# Patient Record
Sex: FEMALE | Race: WHITE | Employment: OTHER | URBAN - METROPOLITAN AREA
[De-identification: names, ages, dates, MRNs, and addresses within clinical notes are randomized per-mention and may not be internally consistent; named-entity substitution may affect disease eponyms.]

---

## 2017-05-18 ENCOUNTER — HOSPITAL ENCOUNTER (OUTPATIENT)
Age: 67
Discharge: HOME | End: 2017-05-18
Payer: MEDICARE

## 2017-05-18 ENCOUNTER — HOSPITAL ENCOUNTER (OUTPATIENT)
Dept: GENERAL RADIOLOGY | Age: 67
Discharge: HOME | End: 2017-05-18
Payer: MEDICARE

## 2017-05-18 DIAGNOSIS — R60.9 SWELLING: ICD-10-CM

## 2017-05-18 PROCEDURE — 71020 XR CHEST STANDARD TWO VW: CPT

## 2017-10-09 ENCOUNTER — HOSPITAL ENCOUNTER (OUTPATIENT)
Age: 67
Discharge: HOME | End: 2017-10-09
Payer: MEDICARE

## 2017-10-09 LAB
ALBUMIN SERPL-MCNC: 3.7 G/DL (ref 3.5–5.2)
ALBUMIN/GLOBULIN RATIO: NORMAL (ref 1–2.5)
ALP BLD-CCNC: 94 U/L (ref 35–104)
ALT SERPL-CCNC: 17 U/L (ref 5–33)
ANION GAP SERPL CALCULATED.3IONS-SCNC: 10 MMOL/L (ref 9–17)
AST SERPL-CCNC: 18 U/L
BILIRUB SERPL-MCNC: 0.68 MG/DL (ref 0.3–1.2)
BUN BLDV-MCNC: 9 MG/DL (ref 8–23)
BUN/CREAT BLD: 13 (ref 9–20)
CALCIUM SERPL-MCNC: 8.9 MG/DL (ref 8.6–10.4)
CHLORIDE BLD-SCNC: 104 MMOL/L (ref 98–107)
CHOLESTEROL, FASTING: 117 MG/DL
CHOLESTEROL/HDL RATIO: 2.5
CO2: 27 MMOL/L (ref 20–31)
CREAT SERPL-MCNC: 0.7 MG/DL (ref 0.5–0.9)
GFR AFRICAN AMERICAN: >60 ML/MIN
GFR NON-AFRICAN AMERICAN: >60 ML/MIN
GFR SERPL CREATININE-BSD FRML MDRD: NORMAL ML/MIN/{1.73_M2}
GFR SERPL CREATININE-BSD FRML MDRD: NORMAL ML/MIN/{1.73_M2}
GLUCOSE FASTING: 91 MG/DL (ref 70–99)
HDLC SERPL-MCNC: 47 MG/DL
LDL CHOLESTEROL: 47 MG/DL (ref 0–130)
POTASSIUM SERPL-SCNC: 4.6 MMOL/L (ref 3.7–5.3)
SODIUM BLD-SCNC: 141 MMOL/L (ref 135–144)
TOTAL CK: 98 U/L (ref 26–192)
TOTAL PROTEIN: 6.9 G/DL (ref 6.4–8.3)
TRIGLYCERIDE, FASTING: 114 MG/DL
VLDLC SERPL CALC-MCNC: NORMAL MG/DL (ref 1–30)

## 2017-10-09 PROCEDURE — 36415 COLL VENOUS BLD VENIPUNCTURE: CPT

## 2017-10-09 PROCEDURE — 80053 COMPREHEN METABOLIC PANEL: CPT

## 2017-10-09 PROCEDURE — 82550 ASSAY OF CK (CPK): CPT

## 2017-10-09 PROCEDURE — 80061 LIPID PANEL: CPT

## 2018-05-15 ENCOUNTER — HOSPITAL ENCOUNTER (OUTPATIENT)
Dept: GENERAL RADIOLOGY | Facility: CLINIC | Age: 68
Discharge: HOME | End: 2018-05-17
Payer: MEDICARE

## 2018-05-15 ENCOUNTER — HOSPITAL ENCOUNTER (OUTPATIENT)
Facility: CLINIC | Age: 68
Discharge: HOME | End: 2018-05-17
Payer: MEDICARE

## 2018-05-15 DIAGNOSIS — M54.41 LOW BACK PAIN WITH BILATERAL SCIATICA, UNSPECIFIED BACK PAIN LATERALITY, UNSPECIFIED CHRONICITY: ICD-10-CM

## 2018-05-15 DIAGNOSIS — M54.42 LOW BACK PAIN WITH BILATERAL SCIATICA, UNSPECIFIED BACK PAIN LATERALITY, UNSPECIFIED CHRONICITY: ICD-10-CM

## 2018-05-15 DIAGNOSIS — Z72.0 TOBACCO ABUSE: ICD-10-CM

## 2018-05-15 DIAGNOSIS — M54.41 ACUTE RIGHT-SIDED LOW BACK PAIN WITH RIGHT-SIDED SCIATICA: ICD-10-CM

## 2018-05-15 PROCEDURE — 71046 X-RAY EXAM CHEST 2 VIEWS: CPT

## 2018-05-15 PROCEDURE — 72100 X-RAY EXAM L-S SPINE 2/3 VWS: CPT

## 2018-07-02 ENCOUNTER — HOSPITAL ENCOUNTER (OUTPATIENT)
Age: 68
Discharge: HOME | End: 2018-07-02
Payer: MEDICARE

## 2018-07-02 LAB
ALBUMIN SERPL-MCNC: 3.8 G/DL (ref 3.5–5.2)
ALBUMIN/GLOBULIN RATIO: NORMAL (ref 1–2.5)
ALP BLD-CCNC: 103 U/L (ref 35–104)
ALT SERPL-CCNC: 13 U/L (ref 5–33)
ANION GAP SERPL CALCULATED.3IONS-SCNC: 10 MMOL/L (ref 9–17)
AST SERPL-CCNC: 17 U/L
BILIRUB SERPL-MCNC: 0.57 MG/DL (ref 0.3–1.2)
BUN BLDV-MCNC: 10 MG/DL (ref 8–23)
BUN/CREAT BLD: 15 (ref 9–20)
CALCIUM SERPL-MCNC: 9.1 MG/DL (ref 8.6–10.4)
CHLORIDE BLD-SCNC: 103 MMOL/L (ref 98–107)
CHOLESTEROL, FASTING: 132 MG/DL
CHOLESTEROL/HDL RATIO: 2.9
CO2: 28 MMOL/L (ref 20–31)
CREAT SERPL-MCNC: 0.65 MG/DL (ref 0.5–0.9)
GFR AFRICAN AMERICAN: >60 ML/MIN
GFR NON-AFRICAN AMERICAN: >60 ML/MIN
GFR SERPL CREATININE-BSD FRML MDRD: NORMAL ML/MIN/{1.73_M2}
GFR SERPL CREATININE-BSD FRML MDRD: NORMAL ML/MIN/{1.73_M2}
GLUCOSE FASTING: 90 MG/DL (ref 70–99)
HDLC SERPL-MCNC: 46 MG/DL
LDL CHOLESTEROL: 71 MG/DL (ref 0–130)
POTASSIUM SERPL-SCNC: 4.8 MMOL/L (ref 3.7–5.3)
SODIUM BLD-SCNC: 141 MMOL/L (ref 135–144)
TOTAL CK: 147 U/L (ref 26–192)
TOTAL PROTEIN: 6.7 G/DL (ref 6.4–8.3)
TRIGLYCERIDE, FASTING: 74 MG/DL
VLDLC SERPL CALC-MCNC: NORMAL MG/DL (ref 1–30)

## 2018-07-02 PROCEDURE — 82550 ASSAY OF CK (CPK): CPT

## 2018-07-02 PROCEDURE — 80053 COMPREHEN METABOLIC PANEL: CPT

## 2018-07-02 PROCEDURE — 36415 COLL VENOUS BLD VENIPUNCTURE: CPT

## 2018-07-02 PROCEDURE — 80061 LIPID PANEL: CPT

## 2018-12-04 ENCOUNTER — HOSPITAL ENCOUNTER (OUTPATIENT)
Age: 68
Discharge: HOME | End: 2018-12-04
Payer: MEDICARE

## 2018-12-04 LAB
ALBUMIN SERPL-MCNC: 4 G/DL (ref 3.5–5.2)
ALBUMIN/GLOBULIN RATIO: ABNORMAL (ref 1–2.5)
ALP BLD-CCNC: 98 U/L (ref 35–104)
ALT SERPL-CCNC: 35 U/L (ref 5–33)
ANION GAP SERPL CALCULATED.3IONS-SCNC: 11 MMOL/L
AST SERPL-CCNC: 30 U/L
BILIRUB SERPL-MCNC: 0.67 MG/DL (ref 0.3–1.2)
BUN BLDV-MCNC: 14 MG/DL (ref 8–23)
BUN/CREAT BLD: ABNORMAL (ref 9–20)
CALCIUM SERPL-MCNC: 9 MG/DL (ref 8.6–10.4)
CHLORIDE BLD-SCNC: 106 MMOL/L (ref 98–107)
CHOLESTEROL, FASTING: 199 MG/DL
CHOLESTEROL/HDL RATIO: 3.2
CO2: 26 MMOL/L (ref 20–31)
CREAT SERPL-MCNC: 0.61 MG/DL (ref 0.5–0.9)
GFR AFRICAN AMERICAN: >60 ML/MIN
GFR NON-AFRICAN AMERICAN: >60 ML/MIN
GFR SERPL CREATININE-BSD FRML MDRD: ABNORMAL ML/MIN/{1.73_M2}
GFR SERPL CREATININE-BSD FRML MDRD: ABNORMAL ML/MIN/{1.73_M2}
GLUCOSE BLD-MCNC: 91 MG/DL (ref 70–99)
HDLC SERPL-MCNC: 63 MG/DL
LDL CHOLESTEROL: 125 MG/DL (ref 0–130)
POTASSIUM SERPL-SCNC: 4.5 MMOL/L (ref 3.7–5.3)
SODIUM BLD-SCNC: 143 MMOL/L (ref 135–144)
TOTAL CK: 115 U/L (ref 26–192)
TOTAL PROTEIN: 6.9 G/DL (ref 6.4–8.3)
TRIGLYCERIDE, FASTING: 53 MG/DL
VLDLC SERPL CALC-MCNC: NORMAL MG/DL (ref 1–30)

## 2018-12-04 PROCEDURE — 80061 LIPID PANEL: CPT

## 2018-12-04 PROCEDURE — 80053 COMPREHEN METABOLIC PANEL: CPT

## 2018-12-04 PROCEDURE — 82550 ASSAY OF CK (CPK): CPT

## 2018-12-04 PROCEDURE — 36415 COLL VENOUS BLD VENIPUNCTURE: CPT

## 2019-03-18 ENCOUNTER — HOSPITAL ENCOUNTER (OUTPATIENT)
Age: 69
Discharge: HOME | End: 2019-03-18
Payer: MEDICARE

## 2019-03-18 LAB
ALBUMIN SERPL-MCNC: 4 G/DL (ref 3.5–5.2)
ALBUMIN/GLOBULIN RATIO: ABNORMAL (ref 1–2.5)
ALP BLD-CCNC: 92 U/L (ref 35–104)
ALT SERPL-CCNC: 18 U/L (ref 5–33)
ANION GAP SERPL CALCULATED.3IONS-SCNC: 8 MMOL/L (ref 9–17)
AST SERPL-CCNC: 19 U/L
BILIRUB SERPL-MCNC: 0.48 MG/DL (ref 0.3–1.2)
BUN BLDV-MCNC: 14 MG/DL (ref 8–23)
BUN/CREAT BLD: ABNORMAL (ref 9–20)
CALCIUM SERPL-MCNC: 9.1 MG/DL (ref 8.6–10.4)
CHLORIDE BLD-SCNC: 106 MMOL/L (ref 98–107)
CHOLESTEROL/HDL RATIO: 2.5
CHOLESTEROL: 140 MG/DL
CO2: 29 MMOL/L (ref 20–31)
CREAT SERPL-MCNC: 0.64 MG/DL (ref 0.5–0.9)
GFR AFRICAN AMERICAN: >60 ML/MIN
GFR NON-AFRICAN AMERICAN: >60 ML/MIN
GFR SERPL CREATININE-BSD FRML MDRD: ABNORMAL ML/MIN/{1.73_M2}
GFR SERPL CREATININE-BSD FRML MDRD: ABNORMAL ML/MIN/{1.73_M2}
GLUCOSE BLD-MCNC: 94 MG/DL (ref 70–99)
HDLC SERPL-MCNC: 55 MG/DL
LDL CHOLESTEROL: 71 MG/DL (ref 0–130)
POTASSIUM SERPL-SCNC: 4.6 MMOL/L (ref 3.7–5.3)
SODIUM BLD-SCNC: 143 MMOL/L (ref 135–144)
TOTAL CK: 139 U/L (ref 26–192)
TOTAL PROTEIN: 6.8 G/DL (ref 6.4–8.3)
TRIGL SERPL-MCNC: 70 MG/DL
VLDLC SERPL CALC-MCNC: NORMAL MG/DL (ref 1–30)

## 2019-03-18 PROCEDURE — 80053 COMPREHEN METABOLIC PANEL: CPT

## 2019-03-18 PROCEDURE — 36415 COLL VENOUS BLD VENIPUNCTURE: CPT

## 2019-03-18 PROCEDURE — 80061 LIPID PANEL: CPT

## 2019-03-18 PROCEDURE — 82550 ASSAY OF CK (CPK): CPT

## 2019-11-05 ENCOUNTER — HOSPITAL ENCOUNTER (OUTPATIENT)
Age: 69
Discharge: HOME | End: 2019-11-05
Payer: MEDICARE

## 2019-11-05 LAB
ALT SERPL-CCNC: 15 U/L (ref 5–33)
AST SERPL-CCNC: 17 U/L
CHOLESTEROL/HDL RATIO: 2.5
CHOLESTEROL: 114 MG/DL
HDLC SERPL-MCNC: 45 MG/DL
LDL CHOLESTEROL: 55 MG/DL (ref 0–130)
TOTAL CK: 101 U/L (ref 26–192)
TRIGL SERPL-MCNC: 69 MG/DL
VLDLC SERPL CALC-MCNC: NORMAL MG/DL (ref 1–30)

## 2019-11-05 PROCEDURE — 36415 COLL VENOUS BLD VENIPUNCTURE: CPT

## 2019-11-05 PROCEDURE — 80061 LIPID PANEL: CPT

## 2019-11-05 PROCEDURE — 82550 ASSAY OF CK (CPK): CPT

## 2019-11-05 PROCEDURE — 84460 ALANINE AMINO (ALT) (SGPT): CPT

## 2019-11-05 PROCEDURE — 84450 TRANSFERASE (AST) (SGOT): CPT

## 2020-10-21 ENCOUNTER — HOSPITAL ENCOUNTER (OUTPATIENT)
Age: 70
Discharge: HOME | End: 2020-10-21
Payer: MEDICARE

## 2020-10-21 LAB
ALBUMIN SERPL-MCNC: 3.9 G/DL (ref 3.5–5.2)
ALBUMIN/GLOBULIN RATIO: ABNORMAL (ref 1–2.5)
ALP BLD-CCNC: 84 U/L (ref 35–104)
ALT SERPL-CCNC: 15 U/L (ref 5–33)
ANION GAP SERPL CALCULATED.3IONS-SCNC: 10 MMOL/L (ref 9–17)
AST SERPL-CCNC: 18 U/L
BILIRUB SERPL-MCNC: 0.41 MG/DL (ref 0.3–1.2)
BUN BLDV-MCNC: 15 MG/DL (ref 8–23)
BUN/CREAT BLD: ABNORMAL (ref 9–20)
CALCIUM SERPL-MCNC: 9.3 MG/DL (ref 8.6–10.4)
CHLORIDE BLD-SCNC: 108 MMOL/L (ref 98–107)
CHOLESTEROL/HDL RATIO: 2.6
CHOLESTEROL: 124 MG/DL
CO2: 24 MMOL/L (ref 20–31)
CREAT SERPL-MCNC: 0.67 MG/DL (ref 0.5–0.9)
GFR AFRICAN AMERICAN: >60 ML/MIN
GFR NON-AFRICAN AMERICAN: >60 ML/MIN
GFR SERPL CREATININE-BSD FRML MDRD: ABNORMAL ML/MIN/{1.73_M2}
GFR SERPL CREATININE-BSD FRML MDRD: ABNORMAL ML/MIN/{1.73_M2}
GLUCOSE BLD-MCNC: 103 MG/DL (ref 70–99)
HDLC SERPL-MCNC: 48 MG/DL
LDL CHOLESTEROL: 62 MG/DL (ref 0–130)
POTASSIUM SERPL-SCNC: 4.4 MMOL/L (ref 3.7–5.3)
SODIUM BLD-SCNC: 142 MMOL/L (ref 135–144)
TOTAL CK: 90 U/L (ref 26–192)
TOTAL PROTEIN: 6.8 G/DL (ref 6.4–8.3)
TRIGL SERPL-MCNC: 70 MG/DL
VLDLC SERPL CALC-MCNC: NORMAL MG/DL (ref 1–30)

## 2020-10-21 PROCEDURE — 80061 LIPID PANEL: CPT

## 2020-10-21 PROCEDURE — 36415 COLL VENOUS BLD VENIPUNCTURE: CPT

## 2020-10-21 PROCEDURE — 82550 ASSAY OF CK (CPK): CPT

## 2020-10-21 PROCEDURE — 80053 COMPREHEN METABOLIC PANEL: CPT

## 2021-05-26 ENCOUNTER — HOSPITAL ENCOUNTER (OUTPATIENT)
Dept: WOMENS IMAGING | Age: 71
Discharge: HOME | End: 2021-05-28
Payer: MEDICARE

## 2021-05-26 VITALS — HEIGHT: 66 IN | BODY MASS INDEX: 26.03 KG/M2 | WEIGHT: 162 LBS

## 2021-05-26 DIAGNOSIS — Z13.820 SCREENING FOR OSTEOPOROSIS: ICD-10-CM

## 2021-05-26 DIAGNOSIS — Z12.31 SCREENING MAMMOGRAM, ENCOUNTER FOR: ICD-10-CM

## 2021-05-26 DIAGNOSIS — Z78.0 MENOPAUSE: ICD-10-CM

## 2021-05-26 PROCEDURE — 77063 BREAST TOMOSYNTHESIS BI: CPT

## 2021-05-26 PROCEDURE — 77080 DXA BONE DENSITY AXIAL: CPT

## 2022-02-01 ENCOUNTER — TELEPHONE (OUTPATIENT)
Dept: GASTROENTEROLOGY | Age: 72
End: 2022-02-01

## 2022-02-01 NOTE — TELEPHONE ENCOUNTER
1st attempt; called and LVM for patient regarding colon screen referral.    2nd attempt; mailed colon screen letter to patient's home address.

## 2022-02-10 DIAGNOSIS — Z86.010 HISTORY OF COLON POLYPS: Primary | ICD-10-CM

## 2022-02-10 RX ORDER — SODIUM, POTASSIUM,MAG SULFATES 17.5-3.13G
SOLUTION, RECONSTITUTED, ORAL ORAL
Qty: 1 EACH | Refills: 0 | Status: ON HOLD | OUTPATIENT
Start: 2022-02-10 | End: 2022-03-24 | Stop reason: ALTCHOICE

## 2022-02-10 NOTE — TELEPHONE ENCOUNTER
Pt called in today wanting to sched GI referral.  After going through colon screen questionnaire w/ pt, she is able to sched colon proc. Pt is unable to remember who or where she had colon done, but she states it was about 5 yrs ago and she had polyps removed. Pt is sched w/ [place] at [facility] Fri 3/18/22 @ 10:30am proc time, 8:30am arrival time. Suprep order will be sent to [facility] on [address]. Bowel prep instructions given to pt over the phone and hard copy mailed to pt's home address. Pt instructed she will need a  and to bring proof of Covid-19 vaccs. PAT phone call is sched 3/11/22 @ 9:15am.  F/u appt sched [facility] 4/14/22 @ 9:45am w/ Ginna Fry. Pt voices her understanding. Appt reminder also mailed w/ prep instructions.

## 2022-02-14 ENCOUNTER — HOSPITAL ENCOUNTER (OUTPATIENT)
Age: 72
Discharge: HOME | End: 2022-02-14
Payer: MEDICARE

## 2022-02-14 LAB
ALBUMIN SERPL-MCNC: 4.1 G/DL (ref 3.5–5.2)
ALBUMIN/GLOBULIN RATIO: NORMAL (ref 1–2.5)
ALP BLD-CCNC: 77 U/L (ref 35–104)
ALT SERPL-CCNC: 15 U/L (ref 5–33)
ANION GAP SERPL CALCULATED.3IONS-SCNC: 9 MMOL/L (ref 9–17)
AST SERPL-CCNC: 17 U/L
BILIRUB SERPL-MCNC: 0.55 MG/DL (ref 0.3–1.2)
BUN BLDV-MCNC: 18 MG/DL (ref 8–23)
BUN/CREAT BLD: NORMAL (ref 9–20)
CALCIUM SERPL-MCNC: 9.2 MG/DL (ref 8.6–10.4)
CHLORIDE BLD-SCNC: 107 MMOL/L (ref 98–107)
CHOLESTEROL, FASTING: 130 MG/DL
CHOLESTEROL/HDL RATIO: 2.3
CO2: 27 MMOL/L (ref 20–31)
CREAT SERPL-MCNC: 0.75 MG/DL (ref 0.5–0.9)
GFR AFRICAN AMERICAN: >60 ML/MIN
GFR NON-AFRICAN AMERICAN: >60 ML/MIN
GFR SERPL CREATININE-BSD FRML MDRD: NORMAL ML/MIN/{1.73_M2}
GFR SERPL CREATININE-BSD FRML MDRD: NORMAL ML/MIN/{1.73_M2}
GLUCOSE FASTING: 99 MG/DL (ref 70–99)
HDLC SERPL-MCNC: 57 MG/DL
LDL CHOLESTEROL: 61 MG/DL (ref 0–130)
POTASSIUM SERPL-SCNC: 4.4 MMOL/L (ref 3.7–5.3)
SODIUM BLD-SCNC: 143 MMOL/L (ref 135–144)
TOTAL CK: 113 U/L (ref 26–192)
TOTAL PROTEIN: 6.7 G/DL (ref 6.4–8.3)
TRIGLYCERIDE, FASTING: 58 MG/DL
VLDLC SERPL CALC-MCNC: NORMAL MG/DL (ref 1–30)

## 2022-02-14 PROCEDURE — 36415 COLL VENOUS BLD VENIPUNCTURE: CPT

## 2022-02-14 PROCEDURE — 82550 ASSAY OF CK (CPK): CPT

## 2022-02-14 PROCEDURE — 80053 COMPREHEN METABOLIC PANEL: CPT

## 2022-02-14 PROCEDURE — 80061 LIPID PANEL: CPT

## 2022-03-01 RX ORDER — SODIUM, POTASSIUM,MAG SULFATES 17.5-3.13G
SOLUTION, RECONSTITUTED, ORAL ORAL
Qty: 1 EACH | Refills: 0 | Status: SHIPPED | OUTPATIENT
Start: 2022-03-01 | End: 2022-04-14 | Stop reason: ALTCHOICE

## 2022-03-01 NOTE — TELEPHONE ENCOUNTER
Called pt to r/s 3/18/22 due to change in provider schedule. Pt now scheduled for [facility] Dr Azeb Klein 3/24/22 at 1030am colon recall (new), suprep, em, vacc, pat call 3/11/22 at 915am. Reviewed bowel prep instructions with patient over phone and mailed to home address.

## 2022-03-08 RX ORDER — POLYETHYLENE GLYCOL 3350 17 G/17G
POWDER, FOR SOLUTION ORAL
Qty: 238 G | Refills: 0 | Status: ON HOLD | OUTPATIENT
Start: 2022-03-08 | End: 2022-03-24 | Stop reason: ALTCHOICE

## 2022-03-08 RX ORDER — BISACODYL 5 MG
TABLET, DELAYED RELEASE (ENTERIC COATED) ORAL
Qty: 2 TABLET | Refills: 0 | Status: ON HOLD | OUTPATIENT
Start: 2022-03-08 | End: 2022-03-24 | Stop reason: ALTCHOICE

## 2022-03-08 NOTE — TELEPHONE ENCOUNTER
Called pt back; went over miralax prep instructions. Sent refill to pharmacy and mailed prep sheet to pt home address.

## 2022-03-08 NOTE — TELEPHONE ENCOUNTER
Rec'd a call from Rox Montiel from pt's PCP office, pt called them & states her suprep is not covered by her insurance and is confused about the procedure. Writer returned phone call to pt, but unfortunately I wasn't able to speak with her as she stated she was having someone work on her car at the time and wouldn't be home for another 20 minutes. Pt is asking for a return phone call to discuss prep and procedure.

## 2022-03-11 ENCOUNTER — HOSPITAL ENCOUNTER (OUTPATIENT)
Dept: PREADMISSION TESTING | Age: 72
Discharge: HOME | End: 2022-03-15

## 2022-03-11 VITALS — HEIGHT: 66 IN | BODY MASS INDEX: 25.07 KG/M2 | WEIGHT: 156 LBS

## 2022-03-11 NOTE — PROGRESS NOTES
doctor should speak with them after your surgery. · After surgery, you will be taken to the recovery room then when you are awake and stable you will go to the short stay unit for preparation to be discharged. · If you use a Bi-PAP or C-PAP machine, please bring it with you and leave it in the car in case it is needed in recovery room. Instructions read to Khadar Boyce and understanding verbalized.

## 2022-03-23 ENCOUNTER — ANESTHESIA EVENT (OUTPATIENT)
Dept: ENDOSCOPY | Age: 72
End: 2022-03-23
Payer: MEDICARE

## 2022-03-24 ENCOUNTER — HOSPITAL ENCOUNTER (OUTPATIENT)
Age: 72
Setting detail: OUTPATIENT SURGERY
Discharge: HOME | End: 2022-03-24
Attending: INTERNAL MEDICINE | Admitting: INTERNAL MEDICINE
Payer: MEDICARE

## 2022-03-24 ENCOUNTER — ANESTHESIA (OUTPATIENT)
Dept: ENDOSCOPY | Age: 72
End: 2022-03-24
Payer: MEDICARE

## 2022-03-24 VITALS
WEIGHT: 156 LBS | OXYGEN SATURATION: 99 % | SYSTOLIC BLOOD PRESSURE: 96 MMHG | DIASTOLIC BLOOD PRESSURE: 76 MMHG | BODY MASS INDEX: 25.07 KG/M2 | TEMPERATURE: 98 F | RESPIRATION RATE: 18 BRPM | HEIGHT: 66 IN | HEART RATE: 88 BPM

## 2022-03-24 VITALS — SYSTOLIC BLOOD PRESSURE: 108 MMHG | TEMPERATURE: 98.6 F | OXYGEN SATURATION: 100 % | DIASTOLIC BLOOD PRESSURE: 67 MMHG

## 2022-03-24 PROCEDURE — 3609010300 HC COLONOSCOPY W/BIOPSY SINGLE/MULTIPLE: Performed by: INTERNAL MEDICINE

## 2022-03-24 PROCEDURE — 2500000003 HC RX 250 WO HCPCS: Performed by: NURSE ANESTHETIST, CERTIFIED REGISTERED

## 2022-03-24 PROCEDURE — 2709999900 HC NON-CHARGEABLE SUPPLY: Performed by: INTERNAL MEDICINE

## 2022-03-24 PROCEDURE — 3700000001 HC ADD 15 MINUTES (ANESTHESIA): Performed by: INTERNAL MEDICINE

## 2022-03-24 PROCEDURE — 88305 TISSUE EXAM BY PATHOLOGIST: CPT

## 2022-03-24 PROCEDURE — 7100000010 HC PHASE II RECOVERY - FIRST 15 MIN: Performed by: INTERNAL MEDICINE

## 2022-03-24 PROCEDURE — 3700000000 HC ANESTHESIA ATTENDED CARE: Performed by: INTERNAL MEDICINE

## 2022-03-24 PROCEDURE — 45385 COLONOSCOPY W/LESION REMOVAL: CPT | Performed by: INTERNAL MEDICINE

## 2022-03-24 PROCEDURE — 2500000003 HC RX 250 WO HCPCS: Performed by: ANESTHESIOLOGY

## 2022-03-24 PROCEDURE — 2580000003 HC RX 258: Performed by: ANESTHESIOLOGY

## 2022-03-24 PROCEDURE — 6360000002 HC RX W HCPCS: Performed by: NURSE ANESTHETIST, CERTIFIED REGISTERED

## 2022-03-24 PROCEDURE — 7100000011 HC PHASE II RECOVERY - ADDTL 15 MIN: Performed by: INTERNAL MEDICINE

## 2022-03-24 RX ORDER — SODIUM CHLORIDE 0.9 % (FLUSH) 0.9 %
5-40 SYRINGE (ML) INJECTION PRN
Status: DISCONTINUED | OUTPATIENT
Start: 2022-03-24 | End: 2022-03-24 | Stop reason: HOSPADM

## 2022-03-24 RX ORDER — LIDOCAINE HYDROCHLORIDE 10 MG/ML
1 INJECTION, SOLUTION EPIDURAL; INFILTRATION; INTRACAUDAL; PERINEURAL
Status: COMPLETED | OUTPATIENT
Start: 2022-03-24 | End: 2022-03-24

## 2022-03-24 RX ORDER — SODIUM CHLORIDE 0.9 % (FLUSH) 0.9 %
10 SYRINGE (ML) INJECTION PRN
Status: DISCONTINUED | OUTPATIENT
Start: 2022-03-24 | End: 2022-03-24 | Stop reason: HOSPADM

## 2022-03-24 RX ORDER — FENTANYL CITRATE 50 UG/ML
25 INJECTION, SOLUTION INTRAMUSCULAR; INTRAVENOUS EVERY 5 MIN PRN
Status: DISCONTINUED | OUTPATIENT
Start: 2022-03-24 | End: 2022-03-24 | Stop reason: HOSPADM

## 2022-03-24 RX ORDER — SODIUM CHLORIDE 0.9 % (FLUSH) 0.9 %
5-40 SYRINGE (ML) INJECTION EVERY 12 HOURS SCHEDULED
Status: DISCONTINUED | OUTPATIENT
Start: 2022-03-24 | End: 2022-03-24 | Stop reason: HOSPADM

## 2022-03-24 RX ORDER — METOCLOPRAMIDE HYDROCHLORIDE 5 MG/ML
10 INJECTION INTRAMUSCULAR; INTRAVENOUS
Status: DISCONTINUED | OUTPATIENT
Start: 2022-03-24 | End: 2022-03-24 | Stop reason: HOSPADM

## 2022-03-24 RX ORDER — PROPOFOL 10 MG/ML
INJECTION, EMULSION INTRAVENOUS PRN
Status: DISCONTINUED | OUTPATIENT
Start: 2022-03-24 | End: 2022-03-24 | Stop reason: SDUPTHER

## 2022-03-24 RX ORDER — SODIUM CHLORIDE 0.9 % (FLUSH) 0.9 %
10 SYRINGE (ML) INJECTION EVERY 12 HOURS SCHEDULED
Status: DISCONTINUED | OUTPATIENT
Start: 2022-03-24 | End: 2022-03-24 | Stop reason: HOSPADM

## 2022-03-24 RX ORDER — ONDANSETRON 2 MG/ML
4 INJECTION INTRAMUSCULAR; INTRAVENOUS
Status: DISCONTINUED | OUTPATIENT
Start: 2022-03-24 | End: 2022-03-24 | Stop reason: HOSPADM

## 2022-03-24 RX ORDER — SODIUM CHLORIDE 9 MG/ML
25 INJECTION, SOLUTION INTRAVENOUS PRN
Status: DISCONTINUED | OUTPATIENT
Start: 2022-03-24 | End: 2022-03-24 | Stop reason: HOSPADM

## 2022-03-24 RX ORDER — MEPERIDINE HYDROCHLORIDE 25 MG/ML
12.5 INJECTION INTRAMUSCULAR; INTRAVENOUS; SUBCUTANEOUS EVERY 5 MIN PRN
Status: DISCONTINUED | OUTPATIENT
Start: 2022-03-24 | End: 2022-03-24 | Stop reason: HOSPADM

## 2022-03-24 RX ORDER — DIPHENHYDRAMINE HYDROCHLORIDE 50 MG/ML
12.5 INJECTION INTRAMUSCULAR; INTRAVENOUS
Status: DISCONTINUED | OUTPATIENT
Start: 2022-03-24 | End: 2022-03-24 | Stop reason: HOSPADM

## 2022-03-24 RX ORDER — LIDOCAINE HYDROCHLORIDE 20 MG/ML
INJECTION, SOLUTION EPIDURAL; INFILTRATION; INTRACAUDAL; PERINEURAL PRN
Status: DISCONTINUED | OUTPATIENT
Start: 2022-03-24 | End: 2022-03-24 | Stop reason: SDUPTHER

## 2022-03-24 RX ORDER — SODIUM CHLORIDE, SODIUM LACTATE, POTASSIUM CHLORIDE, CALCIUM CHLORIDE 600; 310; 30; 20 MG/100ML; MG/100ML; MG/100ML; MG/100ML
INJECTION, SOLUTION INTRAVENOUS CONTINUOUS
Status: DISCONTINUED | OUTPATIENT
Start: 2022-03-24 | End: 2022-03-24 | Stop reason: HOSPADM

## 2022-03-24 RX ADMIN — PROPOFOL 140 MG: 10 INJECTION, EMULSION INTRAVENOUS at 10:34

## 2022-03-24 RX ADMIN — SODIUM CHLORIDE, POTASSIUM CHLORIDE, SODIUM LACTATE AND CALCIUM CHLORIDE: 600; 310; 30; 20 INJECTION, SOLUTION INTRAVENOUS at 08:57

## 2022-03-24 RX ADMIN — LIDOCAINE HYDROCHLORIDE 60 MG: 20 INJECTION, SOLUTION EPIDURAL; INFILTRATION; INTRACAUDAL; PERINEURAL at 10:34

## 2022-03-24 RX ADMIN — LIDOCAINE HYDROCHLORIDE 1 ML: 10 INJECTION, SOLUTION EPIDURAL; INFILTRATION; INTRACAUDAL; PERINEURAL at 08:55

## 2022-03-24 ASSESSMENT — PULMONARY FUNCTION TESTS
PIF_VALUE: 1
PIF_VALUE: 0
PIF_VALUE: 1
PIF_VALUE: 0
PIF_VALUE: 1
PIF_VALUE: 1
PIF_VALUE: 3
PIF_VALUE: 1

## 2022-03-24 ASSESSMENT — ENCOUNTER SYMPTOMS
STRIDOR: 0
SHORTNESS OF BREATH: 1
COUGH: 0
CHEST TIGHTNESS: 0
WHEEZING: 0
SINUS PRESSURE: 0
BACK PAIN: 1
TROUBLE SWALLOWING: 0
SINUS PAIN: 0
RHINORRHEA: 0
SHORTNESS OF BREATH: 1
SORE THROAT: 0
APNEA: 0

## 2022-03-24 ASSESSMENT — PAIN - FUNCTIONAL ASSESSMENT: PAIN_FUNCTIONAL_ASSESSMENT: 0-10

## 2022-03-24 ASSESSMENT — LIFESTYLE VARIABLES: SMOKING_STATUS: 1

## 2022-03-24 ASSESSMENT — PAIN SCALES - GENERAL: PAINLEVEL_OUTOF10: 0

## 2022-03-24 NOTE — OP NOTE
COLONOSCOPY    DATE OF PROCEDURE: 3/24/2022    SURGEON: Jacqueline Wei MD    ASSISTANT: None    PREOPERATIVE DIAGNOSIS: Polyp surveillance colonoscopy. Patient gives history of multiple colon polyps/5 in 2016 and was advised to have colonoscopy in 2021 by different physician. POSTOPERATIVE DIAGNOSIS: Polyp in the lower sigmoid colon. Diverticulosis. OPERATION: Total colonoscopy and snare polypectomy. ANESTHESIA: MAC    ESTIMATED BLOOD LOSS: None    COMPLICATIONS: None     SPECIMENS:  Was Obtained: Polyp from the lower sigmoid colon. HISTORY: The patient is a 66y.o. year old female with history of above preop diagnosis. I recommended colonoscopy with possible biopsy or polypectomy and I explained the risk, benefits, expected outcome, and alternatives to the procedure. Risks included but are not limited to bleeding, infection, respiratory distress, hypotension, and perforation of the colon and possibility of missing a lesion. The patient understands and is in agreement. PROCEDURE:  The patient's SPO2 remained above 90% throughout the procedure. Digital rectal exam was normal.  The colonoscope was inserted through the anus into the rectum and advanced under direct vision to the cecum without difficulty. Terminal ileum was examined for approximately 2 inches. The prep was adequate. Findings:  Terminal ileum: normal    Cecum/Ascending colon: normal, also examined in the retroflexed view. Transverse colon: normal    Descending/Sigmoid colon: normal, has diverticulosis. In the lower sigmoid colon a polyp which is about 5 to 7 mm seen, removed with cold snare. Rectum/Anus: examined in normal and retroflexed positions and was normal    Withdrawal Time was (minutes): 15          The colon was decompressed and the scope was removed. The patient tolerated the procedure without unusual events.      During the procedure, the patient's blood pressure, pulse and oxygen saturation remained stable and documented. No unusual events occurred during the procedure. Patient was transferred to recovery room and will be discharged when criteria is met. Recommendations/Plan:   1. F/U Biopsies  2. F/U In Office as instructed  3. Discussed with the family  4. High fiber diet   5. Precautions to avoid constipation     Next colonoscopy: 3-5 years. If Colonoscopy is less than 10 years the recommended reason is due:polyps, has recurrent colon polyps.     Electronically signed by Uvaldo Pastor MD  on 3/24/2022 at 10:58 AM

## 2022-03-24 NOTE — ANESTHESIA POSTPROCEDURE EVALUATION
POST- ANESTHESIA EVALUATION       Pt Name: Kimmy Antonio  MRN: [id number]  [place]: 1950  Date of evaluation: 3/24/2022  Time:  12:31 PM      BP 96/76   Pulse 88   Temp 98 °F (36.7 °C) (Infrared)   Resp 18   Ht 5' 6\" (1.676 m)   Wt 156 lb (70.8 kg)   SpO2 99%   BMI 25.18 kg/m²      Consciousness Level  Awake  Cardiopulmonary Status  Stable  Pain Adequately Treated YES  Nausea / Vomiting  NO  Adequate Hydration  YES  Anesthesia Related Complications NONE      Electronically signed by Jens Wyatt MD on 3/24/2022 at 12:31 PM       Department of Anesthesiology  Postprocedure Note    Patient: Kimmy Antonio  MRN: [id number]  YOB: 1950  Date of evaluation: 3/24/2022  Time:  12:31 PM     Procedure Summary     Date: 03/24/22 Room / Location: [place] [id number] / [facility]    Anesthesia Start: 1030 Anesthesia Stop: 1101    Procedure: COLONOSCOPY POLYPECTOMY SNARE/COLD BIOPSY (N/A Anus) Diagnosis:       (HX OF COLON POLYPS)      (PT FULLY VACCINATED)    Surgeons: Clive Alonso MD Responsible Provider: Jens Wyatt MD    Anesthesia Type: general ASA Status: 3          Anesthesia Type: general    Shannon Phase I:      Shannon Phase II: Shannon Score: 9    Last vitals: Reviewed and per EMR flowsheets.        Anesthesia Post Evaluation

## 2022-03-24 NOTE — ANESTHESIA PRE PROCEDURE
Department of Anesthesiology  Preprocedure Note       Name:  Art Murray   Age:  66 y.o.  :  1950                                          MRN:  [id number]         Date:  3/24/2022      Surgeon: Yesica Wall):  Alysia Lovell MD    Procedure: Procedure(s):  COLONOSCOPY DIAGNOSTIC    Medications prior to admission:   Prior to Admission medications    Medication Sig Start Date End Date Taking? Authorizing Provider   Na Sulfate-K Sulfate-Mg Sulf (SUPREP) 17.5-3.13-1.6 GM/177ML SOLN solution Follow instructions provided by physician office. 3/1/22   Alysia Lovell MD   Calcium-Vitamin D 600-200 MG-UNIT TABS Take 1 tablet by mouth 3 times daily (before meals) 21   Gudelia Morejon MD   rosuvastatin (CRESTOR) 20 MG tablet  21   Historical Provider, MD   aspirin 81 MG EC tablet Take 81 mg by mouth daily    Historical Provider, MD   Handicap Placard MISC by Does not apply route 5 years, cannot walk over 200 ft. 3/16/21   Gudelia Morejon MD   ramipril (ALTACE) 5 MG capsule Take 5 mg by mouth daily    Historical Provider, MD   metoprolol (LOPRESSOR) 50 MG tablet Take 50 mg by mouth daily     Historical Provider, MD       Current medications:    Current Facility-Administered Medications   Medication Dose Route Frequency Provider Last Rate Last Admin    lactated ringers infusion   IntraVENous Continuous Maurice Blair  mL/hr at 22 0857 New Bag at 22 0857    sodium chloride flush 0.9 % injection 10 mL  10 mL IntraVENous 2 times per day Maurice Blair MD        sodium chloride flush 0.9 % injection 10 mL  10 mL IntraVENous PRN Maurice Blair MD        0.9 % sodium chloride infusion  25 mL IntraVENous PRN Maurice Blair MD           Allergies: Allergies   Allergen Reactions    Ciprofloxacin Hives and Itching       Problem List:  There is no problem list on file for this patient.       Past Medical History:        Diagnosis Date    CAD (coronary artery disease)     Hx of myocardial infarction 2008    Hyperlipidemia     Hypertension     SOBOE (shortness of breath on exertion)        Past Surgical History:        Procedure Laterality Date    COLONOSCOPY      WITH POLYPECTOMY     CORONARY ANGIOPLASTY WITH STENT PLACEMENT      STENTS X4    SHOULDER SURGERY Right 2/1/16    arthroscopy& mini open rotator cuff repair    TUBAL LIGATION         Social History:    Social History     Tobacco Use    Smoking status: Current Every Day Smoker     Packs/day: 1.00     Years: 50.00     Pack years: 50.00    Smokeless tobacco: Never Used   Substance Use Topics    Alcohol use: Yes     Alcohol/week: 0.0 standard drinks     Comment: Rarely                                 Ready to quit: Not Answered  Counseling given: Not Answered      Vital Signs (Current):   Vitals:    03/24/22 0838   BP: 136/78   Pulse: 96   Resp: 18   Temp: 97.1 °F (36.2 °C)   TempSrc: Temporal   SpO2: 96%   Weight: 156 lb (70.8 kg)   Height: 5' 6\" (1.676 m)                                              BP Readings from Last 3 Encounters:   03/24/22 136/78   09/16/21 126/74   03/16/21 124/76       NPO Status: Time of last liquid consumption: 2345                        Time of last solid consumption: 2359                        Date of last liquid consumption: 03/23/22                        Date of last solid food consumption: 03/22/22    BMI:   Wt Readings from Last 3 Encounters:   03/24/22 156 lb (70.8 kg)   03/11/22 156 lb (70.8 kg)   09/16/21 159 lb 8 oz (72.3 kg)     Body mass index is 25.18 kg/m².     CBC:   Lab Results   Component Value Date    WBC 6.0 01/22/2016    RBC 5.03 01/22/2016    HGB 15.6 01/22/2016    HCT 46.8 01/22/2016    MCV 93.0 01/22/2016    RDW 13.7 01/22/2016     01/22/2016       CMP:   Lab Results   Component Value Date     02/14/2022    K 4.4 02/14/2022     02/14/2022    CO2 27 02/14/2022    BUN 18 02/14/2022    CREATININE 0.75 02/14/2022    GFRAA >60 02/14/2022    LABGLOM >60 02/14/2022    GLUCOSE 103 10/21/2020    PROT 6.7 02/14/2022    CALCIUM 9.2 02/14/2022    BILITOT 0.55 02/14/2022    ALKPHOS 77 02/14/2022    AST 17 02/14/2022    ALT 15 02/14/2022       POC Tests: No results for input(s): POCGLU, POCNA, POCK, POCCL, POCBUN, POCHEMO, POCHCT in the last 72 hours. Coags: No results found for: PROTIME, INR, APTT    HCG (If Applicable): No results found for: PREGTESTUR, PREGSERUM, HCG, HCGQUANT     ABGs: No results found for: PHART, PO2ART, FEV0STO, RRI9MKA, BEART, S0EKZPCM     Type & Screen (If Applicable):  No results found for: LABABO, LABRH    Drug/Infectious Status (If Applicable):  No results found for: HIV, HEPCAB    COVID-19 Screening (If Applicable): No results found for: COVID19        Anesthesia Evaluation  Patient summary reviewed and Nursing notes reviewed no history of anesthetic complications:   Airway: Mallampati: II  TM distance: >3 FB   Neck ROM: full  Mouth opening: > = 3 FB Dental:    (+) upper dentures and lower dentures      Pulmonary: breath sounds clear to auscultation  (+) shortness of breath:  current smoker    (-) rhonchi, wheezes, rales, stridor and no decreased breath sounds                           Cardiovascular:    (+) hypertension: no interval change, past MI: no interval change and > 6 months, CAD: no interval change, GOMEZ:,     (-) murmur, weak pulses,  friction rub, systolic click, carotid bruit,  JVD and peripheral edema    ECG reviewed  Rhythm: regular  Rate: normal                    Neuro/Psych:   Negative Neuro/Psych ROS              GI/Hepatic/Renal:             Endo/Other:                     Abdominal:             Vascular: negative vascular ROS. Other Findings:             Anesthesia Plan      general     ASA 3       Induction: intravenous. Anesthetic plan and risks discussed with patient. Plan discussed with CRNA.                   Moisés Allen MD   3/24/2022

## 2022-03-24 NOTE — H&P
HISTORY and [facility] [address]       NAME:  Darinel Villafana  MRN: [id number]   YOB: 1950   Date: 3/24/2022   Age: 66 y.o. Gender: female       COMPLAINT AND PRESENT HISTORY:   Darinel Villafana  is a 66 y.o. female presenting today for a COLONOSCOPY DIAGNOSTIC as r/t a hx of colon polyps. Pt denies any gi symptoms including n/v/d/c, no abdominal pain, no bloody or tarry stools. Pt reports one past colonoscopy with polyp removal, she denies any family hx of colon cancer. Pt reports completing bowel prep without complications, last BM reported this morning. She states last BM was clear with no stool particles. Pt has a PMHX significant for CAD, HTN, HLD, SOBOE, MI-stents x4      NPO since midnight. Pt does wear full  dentures. Pt denies any hx of MRSA infection  Pt currently taking ASA 81Mg, last dose one week ago   Pt denies any personal or FHx of complications with anesthesia. Pt denies any acute symptoms of illness at this time including no SOB, CP, fever, URI or UTI symptoms. RECENT IMAGING    No results found. PAST MEDICAL HISTORY     Past Medical History:   Diagnosis Date    CAD (coronary artery disease)     Hx of myocardial infarction 2008    Hyperlipidemia     Hypertension     SOBOE (shortness of breath on exertion)        SURGICAL HISTORY       Past Surgical History:   Procedure Laterality Date    COLONOSCOPY      WITH POLYPECTOMY     CORONARY ANGIOPLASTY WITH STENT PLACEMENT      STENTS X4    SHOULDER SURGERY Right 2/1/16    arthroscopy& mini open rotator cuff repair    TUBAL LIGATION         FAMILY HISTORY       Family History   Problem Relation Age of Onset    Cancer Father     Kidney Disease Mother        SOCIAL HISTORY       Social History     Socioeconomic History    Marital status:       Spouse name: Not on file    Number of children: Not on file    Years of education: Not on file    Highest education level: Not on file Occupational History    Not on file   Tobacco Use    Smoking status: Current Every Day Smoker     Packs/day: 1.00     Years: 50.00     Pack years: 50.00    Smokeless tobacco: Never Used   Vaping Use    Vaping Use: Never used   Substance and Sexual Activity    Alcohol use: Yes     Alcohol/week: 0.0 standard drinks     Comment: Rarely     Drug use: No    Sexual activity: Not on file   Other Topics Concern    Not on file   Social History Narrative    Not on file     Social Determinants of Health     Financial Resource Strain:     Difficulty of Paying Living Expenses: Not on file   Food Insecurity:     Worried About Running Out of Food in the Last Year: Not on file    Adi of Food in the Last Year: Not on file   Transportation Needs:     Lack of Transportation (Medical): Not on file    Lack of Transportation (Non-Medical): Not on file   Physical Activity:     Days of Exercise per Week: Not on file    Minutes of Exercise per Session: Not on file   Stress:     Feeling of Stress : Not on file   Social Connections:     Frequency of Communication with Friends and Family: Not on file    Frequency of Social Gatherings with Friends and Family: Not on file    Attends Denominational Services: Not on file    Active Member of [address] [place] [address] Con[employer] or Organizations: Not on file    Attends Club or Organization Meetings: Not on file    Marital Status: Not on file   Intimate Partner Violence:     Fear of Current or Ex-Partner: Not on file    Emotionally Abused: Not on file    Physically Abused: Not on file    Sexually Abused: Not on file   Housing Stability:     Unable to Pay for Housing in the Last Year: Not on file    Number of [place] in the Last Year: Not on file    Unstable Housing in the Last Year: Not on file           REVIEW OF SYSTEMS      Allergies   Allergen Reactions    Ciprofloxacin Hives and Itching       No current facility-administered medications on file prior to encounter.      Current Outpatient Medications on File Prior to Encounter   Medication Sig Dispense Refill    Na Sulfate-K Sulfate-Mg Sulf (SUPREP) 17.5-3.13-1.6 GM/177ML SOLN solution Use as directed in your patient instructions. 1 each 0    Calcium-Vitamin D 600-200 MG-UNIT TABS Take 1 tablet by mouth 3 times daily (before meals) 90 tablet 11    rosuvastatin (CRESTOR) 20 MG tablet       aspirin 81 MG EC tablet Take 81 mg by mouth daily      Handicap Placard MISC by Does not apply route 5 years, cannot walk over 200 ft. 1 each 0    ramipril (ALTACE) 5 MG capsule Take 5 mg by mouth daily      metoprolol (LOPRESSOR) 50 MG tablet Take 50 mg by mouth daily           Review of Systems   Constitutional: Negative for chills, diaphoresis, fatigue and fever. HENT: Positive for dental problem and hearing loss. Negative for congestion, ear pain, postnasal drip, rhinorrhea, sinus pressure, sinus pain, sore throat and trouble swallowing. Fully edentulous    Eyes: Positive for visual disturbance. New glasses causing headache    Respiratory: Positive for shortness of breath. Negative for apnea, cough, chest tightness and wheezing. With exertion    Cardiovascular: Negative for chest pain, palpitations and leg swelling. Gastrointestinal:        SEE HPI    Genitourinary: Negative for dysuria, flank pain, frequency and hematuria. Musculoskeletal: Positive for arthralgias and back pain. Negative for joint swelling and myalgias. Skin: Negative for rash and wound. Neurological: Positive for headaches. Negative for dizziness, weakness and numbness. Hematological: Bruises/bleeds easily. Psychiatric/Behavioral: Negative for agitation and confusion. The patient is not nervous/anxious. See HPI    GENERAL PHYSICAL EXAM:     Vitals: See nurse flowsheet for current vitals. Physical Exam  Constitutional:       General: She is not in acute distress. Appearance: Normal appearance.  She is well-developed and normal weight. She is not ill-appearing or toxic-appearing. HENT:      Head: Normocephalic and atraumatic. Mouth/Throat:      Mouth: Mucous membranes are dry. Pharynx: Oropharynx is clear. No oropharyngeal exudate or posterior oropharyngeal erythema. Comments: Fully edentulous   Eyes:      Extraocular Movements: Extraocular movements intact. Conjunctiva/sclera: Conjunctivae normal.      Pupils: Pupils are equal, round, and reactive to light. Cardiovascular:      Rate and Rhythm: Normal rate and regular rhythm. Pulses: Normal pulses. Heart sounds: Normal heart sounds. No murmur heard. No friction rub. No gallop. Pulmonary:      Effort: Pulmonary effort is normal.      Breath sounds: Normal breath sounds. No wheezing. Abdominal:      General: Bowel sounds are normal. There is no distension. Palpations: Abdomen is soft. Tenderness: There is no abdominal tenderness. There is no guarding or rebound. Comments: Hyperactive BS   Musculoskeletal:         General: No swelling. Normal range of motion. Cervical back: Normal range of motion and neck supple. No rigidity or tenderness. Right lower leg: No edema. Left lower leg: No edema. Skin:     General: Skin is warm and dry. Findings: No erythema. Neurological:      General: No focal deficit present. Mental Status: She is alert and oriented to person, place, and time. Mental status is at baseline. Sensory: No sensory deficit. Psychiatric:         Mood and Affect: Mood normal.         Behavior: Behavior normal.         Thought Content: Thought content normal.         Judgment: Judgment normal.                                                                                         PROVISIONAL DIAGNOSES / SURGERY:      COLONOSCOPY DIAGNOSTIC  Hx of colon polyps       There are no problems to display for this patient.               Daniel Green, HARI - CNP on 3/24/2022 at 6:08 AM

## 2022-03-25 LAB — SURGICAL PATHOLOGY REPORT: NORMAL

## 2022-03-28 PROBLEM — I10 PRIMARY HYPERTENSION: Status: ACTIVE | Noted: 2022-03-28

## 2022-03-28 PROBLEM — E78.2 MIXED HYPERLIPIDEMIA: Status: ACTIVE | Noted: 2022-03-28

## 2022-04-14 ENCOUNTER — OFFICE VISIT (OUTPATIENT)
Dept: GASTROENTEROLOGY | Age: 72
End: 2022-04-14
Payer: MEDICARE

## 2022-04-14 VITALS
HEIGHT: 66 IN | DIASTOLIC BLOOD PRESSURE: 82 MMHG | HEART RATE: 78 BPM | OXYGEN SATURATION: 98 % | BODY MASS INDEX: 25.04 KG/M2 | SYSTOLIC BLOOD PRESSURE: 110 MMHG | WEIGHT: 155.8 LBS

## 2022-04-14 DIAGNOSIS — K57.90 DIVERTICULOSIS: ICD-10-CM

## 2022-04-14 DIAGNOSIS — D36.9 TUBULAR ADENOMA: Primary | ICD-10-CM

## 2022-04-14 PROCEDURE — 99213 OFFICE O/P EST LOW 20 MIN: CPT | Performed by: NURSE PRACTITIONER

## 2022-04-14 ASSESSMENT — ENCOUNTER SYMPTOMS
ABDOMINAL PAIN: 0
BLOOD IN STOOL: 0
CONSTIPATION: 0
TROUBLE SWALLOWING: 0
ANAL BLEEDING: 0
ABDOMINAL DISTENTION: 0
NAUSEA: 0
BACK PAIN: 1
COUGH: 0
RECTAL PAIN: 0
SHORTNESS OF BREATH: 0
VOMITING: 0
SORE THROAT: 0
VOICE CHANGE: 0
CHOKING: 0
DIARRHEA: 0

## 2022-04-14 NOTE — PROGRESS NOTES
GI CLINIC FOLLOW UP    INTERVAL HISTORY:   No referring provider defined for this encounter. HISTORY OF PRESENT ILLNESS:     Patient being seen for follow-up surveillance colonoscopy. Colonoscopy prep was adequate. Noted to have 7 mm polyp in the lower sigmoid colon removed with cold snare. Histology revealed tubular adenoma. Has diverticulosis in the sigmoid colon. At present patient is doing well. No significant diarrhea or constipation  Denies any melena, hematochezia. Has good appetite  No weight loss. Reports intermittently has transient sensation of food sticking near the cervical esophagus. Denies any coughing, choking, or regurgitation. Reports has not happened in the last several months. No dyspeptic symptoms. Past Medical,Family, and Social History reviewed and does contribute to the patient presentingcondition. Patient's PMH/PSH,SH,PSYCH Hx, MEDs, ALLERGIES, and ROS were all reviewed and updated in the appropriate sections.     PAST MEDICAL HISTORY:  Past Medical History:   Diagnosis Date    CAD (coronary artery disease)     Hx of myocardial infarction 2008    Hyperlipidemia     Hypertension     SOBOE (shortness of breath on exertion)        Past Surgical History:   Procedure Laterality Date    COLONOSCOPY      WITH POLYPECTOMY     COLONOSCOPY N/A 3/24/2022    COLONOSCOPY WITH BIOPSY performed by Maria Eugenia Wiley MD at [address] [place] [address] X    SHOULDER SURGERY Right 2/1/16    arthroscopy& mini open rotator cuff repair    TUBAL LIGATION         CURRENT MEDICATIONS:    Current Outpatient Medications:     Calcium-Vitamin D 600-200 MG-UNIT TABS, Take 1 tablet by mouth 3 times daily (before meals), Disp: 90 tablet, Rfl: 11    rosuvastatin (CRESTOR) 20 MG tablet, , Disp: , Rfl:     aspirin 81 MG EC tablet, Take 81 mg by mouth daily, Disp: , Rfl:     Handicap Placard MISC, by Does not apply route 5 years, cannot walk over 200 ft., Disp: 1 each, Rfl: 0    ramipril (ALTACE) 5 MG capsule, Take 5 mg by mouth daily, Disp: , Rfl:     metoprolol (LOPRESSOR) 50 MG tablet, Take 50 mg by mouth daily , Disp: , Rfl:     ALLERGIES:   Allergies   Allergen Reactions    Ciprofloxacin Hives and Itching       FAMILY HISTORY:       Problem Relation Age of Onset    Cancer Father     Kidney Disease Mother          SOCIAL HISTORY:   Social History     Socioeconomic History    Marital status:      Spouse name: Not on file    Number of children: Not on file    Years of education: Not on file    Highest education level: Not on file   Occupational History    Not on file   Tobacco Use    Smoking status: Current Every Day Smoker     Packs/day: 1.00     Years: 50.00     Pack years: 50.00    Smokeless tobacco: Never Used   Vaping Use    Vaping Use: Never used   Substance and Sexual Activity    Alcohol use: Yes     Alcohol/week: 0.0 standard drinks     Comment: Rarely     Drug use: No    Sexual activity: Not on file   Other Topics Concern    Not on file   Social History Narrative    Not on file     Social Determinants of Health     Financial Resource Strain:     Difficulty of Paying Living Expenses: Not on file   Food Insecurity:     Worried About Running Out of Food in the Last Year: Not on file    Adi of Food in the Last Year: Not on file   Transportation Needs:     Lack of Transportation (Medical): Not on file    Lack of Transportation (Non-Medical):  Not on file   Physical Activity:     Days of Exercise per Week: Not on file    Minutes of Exercise per Session: Not on file   Stress:     Feeling of Stress : Not on file   Social Connections:     Frequency of Communication with Friends and Family: Not on file    Frequency of Social Gatherings with Friends and Family: Not on file    Attends Zoroastrian Services: Not on file    Active Member of Clubs or Organizations: Not on file    Attends Club or Organization Meetings: Not on file    Marital Status: Not on file   Intimate Partner Violence:     Fear of Current or Ex-Partner: Not on file    Emotionally Abused: Not on file    Physically Abused: Not on file    Sexually Abused: Not on file   Housing Stability:     Unable to Pay for Housing in the Last Year: Not on file    Number of [place] in the Last Year: Not on file    Unstable Housing in the Last Year: Not on file       REVIEW OF SYSTEMS: A 12-point review of systemswas obtained and pertinent positives and negatives were enumerated above in the history of present illness. All other reviewed systems / symptoms were negative. Review of Systems   Constitutional: Negative for appetite change, fatigue and unexpected weight change. HENT: Negative for dental problem, sore throat, trouble swallowing and voice change. Eyes: Negative for visual disturbance. Respiratory: Negative for cough, choking and shortness of breath. Cardiovascular: Negative for chest pain and leg swelling. Gastrointestinal: Negative for abdominal distention, abdominal pain, anal bleeding, blood in stool, constipation, diarrhea, nausea, rectal pain and vomiting. Genitourinary: Negative for difficulty urinating. Musculoskeletal: Positive for back pain. Negative for joint swelling and myalgias. Neurological: Positive for light-headedness and numbness (left hand fingers). Negative for dizziness, tremors, weakness and headaches. Hematological: Bruises/bleeds easily. Psychiatric/Behavioral: Negative for sleep disturbance. The patient is not nervous/anxious. PHYSICAL EXAMINATION: Vital signs reviewed per the nursing documentation. /82   Pulse 78   Ht 5' 6\" (1.676 m)   Wt 155 lb 12.8 oz (70.7 kg)   SpO2 98%   BMI 25.15 kg/m²   Body mass index is 25.15 kg/m². Physical Exam  Constitutional:       Appearance: Normal appearance. Eyes:      General: No scleral icterus.      Pupils: Pupils are equal, round, and reactive to light. Cardiovascular:      Rate and Rhythm: Normal rate and regular rhythm. Heart sounds: Normal heart sounds. Pulmonary:      Effort: Pulmonary effort is normal.      Breath sounds: Normal breath sounds. Abdominal:      General: Bowel sounds are normal. There is no distension. Palpations: Abdomen is soft. There is no mass. Tenderness: There is no abdominal tenderness. There is no guarding. Skin:     General: Skin is warm and dry. Coloration: Skin is not jaundiced. Neurological:      Mental Status: She is alert and oriented to person, place, and time. Mental status is at baseline. LABORATORY DATA: Reviewed  Lab Results   Component Value Date    WBC 6.0 01/22/2016    HGB 15.6 01/22/2016    HCT 46.8 (H) 01/22/2016    MCV 93.0 01/22/2016     01/22/2016     02/14/2022    K 4.4 02/14/2022     02/14/2022    CO2 27 02/14/2022    BUN 18 02/14/2022    CREATININE 0.75 02/14/2022    LABALBU 4.1 02/14/2022    BILITOT 0.55 02/14/2022    ALKPHOS 77 02/14/2022    AST 17 02/14/2022    ALT 15 02/14/2022         Lab Results   Component Value Date    RBC 5.03 01/22/2016    HGB 15.6 01/22/2016    MCV 93.0 01/22/2016    MCH 31.0 01/22/2016    MCHC 33.3 01/22/2016    RDW 13.7 01/22/2016    MPV 7.2 01/22/2016    BASOPCT 1 01/22/2016    LYMPHSABS 1.60 01/22/2016    MONOSABS 0.40 01/22/2016    NEUTROABS 3.80 01/22/2016    EOSABS 0.10 01/22/2016    BASOSABS 0.10 01/22/2016         DIAGNOSTIC TESTING:     COLONOSCOPY     DATE OF PROCEDURE: 3/24/2022     SURGEON: Gavin Marino MD     ASSISTANT: None     PREOPERATIVE DIAGNOSIS: Polyp surveillance colonoscopy. Patient gives history of multiple colon polyps/5 in 2016 and was advised to have colonoscopy in 2021 by different physician.     POSTOPERATIVE DIAGNOSIS: Polyp in the lower sigmoid colon.   Diverticulosis.     OPERATION: Total colonoscopy and snare polypectomy.     ANESTHESIA: MAC     ESTIMATED BLOOD LOSS: None     COMPLICATIONS: None      SPECIMENS:  Was Obtained: Polyp from the lower sigmoid colon.     HISTORY: The patient is a 66y.o. year old female with history of above preop diagnosis. I recommended colonoscopy with possible biopsy or polypectomy and I explained the risk, benefits, expected outcome, and alternatives to the procedure. Risks included but are not limited to bleeding, infection, respiratory distress, hypotension, and perforation of the colon and possibility of missing a lesion. The patient understands and is in agreement. PROCEDURE:  The patient's SPO2 remained above 90% throughout the procedure. Digital rectal exam was normal.  The colonoscope was inserted through the anus into the rectum and advanced under direct vision to the cecum without difficulty. Terminal ileum was examined for approximately 2 inches. The prep was adequate.       Findings:  Terminal ileum: normal     Cecum/Ascending colon: normal, also examined in the retroflexed view. Transverse colon: normal     Descending/Sigmoid colon: normal, has diverticulosis.     In the lower sigmoid colon a polyp which is about 5 to 7 mm seen, removed with cold snare.     Rectum/Anus: examined in normal and retroflexed positions and was normal     Withdrawal Time was (minutes): 15        The colon was decompressed and the scope was removed. The patient tolerated the procedure without unusual events.      During the procedure, the patient's blood pressure, pulse and oxygen saturation remained stable and documented. No unusual events occurred during the procedure. Patient was transferred to recovery room and will be discharged when criteria is met.      Recommendations/Plan:   1. F/U Biopsies  2. F/U In Office as instructed  3. Discussed with the family  4. High fiber diet   5. Precautions to avoid constipation      Next colonoscopy: 3-5 years.   If Colonoscopy is less than 10 years the recommended reason is due:polyps, has recurrent colon polyps.     Electronically signed by Gideon Reese MD  on 3/24/2022 at 10:58 AM    Surgical Pathology Report -- Diagnosis --   RECTOSIGMOID, BIOPSY:   -TUBULAR ADENOMA. Yudelka Parham M.D.   **Electronically Signed Out**         francoise/3/25/2022         Clinical Information   Pre-Op Diagnosis:  HX OF COLON POLYPS   Operative Findings:  RECTOSIGMOID POLYP   Operation Preformed:  COLONOSCOPY DIAGNOSTIC     Source of Specimen   A: RECTO SIGMOID POLYP     Gross Description   \"NIALL Carlyn Hail POLYP\" Four tan-white tissue fragments from   0.2 to 0.4 cm and are 0.6 x 0.4 x 0.2 cm in aggregate.  Entirely 1cs. lm tm       Microscopic Description   Microscopic examination performed. SURGICAL PATHOLOGY CONSULTATION         Patient Name: Deven Santos [facility] Rec: [id number]   Path Number: [id number]     [address], [facility] [address]. [place], [address]   [phone]   Fax: [phone]             IMPRESSION: Ms. Dilshad Sharma is a 66 y.o. female with    Diagnosis Orders   1. Tubular adenoma     2. Diverticulosis         Colonoscopy reviewed with patient in detail. Noted to have 1, 7 mm tubular adenoma in sigmoid colon. Patient has diverticulosis. Advised to follow high fiber diet and to take precautions to avoid constipation and straining. Surveillance colonoscopy recommended in 4 years. Follow-up as needed. Thank you for allowing me to participate in the care of Ms. Dilshad Sharma. For any further questions please do not hesitate to contact me. I have reviewed and agree with the ROS entered by the MA/BGN.          ALLYSSA Chanel    [facility]  Office #: [phone]

## 2022-09-15 ENCOUNTER — HOSPITAL ENCOUNTER (OUTPATIENT)
Age: 72
Discharge: HOME | End: 2022-09-15
Payer: MEDICARE

## 2022-09-15 LAB
ALT SERPL-CCNC: 18 U/L (ref 5–33)
AST SERPL-CCNC: 20 U/L
CHOLESTEROL/HDL RATIO: 2.4
CHOLESTEROL: 134 MG/DL
HDLC SERPL-MCNC: 55 MG/DL
LDL CHOLESTEROL: 68 MG/DL (ref 0–130)
TOTAL CK: 102 U/L (ref 26–192)
TRIGL SERPL-MCNC: 57 MG/DL

## 2022-09-15 PROCEDURE — 80061 LIPID PANEL: CPT

## 2022-09-15 PROCEDURE — 36415 COLL VENOUS BLD VENIPUNCTURE: CPT

## 2022-09-15 PROCEDURE — 84450 TRANSFERASE (AST) (SGOT): CPT

## 2022-09-15 PROCEDURE — 82550 ASSAY OF CK (CPK): CPT

## 2022-09-15 PROCEDURE — 84460 ALANINE AMINO (ALT) (SGPT): CPT

## 2022-10-24 ENCOUNTER — HOSPITAL ENCOUNTER (OUTPATIENT)
Dept: GENERAL RADIOLOGY | Age: 72
Discharge: HOME | End: 2022-10-26
Payer: MEDICARE

## 2022-10-24 ENCOUNTER — HOSPITAL ENCOUNTER (OUTPATIENT)
Age: 72
Discharge: HOME | End: 2022-10-26
Payer: MEDICARE

## 2022-10-24 DIAGNOSIS — M51.36 DEGENERATION OF LUMBAR INTERVERTEBRAL DISC: ICD-10-CM

## 2022-10-24 PROCEDURE — 72100 X-RAY EXAM L-S SPINE 2/3 VWS: CPT

## 2023-07-03 ENCOUNTER — HOSPITAL ENCOUNTER (OUTPATIENT)
Dept: GENERAL RADIOLOGY | Age: 73
Discharge: HOME | End: 2023-07-05
Payer: MEDICARE

## 2023-07-03 ENCOUNTER — HOSPITAL ENCOUNTER (OUTPATIENT)
Age: 73
Discharge: HOME | End: 2023-07-05

## 2023-07-03 ENCOUNTER — HOSPITAL ENCOUNTER (OUTPATIENT)
Age: 73
Discharge: HOME | End: 2023-07-03
Payer: MEDICARE

## 2023-07-03 DIAGNOSIS — M79.605 LEFT LEG PAIN: ICD-10-CM

## 2023-07-03 LAB
ALBUMIN SERPL-MCNC: 4.2 G/DL (ref 3.5–5.2)
ALP SERPL-CCNC: 99 U/L (ref 35–104)
ALT SERPL-CCNC: 17 U/L (ref 5–33)
ANION GAP SERPL CALCULATED.3IONS-SCNC: 9 MMOL/L (ref 9–17)
AST SERPL-CCNC: 20 U/L
BILIRUB SERPL-MCNC: 0.8 MG/DL (ref 0.3–1.2)
BUN SERPL-MCNC: 14 MG/DL (ref 8–23)
CALCIUM SERPL-MCNC: 9.3 MG/DL (ref 8.6–10.4)
CHLORIDE SERPL-SCNC: 106 MMOL/L (ref 98–107)
CHOLEST SERPL-MCNC: 133 MG/DL
CHOLESTEROL/HDL RATIO: 2.3
CK SERPL-CCNC: 90 U/L (ref 26–192)
CO2 SERPL-SCNC: 28 MMOL/L (ref 20–31)
CREAT SERPL-MCNC: 0.67 MG/DL (ref 0.5–0.9)
FOLATE SERPL-MCNC: 12 NG/ML
GFR SERPL CREATININE-BSD FRML MDRD: >60 ML/MIN/1.73M2
GLUCOSE SERPL-MCNC: 91 MG/DL (ref 70–99)
HDLC SERPL-MCNC: 57 MG/DL
LDLC SERPL CALC-MCNC: 63 MG/DL (ref 0–130)
POTASSIUM SERPL-SCNC: 4.7 MMOL/L (ref 3.7–5.3)
PROT SERPL-MCNC: 6.5 G/DL (ref 6.4–8.3)
SODIUM SERPL-SCNC: 143 MMOL/L (ref 135–144)
TRIGL SERPL-MCNC: 65 MG/DL
VIT B12 SERPL-MCNC: 486 PG/ML (ref 232–1245)

## 2023-07-03 PROCEDURE — 82607 VITAMIN B-12: CPT

## 2023-07-03 PROCEDURE — 80061 LIPID PANEL: CPT

## 2023-07-03 PROCEDURE — 82746 ASSAY OF FOLIC ACID SERUM: CPT

## 2023-07-03 PROCEDURE — 80053 COMPREHEN METABOLIC PANEL: CPT

## 2023-07-03 PROCEDURE — 72040 X-RAY EXAM NECK SPINE 2-3 VW: CPT

## 2023-07-03 PROCEDURE — 82550 ASSAY OF CK (CPK): CPT

## 2023-07-03 PROCEDURE — 36415 COLL VENOUS BLD VENIPUNCTURE: CPT

## 2023-07-03 PROCEDURE — 72100 X-RAY EXAM L-S SPINE 2/3 VWS: CPT

## 2023-09-09 ENCOUNTER — TELEPHONE (OUTPATIENT)
Dept: FAMILY MEDICINE CLINIC | Age: 73
End: 2023-09-09

## 2023-09-09 RX ORDER — NITROFURANTOIN 25; 75 MG/1; MG/1
100 CAPSULE ORAL 2 TIMES DAILY
Qty: 10 CAPSULE | Refills: 0 | Status: SHIPPED | OUTPATIENT
Start: 2023-09-09 | End: 2023-09-14

## 2023-09-09 NOTE — PROGRESS NOTES
Pt called after hours line    She tested positive for COVID and has moderate symptoms, cough, no dyspnea, pt is older than 65 and has risk of severe disease. Renal function normal, paxlovid sent to pharmacy. Risk of rebound symptoms discussed. Pt also has dysuria and urinary incontinence new onset, will send macrobid, pt to contact PCP on Monday.      Martina Beltrán MD  9/9/2023

## 2023-10-16 ENCOUNTER — HOSPITAL ENCOUNTER (OUTPATIENT)
Dept: WOMENS IMAGING | Age: 73
Discharge: HOME | End: 2023-10-18
Attending: FAMILY MEDICINE
Payer: MEDICARE

## 2023-10-16 DIAGNOSIS — Z12.31 SCREENING MAMMOGRAM FOR BREAST CANCER: ICD-10-CM

## 2023-10-16 PROCEDURE — 77063 BREAST TOMOSYNTHESIS BI: CPT

## 2024-05-16 ENCOUNTER — APPOINTMENT (OUTPATIENT)
Age: 74
DRG: 309 | End: 2024-05-16
Attending: INTERNAL MEDICINE
Payer: MEDICARE

## 2024-05-16 ENCOUNTER — HOSPITAL ENCOUNTER (INPATIENT)
Age: 74
LOS: 10 days | Discharge: HOME HEALTH | DRG: 273 | End: 2024-05-26
Attending: INTERNAL MEDICINE | Admitting: STUDENT IN AN ORGANIZED HEALTH CARE EDUCATION/TRAINING PROGRAM
Payer: MEDICARE

## 2024-05-16 ENCOUNTER — APPOINTMENT (OUTPATIENT)
Dept: GENERAL RADIOLOGY | Age: 74
DRG: 309 | End: 2024-05-16
Payer: MEDICARE

## 2024-05-16 ENCOUNTER — HOSPITAL ENCOUNTER (INPATIENT)
Age: 74
LOS: 1 days | Discharge: ANOTHER HOSPITAL | DRG: 309 | End: 2024-05-16
Attending: STUDENT IN AN ORGANIZED HEALTH CARE EDUCATION/TRAINING PROGRAM | Admitting: STUDENT IN AN ORGANIZED HEALTH CARE EDUCATION/TRAINING PROGRAM
Payer: MEDICARE

## 2024-05-16 VITALS
BODY MASS INDEX: 30.21 KG/M2 | TEMPERATURE: 98.4 F | HEART RATE: 102 BPM | DIASTOLIC BLOOD PRESSURE: 91 MMHG | SYSTOLIC BLOOD PRESSURE: 150 MMHG | HEIGHT: 61 IN | OXYGEN SATURATION: 93 % | RESPIRATION RATE: 23 BRPM | WEIGHT: 160 LBS

## 2024-05-16 DIAGNOSIS — R06.02 SHORTNESS OF BREATH: ICD-10-CM

## 2024-05-16 DIAGNOSIS — I47.29 NSVT (NONSUSTAINED VENTRICULAR TACHYCARDIA) (HCC): ICD-10-CM

## 2024-05-16 DIAGNOSIS — I42.9 CARDIOMYOPATHY, UNSPECIFIED TYPE (HCC): Primary | ICD-10-CM

## 2024-05-16 DIAGNOSIS — N30.00 ACUTE CYSTITIS WITHOUT HEMATURIA: ICD-10-CM

## 2024-05-16 DIAGNOSIS — R06.02 SHORTNESS OF BREATH: Primary | ICD-10-CM

## 2024-05-16 DIAGNOSIS — I47.20 VENTRICULAR TACHYCARDIA (HCC): ICD-10-CM

## 2024-05-16 DIAGNOSIS — I25.5 ISCHEMIC CARDIOMYOPATHY: ICD-10-CM

## 2024-05-16 DIAGNOSIS — Z95.5 S/P CORONARY ARTERY STENT PLACEMENT: ICD-10-CM

## 2024-05-16 DIAGNOSIS — I20.9 ANGINA PECTORIS (HCC): ICD-10-CM

## 2024-05-16 PROBLEM — I25.10 CAD (CORONARY ARTERY DISEASE): Status: ACTIVE | Noted: 2024-05-16

## 2024-05-16 PROBLEM — Z98.890 S/P CARDIAC CATH: Status: ACTIVE | Noted: 2024-05-16

## 2024-05-16 LAB
ALBUMIN SERPL-MCNC: 3.7 G/DL (ref 3.5–5.2)
ALP SERPL-CCNC: 96 U/L (ref 35–104)
ALT SERPL-CCNC: 68 U/L (ref 5–33)
ANION GAP SERPL CALCULATED.3IONS-SCNC: 10 MMOL/L (ref 9–17)
AST SERPL-CCNC: 58 U/L
BACTERIA URNS QL MICRO: ABNORMAL
BASOPHILS # BLD: 0.1 K/UL (ref 0–0.2)
BASOPHILS NFR BLD: 1 % (ref 0–2)
BILIRUB SERPL-MCNC: 0.4 MG/DL (ref 0.3–1.2)
BILIRUB UR QL STRIP: NEGATIVE
BNP SERPL-MCNC: 3585 PG/ML
BUN SERPL-MCNC: 24 MG/DL (ref 8–23)
CALCIUM SERPL-MCNC: 9.4 MG/DL (ref 8.6–10.4)
CASTS #/AREA URNS LPF: ABNORMAL /LPF
CHLORIDE SERPL-SCNC: 107 MMOL/L (ref 98–107)
CLARITY UR: ABNORMAL
CO2 SERPL-SCNC: 24 MMOL/L (ref 20–31)
COLOR UR: ABNORMAL
CREAT SERPL-MCNC: 0.9 MG/DL (ref 0.5–0.9)
ECHO BSA: 1.77 M2
EKG ATRIAL RATE: 77 BPM
EKG P AXIS: 82 DEGREES
EKG P-R INTERVAL: 164 MS
EKG Q-T INTERVAL: 258 MS
EKG Q-T INTERVAL: 376 MS
EKG QRS DURATION: 142 MS
EKG QRS DURATION: 74 MS
EKG QTC CALCULATION (BAZETT): 408 MS
EKG QTC CALCULATION (BAZETT): 425 MS
EKG R AXIS: 103 DEGREES
EKG R AXIS: 53 DEGREES
EKG T AXIS: 102 DEGREES
EKG T AXIS: 67 DEGREES
EKG VENTRICULAR RATE: 151 BPM
EKG VENTRICULAR RATE: 77 BPM
EOSINOPHIL # BLD: 0.1 K/UL (ref 0–0.4)
EOSINOPHILS RELATIVE PERCENT: 1 % (ref 0–4)
EPI CELLS #/AREA URNS HPF: ABNORMAL /HPF
ERYTHROCYTE [DISTWIDTH] IN BLOOD BY AUTOMATED COUNT: 14.4 % (ref 11.5–14.9)
FLUAV RNA RESP QL NAA+PROBE: NOT DETECTED
FLUBV RNA RESP QL NAA+PROBE: NOT DETECTED
GFR, ESTIMATED: 67 ML/MIN/1.73M2
GLUCOSE SERPL-MCNC: 109 MG/DL (ref 70–99)
GLUCOSE UR STRIP-MCNC: NEGATIVE MG/DL
HCT VFR BLD AUTO: 39.6 % (ref 36–46)
HGB BLD-MCNC: 12.9 G/DL (ref 12–16)
HGB UR QL STRIP.AUTO: NEGATIVE
INR PPP: 1.1
KETONES UR STRIP-MCNC: ABNORMAL MG/DL
LEUKOCYTE ESTERASE UR QL STRIP: ABNORMAL
LYMPHOCYTES NFR BLD: 1.7 K/UL (ref 1–4.8)
LYMPHOCYTES RELATIVE PERCENT: 21 % (ref 24–44)
MAGNESIUM SERPL-MCNC: 2.2 MG/DL (ref 1.6–2.6)
MCH RBC QN AUTO: 30.5 PG (ref 26–34)
MCHC RBC AUTO-ENTMCNC: 32.6 G/DL (ref 31–37)
MCV RBC AUTO: 93.4 FL (ref 80–100)
MONOCYTES NFR BLD: 0.6 K/UL (ref 0.1–1.3)
MONOCYTES NFR BLD: 7 % (ref 1–7)
NEUTROPHILS NFR BLD: 70 % (ref 36–66)
NEUTS SEG NFR BLD: 5.6 K/UL (ref 1.3–9.1)
NITRITE UR QL STRIP: NEGATIVE
PH UR STRIP: 5 [PH] (ref 5–8)
PLATELET # BLD AUTO: 176 K/UL (ref 150–450)
PMV BLD AUTO: 7.4 FL (ref 6–12)
POTASSIUM SERPL-SCNC: 4.5 MMOL/L (ref 3.7–5.3)
PROT SERPL-MCNC: 5.9 G/DL (ref 6.4–8.3)
PROT UR STRIP-MCNC: ABNORMAL MG/DL
PROTHROMBIN TIME: 14.7 SEC (ref 11.8–14.6)
RBC # BLD AUTO: 4.24 M/UL (ref 4–5.2)
RBC #/AREA URNS HPF: ABNORMAL /HPF
SARS-COV-2 RNA RESP QL NAA+PROBE: NOT DETECTED
SODIUM SERPL-SCNC: 141 MMOL/L (ref 135–144)
SOURCE: NORMAL
SP GR UR STRIP: 1.02 (ref 1–1.03)
SPECIMEN DESCRIPTION: NORMAL
TROPONIN I SERPL HS-MCNC: 20 NG/L (ref 0–14)
TROPONIN I SERPL HS-MCNC: 22 NG/L (ref 0–14)
UROBILINOGEN UR STRIP-ACNC: NORMAL EU/DL (ref 0–1)
WBC #/AREA URNS HPF: ABNORMAL /HPF
WBC OTHER # BLD: 7.9 K/UL (ref 3.5–11)

## 2024-05-16 PROCEDURE — 6360000002 HC RX W HCPCS: Performed by: SURGERY

## 2024-05-16 PROCEDURE — 2500000003 HC RX 250 WO HCPCS: Performed by: INTERNAL MEDICINE

## 2024-05-16 PROCEDURE — 80053 COMPREHEN METABOLIC PANEL: CPT

## 2024-05-16 PROCEDURE — 6370000000 HC RX 637 (ALT 250 FOR IP): Performed by: STUDENT IN AN ORGANIZED HEALTH CARE EDUCATION/TRAINING PROGRAM

## 2024-05-16 PROCEDURE — 99222 1ST HOSP IP/OBS MODERATE 55: CPT | Performed by: PHYSICIAN ASSISTANT

## 2024-05-16 PROCEDURE — 93005 ELECTROCARDIOGRAM TRACING: CPT | Performed by: STUDENT IN AN ORGANIZED HEALTH CARE EDUCATION/TRAINING PROGRAM

## 2024-05-16 PROCEDURE — 87636 SARSCOV2 & INF A&B AMP PRB: CPT

## 2024-05-16 PROCEDURE — 93010 ELECTROCARDIOGRAM REPORT: CPT | Performed by: INTERNAL MEDICINE

## 2024-05-16 PROCEDURE — 93458 L HRT ARTERY/VENTRICLE ANGIO: CPT | Performed by: INTERNAL MEDICINE

## 2024-05-16 PROCEDURE — 2580000003 HC RX 258: Performed by: STUDENT IN AN ORGANIZED HEALTH CARE EDUCATION/TRAINING PROGRAM

## 2024-05-16 PROCEDURE — 83735 ASSAY OF MAGNESIUM: CPT

## 2024-05-16 PROCEDURE — 83880 ASSAY OF NATRIURETIC PEPTIDE: CPT

## 2024-05-16 PROCEDURE — 99152 MOD SED SAME PHYS/QHP 5/>YRS: CPT | Performed by: INTERNAL MEDICINE

## 2024-05-16 PROCEDURE — 2580000003 HC RX 258: Performed by: INTERNAL MEDICINE

## 2024-05-16 PROCEDURE — 2000000000 HC ICU R&B

## 2024-05-16 PROCEDURE — 2500000003 HC RX 250 WO HCPCS: Performed by: STUDENT IN AN ORGANIZED HEALTH CARE EDUCATION/TRAINING PROGRAM

## 2024-05-16 PROCEDURE — 7100000011 HC PHASE II RECOVERY - ADDTL 15 MIN: Performed by: INTERNAL MEDICINE

## 2024-05-16 PROCEDURE — 6360000002 HC RX W HCPCS: Performed by: INTERNAL MEDICINE

## 2024-05-16 PROCEDURE — 84484 ASSAY OF TROPONIN QUANT: CPT

## 2024-05-16 PROCEDURE — 36415 COLL VENOUS BLD VENIPUNCTURE: CPT

## 2024-05-16 PROCEDURE — 93005 ELECTROCARDIOGRAM TRACING: CPT | Performed by: EMERGENCY MEDICINE

## 2024-05-16 PROCEDURE — 2709999900 HC NON-CHARGEABLE SUPPLY: Performed by: INTERNAL MEDICINE

## 2024-05-16 PROCEDURE — C1769 GUIDE WIRE: HCPCS | Performed by: INTERNAL MEDICINE

## 2024-05-16 PROCEDURE — 99223 1ST HOSP IP/OBS HIGH 75: CPT | Performed by: STUDENT IN AN ORGANIZED HEALTH CARE EDUCATION/TRAINING PROGRAM

## 2024-05-16 PROCEDURE — 51798 US URINE CAPACITY MEASURE: CPT

## 2024-05-16 PROCEDURE — 85025 COMPLETE CBC W/AUTO DIFF WBC: CPT

## 2024-05-16 PROCEDURE — B2151ZZ FLUOROSCOPY OF LEFT HEART USING LOW OSMOLAR CONTRAST: ICD-10-PCS | Performed by: INTERNAL MEDICINE

## 2024-05-16 PROCEDURE — 81001 URINALYSIS AUTO W/SCOPE: CPT

## 2024-05-16 PROCEDURE — 99285 EMERGENCY DEPT VISIT HI MDM: CPT

## 2024-05-16 PROCEDURE — 85610 PROTHROMBIN TIME: CPT

## 2024-05-16 PROCEDURE — 71045 X-RAY EXAM CHEST 1 VIEW: CPT

## 2024-05-16 PROCEDURE — B2111ZZ FLUOROSCOPY OF MULTIPLE CORONARY ARTERIES USING LOW OSMOLAR CONTRAST: ICD-10-PCS | Performed by: INTERNAL MEDICINE

## 2024-05-16 PROCEDURE — 96374 THER/PROPH/DIAG INJ IV PUSH: CPT

## 2024-05-16 PROCEDURE — 6360000004 HC RX CONTRAST MEDICATION: Performed by: INTERNAL MEDICINE

## 2024-05-16 PROCEDURE — 87086 URINE CULTURE/COLONY COUNT: CPT

## 2024-05-16 PROCEDURE — 4A023N7 MEASUREMENT OF CARDIAC SAMPLING AND PRESSURE, LEFT HEART, PERCUTANEOUS APPROACH: ICD-10-PCS | Performed by: INTERNAL MEDICINE

## 2024-05-16 PROCEDURE — 7100000010 HC PHASE II RECOVERY - FIRST 15 MIN: Performed by: INTERNAL MEDICINE

## 2024-05-16 PROCEDURE — 99223 1ST HOSP IP/OBS HIGH 75: CPT | Performed by: SURGERY

## 2024-05-16 PROCEDURE — 6360000002 HC RX W HCPCS: Performed by: STUDENT IN AN ORGANIZED HEALTH CARE EDUCATION/TRAINING PROGRAM

## 2024-05-16 RX ORDER — ENOXAPARIN SODIUM 100 MG/ML
40 INJECTION SUBCUTANEOUS DAILY
Status: CANCELLED | OUTPATIENT
Start: 2024-05-17

## 2024-05-16 RX ORDER — LEVALBUTEROL INHALATION SOLUTION 1.25 MG/3ML
1.25 SOLUTION RESPIRATORY (INHALATION) EVERY 4 HOURS PRN
Status: DISCONTINUED | OUTPATIENT
Start: 2024-05-16 | End: 2024-05-17 | Stop reason: CLARIF

## 2024-05-16 RX ORDER — POLYETHYLENE GLYCOL 3350 17 G/17G
17 POWDER, FOR SOLUTION ORAL DAILY PRN
Status: DISCONTINUED | OUTPATIENT
Start: 2024-05-16 | End: 2024-05-16 | Stop reason: HOSPADM

## 2024-05-16 RX ORDER — ONDANSETRON 4 MG/1
4 TABLET, ORALLY DISINTEGRATING ORAL EVERY 8 HOURS PRN
Status: DISCONTINUED | OUTPATIENT
Start: 2024-05-16 | End: 2024-05-16 | Stop reason: HOSPADM

## 2024-05-16 RX ORDER — ROSUVASTATIN CALCIUM 20 MG/1
20 TABLET, COATED ORAL NIGHTLY
Status: CANCELLED | OUTPATIENT
Start: 2024-05-16

## 2024-05-16 RX ORDER — ASPIRIN 81 MG/1
81 TABLET ORAL DAILY
Status: DISCONTINUED | OUTPATIENT
Start: 2024-05-17 | End: 2024-05-26 | Stop reason: HOSPADM

## 2024-05-16 RX ORDER — 0.9 % SODIUM CHLORIDE 0.9 %
500 INTRAVENOUS SOLUTION INTRAVENOUS ONCE
Status: COMPLETED | OUTPATIENT
Start: 2024-05-16 | End: 2024-05-16

## 2024-05-16 RX ORDER — METOPROLOL SUCCINATE 50 MG/1
50 TABLET, EXTENDED RELEASE ORAL DAILY
Status: DISCONTINUED | OUTPATIENT
Start: 2024-05-16 | End: 2024-05-16 | Stop reason: HOSPADM

## 2024-05-16 RX ORDER — ONDANSETRON 2 MG/ML
4 INJECTION INTRAMUSCULAR; INTRAVENOUS EVERY 6 HOURS PRN
Status: DISCONTINUED | OUTPATIENT
Start: 2024-05-16 | End: 2024-05-16 | Stop reason: SDUPTHER

## 2024-05-16 RX ORDER — SODIUM CHLORIDE 0.9 % (FLUSH) 0.9 %
5-40 SYRINGE (ML) INJECTION PRN
Status: DISCONTINUED | OUTPATIENT
Start: 2024-05-16 | End: 2024-05-16 | Stop reason: SDUPTHER

## 2024-05-16 RX ORDER — ACETAMINOPHEN 325 MG/1
650 TABLET ORAL EVERY 6 HOURS PRN
Status: CANCELLED | OUTPATIENT
Start: 2024-05-16

## 2024-05-16 RX ORDER — ROSUVASTATIN CALCIUM 20 MG/1
20 TABLET, COATED ORAL NIGHTLY
Status: DISCONTINUED | OUTPATIENT
Start: 2024-05-16 | End: 2024-05-17

## 2024-05-16 RX ORDER — POLYETHYLENE GLYCOL 3350 17 G/17G
17 POWDER, FOR SOLUTION ORAL DAILY PRN
Status: CANCELLED | OUTPATIENT
Start: 2024-05-16

## 2024-05-16 RX ORDER — ACETAMINOPHEN 650 MG/1
650 SUPPOSITORY RECTAL EVERY 6 HOURS PRN
Status: DISCONTINUED | OUTPATIENT
Start: 2024-05-16 | End: 2024-05-26 | Stop reason: HOSPADM

## 2024-05-16 RX ORDER — POLYETHYLENE GLYCOL 3350 17 G/17G
17 POWDER, FOR SOLUTION ORAL DAILY PRN
Status: DISCONTINUED | OUTPATIENT
Start: 2024-05-16 | End: 2024-05-16 | Stop reason: SDUPTHER

## 2024-05-16 RX ORDER — ACETAMINOPHEN 325 MG/1
650 TABLET ORAL EVERY 6 HOURS PRN
Status: DISCONTINUED | OUTPATIENT
Start: 2024-05-16 | End: 2024-05-26 | Stop reason: HOSPADM

## 2024-05-16 RX ORDER — SODIUM CHLORIDE 0.9 % (FLUSH) 0.9 %
5-40 SYRINGE (ML) INJECTION PRN
Status: CANCELLED | OUTPATIENT
Start: 2024-05-16

## 2024-05-16 RX ORDER — SODIUM CHLORIDE 9 MG/ML
INJECTION, SOLUTION INTRAVENOUS PRN
Status: DISCONTINUED | OUTPATIENT
Start: 2024-05-16 | End: 2024-05-26 | Stop reason: HOSPADM

## 2024-05-16 RX ORDER — POTASSIUM CHLORIDE 7.45 MG/ML
10 INJECTION INTRAVENOUS PRN
Status: DISCONTINUED | OUTPATIENT
Start: 2024-05-16 | End: 2024-05-16 | Stop reason: HOSPADM

## 2024-05-16 RX ORDER — LEVALBUTEROL INHALATION SOLUTION 1.25 MG/3ML
1.25 SOLUTION RESPIRATORY (INHALATION) EVERY 4 HOURS PRN
Status: DISCONTINUED | OUTPATIENT
Start: 2024-05-16 | End: 2024-05-16 | Stop reason: HOSPADM

## 2024-05-16 RX ORDER — ACETAMINOPHEN 650 MG/1
650 SUPPOSITORY RECTAL EVERY 6 HOURS PRN
Status: DISCONTINUED | OUTPATIENT
Start: 2024-05-16 | End: 2024-05-16 | Stop reason: HOSPADM

## 2024-05-16 RX ORDER — LISINOPRIL 20 MG/1
20 TABLET ORAL DAILY
Status: CANCELLED | OUTPATIENT
Start: 2024-05-17

## 2024-05-16 RX ORDER — METOPROLOL SUCCINATE 50 MG/1
50 TABLET, EXTENDED RELEASE ORAL DAILY
Status: CANCELLED | OUTPATIENT
Start: 2024-05-17

## 2024-05-16 RX ORDER — ROSUVASTATIN CALCIUM 20 MG/1
20 TABLET, COATED ORAL NIGHTLY
Status: DISCONTINUED | OUTPATIENT
Start: 2024-05-16 | End: 2024-05-16 | Stop reason: HOSPADM

## 2024-05-16 RX ORDER — SODIUM CHLORIDE 0.9 % (FLUSH) 0.9 %
5-40 SYRINGE (ML) INJECTION EVERY 12 HOURS SCHEDULED
Status: DISCONTINUED | OUTPATIENT
Start: 2024-05-16 | End: 2024-05-16 | Stop reason: SDUPTHER

## 2024-05-16 RX ORDER — POTASSIUM CHLORIDE 20 MEQ/1
40 TABLET, EXTENDED RELEASE ORAL PRN
Status: DISCONTINUED | OUTPATIENT
Start: 2024-05-16 | End: 2024-05-16 | Stop reason: HOSPADM

## 2024-05-16 RX ORDER — SODIUM CHLORIDE 9 MG/ML
INJECTION, SOLUTION INTRAVENOUS PRN
Status: DISCONTINUED | OUTPATIENT
Start: 2024-05-16 | End: 2024-05-16 | Stop reason: SDUPTHER

## 2024-05-16 RX ORDER — ASPIRIN 81 MG/1
81 TABLET ORAL DAILY
Status: DISCONTINUED | OUTPATIENT
Start: 2024-05-16 | End: 2024-05-16 | Stop reason: HOSPADM

## 2024-05-16 RX ORDER — POTASSIUM CHLORIDE 7.45 MG/ML
10 INJECTION INTRAVENOUS PRN
Status: DISCONTINUED | OUTPATIENT
Start: 2024-05-16 | End: 2024-05-16 | Stop reason: SDUPTHER

## 2024-05-16 RX ORDER — POTASSIUM CHLORIDE 20 MEQ/1
40 TABLET, EXTENDED RELEASE ORAL PRN
Status: CANCELLED | OUTPATIENT
Start: 2024-05-16

## 2024-05-16 RX ORDER — SODIUM CHLORIDE 0.9 % (FLUSH) 0.9 %
5-40 SYRINGE (ML) INJECTION PRN
Status: DISCONTINUED | OUTPATIENT
Start: 2024-05-16 | End: 2024-05-16 | Stop reason: HOSPADM

## 2024-05-16 RX ORDER — MAGNESIUM SULFATE IN WATER 40 MG/ML
2000 INJECTION, SOLUTION INTRAVENOUS PRN
Status: DISCONTINUED | OUTPATIENT
Start: 2024-05-16 | End: 2024-05-26 | Stop reason: HOSPADM

## 2024-05-16 RX ORDER — ACETAMINOPHEN 325 MG/1
650 TABLET ORAL EVERY 6 HOURS PRN
Status: DISCONTINUED | OUTPATIENT
Start: 2024-05-16 | End: 2024-05-16 | Stop reason: SDUPTHER

## 2024-05-16 RX ORDER — ASPIRIN 81 MG/1
81 TABLET ORAL DAILY
Status: CANCELLED | OUTPATIENT
Start: 2024-05-17

## 2024-05-16 RX ORDER — ACETAMINOPHEN 650 MG/1
650 SUPPOSITORY RECTAL EVERY 6 HOURS PRN
Status: CANCELLED | OUTPATIENT
Start: 2024-05-16

## 2024-05-16 RX ORDER — MIDAZOLAM HYDROCHLORIDE 1 MG/ML
INJECTION INTRAMUSCULAR; INTRAVENOUS PRN
Status: DISCONTINUED | OUTPATIENT
Start: 2024-05-16 | End: 2024-05-16 | Stop reason: HOSPADM

## 2024-05-16 RX ORDER — DOPAMINE HYDROCHLORIDE 160 MG/100ML
1-20 INJECTION, SOLUTION INTRAVENOUS CONTINUOUS
Status: DISCONTINUED | OUTPATIENT
Start: 2024-05-16 | End: 2024-05-16 | Stop reason: HOSPADM

## 2024-05-16 RX ORDER — POTASSIUM CHLORIDE 20 MEQ/1
40 TABLET, EXTENDED RELEASE ORAL PRN
Status: DISCONTINUED | OUTPATIENT
Start: 2024-05-16 | End: 2024-05-16 | Stop reason: SDUPTHER

## 2024-05-16 RX ORDER — FENTANYL CITRATE 50 UG/ML
INJECTION, SOLUTION INTRAMUSCULAR; INTRAVENOUS PRN
Status: DISCONTINUED | OUTPATIENT
Start: 2024-05-16 | End: 2024-05-16 | Stop reason: HOSPADM

## 2024-05-16 RX ORDER — ONDANSETRON 4 MG/1
4 TABLET, ORALLY DISINTEGRATING ORAL EVERY 8 HOURS PRN
Status: DISCONTINUED | OUTPATIENT
Start: 2024-05-16 | End: 2024-05-26 | Stop reason: HOSPADM

## 2024-05-16 RX ORDER — MAGNESIUM SULFATE IN WATER 40 MG/ML
2000 INJECTION, SOLUTION INTRAVENOUS PRN
Status: DISCONTINUED | OUTPATIENT
Start: 2024-05-16 | End: 2024-05-16 | Stop reason: SDUPTHER

## 2024-05-16 RX ORDER — LISINOPRIL 20 MG/1
20 TABLET ORAL DAILY
Status: DISCONTINUED | OUTPATIENT
Start: 2024-05-16 | End: 2024-05-16 | Stop reason: HOSPADM

## 2024-05-16 RX ORDER — ONDANSETRON 2 MG/ML
4 INJECTION INTRAMUSCULAR; INTRAVENOUS EVERY 6 HOURS PRN
Status: DISCONTINUED | OUTPATIENT
Start: 2024-05-16 | End: 2024-05-26 | Stop reason: HOSPADM

## 2024-05-16 RX ORDER — SODIUM CHLORIDE 0.9 % (FLUSH) 0.9 %
5-40 SYRINGE (ML) INJECTION PRN
Status: DISCONTINUED | OUTPATIENT
Start: 2024-05-16 | End: 2024-05-26 | Stop reason: HOSPADM

## 2024-05-16 RX ORDER — SODIUM CHLORIDE 9 MG/ML
INJECTION, SOLUTION INTRAVENOUS PRN
Status: CANCELLED | OUTPATIENT
Start: 2024-05-16

## 2024-05-16 RX ORDER — ENOXAPARIN SODIUM 100 MG/ML
40 INJECTION SUBCUTANEOUS DAILY
Status: DISCONTINUED | OUTPATIENT
Start: 2024-05-16 | End: 2024-05-16 | Stop reason: SDUPTHER

## 2024-05-16 RX ORDER — POTASSIUM CHLORIDE 7.45 MG/ML
10 INJECTION INTRAVENOUS PRN
Status: CANCELLED | OUTPATIENT
Start: 2024-05-16

## 2024-05-16 RX ORDER — POTASSIUM CHLORIDE 20 MEQ/1
40 TABLET, EXTENDED RELEASE ORAL PRN
Status: DISCONTINUED | OUTPATIENT
Start: 2024-05-16 | End: 2024-05-26 | Stop reason: HOSPADM

## 2024-05-16 RX ORDER — ONDANSETRON 4 MG/1
4 TABLET, ORALLY DISINTEGRATING ORAL EVERY 8 HOURS PRN
Status: DISCONTINUED | OUTPATIENT
Start: 2024-05-16 | End: 2024-05-16 | Stop reason: SDUPTHER

## 2024-05-16 RX ORDER — ENOXAPARIN SODIUM 100 MG/ML
40 INJECTION SUBCUTANEOUS DAILY
Status: DISCONTINUED | OUTPATIENT
Start: 2024-05-17 | End: 2024-05-26 | Stop reason: HOSPADM

## 2024-05-16 RX ORDER — SODIUM CHLORIDE 0.9 % (FLUSH) 0.9 %
5-40 SYRINGE (ML) INJECTION EVERY 12 HOURS SCHEDULED
Status: DISCONTINUED | OUTPATIENT
Start: 2024-05-16 | End: 2024-05-16 | Stop reason: HOSPADM

## 2024-05-16 RX ORDER — LEVALBUTEROL INHALATION SOLUTION 1.25 MG/3ML
1.25 SOLUTION RESPIRATORY (INHALATION) EVERY 4 HOURS PRN
Status: CANCELLED | OUTPATIENT
Start: 2024-05-16

## 2024-05-16 RX ORDER — MAGNESIUM SULFATE HEPTAHYDRATE 40 MG/ML
2000 INJECTION, SOLUTION INTRAVENOUS PRN
Status: DISCONTINUED | OUTPATIENT
Start: 2024-05-16 | End: 2024-05-16 | Stop reason: HOSPADM

## 2024-05-16 RX ORDER — SODIUM CHLORIDE 0.9 % (FLUSH) 0.9 %
5-40 SYRINGE (ML) INJECTION EVERY 12 HOURS SCHEDULED
Status: DISCONTINUED | OUTPATIENT
Start: 2024-05-16 | End: 2024-05-26 | Stop reason: HOSPADM

## 2024-05-16 RX ORDER — LIDOCAINE HCL/PF 100 MG/5ML
100 SYRINGE (ML) INJECTION ONCE
Status: COMPLETED | OUTPATIENT
Start: 2024-05-16 | End: 2024-05-16

## 2024-05-16 RX ORDER — LIDOCAINE HYDROCHLORIDE ANHYDROUS AND DEXTROSE MONOHYDRATE 5; 400 G/100ML; MG/100ML
1 INJECTION, SOLUTION INTRAVENOUS CONTINUOUS
Status: DISCONTINUED | OUTPATIENT
Start: 2024-05-16 | End: 2024-05-21

## 2024-05-16 RX ORDER — LISINOPRIL 20 MG/1
20 TABLET ORAL DAILY
Status: DISCONTINUED | OUTPATIENT
Start: 2024-05-17 | End: 2024-05-26 | Stop reason: HOSPADM

## 2024-05-16 RX ORDER — SODIUM CHLORIDE 9 MG/ML
INJECTION, SOLUTION INTRAVENOUS PRN
Status: DISCONTINUED | OUTPATIENT
Start: 2024-05-16 | End: 2024-05-16 | Stop reason: HOSPADM

## 2024-05-16 RX ORDER — ACETAMINOPHEN 325 MG/1
650 TABLET ORAL EVERY 6 HOURS PRN
Status: DISCONTINUED | OUTPATIENT
Start: 2024-05-16 | End: 2024-05-16 | Stop reason: HOSPADM

## 2024-05-16 RX ORDER — ONDANSETRON 2 MG/ML
4 INJECTION INTRAMUSCULAR; INTRAVENOUS EVERY 6 HOURS PRN
Status: DISCONTINUED | OUTPATIENT
Start: 2024-05-16 | End: 2024-05-16 | Stop reason: HOSPADM

## 2024-05-16 RX ORDER — ACETAMINOPHEN 650 MG/1
650 SUPPOSITORY RECTAL EVERY 6 HOURS PRN
Status: DISCONTINUED | OUTPATIENT
Start: 2024-05-16 | End: 2024-05-16 | Stop reason: SDUPTHER

## 2024-05-16 RX ORDER — POLYETHYLENE GLYCOL 3350 17 G/17G
17 POWDER, FOR SOLUTION ORAL DAILY PRN
Status: DISCONTINUED | OUTPATIENT
Start: 2024-05-16 | End: 2024-05-26 | Stop reason: HOSPADM

## 2024-05-16 RX ORDER — 0.9 % SODIUM CHLORIDE 0.9 %
1000 INTRAVENOUS SOLUTION INTRAVENOUS ONCE
Status: COMPLETED | OUTPATIENT
Start: 2024-05-16 | End: 2024-05-16

## 2024-05-16 RX ORDER — ENOXAPARIN SODIUM 100 MG/ML
40 INJECTION SUBCUTANEOUS DAILY
Status: DISCONTINUED | OUTPATIENT
Start: 2024-05-16 | End: 2024-05-16 | Stop reason: HOSPADM

## 2024-05-16 RX ORDER — DOPAMINE HYDROCHLORIDE 160 MG/100ML
1-20 INJECTION, SOLUTION INTRAVENOUS CONTINUOUS
Status: CANCELLED | OUTPATIENT
Start: 2024-05-16

## 2024-05-16 RX ORDER — SODIUM CHLORIDE 0.9 % (FLUSH) 0.9 %
5-40 SYRINGE (ML) INJECTION EVERY 12 HOURS SCHEDULED
Status: CANCELLED | OUTPATIENT
Start: 2024-05-16

## 2024-05-16 RX ORDER — MAGNESIUM SULFATE HEPTAHYDRATE 40 MG/ML
2000 INJECTION, SOLUTION INTRAVENOUS PRN
Status: CANCELLED | OUTPATIENT
Start: 2024-05-16

## 2024-05-16 RX ORDER — ONDANSETRON 2 MG/ML
4 INJECTION INTRAMUSCULAR; INTRAVENOUS EVERY 6 HOURS PRN
Status: CANCELLED | OUTPATIENT
Start: 2024-05-16

## 2024-05-16 RX ORDER — METOPROLOL SUCCINATE 50 MG/1
50 TABLET, EXTENDED RELEASE ORAL DAILY
Status: DISCONTINUED | OUTPATIENT
Start: 2024-05-17 | End: 2024-05-17

## 2024-05-16 RX ORDER — ONDANSETRON 4 MG/1
4 TABLET, ORALLY DISINTEGRATING ORAL EVERY 8 HOURS PRN
Status: CANCELLED | OUTPATIENT
Start: 2024-05-16

## 2024-05-16 RX ORDER — POTASSIUM CHLORIDE 7.45 MG/ML
10 INJECTION INTRAVENOUS PRN
Status: DISCONTINUED | OUTPATIENT
Start: 2024-05-16 | End: 2024-05-26 | Stop reason: HOSPADM

## 2024-05-16 RX ADMIN — ASPIRIN 81 MG: 81 TABLET, COATED ORAL at 10:33

## 2024-05-16 RX ADMIN — SODIUM CHLORIDE 500 ML: 0.9 INJECTION, SOLUTION INTRAVENOUS at 07:38

## 2024-05-16 RX ADMIN — AMIODARONE HYDROCHLORIDE 150 MG: 1.5 INJECTION, SOLUTION INTRAVENOUS at 05:50

## 2024-05-16 RX ADMIN — DOPAMINE HYDROCHLORIDE 2.5 MCG/KG/MIN: 160 INJECTION, SOLUTION INTRAVENOUS at 13:43

## 2024-05-16 RX ADMIN — CEFTRIAXONE SODIUM 1000 MG: 1 INJECTION, POWDER, FOR SOLUTION INTRAMUSCULAR; INTRAVENOUS at 05:52

## 2024-05-16 RX ADMIN — LIDOCAINE HYDROCHLORIDE 100 MG: 20 INJECTION, SOLUTION INTRAVENOUS at 17:10

## 2024-05-16 RX ADMIN — AMIODARONE HYDROCHLORIDE 0.5 MG/MIN: 1.8 INJECTION, SOLUTION INTRAVENOUS at 11:35

## 2024-05-16 RX ADMIN — SODIUM CHLORIDE 1000 ML: 9 INJECTION, SOLUTION INTRAVENOUS at 02:40

## 2024-05-16 RX ADMIN — LIDOCAINE HYDROCHLORIDE 2 MG/MIN: 4 INJECTION, SOLUTION INTRAVENOUS at 17:11

## 2024-05-16 RX ADMIN — SODIUM CHLORIDE, PRESERVATIVE FREE 10 ML: 5 INJECTION INTRAVENOUS at 21:00

## 2024-05-16 RX ADMIN — AMIODARONE HYDROCHLORIDE 1 MG/MIN: 1.8 INJECTION, SOLUTION INTRAVENOUS at 06:00

## 2024-05-16 ASSESSMENT — PAIN - FUNCTIONAL ASSESSMENT
PAIN_FUNCTIONAL_ASSESSMENT: NONE - DENIES PAIN
PAIN_FUNCTIONAL_ASSESSMENT: 0-10
PAIN_FUNCTIONAL_ASSESSMENT: NONE - DENIES PAIN

## 2024-05-16 ASSESSMENT — ENCOUNTER SYMPTOMS
SHORTNESS OF BREATH: 1
VOMITING: 0
SORE THROAT: 0
EYE REDNESS: 0
COUGH: 1
NAUSEA: 0
ABDOMINAL PAIN: 0
DIARRHEA: 0
SHORTNESS OF BREATH: 0
EYE DISCHARGE: 0

## 2024-05-16 ASSESSMENT — LIFESTYLE VARIABLES
HOW MANY STANDARD DRINKS CONTAINING ALCOHOL DO YOU HAVE ON A TYPICAL DAY: 1 OR 2
HOW OFTEN DO YOU HAVE A DRINK CONTAINING ALCOHOL: MONTHLY OR LESS

## 2024-05-16 ASSESSMENT — PAIN SCALES - GENERAL
PAINLEVEL_OUTOF10: 0
PAINLEVEL_OUTOF10: 0

## 2024-05-16 NOTE — ED TRIAGE NOTES
Mode of arrival (squad #, walk in, police, etc) : [facility]        Chief complaint(s): sweats and hand and feet numbness        Arrival Note (brief scenario, treatment PTA, etc).: hand and feet numbness last few months tonight awaken in sweats with sob denies any pain         C= \"Have you ever felt that you should Cut down on your drinking?\"  No  A= \"Have people Annoyed you by criticizing your drinking?\"  No  G= \"Have you ever felt bad or Guilty about your drinking?\"  No  E= \"Have you ever had a drink as an Eye-opener first thing in the morning to steady your nerves or to help a hangover?\"  No      Deferred []      Reason for deferring: N/A    *If yes to two or more: probable alcohol abuse.*

## 2024-05-16 NOTE — ED PROVIDER NOTES
EMERGENCY DEPARTMENT ENCOUNTER    Pt Name: Coni Baker  MRN: [id number]  Birthdate 1950  Date of evaluation: 5/16/24  CHIEF COMPLAINT       Chief Complaint   Patient presents with    Numbness     Hands and feet started yesterday    Sweats     Awaken with sweats tonight     HISTORY OF PRESENT ILLNESS   73-year-old history of hypertension, hyperlipidemia, CAD presenting with diaphoresis    Patient is concerned she has been waking up sweating in the middle of the night over the last couple nights    She does states she got a new dog and they have the AC on so she has been under blankets    She wakes up sweating has to go to the bathroom feels somewhat short of breath    She has no chest pain.  No abdominal pain.  No vomiting diarrhea dysuria hematuria frequency                  REVIEW OF SYSTEMS     Review of Systems   Constitutional:  Positive for diaphoresis. Negative for chills and fever.   HENT:  Negative for rhinorrhea and sore throat.    Eyes:  Negative for discharge and redness.   Respiratory:  Negative for shortness of breath.    Cardiovascular:  Negative for chest pain.   Gastrointestinal:  Negative for abdominal pain, diarrhea, nausea and vomiting.   Genitourinary:  Negative for dysuria, frequency and urgency.   Musculoskeletal:  Negative for arthralgias and myalgias.   Skin:  Negative for rash.   Neurological:  Negative for weakness and numbness.   Psychiatric/Behavioral:  Negative for suicidal ideas.    All other systems reviewed and are negative.    PASTMEDICAL HISTORY     Past Medical History:   Diagnosis Date    CAD (coronary artery disease)     Hx of myocardial infarction 2008    Hyperlipidemia     Hypertension     SOBOE (shortness of breath on exertion)      Past Problem List  Patient Active Problem List   Diagnosis Code    Polyp of sigmoid colon K63.5    Mixed hyperlipidemia E78.2    Primary hypertension I10    NSVT (nonsustained ventricular tachycardia) ([facility]) I47.29     SURGICAL HISTORY       Past          DISPOSITION/PLAN   DISPOSITION Admitted 05/16/2024 05:52:06 AM      OUTPATIENT FOLLOW UP THE PATIENT:  No follow-up provider specified.    MD Cinthya Roberts Jeffrey, MD  05/16/24 0600

## 2024-05-16 NOTE — H&P
[facility]  Office: [phone]  Joey Smith DO, Miguel Machado DO, Vikash Conklin DO, Alan Mcnulty DO, Joseph Thomas MD, Elsy Lacy MD, Eduardo Maddox MD, Kindra Mello MD,  Stef Awad MD, Esperanza Gandhi MD, Andi Hull MD,  Nolberto Allison DO, Anju Ellis MD, Paco Solis MD, Girma Smith DO, Heydi Yu MD,  Rigo Holliday DO, Rajwinder Stafford MD, Ita Camacho MD, Berna Vides MD, Eloy Bronson MD,  Gigi Romero MD, Elis Mckeon MD, Alfred Conner MD, Steve Greer MD, Jaime Banegas MD, Sandhya Collins MD, Boris Frye DO, Ion Henry DO, Jass Wyman MD,  Caesar Leiva MD, Shirley Waterhouse, CNP,  Chastity Astorga, CNP, Fran Fuentes, CNP,  Olive Melgoza, DNP, Madai Holley, CNP, Meghan Rodriguez, CNP, Viky White CNP, Mary Aleman, CNP, Haleigh Francois, CNP, Kira Rosales, PA-C, Pippa Daily PA-C, Meghna Holland, CNP, Tammy Jarrett, CNS, Brooke Tipton, CNP, Carolyne Boston, CNP, Tracy Schwab, CNP         [facility]   IN-PATIENT SERVICE   [facility]    HISTORY AND PHYSICAL EXAMINATION            Date:   5/16/2024  Patient name:  Coni Baker  Date of admission:  5/16/2024  3:45 PM  MRN:   [id number]  Account:  [id number]  YOB: 1950  PCP:    Earlene Salcedo MD  Room:   [facility] Room/PL  Code Status:    Full Code    Chief Complaint:     Shortness of breath    History Obtained From:     patient, electronic medical record    History of Present Illness:     Coni Baker is a 73 y.o. Non- / non  female who presents with shortness of breath and dizziness and is admitted to the hospital for the management of Shortness of breath. Patient has a history significant for hypertension, hyperlipidemia, and CAD with stent placement.    Patient initially presented to [place] ED this morning for shortness of breath and dizziness. Labs significant for elevated LFTs, troponin 22 > 20, BNP 3585, UA

## 2024-05-16 NOTE — H&P
[facility]  Procedure History and Physical Update          Patient Name: Coni Baker  MRN:    [id number]  YOB: 1950  Date of evaluation:  5/16/2024    Procedure:    Cardiac cath +/- PCI    Indication for procedure:  NSVT      Please refer to the office note completed by Allen Alva CNP on 05/16/2024 in the medical record and note that:    [x] I have examined the patient and reviewed the H&P/Consult and there are no changes to be made to the assessment or plan.    [] I have examined the patient and reviewed the H&P/Consult and have noted the following changes:    Past Medical History:   Diagnosis Date    CAD (coronary artery disease)     Hx of myocardial infarction 2008    Hyperlipidemia     Hypertension     SOBOE (shortness of breath on exertion)        Past Surgical History:   Procedure Laterality Date    COLONOSCOPY      WITH POLYPECTOMY     COLONOSCOPY N/A 3/24/2022    COLONOSCOPY WITH BIOPSY performed by Naldo Milton MD at [facility] [place] [facility]    CORONARY ANGIOPLASTY WITH STENT PLACEMENT      STENTS X4    SHOULDER SURGERY Right 2/1/16    arthroscopy& mini open rotator cuff repair    TUBAL LIGATION         Family History   Problem Relation Age of Onset    Cancer Father     Kidney Disease Mother        Allergies   Allergen Reactions    Ciprofloxacin Hives and Itching       Prior to Admission medications    Medication Sig Start Date End Date Taking? Authorizing Provider   Calcium-Vitamin D 600-200 MG-UNIT TABS Take 1 tablet by mouth 3 times daily (before meals) 5/26/21   Earlene Salcedo MD   rosuvastatin (CRESTOR) 20 MG tablet  2/22/21   ProviderDeshawn MD   aspirin 81 MG EC tablet Take 1 tablet by mouth daily    ProviderDeshawn MD   Handicap Placard MISC by Does not apply route 5 years, cannot walk over 200 ft. 3/16/21   Earlene Salcedo MD   ramipril (ALTACE) 5 MG capsule Take 1 capsule by mouth daily    ProviderDeshawn MD   metoprolol (LOPRESSOR) 50 MG

## 2024-05-16 NOTE — CONSULTS
[facility]    Consult                        Today's Date: 5/16/2024  Patient Name: Coni Baker  Date of admission: 5/16/2024  2:16 AM  Patient's age: 73 y.o., 1950  Admission Dx: Shortness of breath [R06.02]  Acute cystitis without hematuria [N30.00]  NSVT (nonsustained ventricular tachycardia) (HCC) [I47.29]    Reason for Consult:  Cardiac evaluation    Requesting Physician: Tan Caldwell MD    CHIEF COMPLAINT:  short of breath dizzy along with palpitation    History Obtained From:  patient, electronic medical record    HISTORY OF PRESENT ILLNESS:      73-year-old history of hypertension, hyperlipidemia, CAD with 4 stents in past presenting with diaphoresis. Was trying to lay down to sleep and been waking up sweating in the middle of the night over the last couple nights went of bathroom  felt short of breath dizzy along with palpitation , CXR unremarkable , was having NSVT in ER and was started on amiodarone drip , being treated for UTI       Past Medical History:   has a past medical history of CAD (coronary artery disease), Hx of myocardial infarction, Hyperlipidemia, Hypertension, and SOBOE (shortness of breath on exertion).    Past Surgical History:   has a past surgical history that includes Tubal ligation; Coronary angioplasty with stent; Colonoscopy; shoulder surgery (Right, 2/1/16); and Colonoscopy (N/A, 3/24/2022).     Home Medications:    Prior to Admission medications    Medication Sig Start Date End Date Taking? Authorizing Provider   Calcium-Vitamin D 600-200 MG-UNIT TABS Take 1 tablet by mouth 3 times daily (before meals) 5/26/21   Earlene Salcedo MD   rosuvastatin (CRESTOR) 20 MG tablet  2/22/21   Deshawn Murphy MD   aspirin 81 MG EC tablet Take 1 tablet by mouth daily    Deshawn Murphy MD   Handicap Placard MISC by Does not apply route 5 years, cannot walk over 200 ft. 3/16/21   Earlene Salcedo MD   ramipril (ALTACE) 5 MG capsule Take 1 capsule  dysfunction. LV systolic pressure is normal. LV end diastolic pressure is normal. There are wall motion abnormalities in the left ventricle. There is moderate (3+) mitral regurgitation.    Aortic Valve  There is no aortic valve stenosis.    Wall Motion  The following segments are dyskinetic: apical anterior and apical inferior.  The following segments are hypokinetic: mid anterior.  The following segments are normal: mid inferior, basilar anterior and basilar inferior.  All Measurements     Exam End: 07/20/22 08:52 Last Resulted: 07/20/22 09:06   Received From: [employer]  Result Received: 10/16/23 14:29   .    Labs:     CBC:   Recent Labs     05/16/24  0250   WBC 7.9   HGB 12.9   HCT 39.6        BMP:   Recent Labs     05/16/24  0250      K 4.5   CO2 24   BUN 24*   CREATININE 0.9   LABGLOM 67   GLUCOSE 109*     BNP: No results for input(s): \"BNP\" in the last 72 hours.  PT/INR:   Recent Labs     05/16/24  0250   PROTIME 14.7*   INR 1.1     APTT:No results for input(s): \"APTT\" in the last 72 hours.  CARDIAC ENZYMES:No results for input(s): \"CKTOTAL\", \"CKMB\", \"CKMBINDEX\", \"TROPONINI\" in the last 72 hours.  FASTING LIPID PANEL:  Lab Results   Component Value Date/Time    HDL 57 07/03/2023 11:28 AM    TRIG 57 09/15/2022 10:00 AM     LIVER PROFILE:  Recent Labs     05/16/24  0250   AST 58*   ALT 68*       IMPRESSION:    Patient Active Problem List   Diagnosis    Polyp of sigmoid colon    Mixed hyperlipidemia    Primary hypertension    NSVT (nonsustained ventricular tachycardia) (HCC)       RECOMMENDATIONS:  NSVT was started on  amio drip now in [facility] with ectopy symptomatic with heart in 30's  - hold amio drip  for now , start heparin drip   Hypotension - had multiple boluses but no urine output - if remains hypotensive , recommend starting dopamine drip   UTI - atb per primary   EKG /case reviewed with dr. Ureña - recommended transfer for [facility] for cardiac cath and need for EP consult

## 2024-05-16 NOTE — CONSULTS
[facility] PULMONARY & CRITICAL CARE SPECIALISTS   CONSULT NOTE:      DATE OF CONSULT 5/16/2024    REASON FOR CONSULTATION:  Critical care management      PCP Earlene Salcedo MD     CHIEF COMPLAINT: Dyspnea, diaphoresis    HISTORY OF PRESENT ILLNESS:     Coni is a pleasant 73-year-old female with no documented previous pulmonary history.  She  was in her usual state of health up until approximately 2 weeks ago when she started having dyspnea on exertion.  This was not accompanied by any cough, fevers, chest pain or pleurisy.  About 2 days prior to admission, she had an episode of diaphoresis and generalized weakness.  Yesterday, she had another episodes of diaphoresis weakness and worsening dyspnea.  She do not have any chest pain, pleurisy and she cannot feel her heart racing.  She did come into the emergency room and she was found to be in a rhythm of SVT.  There was also episodic ventricular tachycardia.  She was started on amiodarone bolus and drip.  Her blood pressure did drop after this.  She was also found to have a urinary tract infection based on her urinalysis.    She has smoked 1 pack a day for approximately 60 years but over the last week she has not smoked at all.  She has no documented history of asthma or COPD and she has never had pulmonary function testing.        ALLERGIES:  Allergies   Allergen Reactions    Ciprofloxacin Hives and Itching       HOME MEDICATIONS:  Medications Prior to Admission: Calcium-Vitamin D 600-200 MG-UNIT TABS, Take 1 tablet by mouth 3 times daily (before meals)  rosuvastatin (CRESTOR) 20 MG tablet,   aspirin 81 MG EC tablet, Take 1 tablet by mouth daily  Handicap Placard MISC, by Does not apply route 5 years, cannot walk over 200 ft.  ramipril (ALTACE) 5 MG capsule, Take 1 capsule by mouth daily  metoprolol (LOPRESSOR) 50 MG tablet, Take 1 tablet by mouth daily      PAST MEDICAL HISTORY:  Past Medical History:   Diagnosis Date    CAD (coronary artery disease)

## 2024-05-16 NOTE — H&P
[facility]  Family Medicine      History & Physical              Date:   5/16/2024  Patient name:  Coni Baker  Date of admission:  5/16/2024  2:16 AM  MRN:   [id number]  YOB: 1950    CHIEF COMPLAINT:       Chief Complaint   Patient presents with    Numbness     Hands and feet started yesterday    Sweats     Awaken with sweats tonight         ASSESSMENT/PLAN       Hospital Problems             Last Modified POA    * (Principal) NSVT (nonsustained ventricular tachycardia) ([facility]) 5/16/2024 Yes    S/P cardiac cath 5/17/2024 Yes    Mixed hyperlipidemia 5/17/2024 Yes    Primary hypertension 5/17/2024 Yes    Shortness of breath 5/17/2024 Yes    Angina pectoris ([facility]) 5/17/2024 Yes         Amiodarone drip, admit to ICU, monitor BP closely  Continue rocephin  Fluid bolus  Continue home meds crestor, aspirin, hold ramipril, hold metoprolol  Cardio on board. [facility] Cardio later transferred pt directly to cath lab.          The severity of this patient's signs and symptoms (specify NSVT) indicate the need for an inpatient admission.      Above plan discussed with the patient    Consultations:   Consults: IP CONSULT TO INTERNAL MEDICINE  IP CONSULT TO SOCIAL WORK  IP CONSULT TO CRITICAL CARE  IP CONSULT TO CARDIOLOGY      HPI:       History Obtained From:  Patient and chart review.    The patient is a 73 y.o.  female who presented with sweating, and feeling very weak and dizzy for 2 days. Pt has also had difficulty breathing and presented to the ER for workup. Pt had elevated BNP, +LE on urinalysis and was noted to have runs of NSVT. Cardio was consulted and pt started on amiodarone drip, admitted to the ICU. In the unit pt had hypotension and continued runs of NSVT, with episodes of severe bradycardia. Transfer to [facility] for cardiac cath was initiated.     PAST MEDICAL HISTORY:        has a past medical history of CAD (coronary artery disease), Hx of myocardial  k/uL    Monocytes Absolute 0.60 0.1 - 1.3 k/uL    Eosinophils Absolute 0.10 0.0 - 0.4 k/uL    Basophils Absolute 0.10 0.0 - 0.2 k/uL   CMP    Collection Time: 05/16/24  2:50 AM   Result Value Ref Range    Sodium 141 135 - 144 mmol/L    Potassium 4.5 3.7 - 5.3 mmol/L    Chloride 107 98 - 107 mmol/L    CO2 24 20 - 31 mmol/L    Anion Gap 10 9 - 17 mmol/L    Glucose 109 (H) 70 - 99 mg/dL    BUN 24 (H) 8 - 23 mg/dL    Creatinine 0.9 0.5 - 0.9 mg/dL    Est, Glom Filt Rate 67 >60 mL/min/1.73m2    Calcium 9.4 8.6 - 10.4 mg/dL    Total Protein 5.9 (L) 6.4 - 8.3 g/dL    Albumin 3.7 3.5 - 5.2 g/dL    Total Bilirubin 0.4 0.3 - 1.2 mg/dL    Alkaline Phosphatase 96 35 - 104 U/L    ALT 68 (H) 5 - 33 U/L    AST 58 (H) <32 U/L   Magnesium    Collection Time: 05/16/24  2:50 AM   Result Value Ref Range    Magnesium 2.2 1.6 - 2.6 mg/dL   Protime-INR    Collection Time: 05/16/24  2:50 AM   Result Value Ref Range    Protime 14.7 (H) 11.8 - 14.6 sec    INR 1.1    Troponin Now and Q 1 Hour    Collection Time: 05/16/24  2:50 AM   Result Value Ref Range    Troponin, High Sensitivity 22 (H) 0 - 14 ng/L   Brain Natriuretic Peptide    Collection Time: 05/16/24  2:50 AM   Result Value Ref Range    Pro-BNP 3,585 (H) <300 pg/mL   COVID-19 & Influenza Combo    Collection Time: 05/16/24  2:51 AM    Specimen: Nasopharyngeal Swab   Result Value Ref Range    Specimen Description .NASOPHARYNGEAL SWAB     Source .NASOPHARYNGEAL SWAB     SARS-CoV-2 RNA, RT PCR Not Detected Not Detected    Influenza A Not Detected Not Detected    Influenza B Not Detected Not Detected   Troponin Now and Q 1 Hour    Collection Time: 05/16/24  4:12 AM   Result Value Ref Range    Troponin, High Sensitivity 20 (H) 0 - 14 ng/L   Urinalysis with Reflex to Culture    Collection Time: 05/16/24  5:15 AM    Specimen: Urine   Result Value Ref Range    Color, UA Dark Yellow (A) Yellow    Turbidity UA Cloudy (A) Clear    Glucose, Ur NEGATIVE NEGATIVE mg/dL    Bilirubin, Urine NEGATIVE

## 2024-05-16 NOTE — PLAN OF CARE
Patient to be admitted under Dr Solis with [facility]. Cardiology to follow as consult. Dr Pham with EP paged regarding consult. Sign out given to Pippa WILKERSON on the phone.   Will continue to follow.    Nadir Gandhi MD       Cardiovascular Fellow  5/16/2024, 4:34 PM

## 2024-05-16 NOTE — PLAN OF CARE
Problem: Safety - Adult  Goal: Free from fall injury  Outcome: Progressing  Flowsheets (Taken 5/16/2024 1754)  Free From Fall Injury:   Instruct family/caregiver on patient safety   Based on caregiver fall risk screen, instruct family/caregiver to ask for assistance with transferring infant if caregiver noted to have fall risk factors     Problem: Discharge Planning  Goal: Discharge to home or other facility with appropriate resources  Outcome: Progressing  Flowsheets (Taken 5/16/2024 1800)  Discharge to home or other facility with appropriate resources:   Identify barriers to discharge with patient and caregiver   Arrange for needed discharge resources and transportation as appropriate   Identify discharge learning needs (meds, wound care, etc)   Arrange for interpreters to assist at discharge as needed   Refer to discharge planning if patient needs post-hospital services based on physician order or complex needs related to functional status, cognitive ability or social support system     Problem: Discharge Planning  Goal: Discharge to home or other facility with appropriate resources  Outcome: Progressing  Flowsheets (Taken 5/16/2024 1800)  Discharge to home or other facility with appropriate resources:   Identify barriers to discharge with patient and caregiver   Arrange for needed discharge resources and transportation as appropriate   Identify discharge learning needs (meds, wound care, etc)   Arrange for interpreters to assist at discharge as needed   Refer to discharge planning if patient needs post-hospital services based on physician order or complex needs related to functional status, cognitive ability or social support system

## 2024-05-16 NOTE — DISCHARGE INSTRUCTIONS
DISCHARGE INSTRUCTIONS::    Home Care :  Ok to shower in 24 hours after procedure.   Discontinue band aid prior to showering.  Do not apply further band aids. Keep clean, dry and open to air.  No powder or lotion.  Do not soak in a pool or Hot Tub or bath tub and for one week after the test.   If there is any bleeding at the catheter site, apply firm pressure with your hands until the bleeding stops. If bleeding continues after 3 minutes call 911.   If there is any swelling or firm areas at your puncture site, this could be bleeding under the skin (hematoma), and if you have any concerns seek help immediately.   Drink plenty of fluids after the test. This will flush the x-ray dye from your system.   Return to your normal diet.    The sedative will make you sleepy. Rest until the effects have worn off.   Ask your doctor when you will be able to return to work.   Avoid lifting objects heavier than 8-10 pounds (gallon of Milk) for 3-5 days.  Avoid Physically demanding activities, and sexual activity for 3-5 days.   Try to change positions frequently to prevent blood clots.      Medications :      If advised by your doctor, resume taking your normal medicines. Use acetaminophen (Tylenol) for pain relief.   DO NOT STOP TAKING PLAVIX/EFFIENT UNLESS TOLD TO BY YOUR CARDIOLOGIST  Do not take metformin (Glucophage) or glyburide and metformin (Glucovance) for 48 hours after the test.        Call Your Doctor If Any of the Following Occurs :  Signs of infection, including fever and chills   Redness, swelling, increasing pain, excessive bleeding, or any discharge from the insertion site     CALL 911 if you have symptoms including :  Drooping facial muscles, Changes in vision or speech   Difficulty walking or using your limbs   Change in sensation to affected leg, including numbness, feeling cold, or change in color , Blue Nail beds.  Extreme sweating, nausea or vomiting   Dizziness or lightheadedness. Weakness or

## 2024-05-16 NOTE — ED NOTES
Dr. Mendes at bedside for update. POC includes Cardiology consult for admission d/t new onset of irregular rhythm.

## 2024-05-17 ENCOUNTER — APPOINTMENT (OUTPATIENT)
Age: 74
DRG: 273 | End: 2024-05-17
Attending: INTERNAL MEDICINE
Payer: MEDICARE

## 2024-05-17 PROBLEM — I25.5 ISCHEMIC CARDIOMYOPATHY: Status: ACTIVE | Noted: 2024-05-17

## 2024-05-17 PROBLEM — N30.00 ACUTE CYSTITIS WITHOUT HEMATURIA: Status: ACTIVE | Noted: 2024-05-17

## 2024-05-17 PROBLEM — I47.20 VENTRICULAR TACHYCARDIA (HCC): Status: ACTIVE | Noted: 2024-05-16

## 2024-05-17 PROBLEM — Z95.5 S/P CORONARY ARTERY STENT PLACEMENT: Status: ACTIVE | Noted: 2024-05-17

## 2024-05-17 LAB
ANION GAP SERPL CALCULATED.3IONS-SCNC: 9 MMOL/L (ref 9–16)
BASOPHILS # BLD: 0.03 K/UL (ref 0–0.2)
BASOPHILS NFR BLD: 0 % (ref 0–2)
BUN SERPL-MCNC: 19 MG/DL (ref 8–23)
CALCIUM SERPL-MCNC: 8.1 MG/DL (ref 8.6–10.4)
CHLORIDE SERPL-SCNC: 110 MMOL/L (ref 98–107)
CO2 SERPL-SCNC: 20 MMOL/L (ref 20–31)
CREAT SERPL-MCNC: 0.7 MG/DL (ref 0.5–0.9)
EKG ATRIAL RATE: 65 BPM
EKG ATRIAL RATE: 71 BPM
EKG ATRIAL RATE: 83 BPM
EKG ATRIAL RATE: 86 BPM
EKG ATRIAL RATE: 93 BPM
EKG P AXIS: 59 DEGREES
EKG P AXIS: 65 DEGREES
EKG P AXIS: 66 DEGREES
EKG P AXIS: 70 DEGREES
EKG P AXIS: 72 DEGREES
EKG P-R INTERVAL: 156 MS
EKG P-R INTERVAL: 160 MS
EKG P-R INTERVAL: 168 MS
EKG P-R INTERVAL: 170 MS
EKG P-R INTERVAL: 170 MS
EKG Q-T INTERVAL: 340 MS
EKG Q-T INTERVAL: 364 MS
EKG Q-T INTERVAL: 374 MS
EKG Q-T INTERVAL: 394 MS
EKG Q-T INTERVAL: 418 MS
EKG QRS DURATION: 68 MS
EKG QRS DURATION: 70 MS
EKG QRS DURATION: 72 MS
EKG QRS DURATION: 72 MS
EKG QRS DURATION: 74 MS
EKG QTC CALCULATION (BAZETT): 409 MS
EKG QTC CALCULATION (BAZETT): 427 MS
EKG QTC CALCULATION (BAZETT): 447 MS
EKG QTC CALCULATION (BAZETT): 454 MS
EKG QTC CALCULATION (BAZETT): 484 MS
EKG R AXIS: 14 DEGREES
EKG R AXIS: 16 DEGREES
EKG R AXIS: 23 DEGREES
EKG R AXIS: 34 DEGREES
EKG R AXIS: 8 DEGREES
EKG T AXIS: 44 DEGREES
EKG T AXIS: 45 DEGREES
EKG T AXIS: 55 DEGREES
EKG T AXIS: 66 DEGREES
EKG T AXIS: 82 DEGREES
EKG VENTRICULAR RATE: 122 BPM
EKG VENTRICULAR RATE: 65 BPM
EKG VENTRICULAR RATE: 71 BPM
EKG VENTRICULAR RATE: 83 BPM
EKG VENTRICULAR RATE: 86 BPM
EOSINOPHIL # BLD: 0.05 K/UL (ref 0–0.44)
EOSINOPHILS RELATIVE PERCENT: 1 % (ref 1–4)
ERYTHROCYTE [DISTWIDTH] IN BLOOD BY AUTOMATED COUNT: 14.1 % (ref 11.8–14.4)
GFR, ESTIMATED: 85 ML/MIN/1.73M2
GLUCOSE SERPL-MCNC: 89 MG/DL (ref 74–99)
HCT VFR BLD AUTO: 37.1 % (ref 36.3–47.1)
HGB BLD-MCNC: 11.7 G/DL (ref 11.9–15.1)
IMM GRANULOCYTES # BLD AUTO: 0.03 K/UL (ref 0–0.3)
IMM GRANULOCYTES NFR BLD: 0 %
LYMPHOCYTES NFR BLD: 1.25 K/UL (ref 1.1–3.7)
LYMPHOCYTES RELATIVE PERCENT: 18 % (ref 24–43)
MCH RBC QN AUTO: 30.5 PG (ref 25.2–33.5)
MCHC RBC AUTO-ENTMCNC: 31.5 G/DL (ref 28.4–34.8)
MCV RBC AUTO: 96.6 FL (ref 82.6–102.9)
MICROORGANISM SPEC CULT: NO GROWTH
MONOCYTES NFR BLD: 0.52 K/UL (ref 0.1–1.2)
MONOCYTES NFR BLD: 8 % (ref 3–12)
NEUTROPHILS NFR BLD: 73 % (ref 36–65)
NEUTS SEG NFR BLD: 4.92 K/UL (ref 1.5–8.1)
NRBC BLD-RTO: 0 PER 100 WBC
PLATELET # BLD AUTO: 128 K/UL (ref 138–453)
PMV BLD AUTO: 9.8 FL (ref 8.1–13.5)
POTASSIUM SERPL-SCNC: 4.5 MMOL/L (ref 3.7–5.3)
RBC # BLD AUTO: 3.84 M/UL (ref 3.95–5.11)
SODIUM SERPL-SCNC: 139 MMOL/L (ref 136–145)
SPECIMEN DESCRIPTION: NORMAL
WBC OTHER # BLD: 6.8 K/UL (ref 3.5–11.3)

## 2024-05-17 PROCEDURE — 93005 ELECTROCARDIOGRAM TRACING: CPT | Performed by: STUDENT IN AN ORGANIZED HEALTH CARE EDUCATION/TRAINING PROGRAM

## 2024-05-17 PROCEDURE — 93306 TTE W/DOPPLER COMPLETE: CPT | Performed by: INTERNAL MEDICINE

## 2024-05-17 PROCEDURE — 6370000000 HC RX 637 (ALT 250 FOR IP): Performed by: STUDENT IN AN ORGANIZED HEALTH CARE EDUCATION/TRAINING PROGRAM

## 2024-05-17 PROCEDURE — 85025 COMPLETE CBC W/AUTO DIFF WBC: CPT

## 2024-05-17 PROCEDURE — 2700000000 HC OXYGEN THERAPY PER DAY

## 2024-05-17 PROCEDURE — 93005 ELECTROCARDIOGRAM TRACING: CPT | Performed by: INTERNAL MEDICINE

## 2024-05-17 PROCEDURE — 94640 AIRWAY INHALATION TREATMENT: CPT

## 2024-05-17 PROCEDURE — 93010 ELECTROCARDIOGRAM REPORT: CPT | Performed by: INTERNAL MEDICINE

## 2024-05-17 PROCEDURE — 99232 SBSQ HOSP IP/OBS MODERATE 35: CPT | Performed by: STUDENT IN AN ORGANIZED HEALTH CARE EDUCATION/TRAINING PROGRAM

## 2024-05-17 PROCEDURE — 2060000000 HC ICU INTERMEDIATE R&B

## 2024-05-17 PROCEDURE — 6360000002 HC RX W HCPCS: Performed by: INTERNAL MEDICINE

## 2024-05-17 PROCEDURE — 6360000002 HC RX W HCPCS: Performed by: STUDENT IN AN ORGANIZED HEALTH CARE EDUCATION/TRAINING PROGRAM

## 2024-05-17 PROCEDURE — 6370000000 HC RX 637 (ALT 250 FOR IP): Performed by: INTERNAL MEDICINE

## 2024-05-17 PROCEDURE — 93306 TTE W/DOPPLER COMPLETE: CPT

## 2024-05-17 PROCEDURE — 80048 BASIC METABOLIC PNL TOTAL CA: CPT

## 2024-05-17 PROCEDURE — 2580000003 HC RX 258: Performed by: INTERNAL MEDICINE

## 2024-05-17 PROCEDURE — 36415 COLL VENOUS BLD VENIPUNCTURE: CPT

## 2024-05-17 PROCEDURE — 99233 SBSQ HOSP IP/OBS HIGH 50: CPT | Performed by: INTERNAL MEDICINE

## 2024-05-17 PROCEDURE — 2500000003 HC RX 250 WO HCPCS: Performed by: STUDENT IN AN ORGANIZED HEALTH CARE EDUCATION/TRAINING PROGRAM

## 2024-05-17 PROCEDURE — 94761 N-INVAS EAR/PLS OXIMETRY MLT: CPT

## 2024-05-17 RX ORDER — PREDNISONE 20 MG/1
20 TABLET ORAL DAILY
Status: DISCONTINUED | OUTPATIENT
Start: 2024-05-17 | End: 2024-05-20

## 2024-05-17 RX ORDER — METOPROLOL SUCCINATE 25 MG/1
12.5 TABLET, EXTENDED RELEASE ORAL 2 TIMES DAILY
Status: DISCONTINUED | OUTPATIENT
Start: 2024-05-17 | End: 2024-05-26 | Stop reason: HOSPADM

## 2024-05-17 RX ORDER — LEVALBUTEROL INHALATION SOLUTION 0.63 MG/3ML
1.25 SOLUTION RESPIRATORY (INHALATION) EVERY 4 HOURS PRN
Status: DISCONTINUED | OUTPATIENT
Start: 2024-05-17 | End: 2024-05-18

## 2024-05-17 RX ORDER — BUDESONIDE AND FORMOTEROL FUMARATE DIHYDRATE 160; 4.5 UG/1; UG/1
2 AEROSOL RESPIRATORY (INHALATION)
Status: DISCONTINUED | OUTPATIENT
Start: 2024-05-17 | End: 2024-05-26 | Stop reason: HOSPADM

## 2024-05-17 RX ADMIN — Medication 0.5 MG/MIN: at 17:26

## 2024-05-17 RX ADMIN — ONDANSETRON 4 MG: 2 INJECTION INTRAMUSCULAR; INTRAVENOUS at 00:57

## 2024-05-17 RX ADMIN — Medication 150 MG: at 09:45

## 2024-05-17 RX ADMIN — BUDESONIDE AND FORMOTEROL FUMARATE DIHYDRATE 2 PUFF: 160; 4.5 AEROSOL RESPIRATORY (INHALATION) at 11:38

## 2024-05-17 RX ADMIN — ENOXAPARIN SODIUM 40 MG: 100 INJECTION SUBCUTANEOUS at 08:49

## 2024-05-17 RX ADMIN — METOPROLOL SUCCINATE 12.5 MG: 25 TABLET, EXTENDED RELEASE ORAL at 21:12

## 2024-05-17 RX ADMIN — SODIUM CHLORIDE, PRESERVATIVE FREE 10 ML: 5 INJECTION INTRAVENOUS at 08:50

## 2024-05-17 RX ADMIN — Medication 1 MG/MIN: at 10:03

## 2024-05-17 RX ADMIN — METOPROLOL SUCCINATE 12.5 MG: 25 TABLET, EXTENDED RELEASE ORAL at 08:49

## 2024-05-17 RX ADMIN — ASPIRIN 81 MG: 81 TABLET, COATED ORAL at 08:49

## 2024-05-17 RX ADMIN — LIDOCAINE HYDROCHLORIDE 1 MG/MIN: 4 INJECTION, SOLUTION INTRAVENOUS at 10:02

## 2024-05-17 RX ADMIN — Medication 1000 MG: at 05:54

## 2024-05-17 RX ADMIN — PREDNISONE 20 MG: 20 TABLET ORAL at 11:06

## 2024-05-17 NOTE — CARE COORDINATION
05/17/24 1045   Readmission Assessment   Number of Days since last admission? 1-7 days   Previous Disposition Other (comment)  (Transfer from [facility])   Who is being Interviewed Patient   What was the patient's/caregiver's perception as to why they think they needed to return back to the hospital? Other (Comment)  (Transfer from [facility])   Did you visit your Primary Care Physician after you left the hospital, before you returned this time? No   Why weren't you able to visit your PCP? Other (Comment)  (Transfer from [facility])   Did you see a specialist, such as Cardiac, Pulmonary, Orthopedic Physician, etc. after you left the hospital? Other (Comment)  (Transfer from [facility])   Who advised the patient to return to the hospital? Physician   Does the patient report anything that got in the way of taking their medications? No   In our efforts to provide the best possible care to you and others like you, can you think of anything that we could have done to help you after you left the hospital the first time, so that you might not have needed to return so soon? Other (Comment)  (Transfer from [facility])

## 2024-05-17 NOTE — DISCHARGE SUMMARY
[facility]  Family Medicine      Discharge Summary      NAME:  Coni Baker  :  1950  MRN:  [id number]    Admit date:  2024  Discharge date:  2024    Admitting Physician:  Tan Caldwell MD    Primary Diagnosis on Admission:   Present on Admission:   NSVT (nonsustained ventricular tachycardia) (HCC)   Mixed hyperlipidemia   Primary hypertension   Shortness of breath   S/P cardiac cath   Angina pectoris (HCC)   Acute cystitis without hematuria      Secondary Diagnoses:  has S/P cardiac cath on their pertinent problem list.      Admission Condition:  poor     Discharged Condition: poor      Hospital Course:   The patient was admitted for the management of sweating, and feeling very weak and dizzy for 2 days. Pt has also had difficulty breathing and presented to the ER for workup. Pt had elevated BNP, +LE on urinalysis and was noted to have runs of NSVT. Cardio was consulted and pt started on amiodarone drip, admitted to the ICU. In the unit pt had hypotension and continued runs of NSVT, with episodes of severe bradycardia. Transfer to [facility] for cardiac cath was initiated.     Consults:  cardiology and pulmonary/intensive care    Significant Diagnostic/theraputic interventions: ICU management      Disposition:   [facility]    Instructions to Patient:      Follow up with Earlene Salcedo MD in  1-2 weeks    Discharge Medications:       Medication List        CONTINUE taking these medications      Handicap Placard Misc  by Does not apply route 5 years, cannot walk over 200 ft.            ASK your doctor about these medications      aspirin 81 MG EC tablet     Calcium-Vitamin D 600-200 MG-UNIT Tabs  Take 1 tablet by mouth 3 times daily (before meals)     metoprolol tartrate 50 MG tablet  Commonly known as: LOPRESSOR     ramipril 5 MG capsule  Commonly known as: ALTACE     rosuvastatin 20 MG tablet  Commonly known as: CRESTOR              Send Copies to: Earlene Salcedo,

## 2024-05-17 NOTE — CARE COORDINATION
Case Management Assessment  Initial Evaluation    Date/Time of Evaluation: 5/17/2024 10:53 AM  Assessment Completed by: TIERA RUTLEDGE RN    If patient is discharged prior to next notation, then this note serves as note for discharge by case management.    Patient Name: Coni Baker                   YOB: 1950  Diagnosis: Angina pectoris (HCC) [I20.9]  Shortness of breath [R06.02]  S/P cardiac cath [Z98.890]                   Date / Time: 5/16/2024  3:45 PM    Patient Admission Status: Inpatient   Readmission Risk (Low < 19, Mod (19-27), High > 27): Readmission Risk Score: 9.8    Current PCP: Earlene Salcedo MD  PCP verified by CM? (P) Yes    Chart Reviewed: Yes      History Provided by: Patient  Patient Orientation: Alert and Oriented    Patient Cognition: Alert    Hospitalization in the last 30 days (Readmission):  No    If yes, Readmission Assessment in CM Navigator will be completed.    Advance Directives:      Code Status: Full Code   Patient's Primary Decision Maker is: (P) Legal Next of Kin    Primary Decision Maker: Girma Bateman - Child - [phone]    Secondary Decision Maker: Gi Chau - Child - [phone]    Discharge Planning:    Patient lives with: (P) Family Members (Granddaughter) Type of Home: (P) House  Primary Care Giver: Self  Patient Support Systems include: (P) Children   Current Financial resources: (P) Medicare  Current community resources:    Current services prior to admission: (P) Durable Medical Equipment            Current DME: (P) Shower Chair, Walker, Wheelchair (Grab bars)            Type of Home Care services:  (P) None    ADLS  Prior functional level: (P) Independent in ADLs/IADLs  Current functional level: (P) Other (see comment) (Unknown at present)    PT AM-PAC:   /24  OT AM-PAC:   /24    Family can provide assistance at DC: (P) Yes  Would you like Case Management to discuss the discharge plan with any other family members/significant others, and

## 2024-05-17 NOTE — PLAN OF CARE
Problem: Safety - Adult  Goal: Free from fall injury  5/17/2024 0724 by Kassandra Taveras RN  Outcome: Progressing  5/16/2024 1826 by Kassandra Taveras RN  Outcome: Progressing  Flowsheets (Taken 5/16/2024 1754)  Free From Fall Injury:   Instruct family/caregiver on patient safety   Based on caregiver fall risk screen, instruct family/caregiver to ask for assistance with transferring infant if caregiver noted to have fall risk factors     Problem: Discharge Planning  Goal: Discharge to home or other facility with appropriate resources  5/17/2024 0724 by Kassandra Taveras, RN  Outcome: Progressing  5/16/2024 1826 by Kassandra Taveras RN  Outcome: Progressing  Flowsheets (Taken 5/16/2024 1800)  Discharge to home or other facility with appropriate resources:   Identify barriers to discharge with patient and caregiver   Arrange for needed discharge resources and transportation as appropriate   Identify discharge learning needs (meds, wound care, etc)   Arrange for interpreters to assist at discharge as needed   Refer to discharge planning if patient needs post-hospital services based on physician order or complex needs related to functional status, cognitive ability or social support system     Problem: Skin/Tissue Integrity  Goal: Absence of new skin breakdown  Description: 1.  Monitor for areas of redness and/or skin breakdown  2.  Assess vascular access sites hourly  3.  Every 4-6 hours minimum:  Change oxygen saturation probe site  4.  Every 4-6 hours:  If on nasal continuous positive airway pressure, respiratory therapy assess nares and determine need for appliance change or resting period.  Outcome: Progressing

## 2024-05-18 PROBLEM — R74.01 TRANSAMINITIS: Status: ACTIVE | Noted: 2024-05-18

## 2024-05-18 PROBLEM — I42.9 CARDIOMYOPATHY (HCC): Status: ACTIVE | Noted: 2024-05-18

## 2024-05-18 LAB
ALBUMIN SERPL-MCNC: 3.5 G/DL (ref 3.5–5.2)
ALBUMIN/GLOB SERPL: 2 {RATIO} (ref 1–2.5)
ALP SERPL-CCNC: 84 U/L (ref 35–104)
ALT SERPL-CCNC: 120 U/L (ref 10–35)
ANION GAP SERPL CALCULATED.3IONS-SCNC: 7 MMOL/L (ref 9–16)
AST SERPL-CCNC: 66 U/L (ref 10–35)
BASOPHILS # BLD: 0.04 K/UL (ref 0–0.2)
BASOPHILS NFR BLD: 0 % (ref 0–2)
BILIRUB SERPL-MCNC: 0.4 MG/DL (ref 0–1.2)
BUN SERPL-MCNC: 24 MG/DL (ref 8–23)
CALCIUM SERPL-MCNC: 8.5 MG/DL (ref 8.6–10.4)
CHLORIDE SERPL-SCNC: 109 MMOL/L (ref 98–107)
CO2 SERPL-SCNC: 19 MMOL/L (ref 20–31)
CREAT SERPL-MCNC: 0.8 MG/DL (ref 0.5–0.9)
ECHO AO ASC DIAM: 3.3 CM
ECHO AO ASCENDING AORTA INDEX: 1.9 CM/M2
ECHO AO ROOT DIAM: 3.1 CM
ECHO AO ROOT INDEX: 1.78 CM/M2
ECHO AV AREA PEAK VELOCITY: 2.3 CM2
ECHO AV AREA VTI: 2.1 CM2
ECHO AV AREA/BSA PEAK VELOCITY: 1.3 CM2/M2
ECHO AV AREA/BSA VTI: 1.2 CM2/M2
ECHO AV MEAN GRADIENT: 7 MMHG
ECHO AV MEAN VELOCITY: 1.2 M/S
ECHO AV PEAK GRADIENT: 14 MMHG
ECHO AV PEAK VELOCITY: 1.9 M/S
ECHO AV VELOCITY RATIO: 0.74
ECHO AV VTI: 36 CM
ECHO BSA: 1.79 M2
ECHO EST RA PRESSURE: 15 MMHG
ECHO LA AREA 2C: 27.8 CM2
ECHO LA AREA 4C: 18.7 CM2
ECHO LA DIAMETER INDEX: 2.47 CM/M2
ECHO LA DIAMETER: 4.3 CM
ECHO LA MAJOR AXIS: 6.2 CM
ECHO LA MINOR AXIS: 6.9 CM
ECHO LA TO AORTIC ROOT RATIO: 1.39
ECHO LA VOL BP: 68 ML (ref 22–52)
ECHO LA VOL MOD A2C: 93 ML (ref 22–52)
ECHO LA VOL MOD A4C: 45 ML (ref 22–52)
ECHO LA VOL/BSA BIPLANE: 39 ML/M2 (ref 16–34)
ECHO LA VOLUME INDEX MOD A2C: 53 ML/M2 (ref 16–34)
ECHO LA VOLUME INDEX MOD A4C: 26 ML/M2 (ref 16–34)
ECHO LV E' LATERAL VELOCITY: 11 CM/S
ECHO LV E' SEPTAL VELOCITY: 9 CM/S
ECHO LV FRACTIONAL SHORTENING: 35 % (ref 28–44)
ECHO LV INTERNAL DIMENSION DIASTOLE INDEX: 2.76 CM/M2
ECHO LV INTERNAL DIMENSION DIASTOLIC: 4.8 CM (ref 3.9–5.3)
ECHO LV INTERNAL DIMENSION SYSTOLIC INDEX: 1.78 CM/M2
ECHO LV INTERNAL DIMENSION SYSTOLIC: 3.1 CM
ECHO LV IVSD: 1 CM (ref 0.6–0.9)
ECHO LV MASS 2D: 158.8 G (ref 67–162)
ECHO LV MASS INDEX 2D: 91.3 G/M2 (ref 43–95)
ECHO LV POSTERIOR WALL DIASTOLIC: 0.9 CM (ref 0.6–0.9)
ECHO LV RELATIVE WALL THICKNESS RATIO: 0.38
ECHO LVOT AREA: 3.1 CM2
ECHO LVOT AV VTI INDEX: 0.68
ECHO LVOT DIAM: 2 CM
ECHO LVOT MEAN GRADIENT: 4 MMHG
ECHO LVOT PEAK GRADIENT: 8 MMHG
ECHO LVOT PEAK VELOCITY: 1.4 M/S
ECHO LVOT STROKE VOLUME INDEX: 44.2 ML/M2
ECHO LVOT SV: 76.9 ML
ECHO LVOT VTI: 24.5 CM
ECHO MV A VELOCITY: 0.74 M/S
ECHO MV AREA VTI: 1.8 CM2
ECHO MV E DECELERATION TIME (DT): 179 MS
ECHO MV E VELOCITY: 0.9 M/S
ECHO MV E/A RATIO: 1.22
ECHO MV E/E' LATERAL: 8.18
ECHO MV E/E' RATIO (AVERAGED): 9.09
ECHO MV E/E' SEPTAL: 10
ECHO MV LVOT VTI INDEX: 1.75
ECHO MV MAX VELOCITY: 1.2 M/S
ECHO MV MEAN GRADIENT: 1 MMHG
ECHO MV MEAN VELOCITY: 0.4 M/S
ECHO MV PEAK GRADIENT: 5 MMHG
ECHO MV VTI: 42.8 CM
ECHO PV MAX VELOCITY: 0.9 M/S
ECHO PV PEAK GRADIENT: 3 MMHG
ECHO RA AREA 4C: 16.2 CM2
ECHO RA END SYSTOLIC VOLUME APICAL 4 CHAMBER INDEX BSA: 27 ML/M2
ECHO RA VOLUME: 47 ML
ECHO RIGHT VENTRICULAR SYSTOLIC PRESSURE (RVSP): 70 MMHG
ECHO RV FREE WALL PEAK S': 18 CM/S
ECHO RV INTERNAL DIMENSION: 3.8 CM
ECHO RV TAPSE: 2 CM (ref 1.7–?)
ECHO TV REGURGITANT MAX VELOCITY: 3.71 M/S
ECHO TV REGURGITANT PEAK GRADIENT: 55 MMHG
EOSINOPHIL # BLD: <0.03 K/UL (ref 0–0.44)
EOSINOPHILS RELATIVE PERCENT: 0 % (ref 1–4)
ERYTHROCYTE [DISTWIDTH] IN BLOOD BY AUTOMATED COUNT: 14.2 % (ref 11.8–14.4)
GFR, ESTIMATED: 75 ML/MIN/1.73M2
GLUCOSE SERPL-MCNC: 106 MG/DL (ref 74–99)
HCT VFR BLD AUTO: 37.5 % (ref 36.3–47.1)
HGB BLD-MCNC: 11.5 G/DL (ref 11.9–15.1)
IMM GRANULOCYTES # BLD AUTO: 0.04 K/UL (ref 0–0.3)
IMM GRANULOCYTES NFR BLD: 0 %
LYMPHOCYTES NFR BLD: 1.74 K/UL (ref 1.1–3.7)
LYMPHOCYTES RELATIVE PERCENT: 19 % (ref 24–43)
MCH RBC QN AUTO: 30.3 PG (ref 25.2–33.5)
MCHC RBC AUTO-ENTMCNC: 30.7 G/DL (ref 28.4–34.8)
MCV RBC AUTO: 98.7 FL (ref 82.6–102.9)
MONOCYTES NFR BLD: 0.72 K/UL (ref 0.1–1.2)
MONOCYTES NFR BLD: 8 % (ref 3–12)
NEUTROPHILS NFR BLD: 73 % (ref 36–65)
NEUTS SEG NFR BLD: 6.4 K/UL (ref 1.5–8.1)
NRBC BLD-RTO: 0 PER 100 WBC
PLATELET # BLD AUTO: 125 K/UL (ref 138–453)
PMV BLD AUTO: 10.1 FL (ref 8.1–13.5)
POTASSIUM SERPL-SCNC: 4.8 MMOL/L (ref 3.7–5.3)
PROT SERPL-MCNC: 5.6 G/DL (ref 6.6–8.7)
RBC # BLD AUTO: 3.8 M/UL (ref 3.95–5.11)
SODIUM SERPL-SCNC: 135 MMOL/L (ref 136–145)
WBC OTHER # BLD: 9 K/UL (ref 3.5–11.3)

## 2024-05-18 PROCEDURE — 6360000002 HC RX W HCPCS: Performed by: STUDENT IN AN ORGANIZED HEALTH CARE EDUCATION/TRAINING PROGRAM

## 2024-05-18 PROCEDURE — 6370000000 HC RX 637 (ALT 250 FOR IP): Performed by: STUDENT IN AN ORGANIZED HEALTH CARE EDUCATION/TRAINING PROGRAM

## 2024-05-18 PROCEDURE — 2060000000 HC ICU INTERMEDIATE R&B

## 2024-05-18 PROCEDURE — 36415 COLL VENOUS BLD VENIPUNCTURE: CPT

## 2024-05-18 PROCEDURE — 6370000000 HC RX 637 (ALT 250 FOR IP): Performed by: INTERNAL MEDICINE

## 2024-05-18 PROCEDURE — 2580000003 HC RX 258: Performed by: INTERNAL MEDICINE

## 2024-05-18 PROCEDURE — 85025 COMPLETE CBC W/AUTO DIFF WBC: CPT

## 2024-05-18 PROCEDURE — 99233 SBSQ HOSP IP/OBS HIGH 50: CPT | Performed by: NURSE PRACTITIONER

## 2024-05-18 PROCEDURE — 94761 N-INVAS EAR/PLS OXIMETRY MLT: CPT

## 2024-05-18 PROCEDURE — 2700000000 HC OXYGEN THERAPY PER DAY

## 2024-05-18 PROCEDURE — 94640 AIRWAY INHALATION TREATMENT: CPT

## 2024-05-18 PROCEDURE — 99232 SBSQ HOSP IP/OBS MODERATE 35: CPT | Performed by: STUDENT IN AN ORGANIZED HEALTH CARE EDUCATION/TRAINING PROGRAM

## 2024-05-18 PROCEDURE — 2500000003 HC RX 250 WO HCPCS: Performed by: STUDENT IN AN ORGANIZED HEALTH CARE EDUCATION/TRAINING PROGRAM

## 2024-05-18 PROCEDURE — 6360000002 HC RX W HCPCS: Performed by: INTERNAL MEDICINE

## 2024-05-18 PROCEDURE — 80053 COMPREHEN METABOLIC PANEL: CPT

## 2024-05-18 RX ORDER — LEVALBUTEROL INHALATION SOLUTION 0.63 MG/3ML
1.25 SOLUTION RESPIRATORY (INHALATION) EVERY 4 HOURS
Status: DISCONTINUED | OUTPATIENT
Start: 2024-05-18 | End: 2024-05-21

## 2024-05-18 RX ADMIN — LIDOCAINE HYDROCHLORIDE 1 MG/MIN: 4 INJECTION, SOLUTION INTRAVENOUS at 20:38

## 2024-05-18 RX ADMIN — METOPROLOL SUCCINATE 12.5 MG: 25 TABLET, EXTENDED RELEASE ORAL at 21:22

## 2024-05-18 RX ADMIN — ENOXAPARIN SODIUM 40 MG: 100 INJECTION SUBCUTANEOUS at 08:52

## 2024-05-18 RX ADMIN — SODIUM CHLORIDE: 9 INJECTION, SOLUTION INTRAVENOUS at 23:37

## 2024-05-18 RX ADMIN — METOPROLOL SUCCINATE 12.5 MG: 25 TABLET, EXTENDED RELEASE ORAL at 08:52

## 2024-05-18 RX ADMIN — LEVALBUTEROL HYDROCHLORIDE 1.25 MG: 0.63 SOLUTION RESPIRATORY (INHALATION) at 15:30

## 2024-05-18 RX ADMIN — ASPIRIN 81 MG: 81 TABLET, COATED ORAL at 08:52

## 2024-05-18 RX ADMIN — LEVALBUTEROL HYDROCHLORIDE 1.25 MG: 0.63 SOLUTION RESPIRATORY (INHALATION) at 21:29

## 2024-05-18 RX ADMIN — Medication 0.5 MG/MIN: at 09:35

## 2024-05-18 RX ADMIN — PREDNISONE 20 MG: 20 TABLET ORAL at 09:01

## 2024-05-18 RX ADMIN — BUDESONIDE AND FORMOTEROL FUMARATE DIHYDRATE 2 PUFF: 160; 4.5 AEROSOL RESPIRATORY (INHALATION) at 07:14

## 2024-05-18 RX ADMIN — SODIUM CHLORIDE, PRESERVATIVE FREE 10 ML: 5 INJECTION INTRAVENOUS at 20:39

## 2024-05-19 LAB
ALBUMIN SERPL-MCNC: 3.4 G/DL (ref 3.5–5.2)
ALBUMIN/GLOB SERPL: 2 {RATIO} (ref 1–2.5)
ALP SERPL-CCNC: 77 U/L (ref 35–104)
ALT SERPL-CCNC: 99 U/L (ref 10–35)
ANION GAP SERPL CALCULATED.3IONS-SCNC: 9 MMOL/L (ref 9–16)
AST SERPL-CCNC: 40 U/L (ref 10–35)
BASOPHILS # BLD: 0.07 K/UL (ref 0–0.2)
BASOPHILS NFR BLD: 1 % (ref 0–2)
BILIRUB SERPL-MCNC: 0.4 MG/DL (ref 0–1.2)
BUN SERPL-MCNC: 21 MG/DL (ref 8–23)
CALCIUM SERPL-MCNC: 8.5 MG/DL (ref 8.6–10.4)
CHLORIDE SERPL-SCNC: 110 MMOL/L (ref 98–107)
CO2 SERPL-SCNC: 19 MMOL/L (ref 20–31)
CREAT SERPL-MCNC: 0.8 MG/DL (ref 0.5–0.9)
EOSINOPHIL # BLD: 0.04 K/UL (ref 0–0.44)
EOSINOPHILS RELATIVE PERCENT: 1 % (ref 1–4)
ERYTHROCYTE [DISTWIDTH] IN BLOOD BY AUTOMATED COUNT: 14.4 % (ref 11.8–14.4)
GFR, ESTIMATED: 77 ML/MIN/1.73M2
GLUCOSE SERPL-MCNC: 86 MG/DL (ref 74–99)
HCT VFR BLD AUTO: 40.5 % (ref 36.3–47.1)
HGB BLD-MCNC: 11.9 G/DL (ref 11.9–15.1)
IMM GRANULOCYTES # BLD AUTO: 0.04 K/UL (ref 0–0.3)
IMM GRANULOCYTES NFR BLD: 1 %
LYMPHOCYTES NFR BLD: 2.31 K/UL (ref 1.1–3.7)
LYMPHOCYTES RELATIVE PERCENT: 27 % (ref 24–43)
MCH RBC QN AUTO: 31 PG (ref 25.2–33.5)
MCHC RBC AUTO-ENTMCNC: 29.4 G/DL (ref 28.4–34.8)
MCV RBC AUTO: 105.5 FL (ref 82.6–102.9)
MONOCYTES NFR BLD: 0.71 K/UL (ref 0.1–1.2)
MONOCYTES NFR BLD: 8 % (ref 3–12)
NEUTROPHILS NFR BLD: 63 % (ref 36–65)
NEUTS SEG NFR BLD: 5.44 K/UL (ref 1.5–8.1)
NRBC BLD-RTO: 0 PER 100 WBC
PLATELET # BLD AUTO: 115 K/UL (ref 138–453)
PMV BLD AUTO: 10.4 FL (ref 8.1–13.5)
POTASSIUM SERPL-SCNC: 4.8 MMOL/L (ref 3.7–5.3)
PROT SERPL-MCNC: 5.5 G/DL (ref 6.6–8.7)
RBC # BLD AUTO: 3.84 M/UL (ref 3.95–5.11)
RBC # BLD: ABNORMAL 10*6/UL
SODIUM SERPL-SCNC: 138 MMOL/L (ref 136–145)
WBC OTHER # BLD: 8.6 K/UL (ref 3.5–11.3)

## 2024-05-19 PROCEDURE — 94761 N-INVAS EAR/PLS OXIMETRY MLT: CPT

## 2024-05-19 PROCEDURE — 94640 AIRWAY INHALATION TREATMENT: CPT

## 2024-05-19 PROCEDURE — 2700000000 HC OXYGEN THERAPY PER DAY

## 2024-05-19 PROCEDURE — 99232 SBSQ HOSP IP/OBS MODERATE 35: CPT | Performed by: STUDENT IN AN ORGANIZED HEALTH CARE EDUCATION/TRAINING PROGRAM

## 2024-05-19 PROCEDURE — 6370000000 HC RX 637 (ALT 250 FOR IP): Performed by: STUDENT IN AN ORGANIZED HEALTH CARE EDUCATION/TRAINING PROGRAM

## 2024-05-19 PROCEDURE — 85025 COMPLETE CBC W/AUTO DIFF WBC: CPT

## 2024-05-19 PROCEDURE — 80053 COMPREHEN METABOLIC PANEL: CPT

## 2024-05-19 PROCEDURE — 36415 COLL VENOUS BLD VENIPUNCTURE: CPT

## 2024-05-19 PROCEDURE — 6360000002 HC RX W HCPCS: Performed by: STUDENT IN AN ORGANIZED HEALTH CARE EDUCATION/TRAINING PROGRAM

## 2024-05-19 PROCEDURE — 2500000003 HC RX 250 WO HCPCS: Performed by: STUDENT IN AN ORGANIZED HEALTH CARE EDUCATION/TRAINING PROGRAM

## 2024-05-19 PROCEDURE — 2060000000 HC ICU INTERMEDIATE R&B

## 2024-05-19 PROCEDURE — 6370000000 HC RX 637 (ALT 250 FOR IP): Performed by: NURSE PRACTITIONER

## 2024-05-19 PROCEDURE — 99233 SBSQ HOSP IP/OBS HIGH 50: CPT | Performed by: NURSE PRACTITIONER

## 2024-05-19 PROCEDURE — 2580000003 HC RX 258: Performed by: INTERNAL MEDICINE

## 2024-05-19 RX ORDER — SENNA AND DOCUSATE SODIUM 50; 8.6 MG/1; MG/1
2 TABLET, FILM COATED ORAL DAILY
Status: DISCONTINUED | OUTPATIENT
Start: 2024-05-19 | End: 2024-05-26 | Stop reason: HOSPADM

## 2024-05-19 RX ORDER — BISACODYL 10 MG
10 SUPPOSITORY, RECTAL RECTAL ONCE
Status: COMPLETED | OUTPATIENT
Start: 2024-05-19 | End: 2024-05-19

## 2024-05-19 RX ORDER — PANTOPRAZOLE SODIUM 40 MG/1
40 TABLET, DELAYED RELEASE ORAL
Status: DISCONTINUED | OUTPATIENT
Start: 2024-05-19 | End: 2024-05-26 | Stop reason: HOSPADM

## 2024-05-19 RX ADMIN — LEVALBUTEROL HYDROCHLORIDE 0.63 MG: 0.63 SOLUTION RESPIRATORY (INHALATION) at 20:19

## 2024-05-19 RX ADMIN — BISACODYL 10 MG: 10 SUPPOSITORY RECTAL at 21:01

## 2024-05-19 RX ADMIN — LEVALBUTEROL HYDROCHLORIDE 1.25 MG: 0.63 SOLUTION RESPIRATORY (INHALATION) at 15:42

## 2024-05-19 RX ADMIN — SODIUM CHLORIDE, PRESERVATIVE FREE 10 ML: 5 INJECTION INTRAVENOUS at 08:31

## 2024-05-19 RX ADMIN — SENNOSIDES AND DOCUSATE SODIUM 2 TABLET: 50; 8.6 TABLET ORAL at 23:30

## 2024-05-19 RX ADMIN — METOPROLOL SUCCINATE 12.5 MG: 25 TABLET, EXTENDED RELEASE ORAL at 08:31

## 2024-05-19 RX ADMIN — Medication 0.5 MG/MIN: at 19:25

## 2024-05-19 RX ADMIN — BUDESONIDE AND FORMOTEROL FUMARATE DIHYDRATE 2 PUFF: 160; 4.5 AEROSOL RESPIRATORY (INHALATION) at 20:18

## 2024-05-19 RX ADMIN — PREDNISONE 20 MG: 20 TABLET ORAL at 08:31

## 2024-05-19 RX ADMIN — LEVALBUTEROL HYDROCHLORIDE 0.63 MG: 0.63 SOLUTION RESPIRATORY (INHALATION) at 23:22

## 2024-05-19 RX ADMIN — LEVALBUTEROL HYDROCHLORIDE 1.25 MG: 0.63 SOLUTION RESPIRATORY (INHALATION) at 11:41

## 2024-05-19 RX ADMIN — Medication 0.5 MG/MIN: at 01:24

## 2024-05-19 RX ADMIN — BUDESONIDE AND FORMOTEROL FUMARATE DIHYDRATE 2 PUFF: 160; 4.5 AEROSOL RESPIRATORY (INHALATION) at 08:15

## 2024-05-19 RX ADMIN — SODIUM CHLORIDE, PRESERVATIVE FREE 10 ML: 5 INJECTION INTRAVENOUS at 23:28

## 2024-05-19 RX ADMIN — PANTOPRAZOLE SODIUM 40 MG: 40 TABLET, DELAYED RELEASE ORAL at 08:36

## 2024-05-19 RX ADMIN — ENOXAPARIN SODIUM 40 MG: 100 INJECTION SUBCUTANEOUS at 08:31

## 2024-05-19 RX ADMIN — ASPIRIN 81 MG: 81 TABLET, COATED ORAL at 08:31

## 2024-05-19 RX ADMIN — METOPROLOL SUCCINATE 12.5 MG: 25 TABLET, EXTENDED RELEASE ORAL at 23:28

## 2024-05-19 RX ADMIN — LEVALBUTEROL HYDROCHLORIDE 1.25 MG: 0.63 SOLUTION RESPIRATORY (INHALATION) at 08:03

## 2024-05-19 NOTE — PLAN OF CARE
Problem: Safety - Adult  Goal: Free from fall injury  Outcome: Progressing  Flowsheets (Taken 5/18/2024 1927)  Free From Fall Injury: Instruct family/caregiver on patient safety     Problem: Discharge Planning  Goal: Discharge to home or other facility with appropriate resources  Outcome: Progressing  Flowsheets (Taken 5/18/2024 2015)  Discharge to home or other facility with appropriate resources:   Identify barriers to discharge with patient and caregiver   Arrange for needed discharge resources and transportation as appropriate   Identify discharge learning needs (meds, wound care, etc)   Arrange for interpreters to assist at discharge as needed   Refer to discharge planning if patient needs post-hospital services based on physician order or complex needs related to functional status, cognitive ability or social support system     Problem: Skin/Tissue Integrity  Goal: Absence of new skin breakdown  Description: 1.  Monitor for areas of redness and/or skin breakdown  2.  Assess vascular access sites hourly  3.  Every 4-6 hours minimum:  Change oxygen saturation probe site  4.  Every 4-6 hours:  If on nasal continuous positive airway pressure, respiratory therapy assess nares and determine need for appliance change or resting period.  Outcome: Progressing     Problem: Respiratory - Adult  Goal: Achieves optimal ventilation and oxygenation  5/19/2024 0116 by TAO FLOWERS  Outcome: Progressing  5/18/2024 2134 by Eloy Grajeda RCP  Outcome: Progressing

## 2024-05-20 ENCOUNTER — APPOINTMENT (OUTPATIENT)
Dept: GENERAL RADIOLOGY | Age: 74
DRG: 273 | End: 2024-05-20
Attending: INTERNAL MEDICINE
Payer: MEDICARE

## 2024-05-20 LAB
ALBUMIN SERPL-MCNC: 3.8 G/DL (ref 3.5–5.2)
ALBUMIN/GLOB SERPL: 2 {RATIO} (ref 1–2.5)
ALP SERPL-CCNC: 84 U/L (ref 35–104)
ALT SERPL-CCNC: 96 U/L (ref 10–35)
ANION GAP SERPL CALCULATED.3IONS-SCNC: 10 MMOL/L (ref 9–16)
ANION GAP SERPL CALCULATED.3IONS-SCNC: 11 MMOL/L (ref 9–16)
AST SERPL-CCNC: 37 U/L (ref 10–35)
BASOPHILS # BLD: 0.05 K/UL (ref 0–0.2)
BASOPHILS # BLD: 0.07 K/UL (ref 0–0.2)
BASOPHILS NFR BLD: 1 % (ref 0–2)
BASOPHILS NFR BLD: 1 % (ref 0–2)
BILIRUB SERPL-MCNC: 0.6 MG/DL (ref 0–1.2)
BUN BLD-MCNC: 24 MG/DL (ref 8–26)
BUN SERPL-MCNC: 24 MG/DL (ref 8–23)
BUN SERPL-MCNC: 24 MG/DL (ref 8–23)
CA-I BLD-SCNC: 1.25 MMOL/L (ref 1.15–1.33)
CALCIUM SERPL-MCNC: 8.6 MG/DL (ref 8.6–10.4)
CALCIUM SERPL-MCNC: 8.7 MG/DL (ref 8.6–10.4)
CHLORIDE BLD-SCNC: 108 MMOL/L (ref 98–107)
CHLORIDE SERPL-SCNC: 106 MMOL/L (ref 98–107)
CHLORIDE SERPL-SCNC: 108 MMOL/L (ref 98–107)
CO2 BLD CALC-SCNC: 22 MMOL/L (ref 22–30)
CO2 SERPL-SCNC: 18 MMOL/L (ref 20–31)
CO2 SERPL-SCNC: 23 MMOL/L (ref 20–31)
CREAT SERPL-MCNC: 0.8 MG/DL (ref 0.5–0.9)
CREAT SERPL-MCNC: 0.8 MG/DL (ref 0.5–0.9)
EGFR, POC: 67 ML/MIN/1.73M2
EOSINOPHIL # BLD: 0.03 K/UL (ref 0–0.44)
EOSINOPHIL # BLD: <0.03 K/UL (ref 0–0.44)
EOSINOPHILS RELATIVE PERCENT: 0 % (ref 1–4)
EOSINOPHILS RELATIVE PERCENT: 0 % (ref 1–4)
ERYTHROCYTE [DISTWIDTH] IN BLOOD BY AUTOMATED COUNT: 14.5 % (ref 11.8–14.4)
ERYTHROCYTE [DISTWIDTH] IN BLOOD BY AUTOMATED COUNT: 14.6 % (ref 11.8–14.4)
GFR, ESTIMATED: 73 ML/MIN/1.73M2
GFR, ESTIMATED: 74 ML/MIN/1.73M2
GLUCOSE BLD-MCNC: 119 MG/DL (ref 74–100)
GLUCOSE SERPL-MCNC: 104 MG/DL (ref 74–99)
GLUCOSE SERPL-MCNC: 107 MG/DL (ref 74–99)
HCT VFR BLD AUTO: 37 % (ref 36.3–47.1)
HCT VFR BLD AUTO: 39.6 % (ref 36.3–47.1)
HCT VFR BLD AUTO: 40 % (ref 36–46)
HGB BLD-MCNC: 12.2 G/DL (ref 11.9–15.1)
HGB BLD-MCNC: 12.3 G/DL (ref 11.9–15.1)
IMM GRANULOCYTES # BLD AUTO: 0.05 K/UL (ref 0–0.3)
IMM GRANULOCYTES # BLD AUTO: 0.07 K/UL (ref 0–0.3)
IMM GRANULOCYTES NFR BLD: 1 %
IMM GRANULOCYTES NFR BLD: 1 %
LACTIC ACID, WHOLE BLOOD: 1.8 MMOL/L (ref 0.7–2.1)
LYMPHOCYTES NFR BLD: 0.96 K/UL (ref 1.1–3.7)
LYMPHOCYTES NFR BLD: 1.92 K/UL (ref 1.1–3.7)
LYMPHOCYTES RELATIVE PERCENT: 10 % (ref 24–43)
LYMPHOCYTES RELATIVE PERCENT: 18 % (ref 24–43)
MAGNESIUM SERPL-MCNC: 2 MG/DL (ref 1.6–2.4)
MCH RBC QN AUTO: 31.1 PG (ref 25.2–33.5)
MCH RBC QN AUTO: 31.3 PG (ref 25.2–33.5)
MCHC RBC AUTO-ENTMCNC: 30.8 G/DL (ref 28.4–34.8)
MCHC RBC AUTO-ENTMCNC: 33.2 G/DL (ref 28.4–34.8)
MCV RBC AUTO: 101 FL (ref 82.6–102.9)
MCV RBC AUTO: 94.1 FL (ref 82.6–102.9)
MONOCYTES NFR BLD: 0.65 K/UL (ref 0.1–1.2)
MONOCYTES NFR BLD: 0.87 K/UL (ref 0.1–1.2)
MONOCYTES NFR BLD: 7 % (ref 3–12)
MONOCYTES NFR BLD: 8 % (ref 3–12)
NEGATIVE BASE EXCESS, ART: 3 MMOL/L (ref 0–2)
NEUTROPHILS NFR BLD: 72 % (ref 36–65)
NEUTROPHILS NFR BLD: 83 % (ref 36–65)
NEUTS SEG NFR BLD: 7.6 K/UL (ref 1.5–8.1)
NEUTS SEG NFR BLD: 8.25 K/UL (ref 1.5–8.1)
NRBC BLD-RTO: 0 PER 100 WBC
NRBC BLD-RTO: 0 PER 100 WBC
PLATELET # BLD AUTO: 119 K/UL (ref 138–453)
PLATELET # BLD AUTO: 140 K/UL (ref 138–453)
PMV BLD AUTO: 10.3 FL (ref 8.1–13.5)
PMV BLD AUTO: 10.5 FL (ref 8.1–13.5)
POC ANION GAP: 11 MMOL/L (ref 7–16)
POC CREATININE: 0.9 MG/DL (ref 0.51–1.19)
POC HCO3: 22.2 MMOL/L (ref 21–28)
POC HEMOGLOBIN (CALC): 13.5 G/DL (ref 12–16)
POC LACTIC ACID: 1.3 MMOL/L (ref 0.56–1.39)
POC O2 SATURATION: 91.3 % (ref 94–98)
POC PCO2: 39.4 MM HG (ref 35–48)
POC PH: 7.36 (ref 7.35–7.45)
POC PO2: 64 MM HG (ref 83–108)
POTASSIUM BLD-SCNC: 4.9 MMOL/L (ref 3.5–4.5)
POTASSIUM SERPL-SCNC: 4.5 MMOL/L (ref 3.7–5.3)
POTASSIUM SERPL-SCNC: 4.7 MMOL/L (ref 3.7–5.3)
PROT SERPL-MCNC: 5.9 G/DL (ref 6.6–8.7)
RBC # BLD AUTO: 3.92 M/UL (ref 3.95–5.11)
RBC # BLD AUTO: 3.93 M/UL (ref 3.95–5.11)
RBC # BLD: ABNORMAL 10*6/UL
RBC # BLD: ABNORMAL 10*6/UL
SAMPLE SITE: ABNORMAL
SODIUM BLD-SCNC: 140 MMOL/L (ref 138–146)
SODIUM SERPL-SCNC: 137 MMOL/L (ref 136–145)
SODIUM SERPL-SCNC: 139 MMOL/L (ref 136–145)
WBC OTHER # BLD: 10 K/UL (ref 3.5–11.3)
WBC OTHER # BLD: 10.6 K/UL (ref 3.5–11.3)

## 2024-05-20 PROCEDURE — 99233 SBSQ HOSP IP/OBS HIGH 50: CPT | Performed by: NURSE PRACTITIONER

## 2024-05-20 PROCEDURE — 80053 COMPREHEN METABOLIC PANEL: CPT

## 2024-05-20 PROCEDURE — 6360000002 HC RX W HCPCS: Performed by: STUDENT IN AN ORGANIZED HEALTH CARE EDUCATION/TRAINING PROGRAM

## 2024-05-20 PROCEDURE — 2580000003 HC RX 258: Performed by: INTERNAL MEDICINE

## 2024-05-20 PROCEDURE — 83605 ASSAY OF LACTIC ACID: CPT

## 2024-05-20 PROCEDURE — 80051 ELECTROLYTE PANEL: CPT

## 2024-05-20 PROCEDURE — 83735 ASSAY OF MAGNESIUM: CPT

## 2024-05-20 PROCEDURE — 82947 ASSAY GLUCOSE BLOOD QUANT: CPT

## 2024-05-20 PROCEDURE — 6370000000 HC RX 637 (ALT 250 FOR IP): Performed by: NURSE PRACTITIONER

## 2024-05-20 PROCEDURE — 84520 ASSAY OF UREA NITROGEN: CPT

## 2024-05-20 PROCEDURE — 85025 COMPLETE CBC W/AUTO DIFF WBC: CPT

## 2024-05-20 PROCEDURE — 82330 ASSAY OF CALCIUM: CPT

## 2024-05-20 PROCEDURE — 93005 ELECTROCARDIOGRAM TRACING: CPT | Performed by: STUDENT IN AN ORGANIZED HEALTH CARE EDUCATION/TRAINING PROGRAM

## 2024-05-20 PROCEDURE — 36415 COLL VENOUS BLD VENIPUNCTURE: CPT

## 2024-05-20 PROCEDURE — 85014 HEMATOCRIT: CPT

## 2024-05-20 PROCEDURE — 6370000000 HC RX 637 (ALT 250 FOR IP): Performed by: STUDENT IN AN ORGANIZED HEALTH CARE EDUCATION/TRAINING PROGRAM

## 2024-05-20 PROCEDURE — 99291 CRITICAL CARE FIRST HOUR: CPT | Performed by: INTERNAL MEDICINE

## 2024-05-20 PROCEDURE — 2060000000 HC ICU INTERMEDIATE R&B

## 2024-05-20 PROCEDURE — 71045 X-RAY EXAM CHEST 1 VIEW: CPT

## 2024-05-20 PROCEDURE — 80048 BASIC METABOLIC PNL TOTAL CA: CPT

## 2024-05-20 PROCEDURE — 94761 N-INVAS EAR/PLS OXIMETRY MLT: CPT

## 2024-05-20 PROCEDURE — 6360000002 HC RX W HCPCS: Performed by: INTERNAL MEDICINE

## 2024-05-20 PROCEDURE — 82565 ASSAY OF CREATININE: CPT

## 2024-05-20 PROCEDURE — 82803 BLOOD GASES ANY COMBINATION: CPT

## 2024-05-20 PROCEDURE — 2700000000 HC OXYGEN THERAPY PER DAY

## 2024-05-20 PROCEDURE — 94640 AIRWAY INHALATION TREATMENT: CPT

## 2024-05-20 PROCEDURE — 99232 SBSQ HOSP IP/OBS MODERATE 35: CPT | Performed by: STUDENT IN AN ORGANIZED HEALTH CARE EDUCATION/TRAINING PROGRAM

## 2024-05-20 PROCEDURE — 94660 CPAP INITIATION&MGMT: CPT

## 2024-05-20 PROCEDURE — 74018 RADEX ABDOMEN 1 VIEW: CPT

## 2024-05-20 RX ORDER — FUROSEMIDE 10 MG/ML
40 INJECTION INTRAMUSCULAR; INTRAVENOUS ONCE
Status: COMPLETED | OUTPATIENT
Start: 2024-05-20 | End: 2024-05-20

## 2024-05-20 RX ORDER — POLYETHYLENE GLYCOL 3350 17 G/17G
17 POWDER, FOR SOLUTION ORAL DAILY
Status: DISCONTINUED | OUTPATIENT
Start: 2024-05-20 | End: 2024-05-26 | Stop reason: HOSPADM

## 2024-05-20 RX ORDER — BISACODYL 10 MG
10 SUPPOSITORY, RECTAL RECTAL DAILY PRN
Status: DISCONTINUED | OUTPATIENT
Start: 2024-05-20 | End: 2024-05-26 | Stop reason: HOSPADM

## 2024-05-20 RX ADMIN — LEVALBUTEROL HYDROCHLORIDE 1.25 MG: 0.63 SOLUTION RESPIRATORY (INHALATION) at 08:01

## 2024-05-20 RX ADMIN — LEVALBUTEROL HYDROCHLORIDE 0.63 MG: 0.63 SOLUTION RESPIRATORY (INHALATION) at 03:58

## 2024-05-20 RX ADMIN — PANTOPRAZOLE SODIUM 40 MG: 40 TABLET, DELAYED RELEASE ORAL at 07:52

## 2024-05-20 RX ADMIN — LEVALBUTEROL HYDROCHLORIDE 1.25 MG: 0.63 SOLUTION RESPIRATORY (INHALATION) at 15:21

## 2024-05-20 RX ADMIN — BUDESONIDE AND FORMOTEROL FUMARATE DIHYDRATE 2 PUFF: 160; 4.5 AEROSOL RESPIRATORY (INHALATION) at 20:27

## 2024-05-20 RX ADMIN — LEVALBUTEROL HYDROCHLORIDE 1.25 MG: 0.63 SOLUTION RESPIRATORY (INHALATION) at 21:25

## 2024-05-20 RX ADMIN — FUROSEMIDE 40 MG: 10 INJECTION, SOLUTION INTRAMUSCULAR; INTRAVENOUS at 03:25

## 2024-05-20 RX ADMIN — ASPIRIN 81 MG: 81 TABLET, COATED ORAL at 07:53

## 2024-05-20 RX ADMIN — LEVALBUTEROL HYDROCHLORIDE 1.25 MG: 0.63 SOLUTION RESPIRATORY (INHALATION) at 11:47

## 2024-05-20 RX ADMIN — SODIUM CHLORIDE, PRESERVATIVE FREE 10 ML: 5 INJECTION INTRAVENOUS at 21:00

## 2024-05-20 RX ADMIN — ENOXAPARIN SODIUM 40 MG: 100 INJECTION SUBCUTANEOUS at 07:52

## 2024-05-20 RX ADMIN — BUDESONIDE AND FORMOTEROL FUMARATE DIHYDRATE 2 PUFF: 160; 4.5 AEROSOL RESPIRATORY (INHALATION) at 08:02

## 2024-05-20 RX ADMIN — SODIUM CHLORIDE, PRESERVATIVE FREE 10 ML: 5 INJECTION INTRAVENOUS at 07:54

## 2024-05-20 RX ADMIN — SENNOSIDES AND DOCUSATE SODIUM 2 TABLET: 50; 8.6 TABLET ORAL at 07:52

## 2024-05-20 RX ADMIN — METHYLPREDNISOLONE SODIUM SUCCINATE 125 MG: 125 INJECTION INTRAMUSCULAR; INTRAVENOUS at 03:21

## 2024-05-20 ASSESSMENT — PAIN SCALES - GENERAL: PAINLEVEL_OUTOF10: 0

## 2024-05-20 NOTE — CONSULTS
input(s): \"APTT\" in the last 72 hours.  CARDIAC ENZYMES:No results for input(s): \"CKTOTAL\", \"CKMB\", \"CKMBINDEX\", \"TROPONINI\" in the last 72 hours.  FASTING LIPID PANEL:  Lab Results   Component Value Date/Time    HDL 57 07/03/2023 11:28 AM    TRIG 57 09/15/2022 10:00 AM     LIVER PROFILE:  Recent Labs     05/16/24  0250   AST 58*   ALT 68*         IMPRESSION:       Repetitive nonsustained monomorphic VT; left bundle inferior axis morphology c/w RVOT origin  Moderately reduced LVEF, 35% with anteroapical akinesis; no CHF on exam   CAD with h/o stents, patent on cardiac catheterization 5/16/24  Essential HTN  UTI  Hypotension, improved        RECOMMENDATIONS:    IV lidocaine 100 mg bolus followed by drip at 2 mg/min  Resume Toprol XL tomorrow AM at 12.5 mg bid   2D echo  Will ask Dr. Cordero to evaluate for consideration for VT ablation next week  Continue IV ceftriaxone    Discussed with patient and nursing.    Electronically signed by Papito Hammonds MD on 5/17/2024 at 6:15 AM.  [facility] cardiology Consultant    
rash or pruritis.  Neurological: No headache, diplopia, change in muscle strength, numbness or tingling. No change in gait, balance, coordination, mood, affect, memory, mentation, behavior.  Psychiatric: No anxiety, or depression.  Endocrine: No temperature intolerance. No excessive thirst, fluid intake, or urination. No tremor.  Hematologic/Lymphatic: No abnormal bruising or bleeding, blood clots or swollen lymph nodes.  Allergic/Immunologic: No nasal congestion or hives.    PHYSICAL EXAM:    Vitals:    VITALS:  BP (!) 106/52   Pulse (!) 44   Temp 98.3 °F (36.8 °C) (Oral)   Resp 17   Ht 1.549 m (5' 0.98\")   Wt 84.3 kg (185 lb 12.8 oz)   SpO2 98%   BMI 35.13 kg/m²   24HR INTAKE/OUTPUT:    Intake/Output Summary (Last 24 hours) at 5/20/2024 1418  Last data filed at 5/20/2024 0624  Gross per 24 hour   Intake 858.03 ml   Output 2800 ml   Net -1941.97 ml       General appearance: Awake, alert, cooperative  Head: Normocephalic, without obvious abnormality, atraumatic  Eyes: Conjunctivae/corneas clear, EOMs intact  Neck: no adenopathy, no carotid bruit, no JVD, and thyroid: not enlarged  Lungs: Mildly reduced air movement with trace expiratory wheezing  Heart: Regular rate with frequent extrasystole and bigeminy, no murmurs  Abdomen: Soft, non-tender, bowel sounds normal,  no organomegaly  Extremities: extremities normal, atraumatic, no cyanosis or edema  Skin: Skin color, turgor normal, no rashes or lesions  Neurologic: Grossly normal     DATA:     Cardiology Labs:No results for input(s): \"MYOGLOBIN\", \"CKTOTAL\", \"CKMB\", \"CKMBINDEX\", \"TROPHS\", \"TROPONINT\" in the last 72 hours.  Warfarin PT/INR:  Lab Results   Component Value Date/Time    PROTIME 14.7 05/16/2024 02:50 AM    INR 1.1 05/16/2024 02:50 AM     CBC:  Lab Results   Component Value Date/Time    WBC 10.0 05/20/2024 05:36 AM    RBC 3.93 05/20/2024 05:36 AM    HGB 12.3 05/20/2024 05:36 AM    HCT 37.0 05/20/2024 05:36 AM    MCV 94.1 05/20/2024 05:36 AM    [facility]

## 2024-05-20 NOTE — PLAN OF CARE
Problem: Respiratory - Adult  Goal: Achieves optimal ventilation and oxygenation  Flowsheets (Taken 5/19/2024 2023)  Achieves optimal ventilation and oxygenation:   Assess for changes in respiratory status   Assess for changes in mentation and behavior   Position to facilitate oxygenation and minimize respiratory effort   Oxygen supplementation based on oxygen saturation or arterial blood gases   Initiate smoking cessation protocol as indicated   Encourage broncho-pulmonary hygiene including cough, deep breathe, incentive spirometry   Assess the need for suctioning and aspirate as needed   Assess and instruct to report shortness of breath or any respiratory difficulty   Respiratory therapy support as indicated

## 2024-05-20 NOTE — CARE COORDINATION
Met with patient to discuss transitional planning. She is returning home. Her granddaughter has been staying with her. She would like home care. List provided

## 2024-05-20 NOTE — H&P (VIEW-ONLY)
Types: Cigarettes    Smokeless tobacco: Never   Vaping Use    Vaping Use: Never used   Substance and Sexual Activity    Alcohol use: Yes     Alcohol/week: 0.0 standard drinks of alcohol     Comment: Rarely     Drug use: No    Sexual activity: Not Currently   Other Topics Concern    Not on file   Social History Narrative    Not on file     Social Determinants of Health     Financial Resource Strain: Low Risk  (4/1/2024)    Overall Financial Resource Strain (CARDIA)     Difficulty of Paying Living Expenses: Not hard at all   Food Insecurity: No Food Insecurity (5/16/2024)    Hunger Vital Sign     Worried About Running Out of Food in the Last Year: Never true     Ran Out of Food in the Last Year: Never true   Transportation Needs: No Transportation Needs (5/16/2024)    PRAPARE - Transportation     Lack of Transportation (Medical): No     Lack of Transportation (Non-Medical): No   Physical Activity: Inactive (4/1/2024)    Exercise Vital Sign     Days of Exercise per Week: 0 days     Minutes of Exercise per Session: 0 min   Stress: Not on file   Social Connections: Not on file   Intimate Partner Violence: Not on file   Housing Stability: Low Risk  (5/16/2024)    Housing Stability Vital Sign     Unable to Pay for Housing in the Last Year: No     Number of Places Lived in the Last Year: 1     Unstable Housing in the Last Year: No     Family History:   Family History   Problem Relation Age of Onset    Cancer Father     Kidney Disease Mother        REVIEW OF SYSTEMS:   Constitutional: Per HPI  Eyes: No visual changes or diplopia. No scleral icterus.  ENT: No Headaches, hearing loss or vertigo. No mouth sores or sore throat.  Cardiovascular: Per HPI  Respiratory: Per HPI  Gastrointestinal: No abdominal pain, appetite loss, blood in stools. No change in bowel or bladder habits.  Genitourinary: No dysuria, trouble voiding, or hematuria.  Musculoskeletal:  No gait disturbance, weakness or joint complaints.  Integumentary: No

## 2024-05-20 NOTE — PLAN OF CARE
Problem: Respiratory - Adult  Goal: Achieves optimal ventilation and oxygenation  5/20/2024 0806 by Tabitha Peña, RCP  Outcome: Progressing  Note: BRONCHOSPASM/BRONCHOCONSTRICTION     [x]         IMPROVE AERATION/BREATH SOUNDS  [x]   ADMINISTER BRONCHODILATOR THERAPY AS APPROPRIATE  [x]   ASSESS BREATH SOUNDS  []   IMPLEMENT AEROSOL/MDI PROTOCOL  [x]   PATIENT EDUCATION AS NEEDED

## 2024-05-21 LAB
EKG ATRIAL RATE: 150 BPM
EKG ATRIAL RATE: 36 BPM
EKG ATRIAL RATE: 75 BPM
EKG P AXIS: 68 DEGREES
EKG P AXIS: 75 DEGREES
EKG P AXIS: 77 DEGREES
EKG P-R INTERVAL: 162 MS
EKG P-R INTERVAL: 168 MS
EKG P-R INTERVAL: 168 MS
EKG Q-T INTERVAL: 370 MS
EKG Q-T INTERVAL: 376 MS
EKG Q-T INTERVAL: 382 MS
EKG QRS DURATION: 72 MS
EKG QRS DURATION: 74 MS
EKG QRS DURATION: 76 MS
EKG QTC CALCULATION (BAZETT): 426 MS
EKG QTC CALCULATION (BAZETT): 450 MS
EKG QTC CALCULATION (BAZETT): 494 MS
EKG R AXIS: 22 DEGREES
EKG R AXIS: 22 DEGREES
EKG R AXIS: 26 DEGREES
EKG T AXIS: 51 DEGREES
EKG T AXIS: 73 DEGREES
EKG T AXIS: 86 DEGREES
EKG VENTRICULAR RATE: 104 BPM
EKG VENTRICULAR RATE: 75 BPM
EKG VENTRICULAR RATE: 89 BPM
MAGNESIUM SERPL-MCNC: 2.1 MG/DL (ref 1.6–2.4)

## 2024-05-21 PROCEDURE — 99233 SBSQ HOSP IP/OBS HIGH 50: CPT | Performed by: NURSE PRACTITIONER

## 2024-05-21 PROCEDURE — 83735 ASSAY OF MAGNESIUM: CPT

## 2024-05-21 PROCEDURE — C1894 INTRO/SHEATH, NON-LASER: HCPCS | Performed by: INTERNAL MEDICINE

## 2024-05-21 PROCEDURE — 6360000002 HC RX W HCPCS: Performed by: STUDENT IN AN ORGANIZED HEALTH CARE EDUCATION/TRAINING PROGRAM

## 2024-05-21 PROCEDURE — C1730 CATH, EP, 19 OR FEW ELECT: HCPCS | Performed by: INTERNAL MEDICINE

## 2024-05-21 PROCEDURE — 93613 INTRACARDIAC EPHYS 3D MAPG: CPT | Performed by: INTERNAL MEDICINE

## 2024-05-21 PROCEDURE — 94640 AIRWAY INHALATION TREATMENT: CPT

## 2024-05-21 PROCEDURE — C1732 CATH, EP, DIAG/ABL, 3D/VECT: HCPCS | Performed by: INTERNAL MEDICINE

## 2024-05-21 PROCEDURE — C1893 INTRO/SHEATH, FIXED,NON-PEEL: HCPCS | Performed by: INTERNAL MEDICINE

## 2024-05-21 PROCEDURE — 93010 ELECTROCARDIOGRAM REPORT: CPT | Performed by: INTERNAL MEDICINE

## 2024-05-21 PROCEDURE — 6370000000 HC RX 637 (ALT 250 FOR IP): Performed by: STUDENT IN AN ORGANIZED HEALTH CARE EDUCATION/TRAINING PROGRAM

## 2024-05-21 PROCEDURE — 94761 N-INVAS EAR/PLS OXIMETRY MLT: CPT

## 2024-05-21 PROCEDURE — 2709999900 HC NON-CHARGEABLE SUPPLY: Performed by: INTERNAL MEDICINE

## 2024-05-21 PROCEDURE — 2060000000 HC ICU INTERMEDIATE R&B

## 2024-05-21 PROCEDURE — 93622 COMP EP EVAL L VENTR PAC&REC: CPT | Performed by: INTERNAL MEDICINE

## 2024-05-21 PROCEDURE — 02563ZZ DESTRUCTION OF RIGHT ATRIUM, PERCUTANEOUS APPROACH: ICD-10-PCS | Performed by: INTERNAL MEDICINE

## 2024-05-21 PROCEDURE — 76376 3D RENDER W/INTRP POSTPROCES: CPT | Performed by: INTERNAL MEDICINE

## 2024-05-21 PROCEDURE — 6360000002 HC RX W HCPCS: Performed by: INTERNAL MEDICINE

## 2024-05-21 PROCEDURE — 02K83ZZ MAP CONDUCTION MECHANISM, PERCUTANEOUS APPROACH: ICD-10-PCS | Performed by: INTERNAL MEDICINE

## 2024-05-21 PROCEDURE — 2580000003 HC RX 258: Performed by: INTERNAL MEDICINE

## 2024-05-21 PROCEDURE — 2720000010 HC SURG SUPPLY STERILE: Performed by: INTERNAL MEDICINE

## 2024-05-21 PROCEDURE — 36415 COLL VENOUS BLD VENIPUNCTURE: CPT

## 2024-05-21 PROCEDURE — 2700000000 HC OXYGEN THERAPY PER DAY

## 2024-05-21 PROCEDURE — 93654 COMPRE EP EVAL TX VT: CPT | Performed by: INTERNAL MEDICINE

## 2024-05-21 PROCEDURE — 99153 MOD SED SAME PHYS/QHP EA: CPT | Performed by: INTERNAL MEDICINE

## 2024-05-21 PROCEDURE — 2500000003 HC RX 250 WO HCPCS: Performed by: INTERNAL MEDICINE

## 2024-05-21 PROCEDURE — 99152 MOD SED SAME PHYS/QHP 5/>YRS: CPT | Performed by: INTERNAL MEDICINE

## 2024-05-21 RX ORDER — PROTAMINE SULFATE 10 MG/ML
INJECTION, SOLUTION INTRAVENOUS PRN
Status: DISCONTINUED | OUTPATIENT
Start: 2024-05-21 | End: 2024-05-21 | Stop reason: HOSPADM

## 2024-05-21 RX ORDER — ACETAMINOPHEN 325 MG/1
650 TABLET ORAL EVERY 4 HOURS PRN
Status: CANCELLED | OUTPATIENT
Start: 2024-05-21

## 2024-05-21 RX ORDER — LEVALBUTEROL INHALATION SOLUTION 0.63 MG/3ML
1.25 SOLUTION RESPIRATORY (INHALATION) EVERY 6 HOURS
Status: DISCONTINUED | OUTPATIENT
Start: 2024-05-21 | End: 2024-05-21

## 2024-05-21 RX ORDER — MEXILETINE HYDROCHLORIDE 200 MG/1
200 CAPSULE ORAL
Status: DISCONTINUED | OUTPATIENT
Start: 2024-05-21 | End: 2024-05-23

## 2024-05-21 RX ORDER — SODIUM CHLORIDE 0.9 % (FLUSH) 0.9 %
5-40 SYRINGE (ML) INJECTION PRN
Status: CANCELLED | OUTPATIENT
Start: 2024-05-21

## 2024-05-21 RX ORDER — SODIUM CHLORIDE 9 MG/ML
INJECTION, SOLUTION INTRAVENOUS PRN
Status: CANCELLED | OUTPATIENT
Start: 2024-05-21

## 2024-05-21 RX ORDER — FENTANYL CITRATE 50 UG/ML
INJECTION, SOLUTION INTRAMUSCULAR; INTRAVENOUS PRN
Status: DISCONTINUED | OUTPATIENT
Start: 2024-05-21 | End: 2024-05-21 | Stop reason: HOSPADM

## 2024-05-21 RX ORDER — SODIUM CHLORIDE 0.9 % (FLUSH) 0.9 %
5-40 SYRINGE (ML) INJECTION EVERY 12 HOURS SCHEDULED
Status: CANCELLED | OUTPATIENT
Start: 2024-05-21

## 2024-05-21 RX ORDER — MEXILETINE HYDROCHLORIDE 150 MG/1
150 CAPSULE ORAL
Status: DISCONTINUED | OUTPATIENT
Start: 2024-05-21 | End: 2024-05-21

## 2024-05-21 RX ORDER — MIDAZOLAM HYDROCHLORIDE 1 MG/ML
INJECTION INTRAMUSCULAR; INTRAVENOUS PRN
Status: DISCONTINUED | OUTPATIENT
Start: 2024-05-21 | End: 2024-05-21 | Stop reason: HOSPADM

## 2024-05-21 RX ORDER — LEVALBUTEROL INHALATION SOLUTION 0.63 MG/3ML
1.25 SOLUTION RESPIRATORY (INHALATION)
Status: DISCONTINUED | OUTPATIENT
Start: 2024-05-21 | End: 2024-05-24

## 2024-05-21 RX ORDER — HEPARIN SODIUM 1000 [USP'U]/ML
INJECTION, SOLUTION INTRAVENOUS; SUBCUTANEOUS PRN
Status: DISCONTINUED | OUTPATIENT
Start: 2024-05-21 | End: 2024-05-21 | Stop reason: HOSPADM

## 2024-05-21 RX ADMIN — ASPIRIN 81 MG: 81 TABLET, COATED ORAL at 10:40

## 2024-05-21 RX ADMIN — SODIUM CHLORIDE, PRESERVATIVE FREE 5 ML: 5 INJECTION INTRAVENOUS at 10:53

## 2024-05-21 RX ADMIN — BUDESONIDE AND FORMOTEROL FUMARATE DIHYDRATE 2 PUFF: 160; 4.5 AEROSOL RESPIRATORY (INHALATION) at 07:55

## 2024-05-21 RX ADMIN — LEVALBUTEROL HYDROCHLORIDE 1.25 MG: 0.63 SOLUTION RESPIRATORY (INHALATION) at 20:02

## 2024-05-21 RX ADMIN — ENOXAPARIN SODIUM 40 MG: 100 INJECTION SUBCUTANEOUS at 10:40

## 2024-05-21 RX ADMIN — LEVALBUTEROL HYDROCHLORIDE 1.25 MG: 0.63 SOLUTION RESPIRATORY (INHALATION) at 07:53

## 2024-05-21 RX ADMIN — SODIUM CHLORIDE, PRESERVATIVE FREE 10 ML: 5 INJECTION INTRAVENOUS at 21:09

## 2024-05-21 RX ADMIN — LEVALBUTEROL HYDROCHLORIDE 1.26 MG: 0.63 SOLUTION RESPIRATORY (INHALATION) at 11:59

## 2024-05-21 ASSESSMENT — PAIN SCALES - GENERAL
PAINLEVEL_OUTOF10: 0

## 2024-05-21 ASSESSMENT — PULMONARY FUNCTION TESTS
PIF_VALUE: 0

## 2024-05-21 NOTE — PROCEDURES
[facility]        Date:   5/16/2024  Patient name: Coni Baker  Date of admission:  5/16/2024  3:45 PM  MRN:   [id number]  YOB: 1950    CARDIAC CATHETERIZATION    Operators:  Primary: Moises Dunbar MD.  Assistant:     Indications for cath: Ventricular tachycardia, NSTEMI    Procedure performed: Cardiac cath.    Access: Right Radial artery      Procedure: After informed consent was obtained with explanation of the risks and benefits, patient was brought to the cath lab. The right wrist was prepped and draped in sterile fashion. 1% lidocaine was used for local block. The Radial artery was cannulated with 6  Fr sheath with brisk arterial blood return. The side port was frequently flushed and aspirated with normal saline.    EBL is 5 mL    Dominance is right    Findings:    Left main: Normal with 0% stenosis.    LAD: Patent proximal and mid stents with luminal irregularities of 20 to 30%    LCX: Luminal irregularities of 20 to 30%    RCA: Normal with 0% stenosis.    The LV gram was performed in the GUNTER 30 position.   LVEF: 35%. LV Wall Motion: Anteroapical akinesis    Conclusions:  Patent prior LAD stents.  Minimal LCx and RCA disease  Moderately reduced LV systolic function    Recommendation:  Medical treatments.  Risk factors modifications.        Electronically signed by Moises Dunbar MD on 5/16/2024 at 4:15 PM      [facility]  [phone]              
ablation.      PROCEDURE AND FINDINGS:  The patient was brought to the EP laboratory in the post absorptive state.  The patient was then prepared and draped in routine sterile fashion.  Local anesthesia was provided with 1% lidocaine injection.  Modified Seldinger access was obtained at the right common femoral vein with two separate venipunctures.  A short 7 Kosovan sheath and short 9 Kosovan sheath were placed into the right common femoral vein in over-the-wire fashion.    CATHETERS USED FOR THE PROCEDURE:    A 7 Kosovan [employer] Livewire split Duo-deca catheter was positioned within the distal coronary sinus with electrodes spacing the CS and high right atrium.    A 7 Kosovan Smart Touch SF D-F irrigated ablation catheter was positioned in the right and left ventricular outflow tracts for mapping and ablation.    Adequate pacing thresholds were confirmed for all catheters.      PVC/VT MORPHOLOGY:    The presenting rhythm was normal sinus with frequency monomorphic PVCs in bigeminy.  PVC morphology is:     Left bundle branch block with fractionated QS in V1-V3  Qr in V4  qS in V5-V6  Deep inferior rightward axis  Delayed intrinsicoid point  Suggesting RV outflow tract origin    ABLATION PORTION OF THE PROCEDURE:    The Carto 3D electroanatomic mapping system was used to construct a rudimentary structure of the RVOT.  A local activation (LAT) map was created showing earliest PVC activation mapped to the anterior RVOT about 4 cm beneath the pulmonary annulus.  Local activation preceded the surface EKG by about  (-)15 msec. Pace-mapping at this location had a similar precordial transition point but with poor overall matched-morphology.  Multiple radiofrequency lesions were delivered at this location and a broad surrounding area without any suppression of PVCs.      Decision was made to map the LVOT as well.  Right femoral artery access was obtained with modified Seldinger technique and single puncture.  A short 6

## 2024-05-21 NOTE — PLAN OF CARE
Problem: Safety - Adult  Goal: Free from fall injury  Outcome: Progressing     Problem: Discharge Planning  Goal: Discharge to home or other facility with appropriate resources  Outcome: Progressing  Flowsheets (Taken 5/20/2024 2000)  Discharge to home or other facility with appropriate resources:   Identify barriers to discharge with patient and caregiver   Arrange for needed discharge resources and transportation as appropriate   Identify discharge learning needs (meds, wound care, etc)     Problem: Skin/Tissue Integrity  Goal: Absence of new skin breakdown  Description: 1.  Monitor for areas of redness and/or skin breakdown  2.  Assess vascular access sites hourly  3.  Every 4-6 hours minimum:  Change oxygen saturation probe site  4.  Every 4-6 hours:  If on nasal continuous positive airway pressure, respiratory therapy assess nares and determine need for appliance change or resting period.  Outcome: Progressing     Problem: Respiratory - Adult  Goal: Achieves optimal ventilation and oxygenation  Outcome: Progressing  Flowsheets  Taken 5/20/2024 2029 by Jadyn Gracia RCP  Achieves optimal ventilation and oxygenation:   Respiratory therapy support as indicated   Assess and instruct to report shortness of breath or any respiratory difficulty   Assess the need for suctioning and aspirate as needed   Initiate smoking cessation protocol as indicated   Encourage broncho-pulmonary hygiene including cough, deep breathe, incentive spirometry   Oxygen supplementation based on oxygen saturation or arterial blood gases   Position to facilitate oxygenation and minimize respiratory effort   Assess for changes in mentation and behavior   Assess for changes in respiratory status  Taken 5/20/2024 2000 by Norma Abbott, RN  Achieves optimal ventilation and oxygenation:   Assess for changes in respiratory status   Assess for changes in mentation and behavior   Position to facilitate oxygenation and minimize respiratory

## 2024-05-21 NOTE — PLAN OF CARE
Problem: Respiratory - Adult  Goal: Achieves optimal ventilation and oxygenation  5/21/2024 1640 by Vahe Kim RCP  Outcome: Progressing  5/21/2024 0528 by Norma Abbott, RN  Outcome: Progressing  Flowsheets  Taken 5/20/2024 2029 by Jadyn Gracia RCP  Achieves optimal ventilation and oxygenation:   Respiratory therapy support as indicated   Assess and instruct to report shortness of breath or any respiratory difficulty   Assess the need for suctioning and aspirate as needed   Initiate smoking cessation protocol as indicated   Encourage broncho-pulmonary hygiene including cough, deep breathe, incentive spirometry   Oxygen supplementation based on oxygen saturation or arterial blood gases   Position to facilitate oxygenation and minimize respiratory effort   Assess for changes in mentation and behavior   Assess for changes in respiratory status  Taken 5/20/2024 2000 by Norma Abbott, RN  Achieves optimal ventilation and oxygenation:   Assess for changes in respiratory status   Assess for changes in mentation and behavior   Position to facilitate oxygenation and minimize respiratory effort   Respiratory therapy support as indicated

## 2024-05-22 LAB
ECHO BSA: 1.79 M2
EKG ATRIAL RATE: 66 BPM
EKG P AXIS: 62 DEGREES
EKG P-R INTERVAL: 162 MS
EKG Q-T INTERVAL: 440 MS
EKG QRS DURATION: 84 MS
EKG QTC CALCULATION (BAZETT): 461 MS
EKG R AXIS: 14 DEGREES
EKG T AXIS: 60 DEGREES
EKG VENTRICULAR RATE: 66 BPM
MAGNESIUM SERPL-MCNC: 2.1 MG/DL (ref 1.6–2.4)

## 2024-05-22 PROCEDURE — 36415 COLL VENOUS BLD VENIPUNCTURE: CPT

## 2024-05-22 PROCEDURE — 6360000002 HC RX W HCPCS: Performed by: STUDENT IN AN ORGANIZED HEALTH CARE EDUCATION/TRAINING PROGRAM

## 2024-05-22 PROCEDURE — 6370000000 HC RX 637 (ALT 250 FOR IP): Performed by: STUDENT IN AN ORGANIZED HEALTH CARE EDUCATION/TRAINING PROGRAM

## 2024-05-22 PROCEDURE — 6360000002 HC RX W HCPCS: Performed by: INTERNAL MEDICINE

## 2024-05-22 PROCEDURE — 94640 AIRWAY INHALATION TREATMENT: CPT

## 2024-05-22 PROCEDURE — 93005 ELECTROCARDIOGRAM TRACING: CPT | Performed by: INTERNAL MEDICINE

## 2024-05-22 PROCEDURE — 2060000000 HC ICU INTERMEDIATE R&B

## 2024-05-22 PROCEDURE — 6370000000 HC RX 637 (ALT 250 FOR IP): Performed by: INTERNAL MEDICINE

## 2024-05-22 PROCEDURE — 2580000003 HC RX 258: Performed by: INTERNAL MEDICINE

## 2024-05-22 PROCEDURE — 94761 N-INVAS EAR/PLS OXIMETRY MLT: CPT

## 2024-05-22 PROCEDURE — 93010 ELECTROCARDIOGRAM REPORT: CPT | Performed by: INTERNAL MEDICINE

## 2024-05-22 PROCEDURE — 2700000000 HC OXYGEN THERAPY PER DAY

## 2024-05-22 PROCEDURE — 83735 ASSAY OF MAGNESIUM: CPT

## 2024-05-22 RX ORDER — SOTALOL HYDROCHLORIDE 120 MG/1
120 TABLET ORAL 2 TIMES DAILY
Status: DISCONTINUED | OUTPATIENT
Start: 2024-05-22 | End: 2024-05-23

## 2024-05-22 RX ADMIN — LEVALBUTEROL HYDROCHLORIDE 1.25 MG: 0.63 SOLUTION RESPIRATORY (INHALATION) at 07:38

## 2024-05-22 RX ADMIN — SODIUM CHLORIDE, PRESERVATIVE FREE 10 ML: 5 INJECTION INTRAVENOUS at 20:22

## 2024-05-22 RX ADMIN — SOTALOL HYDROCHLORIDE 120 MG: 120 TABLET ORAL at 09:23

## 2024-05-22 RX ADMIN — METOPROLOL SUCCINATE 12.5 MG: 25 TABLET, EXTENDED RELEASE ORAL at 08:03

## 2024-05-22 RX ADMIN — LEVALBUTEROL HYDROCHLORIDE 1.25 MG: 0.63 SOLUTION RESPIRATORY (INHALATION) at 15:11

## 2024-05-22 RX ADMIN — ENOXAPARIN SODIUM 40 MG: 100 INJECTION SUBCUTANEOUS at 08:07

## 2024-05-22 RX ADMIN — MEXILETINE HYDROCHLORIDE 200 MG: 200 CAPSULE ORAL at 08:03

## 2024-05-22 RX ADMIN — BUDESONIDE AND FORMOTEROL FUMARATE DIHYDRATE 2 PUFF: 160; 4.5 AEROSOL RESPIRATORY (INHALATION) at 20:32

## 2024-05-22 RX ADMIN — MEXILETINE HYDROCHLORIDE 200 MG: 200 CAPSULE ORAL at 13:38

## 2024-05-22 RX ADMIN — BUDESONIDE AND FORMOTEROL FUMARATE DIHYDRATE 2 PUFF: 160; 4.5 AEROSOL RESPIRATORY (INHALATION) at 07:48

## 2024-05-22 RX ADMIN — LEVALBUTEROL HYDROCHLORIDE 0.63 MG: 0.63 SOLUTION RESPIRATORY (INHALATION) at 20:32

## 2024-05-22 RX ADMIN — MEXILETINE HYDROCHLORIDE 200 MG: 200 CAPSULE ORAL at 17:17

## 2024-05-22 RX ADMIN — ASPIRIN 81 MG: 81 TABLET, COATED ORAL at 08:04

## 2024-05-22 RX ADMIN — LEVALBUTEROL HYDROCHLORIDE 1.25 MG: 0.63 SOLUTION RESPIRATORY (INHALATION) at 11:47

## 2024-05-22 RX ADMIN — SODIUM CHLORIDE, PRESERVATIVE FREE 10 ML: 5 INJECTION INTRAVENOUS at 08:05

## 2024-05-22 RX ADMIN — SOTALOL HYDROCHLORIDE 120 MG: 120 TABLET ORAL at 18:40

## 2024-05-22 RX ADMIN — POLYETHYLENE GLYCOL 3350 17 G: 17 POWDER, FOR SOLUTION ORAL at 17:19

## 2024-05-22 RX ADMIN — PANTOPRAZOLE SODIUM 40 MG: 40 TABLET, DELAYED RELEASE ORAL at 08:04

## 2024-05-22 ASSESSMENT — PAIN SCALES - GENERAL
PAINLEVEL_OUTOF10: 0

## 2024-05-22 NOTE — CARE COORDINATION
Met with patient to discuss transitional planning. She is returning home with her granddaughter. She has not chosen a home care agency yet. She denies other needs    Patient has a qualifying diagnosis for cardiac rehabilitation.     Education provided to patient regarding the health benefits of participating in a cardiac rehabilitation program. Handout provided with an overview of cardiac rehabilitation from the [employer].     Patient agreeable: Yes    Freedom of choice provided.     Referral made to :   [x] [facility]  [] [facility]  [] [facility]  [] [facility]  [] [facility]  [] Other

## 2024-05-22 NOTE — INTERVAL H&P NOTE
Update History & Physical    The patient's History and Physical was reviewed with the patient and I examined the patient. There was no change. The surgical site was confirmed by the patient and me.     Plan: The risks, benefits, expected outcome, and alternative to the recommended procedure have been discussed with the patient. Patient understands and wants to proceed with the procedure.     Electronically signed by Juan Pablo Cordero MD on 5/21/2024 at 8:15 PM

## 2024-05-22 NOTE — PLAN OF CARE
Problem: Safety - Adult  Goal: Free from fall injury  5/22/2024 1154 by Marisol Huggins RN  Outcome: Progressing  5/22/2024 0442 by Norma Abbott RN  Outcome: Progressing     Problem: Discharge Planning  Goal: Discharge to home or other facility with appropriate resources  5/22/2024 1154 by Marisol Huggins RN  Outcome: Progressing  5/22/2024 0442 by Norma Abbott RN  Outcome: Progressing  Flowsheets (Taken 5/21/2024 2000)  Discharge to home or other facility with appropriate resources:   Identify barriers to discharge with patient and caregiver   Arrange for needed discharge resources and transportation as appropriate   Identify discharge learning needs (meds, wound care, etc)     Problem: Skin/Tissue Integrity  Goal: Absence of new skin breakdown  Description: 1.  Monitor for areas of redness and/or skin breakdown  2.  Assess vascular access sites hourly  3.  Every 4-6 hours minimum:  Change oxygen saturation probe site  4.  Every 4-6 hours:  If on nasal continuous positive airway pressure, respiratory therapy assess nares and determine need for appliance change or resting period.  5/22/2024 1154 by Mairsol Huggins RN  Outcome: Progressing  5/22/2024 0442 by Norma Abbott RN  Outcome: Progressing     Problem: Respiratory - Adult  Goal: Achieves optimal ventilation and oxygenation  5/22/2024 1154 by Marisol Huggins RN  Outcome: Progressing  Flowsheets (Taken 5/22/2024 0748 by Barbara Rosario, [facility])  Achieves optimal ventilation and oxygenation:   Assess for changes in respiratory status   Assess for changes in mentation and behavior   Position to facilitate oxygenation and minimize respiratory effort   Oxygen supplementation based on oxygen saturation or arterial blood gases   Initiate smoking cessation protocol as indicated   Encourage broncho-pulmonary hygiene including cough, deep breathe, incentive spirometry   Assess the need for suctioning and aspirate as needed   Assess and instruct to report shortness of

## 2024-05-22 NOTE — PRE SEDATION
Sedation Plan  ASA: class 3 - patient with severe systemic disease     Mallampati class: II - soft palate, uvula, fauces visible.    Sedation plan: moderate (conscious sedation)    Risks, benefits, and alternatives discussed with patient.  Use of blood products discussed with patient who consented to blood products.

## 2024-05-22 NOTE — PLAN OF CARE
Problem: Safety - Adult  Goal: Free from fall injury  Outcome: Progressing     Problem: Discharge Planning  Goal: Discharge to home or other facility with appropriate resources  Outcome: Progressing  Flowsheets (Taken 5/21/2024 2000)  Discharge to home or other facility with appropriate resources:   Identify barriers to discharge with patient and caregiver   Arrange for needed discharge resources and transportation as appropriate   Identify discharge learning needs (meds, wound care, etc)     Problem: Skin/Tissue Integrity  Goal: Absence of new skin breakdown  Description: 1.  Monitor for areas of redness and/or skin breakdown  2.  Assess vascular access sites hourly  3.  Every 4-6 hours minimum:  Change oxygen saturation probe site  4.  Every 4-6 hours:  If on nasal continuous positive airway pressure, respiratory therapy assess nares and determine need for appliance change or resting period.  Outcome: Progressing     Problem: Respiratory - Adult  Goal: Achieves optimal ventilation and oxygenation  5/22/2024 0442 by Norma Abbott RN  Outcome: Progressing  Flowsheets (Taken 5/21/2024 2003 by Garth Guerrero RCP)  Achieves optimal ventilation and oxygenation:   Respiratory therapy support as indicated   Assess and instruct to report shortness of breath or any respiratory difficulty   Assess the need for suctioning and aspirate as needed   Encourage broncho-pulmonary hygiene including cough, deep breathe, incentive spirometry   Initiate smoking cessation protocol as indicated   Position to facilitate oxygenation and minimize respiratory effort   Oxygen supplementation based on oxygen saturation or arterial blood gases   Assess for changes in mentation and behavior   Assess for changes in respiratory status  5/21/2024 1640 by Vahe Kim RCP  Outcome: Progressing     Problem: Pain  Goal: Verbalizes/displays adequate comfort level or baseline comfort level  Outcome: Progressing

## 2024-05-23 LAB
ANION GAP SERPL CALCULATED.3IONS-SCNC: 8 MMOL/L (ref 9–16)
BUN SERPL-MCNC: 17 MG/DL (ref 8–23)
CALCIUM SERPL-MCNC: 7.8 MG/DL (ref 8.6–10.4)
CHLORIDE SERPL-SCNC: 107 MMOL/L (ref 98–107)
CO2 SERPL-SCNC: 23 MMOL/L (ref 20–31)
CREAT SERPL-MCNC: 0.8 MG/DL (ref 0.5–0.9)
GFR, ESTIMATED: 78 ML/MIN/1.73M2
GLUCOSE SERPL-MCNC: 91 MG/DL (ref 74–99)
POTASSIUM SERPL-SCNC: 4.2 MMOL/L (ref 3.7–5.3)
SODIUM SERPL-SCNC: 138 MMOL/L (ref 136–145)

## 2024-05-23 PROCEDURE — 94640 AIRWAY INHALATION TREATMENT: CPT

## 2024-05-23 PROCEDURE — 36415 COLL VENOUS BLD VENIPUNCTURE: CPT

## 2024-05-23 PROCEDURE — 6370000000 HC RX 637 (ALT 250 FOR IP): Performed by: STUDENT IN AN ORGANIZED HEALTH CARE EDUCATION/TRAINING PROGRAM

## 2024-05-23 PROCEDURE — 2060000000 HC ICU INTERMEDIATE R&B

## 2024-05-23 PROCEDURE — 93005 ELECTROCARDIOGRAM TRACING: CPT | Performed by: INTERNAL MEDICINE

## 2024-05-23 PROCEDURE — 94761 N-INVAS EAR/PLS OXIMETRY MLT: CPT

## 2024-05-23 PROCEDURE — 99233 SBSQ HOSP IP/OBS HIGH 50: CPT | Performed by: NURSE PRACTITIONER

## 2024-05-23 PROCEDURE — 6370000000 HC RX 637 (ALT 250 FOR IP): Performed by: INTERNAL MEDICINE

## 2024-05-23 PROCEDURE — 2700000000 HC OXYGEN THERAPY PER DAY

## 2024-05-23 PROCEDURE — 80048 BASIC METABOLIC PNL TOTAL CA: CPT

## 2024-05-23 PROCEDURE — 6360000002 HC RX W HCPCS: Performed by: INTERNAL MEDICINE

## 2024-05-23 PROCEDURE — 2580000003 HC RX 258: Performed by: INTERNAL MEDICINE

## 2024-05-23 PROCEDURE — 6360000002 HC RX W HCPCS: Performed by: STUDENT IN AN ORGANIZED HEALTH CARE EDUCATION/TRAINING PROGRAM

## 2024-05-23 PROCEDURE — 6370000000 HC RX 637 (ALT 250 FOR IP): Performed by: NURSE PRACTITIONER

## 2024-05-23 RX ORDER — METOPROLOL SUCCINATE 25 MG/1
12.5 TABLET, EXTENDED RELEASE ORAL 2 TIMES DAILY
Qty: 30 TABLET | Refills: 3 | Status: SHIPPED | OUTPATIENT
Start: 2024-05-23

## 2024-05-23 RX ORDER — SOTALOL HYDROCHLORIDE 80 MG/1
160 TABLET ORAL 2 TIMES DAILY
Status: DISCONTINUED | OUTPATIENT
Start: 2024-05-23 | End: 2024-05-24

## 2024-05-23 RX ORDER — MEXILETINE HYDROCHLORIDE 200 MG/1
200 CAPSULE ORAL
Qty: 90 CAPSULE | Refills: 3 | Status: SHIPPED | OUTPATIENT
Start: 2024-05-23 | End: 2024-05-26 | Stop reason: HOSPADM

## 2024-05-23 RX ORDER — SOTALOL HYDROCHLORIDE 120 MG/1
120 TABLET ORAL 2 TIMES DAILY
Qty: 60 TABLET | Refills: 3 | Status: SHIPPED | OUTPATIENT
Start: 2024-05-23 | End: 2024-05-26 | Stop reason: HOSPADM

## 2024-05-23 RX ADMIN — METOPROLOL SUCCINATE 12.5 MG: 25 TABLET, EXTENDED RELEASE ORAL at 08:31

## 2024-05-23 RX ADMIN — LEVALBUTEROL HYDROCHLORIDE 1.25 MG: 0.63 SOLUTION RESPIRATORY (INHALATION) at 11:30

## 2024-05-23 RX ADMIN — BUDESONIDE AND FORMOTEROL FUMARATE DIHYDRATE 2 PUFF: 160; 4.5 AEROSOL RESPIRATORY (INHALATION) at 07:40

## 2024-05-23 RX ADMIN — SOTALOL HYDROCHLORIDE 120 MG: 120 TABLET ORAL at 08:29

## 2024-05-23 RX ADMIN — MEXILETINE HYDROCHLORIDE 200 MG: 200 CAPSULE ORAL at 12:37

## 2024-05-23 RX ADMIN — SOTALOL HYDROCHLORIDE 160 MG: 80 TABLET ORAL at 18:33

## 2024-05-23 RX ADMIN — LEVALBUTEROL HYDROCHLORIDE 1.25 MG: 0.63 SOLUTION RESPIRATORY (INHALATION) at 07:32

## 2024-05-23 RX ADMIN — SENNOSIDES AND DOCUSATE SODIUM 2 TABLET: 50; 8.6 TABLET ORAL at 08:27

## 2024-05-23 RX ADMIN — LEVALBUTEROL HYDROCHLORIDE 1.25 MG: 0.63 SOLUTION RESPIRATORY (INHALATION) at 19:58

## 2024-05-23 RX ADMIN — ASPIRIN 81 MG: 81 TABLET, COATED ORAL at 08:28

## 2024-05-23 RX ADMIN — ENOXAPARIN SODIUM 40 MG: 100 INJECTION SUBCUTANEOUS at 08:30

## 2024-05-23 RX ADMIN — PANTOPRAZOLE SODIUM 40 MG: 40 TABLET, DELAYED RELEASE ORAL at 08:31

## 2024-05-23 RX ADMIN — LEVALBUTEROL HYDROCHLORIDE 1.25 MG: 0.63 SOLUTION RESPIRATORY (INHALATION) at 15:56

## 2024-05-23 RX ADMIN — SODIUM CHLORIDE, PRESERVATIVE FREE 10 ML: 5 INJECTION INTRAVENOUS at 21:49

## 2024-05-23 RX ADMIN — MEXILETINE HYDROCHLORIDE 200 MG: 200 CAPSULE ORAL at 08:28

## 2024-05-23 RX ADMIN — BUDESONIDE AND FORMOTEROL FUMARATE DIHYDRATE 2 PUFF: 160; 4.5 AEROSOL RESPIRATORY (INHALATION) at 19:58

## 2024-05-23 RX ADMIN — SODIUM CHLORIDE, PRESERVATIVE FREE 10 ML: 5 INJECTION INTRAVENOUS at 08:31

## 2024-05-23 ASSESSMENT — PAIN SCALES - GENERAL
PAINLEVEL_OUTOF10: 0

## 2024-05-23 NOTE — PLAN OF CARE
Problem: Safety - Adult  Goal: Free from fall injury  5/23/2024 1229 by Marisol Huggins RN  Outcome: Progressing  5/23/2024 0237 by Martine Gonzales RN  Outcome: Progressing     Problem: Discharge Planning  Goal: Discharge to home or other facility with appropriate resources  5/23/2024 1229 by Marisol Huggins RN  Outcome: Progressing  5/23/2024 0237 by Martine Gonzales RN  Outcome: Progressing     Problem: Skin/Tissue Integrity  Goal: Absence of new skin breakdown  Description: 1.  Monitor for areas of redness and/or skin breakdown  2.  Assess vascular access sites hourly  3.  Every 4-6 hours minimum:  Change oxygen saturation probe site  4.  Every 4-6 hours:  If on nasal continuous positive airway pressure, respiratory therapy assess nares and determine need for appliance change or resting period.  5/23/2024 1229 by Marisol Huggins RN  Outcome: Progressing  5/23/2024 0237 by Martine Gonzales RN  Outcome: Progressing     Problem: Respiratory - Adult  Goal: Achieves optimal ventilation and oxygenation  5/23/2024 1229 by Marisol Huggins RN  Outcome: Progressing  5/23/2024 0738 by Barbara Rosario DEBORAH  Outcome: Progressing  Flowsheets (Taken 5/23/2024 0732)  Achieves optimal ventilation and oxygenation:   Assess for changes in respiratory status   Assess for changes in mentation and behavior   Position to facilitate oxygenation and minimize respiratory effort   Oxygen supplementation based on oxygen saturation or arterial blood gases   Initiate smoking cessation protocol as indicated   Encourage broncho-pulmonary hygiene including cough, deep breathe, incentive spirometry   Assess the need for suctioning and aspirate as needed   Assess and instruct to report shortness of breath or any respiratory difficulty   Respiratory therapy support as indicated  5/23/2024 0237 by Martine Gonzales RN  Outcome: Progressing     Problem: Pain  Goal: Verbalizes/displays adequate comfort level or baseline comfort level  5/23/2024 1229

## 2024-05-23 NOTE — PLAN OF CARE
Problem: Respiratory - Adult  Goal: Achieves optimal ventilation and oxygenation  5/23/2024 0738 by Barbara Rosario, RCP  Outcome: Progressing  Flowsheets (Taken 5/23/2024 0732)  Achieves optimal ventilation and oxygenation:   Assess for changes in respiratory status   Assess for changes in mentation and behavior   Position to facilitate oxygenation and minimize respiratory effort   Oxygen supplementation based on oxygen saturation or arterial blood gases   Initiate smoking cessation protocol as indicated   Encourage broncho-pulmonary hygiene including cough, deep breathe, incentive spirometry   Assess the need for suctioning and aspirate as needed   Assess and instruct to report shortness of breath or any respiratory difficulty   Respiratory therapy support as indicated

## 2024-05-23 NOTE — PLAN OF CARE
Problem: Safety - Adult  Goal: Free from fall injury  Outcome: Progressing     Problem: Discharge Planning  Goal: Discharge to home or other facility with appropriate resources  Outcome: Progressing     Problem: Skin/Tissue Integrity  Goal: Absence of new skin breakdown  Description: 1.  Monitor for areas of redness and/or skin breakdown  2.  Assess vascular access sites hourly  3.  Every 4-6 hours minimum:  Change oxygen saturation probe site  4.  Every 4-6 hours:  If on nasal continuous positive airway pressure, respiratory therapy assess nares and determine need for appliance change or resting period.  Outcome: Progressing     Problem: Respiratory - Adult  Goal: Achieves optimal ventilation and oxygenation  5/23/2024 0237 by Martine Gonzales RN  Outcome: Progressing     Problem: Pain  Goal: Verbalizes/displays adequate comfort level or baseline comfort level  Outcome: Progressing

## 2024-05-23 NOTE — PLAN OF CARE
Problem: Respiratory - Adult  Goal: Achieves optimal ventilation and oxygenation  5/22/2024 2130 by Radha Nickerson RCP  Outcome: Progressing

## 2024-05-24 LAB
EKG ATRIAL RATE: 57 BPM
EKG P AXIS: 57 DEGREES
EKG P-R INTERVAL: 160 MS
EKG Q-T INTERVAL: 478 MS
EKG QRS DURATION: 86 MS
EKG QTC CALCULATION (BAZETT): 465 MS
EKG R AXIS: 8 DEGREES
EKG T AXIS: 45 DEGREES
EKG VENTRICULAR RATE: 57 BPM
GLUCOSE BLD-MCNC: 105 MG/DL (ref 65–105)

## 2024-05-24 PROCEDURE — 6370000000 HC RX 637 (ALT 250 FOR IP): Performed by: STUDENT IN AN ORGANIZED HEALTH CARE EDUCATION/TRAINING PROGRAM

## 2024-05-24 PROCEDURE — 97166 OT EVAL MOD COMPLEX 45 MIN: CPT

## 2024-05-24 PROCEDURE — 82947 ASSAY GLUCOSE BLOOD QUANT: CPT

## 2024-05-24 PROCEDURE — 6360000002 HC RX W HCPCS: Performed by: STUDENT IN AN ORGANIZED HEALTH CARE EDUCATION/TRAINING PROGRAM

## 2024-05-24 PROCEDURE — 94640 AIRWAY INHALATION TREATMENT: CPT

## 2024-05-24 PROCEDURE — 6370000000 HC RX 637 (ALT 250 FOR IP): Performed by: INTERNAL MEDICINE

## 2024-05-24 PROCEDURE — 2060000000 HC ICU INTERMEDIATE R&B

## 2024-05-24 PROCEDURE — 2580000003 HC RX 258: Performed by: INTERNAL MEDICINE

## 2024-05-24 PROCEDURE — 94761 N-INVAS EAR/PLS OXIMETRY MLT: CPT

## 2024-05-24 PROCEDURE — 97535 SELF CARE MNGMENT TRAINING: CPT

## 2024-05-24 PROCEDURE — 2700000000 HC OXYGEN THERAPY PER DAY

## 2024-05-24 PROCEDURE — 6370000000 HC RX 637 (ALT 250 FOR IP): Performed by: NURSE PRACTITIONER

## 2024-05-24 PROCEDURE — 6360000002 HC RX W HCPCS: Performed by: INTERNAL MEDICINE

## 2024-05-24 PROCEDURE — 93005 ELECTROCARDIOGRAM TRACING: CPT | Performed by: INTERNAL MEDICINE

## 2024-05-24 RX ORDER — LEVALBUTEROL INHALATION SOLUTION 1.25 MG/3ML
1.25 SOLUTION RESPIRATORY (INHALATION)
Status: DISCONTINUED | OUTPATIENT
Start: 2024-05-24 | End: 2024-05-25

## 2024-05-24 RX ORDER — MEXILETINE HYDROCHLORIDE 200 MG/1
200 CAPSULE ORAL
Status: DISCONTINUED | OUTPATIENT
Start: 2024-05-24 | End: 2024-05-26 | Stop reason: HOSPADM

## 2024-05-24 RX ORDER — SOTALOL HYDROCHLORIDE 120 MG/1
120 TABLET ORAL 2 TIMES DAILY
Status: DISCONTINUED | OUTPATIENT
Start: 2024-05-24 | End: 2024-05-26 | Stop reason: HOSPADM

## 2024-05-24 RX ORDER — MEXILETINE HYDROCHLORIDE 200 MG/1
200 CAPSULE ORAL EVERY 8 HOURS
Status: DISCONTINUED | OUTPATIENT
Start: 2024-05-24 | End: 2024-05-24

## 2024-05-24 RX ADMIN — BUDESONIDE AND FORMOTEROL FUMARATE DIHYDRATE 2 PUFF: 160; 4.5 AEROSOL RESPIRATORY (INHALATION) at 19:49

## 2024-05-24 RX ADMIN — BUDESONIDE AND FORMOTEROL FUMARATE DIHYDRATE 2 PUFF: 160; 4.5 AEROSOL RESPIRATORY (INHALATION) at 08:41

## 2024-05-24 RX ADMIN — ASPIRIN 81 MG: 81 TABLET, COATED ORAL at 09:09

## 2024-05-24 RX ADMIN — SOTALOL HYDROCHLORIDE 160 MG: 80 TABLET ORAL at 09:10

## 2024-05-24 RX ADMIN — MEXILETINE HYDROCHLORIDE 200 MG: 200 CAPSULE ORAL at 14:19

## 2024-05-24 RX ADMIN — SENNOSIDES AND DOCUSATE SODIUM 2 TABLET: 50; 8.6 TABLET ORAL at 09:08

## 2024-05-24 RX ADMIN — SODIUM CHLORIDE, PRESERVATIVE FREE 10 ML: 5 INJECTION INTRAVENOUS at 20:48

## 2024-05-24 RX ADMIN — MEXILETINE HYDROCHLORIDE 200 MG: 200 CAPSULE ORAL at 09:13

## 2024-05-24 RX ADMIN — LEVALBUTEROL HYDROCHLORIDE 1.25 MG: 1.25 SOLUTION RESPIRATORY (INHALATION) at 19:49

## 2024-05-24 RX ADMIN — MEXILETINE HYDROCHLORIDE 200 MG: 200 CAPSULE ORAL at 19:44

## 2024-05-24 RX ADMIN — LEVALBUTEROL HYDROCHLORIDE 1.25 MG: 1.25 SOLUTION RESPIRATORY (INHALATION) at 15:52

## 2024-05-24 RX ADMIN — METOPROLOL SUCCINATE 12.5 MG: 25 TABLET, EXTENDED RELEASE ORAL at 09:10

## 2024-05-24 RX ADMIN — ENOXAPARIN SODIUM 40 MG: 100 INJECTION SUBCUTANEOUS at 09:10

## 2024-05-24 RX ADMIN — LEVALBUTEROL HYDROCHLORIDE 1.25 MG: 1.25 SOLUTION RESPIRATORY (INHALATION) at 11:21

## 2024-05-24 RX ADMIN — SOTALOL HYDROCHLORIDE 120 MG: 120 TABLET ORAL at 20:44

## 2024-05-24 RX ADMIN — PANTOPRAZOLE SODIUM 40 MG: 40 TABLET, DELAYED RELEASE ORAL at 09:09

## 2024-05-24 RX ADMIN — SODIUM CHLORIDE, PRESERVATIVE FREE 10 ML: 5 INJECTION INTRAVENOUS at 09:15

## 2024-05-24 ASSESSMENT — PAIN SCALES - GENERAL
PAINLEVEL_OUTOF10: 0

## 2024-05-24 NOTE — PLAN OF CARE
Problem: Respiratory - Adult  Goal: Achieves optimal ventilation and oxygenation  5/24/2024 1953 by Anne Guardado RCP  Flowsheets (Taken 5/24/2024 1953)  Achieves optimal ventilation and oxygenation: Respiratory therapy support as indicated

## 2024-05-24 NOTE — PLAN OF CARE
Problem: Safety - Adult  Goal: Free from fall injury  Outcome: Progressing     Problem: Discharge Planning  Goal: Discharge to home or other facility with appropriate resources  Outcome: Progressing  Flowsheets (Taken 5/23/2024 2000)  Discharge to home or other facility with appropriate resources:   Identify barriers to discharge with patient and caregiver   Arrange for needed discharge resources and transportation as appropriate   Identify discharge learning needs (meds, wound care, etc)     Problem: Skin/Tissue Integrity  Goal: Absence of new skin breakdown  Description: 1.  Monitor for areas of redness and/or skin breakdown  2.  Assess vascular access sites hourly  3.  Every 4-6 hours minimum:  Change oxygen saturation probe site  4.  Every 4-6 hours:  If on nasal continuous positive airway pressure, respiratory therapy assess nares and determine need for appliance change or resting period.  Outcome: Progressing     Problem: Respiratory - Adult  Goal: Achieves optimal ventilation and oxygenation  Outcome: Progressing  Flowsheets (Taken 5/23/2024 1958 by Sheridan Saenz RCP)  Achieves optimal ventilation and oxygenation:   Assess for changes in respiratory status   Assess for changes in mentation and behavior   Position to facilitate oxygenation and minimize respiratory effort   Oxygen supplementation based on oxygen saturation or arterial blood gases   Encourage broncho-pulmonary hygiene including cough, deep breathe, incentive spirometry   Assess the need for suctioning and aspirate as needed   Assess and instruct to report shortness of breath or any respiratory difficulty   Respiratory therapy support as indicated     Problem: Pain  Goal: Verbalizes/displays adequate comfort level or baseline comfort level  Outcome: Progressing

## 2024-05-24 NOTE — SIGNIFICANT EVENT
ELECTROPHYSIOLOGY BRIEF NOTE    EKGs reviewed.  Mild QTC prolongation this morning to 460 msec    FINAL RECOMMENDATIONS:   Stable for discharge on sotalol 120 mg twice daily, mexiletine 200 mg twice daily, aspirin 81 mg daily, Toprol-XL 12.5 mg twice daily  Will refer to [facility] electrophysiology for outpatient assessment of cardial PVC ablation

## 2024-05-24 NOTE — PLAN OF CARE
Problem: Respiratory - Adult  Goal: Achieves optimal ventilation and oxygenation  5/24/2024 0850 by Estelle Sandoval RCP  Outcome: Progressing

## 2024-05-24 NOTE — CARE COORDINATION
Transitional planning  Spoke with pt and pt's daughter at bedside  Dtr states between her and her brother one of them will be with her.  Brother during the day and dtr at night.  Pt agitated and declining home care at this .  Called [phone] spoke with [place] asking if someone was able to do a PT eval today she will check doesn't look like pt was seen.  She will see if she can get someone to see her still today.

## 2024-05-25 LAB
EKG ATRIAL RATE: 52 BPM
EKG ATRIAL RATE: 53 BPM
EKG ATRIAL RATE: 58 BPM
EKG ATRIAL RATE: 63 BPM
EKG ATRIAL RATE: 65 BPM
EKG ATRIAL RATE: 69 BPM
EKG P AXIS: 63 DEGREES
EKG P AXIS: 63 DEGREES
EKG P AXIS: 65 DEGREES
EKG P AXIS: 68 DEGREES
EKG P AXIS: 68 DEGREES
EKG P AXIS: 71 DEGREES
EKG P-R INTERVAL: 166 MS
EKG P-R INTERVAL: 168 MS
EKG P-R INTERVAL: 170 MS
EKG P-R INTERVAL: 172 MS
EKG P-R INTERVAL: 174 MS
EKG P-R INTERVAL: 176 MS
EKG Q-T INTERVAL: 440 MS
EKG Q-T INTERVAL: 444 MS
EKG Q-T INTERVAL: 452 MS
EKG Q-T INTERVAL: 454 MS
EKG Q-T INTERVAL: 458 MS
EKG Q-T INTERVAL: 490 MS
EKG QRS DURATION: 78 MS
EKG QRS DURATION: 80 MS
EKG QRS DURATION: 82 MS
EKG QRS DURATION: 86 MS
EKG QRS DURATION: 86 MS
EKG QRS DURATION: 92 MS
EKG QTC CALCULATION (BAZETT): 424 MS
EKG QTC CALCULATION (BAZETT): 431 MS
EKG QTC CALCULATION (BAZETT): 455 MS
EKG QTC CALCULATION (BAZETT): 468 MS
EKG QTC CALCULATION (BAZETT): 472 MS
EKG QTC CALCULATION (BAZETT): 475 MS
EKG R AXIS: 11 DEGREES
EKG R AXIS: 13 DEGREES
EKG R AXIS: 16 DEGREES
EKG R AXIS: 25 DEGREES
EKG R AXIS: 29 DEGREES
EKG R AXIS: 9 DEGREES
EKG T AXIS: 48 DEGREES
EKG T AXIS: 51 DEGREES
EKG T AXIS: 52 DEGREES
EKG T AXIS: 61 DEGREES
EKG T AXIS: 62 DEGREES
EKG T AXIS: 66 DEGREES
EKG VENTRICULAR RATE: 52 BPM
EKG VENTRICULAR RATE: 53 BPM
EKG VENTRICULAR RATE: 58 BPM
EKG VENTRICULAR RATE: 63 BPM
EKG VENTRICULAR RATE: 65 BPM
EKG VENTRICULAR RATE: 69 BPM

## 2024-05-25 PROCEDURE — 93005 ELECTROCARDIOGRAM TRACING: CPT | Performed by: INTERNAL MEDICINE

## 2024-05-25 PROCEDURE — 2700000000 HC OXYGEN THERAPY PER DAY

## 2024-05-25 PROCEDURE — 94640 AIRWAY INHALATION TREATMENT: CPT

## 2024-05-25 PROCEDURE — 2060000000 HC ICU INTERMEDIATE R&B

## 2024-05-25 PROCEDURE — 97530 THERAPEUTIC ACTIVITIES: CPT

## 2024-05-25 PROCEDURE — 6360000002 HC RX W HCPCS: Performed by: STUDENT IN AN ORGANIZED HEALTH CARE EDUCATION/TRAINING PROGRAM

## 2024-05-25 PROCEDURE — 6370000000 HC RX 637 (ALT 250 FOR IP): Performed by: STUDENT IN AN ORGANIZED HEALTH CARE EDUCATION/TRAINING PROGRAM

## 2024-05-25 PROCEDURE — 93010 ELECTROCARDIOGRAM REPORT: CPT | Performed by: INTERNAL MEDICINE

## 2024-05-25 PROCEDURE — 6360000002 HC RX W HCPCS: Performed by: INTERNAL MEDICINE

## 2024-05-25 PROCEDURE — 94761 N-INVAS EAR/PLS OXIMETRY MLT: CPT

## 2024-05-25 PROCEDURE — 2580000003 HC RX 258: Performed by: INTERNAL MEDICINE

## 2024-05-25 PROCEDURE — 97162 PT EVAL MOD COMPLEX 30 MIN: CPT

## 2024-05-25 PROCEDURE — 6370000000 HC RX 637 (ALT 250 FOR IP): Performed by: INTERNAL MEDICINE

## 2024-05-25 PROCEDURE — 99232 SBSQ HOSP IP/OBS MODERATE 35: CPT | Performed by: STUDENT IN AN ORGANIZED HEALTH CARE EDUCATION/TRAINING PROGRAM

## 2024-05-25 RX ORDER — LEVALBUTEROL INHALATION SOLUTION 1.25 MG/3ML
1.25 SOLUTION RESPIRATORY (INHALATION) 4 TIMES DAILY PRN
Status: DISCONTINUED | OUTPATIENT
Start: 2024-05-25 | End: 2024-05-26 | Stop reason: HOSPADM

## 2024-05-25 RX ADMIN — ENOXAPARIN SODIUM 40 MG: 100 INJECTION SUBCUTANEOUS at 09:10

## 2024-05-25 RX ADMIN — BUDESONIDE AND FORMOTEROL FUMARATE DIHYDRATE 2 PUFF: 160; 4.5 AEROSOL RESPIRATORY (INHALATION) at 07:51

## 2024-05-25 RX ADMIN — SODIUM CHLORIDE, PRESERVATIVE FREE 10 ML: 5 INJECTION INTRAVENOUS at 21:11

## 2024-05-25 RX ADMIN — MEXILETINE HYDROCHLORIDE 200 MG: 200 CAPSULE ORAL at 11:41

## 2024-05-25 RX ADMIN — SOTALOL HYDROCHLORIDE 120 MG: 120 TABLET ORAL at 06:09

## 2024-05-25 RX ADMIN — PANTOPRAZOLE SODIUM 40 MG: 40 TABLET, DELAYED RELEASE ORAL at 06:09

## 2024-05-25 RX ADMIN — MEXILETINE HYDROCHLORIDE 200 MG: 200 CAPSULE ORAL at 09:13

## 2024-05-25 RX ADMIN — MEXILETINE HYDROCHLORIDE 200 MG: 200 CAPSULE ORAL at 17:39

## 2024-05-25 RX ADMIN — METOPROLOL SUCCINATE 12.5 MG: 25 TABLET, EXTENDED RELEASE ORAL at 21:10

## 2024-05-25 RX ADMIN — ASPIRIN 81 MG: 81 TABLET, COATED ORAL at 09:10

## 2024-05-25 RX ADMIN — LEVALBUTEROL HYDROCHLORIDE 1.25 MG: 1.25 SOLUTION RESPIRATORY (INHALATION) at 07:51

## 2024-05-25 RX ADMIN — SODIUM CHLORIDE, PRESERVATIVE FREE 10 ML: 5 INJECTION INTRAVENOUS at 09:11

## 2024-05-25 RX ADMIN — SOTALOL HYDROCHLORIDE 120 MG: 120 TABLET ORAL at 21:10

## 2024-05-25 RX ADMIN — BUDESONIDE AND FORMOTEROL FUMARATE DIHYDRATE 2 PUFF: 160; 4.5 AEROSOL RESPIRATORY (INHALATION) at 21:17

## 2024-05-25 ASSESSMENT — PAIN SCALES - GENERAL
PAINLEVEL_OUTOF10: 2
PAINLEVEL_OUTOF10: 0

## 2024-05-25 NOTE — RT PROTOCOL NOTE
RT Inhaler-Nebulizer Bronchodilator Protocol Note    There is a bronchodilator order in the chart from a provider indicating to follow the RT Bronchodilator Protocol and there is an “Initiate RT Inhaler-Nebulizer Bronchodilator Protocol” order as well (see protocol at bottom of note).      The findings from the last RT Protocol Assessment were as follows:   History Pulmonary Disease: Chronic pulmonary disease  Respiratory Pattern: Regular pattern and RR 12-20 bpm  Breath Sounds: Clear breath sounds  Cough: Strong, productive  Indication for Bronchodilator Therapy: None  Bronchodilator Assessment Score: 3    Aerosolized bronchodilator medication orders have been revised according to the RT Inhaler-Nebulizer Bronchodilator Protocol below.    Respiratory Therapist to perform RT Therapy Protocol Assessment initially then follow the protocol.  Repeat RT Therapy Protocol Assessment PRN for score 0-3 or on second treatment, BID, and PRN for scores above 3.    No Indications - adjust the frequency to every 6 hours PRN wheezing or bronchospasm, if no treatments needed after 48 hours then discontinue using Per Protocol order mode.     If indication present, adjust the RT bronchodilator orders based on the Bronchodilator Assessment Score as indicated below.  Use Inhaler orders unless patient has one or more of the following: on home nebulizer, not able to hold breath for 10 seconds, is not alert and oriented, cannot activate and use MDI correctly, or respiratory rate 25 breaths per minute or more, then use the equivalent nebulizer order(s) with same Frequency and PRN reasons based on the score.  If a patient is on this medication at home then do not decrease Frequency below that used at home.    0-3 - enter or revise RT bronchodilator order(s) to equivalent RT Bronchodilator order with Frequency of every 4 hours PRN for wheezing or increased work of breathing using Per Protocol order mode.        4-6 - enter or revise RT

## 2024-05-25 NOTE — PLAN OF CARE
Problem: Safety - Adult  Goal: Free from fall injury  Outcome: Progressing     Problem: Discharge Planning  Goal: Discharge to home or other facility with appropriate resources  Outcome: Progressing     Problem: Skin/Tissue Integrity  Goal: Absence of new skin breakdown  Description: 1.  Monitor for areas of redness and/or skin breakdown  2.  Assess vascular access sites hourly  3.  Every 4-6 hours minimum:  Change oxygen saturation probe site  4.  Every 4-6 hours:  If on nasal continuous positive airway pressure, respiratory therapy assess nares and determine need for appliance change or resting period.  Outcome: Progressing     Problem: Respiratory - Adult  Goal: Achieves optimal ventilation and oxygenation  5/24/2024 2019 by Jan London RN  Outcome: Progressing  5/24/2024 1953 by Anne Guardado RCP  Flowsheets (Taken 5/24/2024 1953)  Achieves optimal ventilation and oxygenation: Respiratory therapy support as indicated  5/24/2024 0850 by Estelle Sandoval RCP  Outcome: Progressing     Problem: Pain  Goal: Verbalizes/displays adequate comfort level or baseline comfort level  Outcome: Progressing  Flowsheets (Taken 5/24/2024 0745)  Verbalizes/displays adequate comfort level or baseline comfort level: Encourage patient to monitor pain and request assistance

## 2024-05-26 VITALS
DIASTOLIC BLOOD PRESSURE: 62 MMHG | HEIGHT: 61 IN | HEART RATE: 60 BPM | TEMPERATURE: 98.2 F | OXYGEN SATURATION: 94 % | BODY MASS INDEX: 33.64 KG/M2 | SYSTOLIC BLOOD PRESSURE: 116 MMHG | RESPIRATION RATE: 16 BRPM | WEIGHT: 178.2 LBS

## 2024-05-26 PROBLEM — I25.10 CAD IN NATIVE ARTERY: Status: RESOLVED | Noted: 2024-05-16 | Resolved: 2024-05-26

## 2024-05-26 PROBLEM — I20.9 ANGINA PECTORIS (HCC): Status: RESOLVED | Noted: 2024-05-16 | Resolved: 2024-05-26

## 2024-05-26 PROCEDURE — 99239 HOSP IP/OBS DSCHRG MGMT >30: CPT | Performed by: STUDENT IN AN ORGANIZED HEALTH CARE EDUCATION/TRAINING PROGRAM

## 2024-05-26 PROCEDURE — 94761 N-INVAS EAR/PLS OXIMETRY MLT: CPT

## 2024-05-26 PROCEDURE — 6370000000 HC RX 637 (ALT 250 FOR IP): Performed by: STUDENT IN AN ORGANIZED HEALTH CARE EDUCATION/TRAINING PROGRAM

## 2024-05-26 PROCEDURE — 93005 ELECTROCARDIOGRAM TRACING: CPT | Performed by: INTERNAL MEDICINE

## 2024-05-26 PROCEDURE — 94640 AIRWAY INHALATION TREATMENT: CPT

## 2024-05-26 PROCEDURE — 6370000000 HC RX 637 (ALT 250 FOR IP): Performed by: INTERNAL MEDICINE

## 2024-05-26 PROCEDURE — 6360000002 HC RX W HCPCS: Performed by: STUDENT IN AN ORGANIZED HEALTH CARE EDUCATION/TRAINING PROGRAM

## 2024-05-26 PROCEDURE — 2580000003 HC RX 258: Performed by: INTERNAL MEDICINE

## 2024-05-26 RX ORDER — SOTALOL HYDROCHLORIDE 120 MG/1
120 TABLET ORAL 2 TIMES DAILY
Qty: 60 TABLET | Refills: 3 | Status: SHIPPED | OUTPATIENT
Start: 2024-05-26

## 2024-05-26 RX ORDER — MEXILETINE HYDROCHLORIDE 200 MG/1
200 CAPSULE ORAL
Qty: 90 CAPSULE | Refills: 3 | Status: SHIPPED | OUTPATIENT
Start: 2024-05-26

## 2024-05-26 RX ORDER — BUDESONIDE AND FORMOTEROL FUMARATE DIHYDRATE 160; 4.5 UG/1; UG/1
2 AEROSOL RESPIRATORY (INHALATION)
Qty: 10.2 G | Refills: 3 | Status: SHIPPED | OUTPATIENT
Start: 2024-05-26

## 2024-05-26 RX ADMIN — PANTOPRAZOLE SODIUM 40 MG: 40 TABLET, DELAYED RELEASE ORAL at 09:16

## 2024-05-26 RX ADMIN — ENOXAPARIN SODIUM 40 MG: 100 INJECTION SUBCUTANEOUS at 09:15

## 2024-05-26 RX ADMIN — METOPROLOL SUCCINATE 12.5 MG: 25 TABLET, EXTENDED RELEASE ORAL at 09:12

## 2024-05-26 RX ADMIN — ACETAMINOPHEN 650 MG: 325 TABLET ORAL at 09:14

## 2024-05-26 RX ADMIN — MEXILETINE HYDROCHLORIDE 200 MG: 200 CAPSULE ORAL at 09:13

## 2024-05-26 RX ADMIN — MEXILETINE HYDROCHLORIDE 200 MG: 200 CAPSULE ORAL at 12:41

## 2024-05-26 RX ADMIN — ASPIRIN 81 MG: 81 TABLET, COATED ORAL at 09:14

## 2024-05-26 RX ADMIN — BUDESONIDE AND FORMOTEROL FUMARATE DIHYDRATE 2 PUFF: 160; 4.5 AEROSOL RESPIRATORY (INHALATION) at 07:42

## 2024-05-26 RX ADMIN — SODIUM CHLORIDE, PRESERVATIVE FREE 10 ML: 5 INJECTION INTRAVENOUS at 09:16

## 2024-05-26 RX ADMIN — SOTALOL HYDROCHLORIDE 120 MG: 120 TABLET ORAL at 09:13

## 2024-05-26 ASSESSMENT — PAIN - FUNCTIONAL ASSESSMENT: PAIN_FUNCTIONAL_ASSESSMENT: PREVENTS OR INTERFERES SOME ACTIVE ACTIVITIES AND ADLS

## 2024-05-26 ASSESSMENT — PAIN DESCRIPTION - LOCATION: LOCATION: GENERALIZED

## 2024-05-26 ASSESSMENT — PAIN SCALES - GENERAL
PAINLEVEL_OUTOF10: 2
PAINLEVEL_OUTOF10: 0

## 2024-05-26 ASSESSMENT — PAIN DESCRIPTION - DESCRIPTORS: DESCRIPTORS: ACHING

## 2024-05-26 ASSESSMENT — PAIN DESCRIPTION - FREQUENCY: FREQUENCY: INTERMITTENT

## 2024-05-26 NOTE — DISCHARGE INSTR - COC
Z95.5    Cardiomyopathy (HCC) I42.9    Transaminitis R74.01       Isolation/Infection:   Isolation            No Isolation          Patient Infection Status       None to display                     Nurse Assessment:  Last Vital Signs: /62   Pulse 60   Temp 98.2 °F (36.8 °C) (Oral)   Resp 16   Ht 1.549 m (5' 0.98\")   Wt 80.8 kg (178 lb 3.2 oz)   SpO2 94%   BMI 33.69 kg/m²     Last documented pain score (0-10 scale): Pain Level: 2  Last Weight:   Wt Readings from Last 1 Encounters:   05/26/24 80.8 kg (178 lb 3.2 oz)     Mental Status:  oriented, alert, coherent, logical, thought processes intact, and able to concentrate and follow conversation    IV Access:  - None    Nursing Mobility/ADLs:  Walking   Assisted  Transfer  Assisted  Bathing  Assisted  Dressing  Assisted  Toileting  Assisted  Feeding  Independent  Med Admin  Assisted  Med Delivery   whole    Wound Care Documentation and Therapy:        Elimination:  Continence:   Bowel: Yes  Bladder: No  Urinary Catheter: None   Colostomy/Ileostomy/Ileal Conduit: No       Date of Last BM: 5/25/2024    Intake/Output Summary (Last 24 hours) at 5/26/2024 1158  Last data filed at 5/26/2024 0900  Gross per 24 hour   Intake 200 ml   Output 550 ml   Net -350 ml     I/O last 3 completed shifts:  In: -   Out: 300 [Urine:300]    Safety Concerns:     None    Impairments/Disabilities:      None    Nutrition Therapy:  Current Nutrition Therapy:   - Oral Diet:  General    Routes of Feeding: Oral  Liquids: No Restrictions  Daily Fluid Restriction: no  Last Modified Barium Swallow with Video (Video Swallowing Test): not done    Treatments at the Time of Hospital Discharge:   Respiratory Treatments: symbacort inhaler  Oxygen Therapy:  is not on home oxygen therapy.  Ventilator:    - No ventilator support    Rehab Therapies: Physical Therapy and Occupational Therapy  Weight Bearing Status/Restrictions: No weight bearing restrictions  Other Medical Equipment (for information

## 2024-05-26 NOTE — PLAN OF CARE
Problem: Safety - Adult  Goal: Free from fall injury  5/26/2024 0958 by Rosibel Brown RN  Outcome: Progressing  5/26/2024 0110 by Ema Oliver RN  Outcome: Progressing     Problem: Discharge Planning  Goal: Discharge to home or other facility with appropriate resources  5/26/2024 0958 by Rosibel Brown RN  Outcome: Progressing  5/26/2024 0110 by Ema Oliver RN  Outcome: Progressing  Flowsheets (Taken 5/25/2024 2000)  Discharge to home or other facility with appropriate resources:   Identify barriers to discharge with patient and caregiver   Arrange for needed discharge resources and transportation as appropriate   Identify discharge learning needs (meds, wound care, etc)   Refer to discharge planning if patient needs post-hospital services based on physician order or complex needs related to functional status, cognitive ability or social support system     Problem: Skin/Tissue Integrity  Goal: Absence of new skin breakdown  Description: 1.  Monitor for areas of redness and/or skin breakdown  2.  Assess vascular access sites hourly  3.  Every 4-6 hours minimum:  Change oxygen saturation probe site  4.  Every 4-6 hours:  If on nasal continuous positive airway pressure, respiratory therapy assess nares and determine need for appliance change or resting period.  5/26/2024 0958 by Rosibel Brown RN  Outcome: Progressing  5/26/2024 0110 by Ema Oliver RN  Outcome: Progressing     Problem: Pain  Goal: Verbalizes/displays adequate comfort level or baseline comfort level  5/26/2024 0958 by Rosibel Brown RN  Outcome: Progressing  5/26/2024 0110 by Ema Oliver RN  Outcome: Progressing  Flowsheets (Taken 5/25/2024 1936)  Verbalizes/displays adequate comfort level or baseline comfort level:   Encourage patient to monitor pain and request assistance   Assess pain using appropriate pain scale   Administer analgesics based on type and severity of pain and evaluate response   Implement non-pharmacological measures as

## 2024-05-26 NOTE — DISCHARGE SUMMARY
[facility]  Office: [phone]  Joey Smith DO, Miguel Machado DO, Vikash Conklin DO, Alan Mcnulty DO, Joseph Thomas MD, Elsy Lacy MD, Eduardo Maddox MD, Kindra Mello MD,  Stef Awad MD, Esperanza Gandhi MD, Andi Hull MD,  Nolberto Allison DO, Anju Ellis MD, Paco Solis MD, Girma Smith DO, Heydi Yu MD,  Rigo Holliday DO, Rajwinder Stafford MD, Ita Camacho MD, Berna Vides MD, Eloy Bronson MD,  Gigi Romero MD, Elis Mckeon MD, Alfred Conner MD, Steve Greer MD, Jaime Banegas MD, Sandhya Collnis MD, Boris Frye DO, Ion Henry DO, Jass Wyman MD,  Caesar Leiva MD, Shirley Waterhouse, CNP,  Chastity Astorga CNP, Fran Fuentes, CNP,  Olive Melgoza, DNP, Madai Holley, CNP, Meghan Rodriguez, CNP, Mary Aleman CNP, Haleigh Francois, CNP, Kira Rosales, PA-C, Pippa Daily PA-C, Radha Mariee, CNP, Debbie Alegria, CNP, Michael Hurst, CNP, Meghna Holland, CNP, Viky White, CNP, Tammy Jarrett, CNS, Brooke Tipton, CNP, Carolyne Boston CNP, Tracy Schwab, CNP         [facility]   IN-PATIENT SERVICE   [facility]    Discharge Summary     Patient ID: Coni Baker  :  1950   MRN: [id number]     ACCOUNT:  [id number]   Patient's PCP: Earlene Salcedo MD  Admit Date: 2024   Discharge Date: 2024     Length of Stay: 10  Code Status:  Full Code  Admitting Physician: Elsy Lacy MD  Discharge Physician: Dilnoor Aiyana, MD     Active Discharge Diagnoses:     Hospital Problem Lists:  Principal Problem:    Shortness of breath  Active Problems:    S/P cardiac cath    Ischemic cardiomyopathy    S/P coronary artery stent placement    Mixed hyperlipidemia    Primary hypertension    Ventricular tachycardia (HCC)    Acute cystitis without hematuria    Cardiomyopathy (HCC)    Transaminitis  Resolved Problems:    CAD in native artery    Angina pectoris (HCC)      Admission Condition:  fair     Discharged

## 2024-05-26 NOTE — CARE COORDINATION
Referral sent to [address] and [facility] as requested by patient's son. Patient denies other needs for home and has transportation    1238 received call from Cathy from [facility]. They are not able to accept. Spoke to Pilar from [address]. She will check with intake and have someone call back    1335 notified by Padmaja from [address] that they are able to accept. Patient updated    Discharge Report    [facility]  Clinical Case Management Department  Written by: Jihan Sagastume RN    Patient Name: Coni Baker  Attending Provider: Eloy Bronson MD  Admit Date: 2024  3:45 PM  MRN: [id number]  Account: [id number]                     : 1950  Discharge Date: 2024      Disposition: home    Jihan Sagastume RN

## 2024-05-26 NOTE — PLAN OF CARE
Problem: Safety - Adult  Goal: Free from fall injury  Outcome: Progressing     Problem: Discharge Planning  Goal: Discharge to home or other facility with appropriate resources  Outcome: Progressing  Flowsheets (Taken 5/25/2024 2000)  Discharge to home or other facility with appropriate resources:   Identify barriers to discharge with patient and caregiver   Arrange for needed discharge resources and transportation as appropriate   Identify discharge learning needs (meds, wound care, etc)   Refer to discharge planning if patient needs post-hospital services based on physician order or complex needs related to functional status, cognitive ability or social support system     Problem: Skin/Tissue Integrity  Goal: Absence of new skin breakdown  Description: 1.  Monitor for areas of redness and/or skin breakdown  2.  Assess vascular access sites hourly  3.  Every 4-6 hours minimum:  Change oxygen saturation probe site  4.  Every 4-6 hours:  If on nasal continuous positive airway pressure, respiratory therapy assess nares and determine need for appliance change or resting period.  Outcome: Progressing     Problem: Respiratory - Adult  Goal: Achieves optimal ventilation and oxygenation  5/26/2024 0110 by Ema Oliver RN  Outcome: Progressing  5/25/2024 2147 by Steph Osuna RCP  Outcome: Progressing  Flowsheets (Taken 5/25/2024 2000 by Ema Oliver RN)  Achieves optimal ventilation and oxygenation:   Assess for changes in respiratory status   Position to facilitate oxygenation and minimize respiratory effort   Encourage broncho-pulmonary hygiene including cough, deep breathe, incentive spirometry     Problem: Pain  Goal: Verbalizes/displays adequate comfort level or baseline comfort level  Outcome: Progressing  Flowsheets (Taken 5/25/2024 1936)  Verbalizes/displays adequate comfort level or baseline comfort level:   Encourage patient to monitor pain and request assistance   Assess pain using appropriate pain

## 2024-05-26 NOTE — PLAN OF CARE
Problem: Safety - Adult  Goal: Free from fall injury  5/26/2024 1317 by Rosibel Brown RN  Outcome: Adequate for Discharge  5/26/2024 0958 by Rosibel Brown RN  Outcome: Progressing  5/26/2024 0110 by Ema Oliver RN  Outcome: Progressing     Problem: Discharge Planning  Goal: Discharge to home or other facility with appropriate resources  5/26/2024 1317 by Rosibel Brown RN  Outcome: Adequate for Discharge  5/26/2024 0958 by Rosibel Brown RN  Outcome: Progressing  5/26/2024 0110 by Ema Oliver RN  Outcome: Progressing  Flowsheets (Taken 5/25/2024 2000)  Discharge to home or other facility with appropriate resources:   Identify barriers to discharge with patient and caregiver   Arrange for needed discharge resources and transportation as appropriate   Identify discharge learning needs (meds, wound care, etc)   Refer to discharge planning if patient needs post-hospital services based on physician order or complex needs related to functional status, cognitive ability or social support system     Problem: Skin/Tissue Integrity  Goal: Absence of new skin breakdown  Description: 1.  Monitor for areas of redness and/or skin breakdown  2.  Assess vascular access sites hourly  3.  Every 4-6 hours minimum:  Change oxygen saturation probe site  4.  Every 4-6 hours:  If on nasal continuous positive airway pressure, respiratory therapy assess nares and determine need for appliance change or resting period.  5/26/2024 1317 by Rosibel Brown RN  Outcome: Adequate for Discharge  5/26/2024 0958 by Rosibel Brown RN  Outcome: Progressing  5/26/2024 0110 by Ema Oliver RN  Outcome: Progressing     Problem: Respiratory - Adult  Goal: Achieves optimal ventilation and oxygenation  5/26/2024 1317 by Rosibel Brown RN  Outcome: Adequate for Discharge  5/26/2024 0958 by Rosibel Brown RN  Outcome: Progressing  5/26/2024 0110 by Ema Oliver RN  Outcome: Progressing     Problem: Pain  Goal: Verbalizes/displays adequate comfort level or

## 2024-05-26 NOTE — PLAN OF CARE
Problem: Respiratory - Adult  Goal: Achieves optimal ventilation and oxygenation  Outcome: Progressing   BRONCHOSPASM/BRONCHOCONSTRICTION     [x]         IMPROVE AERATION/BREATH SOUNDS  [x]   ADMINISTER BRONCHODILATOR THERAPY AS APPROPRIATE  [x]   ASSESS BREATH SOUNDS  [x]   IMPLEMENT AEROSOL/MDI PROTOCOL  [x]   PATIENT EDUCATION AS NEEDED  PROVIDE ADEQUATE OXYGENATION WITH ACCEPTABLE SP02/ABG'S    [x]  IDENTIFY APPROPRIATE OXYGEN THERAPY  [x]   MONITOR SP02/ABG'S AS NEEDED   [x]   PATIENT EDUCATION AS NEEDED

## 2024-05-27 LAB
EKG ATRIAL RATE: 52 BPM
EKG ATRIAL RATE: 64 BPM
EKG P AXIS: 57 DEGREES
EKG P AXIS: 63 DEGREES
EKG P-R INTERVAL: 176 MS
EKG P-R INTERVAL: 184 MS
EKG Q-T INTERVAL: 436 MS
EKG Q-T INTERVAL: 444 MS
EKG QRS DURATION: 80 MS
EKG QRS DURATION: 88 MS
EKG QTC CALCULATION (BAZETT): 412 MS
EKG QTC CALCULATION (BAZETT): 449 MS
EKG R AXIS: 23 DEGREES
EKG R AXIS: 8 DEGREES
EKG T AXIS: 55 DEGREES
EKG T AXIS: 70 DEGREES
EKG VENTRICULAR RATE: 52 BPM
EKG VENTRICULAR RATE: 64 BPM

## 2024-05-28 NOTE — PROGRESS NOTES
Section of Cardiology  Electrophysiology Progress Note      Date:  5/25/2024  Patient: Coni Baker  Admission:  5/16/2024  3:45 PM  Admit DX: Angina pectoris (HCC) [I20.9]  Shortness of breath [R06.02]  S/P cardiac cath [Z98.890]  Age:  73 y.o., 1950     LOS: 9 days     Reason for evaluation:   Premature ventricular contractions, nonsustained VT      SUBJECTIVE:     No acute events overnight.  Telemetry shows multiple episodes of PVCs with bigeminy and rare couplets.    Patient says she is frustrated about still being in the hospital.  Says she would like referral to [facility] at discharge for any potential treatment.  Denies chest pain, lightheadedness    OBJECTIVE:        Current Inpatient Medications:   mexiletine  200 mg Oral TID WC    levalbuterol  1.25 mg Nebulization 4x Daily RT    sotalol  120 mg Oral BID    polyethylene glycol  17 g Oral Daily    pantoprazole  40 mg Oral QAM AC    sennosides-docusate sodium  2 tablet Oral Daily    metoprolol succinate  12.5 mg Oral BID    budesonide-formoterol  2 puff Inhalation BID RT    aspirin  81 mg Oral Daily    [Held by provider] lisinopril  20 mg Oral Daily    enoxaparin  40 mg SubCUTAneous Daily    sodium chloride flush  5-40 mL IntraVENous 2 times per day       IV Infusions (if any):   sodium chloride Stopped (05/19/24 1347)           EXAM:   Vitals:    VITALS:  BP (!) 151/80   Pulse 66   Temp 97.9 °F (36.6 °C) (Axillary)   Resp 16   Ht 1.549 m (5' 0.98\")   Wt 84.3 kg (185 lb 12.8 oz)   SpO2 96%   BMI 35.13 kg/m²   24HR INTAKE/OUTPUT:    Intake/Output Summary (Last 24 hours) at 5/25/2024 0732  Last data filed at 5/24/2024 0915  Gross per 24 hour   Intake 10 ml   Output --   Net 10 ml       General: Lying in bed in no acute distress, alert oriented x3  HEENT:  Anicteric, Fair dentition  Neck:  No JVD  Chest:  Clear to auscultation bilaterally  CV:  S2 normal, regular rate with frequent extrasystole, no murmurs  Abdomen:  Soft, nontender, 
   05/20/24 2029   Care Plan - Respiratory Goals   Achieves optimal ventilation and oxygenation Respiratory therapy support as indicated;Assess and instruct to report shortness of breath or any respiratory difficulty;Assess the need for suctioning and aspirate as needed;Initiate smoking cessation protocol as indicated;Encourage broncho-pulmonary hygiene including cough, deep breathe, incentive spirometry;Oxygen supplementation based on oxygen saturation or arterial blood gases;Position to facilitate oxygenation and minimize respiratory effort;Assess for changes in mentation and behavior;Assess for changes in respiratory status       
   05/21/24 2003   Care Plan - Respiratory Goals   Achieves optimal ventilation and oxygenation Respiratory therapy support as indicated;Assess and instruct to report shortness of breath or any respiratory difficulty;Assess the need for suctioning and aspirate as needed;Encourage broncho-pulmonary hygiene including cough, deep breathe, incentive spirometry;Initiate smoking cessation protocol as indicated;Position to facilitate oxygenation and minimize respiratory effort;Oxygen supplementation based on oxygen saturation or arterial blood gases;Assess for changes in mentation and behavior;Assess for changes in respiratory status       
   05/23/24 1958   Care Plan - Respiratory Goals   Achieves optimal ventilation and oxygenation Assess for changes in respiratory status;Assess for changes in mentation and behavior;Position to facilitate oxygenation and minimize respiratory effort;Oxygen supplementation based on oxygen saturation or arterial blood gases;Encourage broncho-pulmonary hygiene including cough, deep breathe, incentive spirometry;Assess the need for suctioning and aspirate as needed;Assess and instruct to report shortness of breath or any respiratory difficulty;Respiratory therapy support as indicated       
0305 pt with sudden acute respiratory distress, c/o \"i can't breath I feel like I did when I came here\" call light pulled from wall, staff to bedside, pt with heart rate in 90s on monitor running multiple ventricular beats, RR 40's   0310 rapid response called.   0313 pt placed on life pack, rapid team to bedside, orders written  0325 lasix given solu-medrol given, labs drawn BIPAP placed on pt  0330 additional IV started   0345 friedman placed, linens changed, pt repositioned  0400 pt lying quietly resting with no complaints RR improved, pt states \" I feel so much better\"  
[facility]  Office: [phone]  Joey Smith DO, Miguel Mahcado DO, Vikash Conklin DO, Alan Mcnulty DO, Joseph Thomas MD, Elsy Lacy MD, Eduardo Maddox MD, Kindra Mello MD,  Stef Awad MD, Esperanza Gandhi MD, Andi Hull MD,  Nolberto Allison DO, Anju Ellis MD, Paco Solis MD, Girma Smith DO, Heydi Yu MD,  Rigo Holliday DO, Rajwinder Stafford MD, Ita Camacho MD, Berna Vides MD, Eloy Bronson MD,  Gigi Romero MD, Elis Mckeon MD, Alfred Conner MD, Steve Greer MD, Jaime Banegas MD, Sandhya Collins MD, Boris Frye DO, Ion Henry DO, Jass Wyman MD,  Caesar Leiva MD, Shirley Waterhouse, CNP,  Chastity Astorga CNP, Fran Fuentes, CNP,  Olive Melgoza, DNP, Madai Holley, CNP, Meghan Rodriguez, CNP, Mary Aleman CNP, Haleigh Francois CNP, Kira Rosales, PA-C, Pippa Daily PA-C, Radha Mariee, CNP, Debbie Alegria, CNP, Michael Hurst, CNP, Meghna Holland, CNP, Viky White, CNP, Tammy Jarrett, CNS, Brooke Tipton, CNP, Carolyne Boston CNP, Tracy Schwab, CNP         [facility]   IN-PATIENT SERVICE   [facility]    Progress Note    5/25/2024    7:47 AM    Name:   Coni Baker  MRN:     [id number]     Acct:      [id number]   Room:   2003/2003-01  IP Day:  9  Admit Date:  5/16/2024  3:45 PM    PCP:   Earlene Salcedo MD  Code Status:  Full Code    Subjective:     C/C: SOB    Interval History Status: improved.     Patient seen and examined bedside.  Family at bedside.  Blood pressure soft this morning.  Patient looked tired and winded in morning.  Mentioned that while working with PT she felt dizzy.  Continued on Toprol-XL, maxitil and sotalol.    Brief History:     Per my partner:  \"This is a 73-year-old female with a significant past medical history of hypertension, hyperlipidemia, CAD status post stent placement who initially presented to [place] emergency department with a chief complaint of shortness 
Comprehensive Nutrition Assessment    Type and Reason for Visit:  RD Nutrition Re-Screen/LOS    Nutrition Recommendations/Plan:   If PO intake declines, provide high kcal, high pro ONS BID for nutrition support     Malnutrition Assessment:  Malnutrition Status:  No malnutrition (05/23/24 1434)    Context:  Chronic Illness     Findings of the 6 clinical characteristics of malnutrition:  Energy Intake:  No significant decrease in energy intake  Weight Loss:  No significant weight loss     Body Fat Loss:  No significant body fat loss     Muscle Mass Loss:  No significant muscle mass loss    Fluid Accumulation:  No significant fluid accumulation     Strength:  Not Performed    Nutrition Assessment:    Pt seen for LOS. Pt stated her appetite has been good since having to be 3 days NPO, then stated her appetite is not great - will eat 1 meal and maybe 1-2 snacks when she's home. Stated she always eats breakfast, sometimes does not eat dinner due to not being able to cook. Discussed with pt taking ensures at home to support nutrition intake - stated she used to drink them and will plan to start having them on hand when her consumption is low. Pt stated she knows she needs to eat, especially when taking her medications 3x per day. Pt was unsure of her UBW - stated \"maybe 160-165lbs before coming to the hospital, so 2 weeks ago\" when she last weighed that much. Pt is currently 185lbs. Pt stated she has \"gotten out of bed once since being here\" and is hoping to get discharged today and go to [facility] for continued care.    Nutrition Related Findings:    Labs/meds reviewed Wound Type: None       Current Nutrition Intake & Therapies:    Average Meal Intake: %  Average Supplements Intake: None Ordered  ADULT DIET; Regular    Anthropometric Measures:  Height: 154.9 cm (5' 0.98\")  Ideal Body Weight (IBW): 105 lbs (48 kg)       Current Body Weight: 83.9 kg (185 lb),   IBW. Weight Source: Bed Scale  Current BMI 
Dayshilogan RN called report to [facility]. Pt tx to [facility]2 and given updates to Sandi GARRETT by writing RN. Any questions, comments or concerns addressed at the time. Pt VSS on 2L O2 w/no complaints.   
Discussed with nursing and follow-up.  Symptoms improved.  Continue intermediate level care with consideration for transfer to ICU if further worsening symptoms.    
[facility]  Office: [phone]  Joey Smith DO, Miguel Machado, DO, Vikash Conklin DO, Alan Mcnulty, DO, Joseph Thomas MD, Elsy Lacy MD, Eduardo Maddox MD, Kindra Mello MD,  Stef Awad MD, Esperanza Gandhi MD, Andi Hull MD,  Nolberto Allison DO, Anju Ellis MD, Paco Solis MD, Girma Smith DO, Heydi Yu MD,  Rigo Holliday DO, Rajwinder Stafford MD, Ita Camacho MD, Berna Vides MD, Eloy Bronson MD,  Gigi Romero MD, Elis Mckeon MD, Alfred Conner MD, Steve Greer MD, Jaime Banegas MD, Sandhya Collins MD, Boris Frye DO, Ion Henry DO, Jass Wyman MD,  Caesar Leiva MD, Shirley Waterhouse, CNP,  Chastity Astorga CNP, Fran Fuentes, CNP,  Olive Melgoza, DNP, Madai Holley, CNP, Meghan Rodriguez, CNP, Mary Aleman, CNP, Haleigh Francois, CNP, Kira Rosales, PA-C, Pippa Daily, PA-C, Radha Mariee, CNP, Debbie Alegria, CNP, Michael Hurst, CNP, Meghna Holland, CNP, Viky White, CNP, Tammy Jarrett, CNS, Brooke Tipton, CNP, Carolyne Boston CNP, Tracy Schwab, CNP         [facility]   IN-PATIENT SERVICE   [facility]    Progress Note    5/20/2024    8:06 AM    Name:   Coni Baker  MRN:     [id number]     Acct:      [id number]   Room:   2003/2003-01  IP Day:  4  Admit Date:  5/16/2024  3:45 PM    PCP:   Earlene Salcedo MD  Code Status:  Full Code    Subjective:     C/C: Shortness of Breath.    Interval History Status: not changed.     Vitals reviewed, afebrile but intermittently bradycardic otherwise hemodynamically stable. Saturating on 2 L nasal cannula.  Labs reviewed, stable.  LFTs are stable.  Urinalysis consistent with UTI.  Urine culture no growth.  Overnight patient had rapid response due to dyspnea for which her amiodarone and lidocaine infusions were stopped.    On examination patient continues to complain of constipation with possible impaction.  KUB ordered.  Bowel sounds are hypoactive.  EP 
[facility]   Progress Note                   Date:   5/17/2024  Patient name: Coni Baker  Date of admission:  5/16/2024  3:45 PM  MRN:   [id number]  YOB: 1950  PCP: Earlene Salcedo MD    Reason for Admission:     Subjective:       Patient seen and examined at bedside.  Patent stents on left heart cath yesterday.  Was seen by EP.  Started on IV lidocaine.  Currently in normal sinus rhythm with PVCs in bigeminy pattern.  No chest pain or shortness of breath.  Currently on IV lidocaine at 2.    Medications:   Scheduled Meds:   metoprolol succinate  12.5 mg Oral BID    aspirin  81 mg Oral Daily    [Held by provider] lisinopril  20 mg Oral Daily    [Held by provider] rosuvastatin  20 mg Oral Nightly    enoxaparin  40 mg SubCUTAneous Daily    cefTRIAXone (ROCEPHIN) IV  1,000 mg IntraVENous Q24H    sodium chloride flush  5-40 mL IntraVENous 2 times per day     Continuous Infusions:   lidocaine 1 mg/min (05/17/24 0659)    sodium chloride       CBC:   Recent Labs     05/16/24 0250 05/17/24  0333   WBC 7.9 6.8   HGB 12.9 11.7*    128*     BMP:    Recent Labs     05/16/24 0250 05/17/24 0333    139   K 4.5 4.5    110*   CO2 24 20   BUN 24* 19   CREATININE 0.9 0.7   GLUCOSE 109* 89     Hepatic:   Recent Labs     05/16/24 0250   AST 58*   ALT 68*   BILITOT 0.4   ALKPHOS 96     Troponin: No results for input(s): \"TROPONINI\" in the last 72 hours.  BNP: No results for input(s): \"BNP\" in the last 72 hours.  Lipids: No results for input(s): \"CHOL\", \"HDL\" in the last 72 hours.    Invalid input(s): \"LDLCALCU\"  INR:   Recent Labs     05/16/24 0250   INR 1.1       Objective:   Vitals: BP (!) 97/50   Pulse 80   Temp 97.7 °F (36.5 °C) (Oral)   Resp 23   Ht 1.549 m (5' 0.98\")   Wt 74.8 kg (165 lb)   SpO2 98%   BMI 31.19 kg/m²   General appearance: alert and cooperative with exam  HEENT: Normocephalic, atraumatic   Neck: no carotid bruit, no JVD, trachea midline and 
[facility]   Progress Note                   Date:   5/18/2024  Patient name: Coni Baker  Date of admission:  5/16/2024  3:45 PM  MRN:   [id number]  YOB: 1950  PCP: Earlene Salcedo MD    Reason for Admission:     Subjective:       Patient seen and examined at bedside.  Patent stents on left heart cath yesterday.  Was seen by EP.  Started on IV lidocaine.  Currently in normal sinus rhythm with PVCs in bigeminy pattern.  No chest pain or shortness of breath.     Medications:   Scheduled Meds:   metoprolol succinate  12.5 mg Oral BID    predniSONE  20 mg Oral Daily    budesonide-formoterol  2 puff Inhalation BID RT    aspirin  81 mg Oral Daily    [Held by provider] lisinopril  20 mg Oral Daily    enoxaparin  40 mg SubCUTAneous Daily    sodium chloride flush  5-40 mL IntraVENous 2 times per day     Continuous Infusions:   amiodarone 0.5 mg/min (05/18/24 0935)    lidocaine 1 mg/min (05/17/24 1711)    sodium chloride       CBC:   Recent Labs     05/16/24 0250 05/17/24 0333 05/18/24  0629   WBC 7.9 6.8 9.0   HGB 12.9 11.7* 11.5*    128* 125*       BMP:    Recent Labs     05/16/24 0250 05/17/24 0333 05/18/24  0629    139 135*   K 4.5 4.5 4.8    110* 109*   CO2 24 20 19*   BUN 24* 19 24*   CREATININE 0.9 0.7 0.8   GLUCOSE 109* 89 106*       Hepatic:   Recent Labs     05/16/24 0250 05/18/24  0629   AST 58* 66*   ALT 68* 120*   BILITOT 0.4 0.4   ALKPHOS 96 84       Troponin: No results for input(s): \"TROPONINI\" in the last 72 hours.  BNP: No results for input(s): \"BNP\" in the last 72 hours.  Lipids: No results for input(s): \"CHOL\", \"HDL\" in the last 72 hours.    Invalid input(s): \"LDLCALCU\"  INR:   Recent Labs     05/16/24 0250   INR 1.1         Objective:   Vitals: /69   Pulse 56   Temp 98.1 °F (36.7 °C)   Resp 20   Ht 1.549 m (5' 0.98\")   Wt 74.8 kg (165 lb)   SpO2 97%   BMI 31.19 kg/m²   General appearance: alert and cooperative with exam  HEENT: 
[facility]   Progress Note                   Date:   5/20/2024  Patient name: Coni Baker  Date of admission:  5/16/2024  3:45 PM  MRN:   [id number]  YOB: 1950  PCP: Earlene Salcedo MD    Reason for Admission:     Subjective:       Patient seen and examined at bedside.  Was seen by EP this morning. Denies any current chest pain or shortness of breath. She reports that her breathing has improved since being admitted.  Currently in normal sinus rhythm with PVCs in bigeminy pattern.      Medications:   Scheduled Meds:   polyethylene glycol  17 g Oral Daily    pantoprazole  40 mg Oral QAM AC    sennosides-docusate sodium  2 tablet Oral Daily    levalbuterol  1.25 mg Nebulization Q4H    metoprolol succinate  12.5 mg Oral BID    budesonide-formoterol  2 puff Inhalation BID RT    aspirin  81 mg Oral Daily    [Held by provider] lisinopril  20 mg Oral Daily    enoxaparin  40 mg SubCUTAneous Daily    sodium chloride flush  5-40 mL IntraVENous 2 times per day     Continuous Infusions:   amiodarone Stopped (05/20/24 0343)    lidocaine Stopped (05/20/24 0343)    sodium chloride Stopped (05/19/24 1347)     CBC:   Recent Labs     05/19/24  0614 05/20/24  0403 05/20/24  0536   WBC 8.6 10.6 10.0   HGB 11.9 12.2 12.3   * 140 119*       BMP:    Recent Labs     05/19/24  0614 05/20/24  0323 05/20/24  0403 05/20/24  0536     --  137 139   K 4.8  --  4.7 4.5   *  --  108* 106   CO2 19*  --  18* 23   BUN 21  --  24* 24*   CREATININE 0.8 0.9 0.8 0.8   GLUCOSE 86  --  104* 107*       Hepatic:   Recent Labs     05/18/24  0629 05/19/24  0614 05/20/24  0536   AST 66* 40* 37*   * 99* 96*   BILITOT 0.4 0.4 0.6   ALKPHOS 84 77 84       Troponin: No results for input(s): \"TROPONINI\" in the last 72 hours.  BNP: No results for input(s): \"BNP\" in the last 72 hours.  Lipids: No results for input(s): \"CHOL\", \"HDL\" in the last 72 hours.    Invalid input(s): \"LDLCALCU\"  INR:   No results 
[facility]   Progress Note                   Date:   5/21/2024  Patient name: Coni Baker  Date of admission:  5/16/2024  3:45 PM  MRN:   [id number]  YOB: 1950  PCP: Earlene Salcedo MD    Reason for Admission:     Subjective:       Patient seen and examined at bedside.  Denies any current chest pain or shortness of breath. She reports that her breathing continues to improve.  Currently in normal sinus rhythm/SB with PVCs in bigeminy pattern.      Medications:   Scheduled Meds:   polyethylene glycol  17 g Oral Daily    pantoprazole  40 mg Oral QAM AC    sennosides-docusate sodium  2 tablet Oral Daily    levalbuterol  1.25 mg Nebulization Q4H    metoprolol succinate  12.5 mg Oral BID    budesonide-formoterol  2 puff Inhalation BID RT    aspirin  81 mg Oral Daily    [Held by provider] lisinopril  20 mg Oral Daily    enoxaparin  40 mg SubCUTAneous Daily    sodium chloride flush  5-40 mL IntraVENous 2 times per day     Continuous Infusions:   amiodarone Stopped (05/20/24 0343)    lidocaine Stopped (05/20/24 0343)    sodium chloride Stopped (05/19/24 1347)     CBC:   Recent Labs     05/19/24  0614 05/20/24  0403 05/20/24  0536   WBC 8.6 10.6 10.0   HGB 11.9 12.2 12.3   * 140 119*       BMP:    Recent Labs     05/19/24  0614 05/20/24  0323 05/20/24  0403 05/20/24  0536     --  137 139   K 4.8  --  4.7 4.5   *  --  108* 106   CO2 19*  --  18* 23   BUN 21  --  24* 24*   CREATININE 0.8 0.9 0.8 0.8   GLUCOSE 86  --  104* 107*       Hepatic:   Recent Labs     05/19/24  0614 05/20/24  0536   AST 40* 37*   ALT 99* 96*   BILITOT 0.4 0.6   ALKPHOS 77 84       Troponin: No results for input(s): \"TROPONINI\" in the last 72 hours.  BNP: No results for input(s): \"BNP\" in the last 72 hours.  Lipids: No results for input(s): \"CHOL\", \"HDL\" in the last 72 hours.    Invalid input(s): \"LDLCALCU\"  INR:   No results for input(s): \"INR\" in the last 72 hours.      Objective:   Vitals: BP 
[facility]   Progress Note                   Date:   5/22/2024  Patient name: Coni Baker  Date of admission:  5/16/2024  3:45 PM  MRN:   [id number]  YOB: 1950  PCP: Earlene Salcedo MD    Reason for Admission:     Subjective:       Patient seen and examined at bedside.  Denies any current chest pain or shortness of breath.  Currently in normal sinus rhythm with frequent PVCs/Bigemini.  S/P failed ablation 5/21/24.    Medications:   Scheduled Meds:   sotalol  120 mg Oral BID    levalbuterol  1.25 mg Nebulization 4x Daily RT    mexiletine  200 mg Oral TID WC    polyethylene glycol  17 g Oral Daily    pantoprazole  40 mg Oral QAM AC    sennosides-docusate sodium  2 tablet Oral Daily    metoprolol succinate  12.5 mg Oral BID    budesonide-formoterol  2 puff Inhalation BID RT    aspirin  81 mg Oral Daily    [Held by provider] lisinopril  20 mg Oral Daily    enoxaparin  40 mg SubCUTAneous Daily    sodium chloride flush  5-40 mL IntraVENous 2 times per day     Continuous Infusions:   sodium chloride Stopped (05/19/24 1347)     CBC:   Recent Labs     05/20/24  0403 05/20/24  0536   WBC 10.6 10.0   HGB 12.2 12.3    119*       BMP:    Recent Labs     05/20/24  0323 05/20/24  0403 05/20/24  0536   NA  --  137 139   K  --  4.7 4.5   CL  --  108* 106   CO2  --  18* 23   BUN  --  24* 24*   CREATININE 0.9 0.8 0.8   GLUCOSE  --  104* 107*       Hepatic:   Recent Labs     05/20/24  0536   AST 37*   ALT 96*   BILITOT 0.6   ALKPHOS 84       Troponin: No results for input(s): \"TROPONINI\" in the last 72 hours.  BNP: No results for input(s): \"BNP\" in the last 72 hours.  Lipids: No results for input(s): \"CHOL\", \"HDL\" in the last 72 hours.    Invalid input(s): \"LDLCALCU\"  INR:   No results for input(s): \"INR\" in the last 72 hours.      Objective:   Vitals: BP (!) 124/55   Pulse 96   Temp 97.5 °F (36.4 °C) (Axillary)   Resp 14   Ht 1.549 m (5' 0.98\")   Wt 84.3 kg (185 lb 12.8 oz)   SpO2 
[facility]   Progress Note                   Date:   5/23/2024  Patient name: Coni Baker  Date of admission:  5/16/2024  3:45 PM  MRN:   [id number]  YOB: 1950  PCP: Earlene Salcedo MD    Reason for Admission:     Subjective:       Patient seen and examined at bedside.  Denies any current chest pain or shortness of breath.  Currently in normal sinus rhythm.      Medications:   Scheduled Meds:   sotalol  120 mg Oral BID    levalbuterol  1.25 mg Nebulization 4x Daily RT    mexiletine  200 mg Oral TID WC    polyethylene glycol  17 g Oral Daily    pantoprazole  40 mg Oral QAM AC    sennosides-docusate sodium  2 tablet Oral Daily    metoprolol succinate  12.5 mg Oral BID    budesonide-formoterol  2 puff Inhalation BID RT    aspirin  81 mg Oral Daily    [Held by provider] lisinopril  20 mg Oral Daily    enoxaparin  40 mg SubCUTAneous Daily    sodium chloride flush  5-40 mL IntraVENous 2 times per day     Continuous Infusions:   sodium chloride Stopped (05/19/24 1347)     CBC:   No results for input(s): \"WBC\", \"HGB\", \"PLT\" in the last 72 hours.    BMP:    Recent Labs     05/23/24  0542      K 4.2      CO2 23   BUN 17   CREATININE 0.8   GLUCOSE 91       Hepatic:   No results for input(s): \"AST\", \"ALT\", \"BILITOT\", \"ALKPHOS\" in the last 72 hours.    Invalid input(s): \"ALB\"    Troponin: No results for input(s): \"TROPONINI\" in the last 72 hours.  BNP: No results for input(s): \"BNP\" in the last 72 hours.  Lipids: No results for input(s): \"CHOL\", \"HDL\" in the last 72 hours.    Invalid input(s): \"LDLCALCU\"  INR:   No results for input(s): \"INR\" in the last 72 hours.      Objective:   Vitals: /61   Pulse 61   Temp 97.7 °F (36.5 °C) (Axillary)   Resp 18   Ht 1.549 m (5' 0.98\")   Wt 84.3 kg (185 lb 12.8 oz)   SpO2 94%   BMI 35.13 kg/m²   General appearance: alert and cooperative with exam  HEENT: Normocephalic, atraumatic   Neck: no carotid bruit, no JVD, trachea midline 
[facility]  Office: [phone]  Joey Smith DO, Miguel Machado DO, Vikash Conklin DO, Alan Mcnulty, DO, Joseph Thomas MD, Elsy Lacy MD, Eduardo Maddox MD, Kindra Mello MD,  Stef Awad MD, Esperanza Gandhi MD, Andi Hull MD,  Nolberto Allison DO, Anju Ellis MD, Paco Solis MD, Girma Smith DO, Heydi Yu MD,  Rigo Holliday DO, Rajwinedr Stafford MD, Ita Camacho MD, Berna Vides MD, Eloy Bronson MD,  Gigi Romero MD, Elis Mckeon MD, Alfred Conner MD, Steve Greer MD, Jaime Banegas MD, Sandhya Collins MD, Boris Frye DO, Ion Henry DO, Jass Wyman MD,  Caesar Leiva MD, Shirley Waterhouse, CNP,  Chastity Astorga CNP, Fran Fuentes, CNP,  Olive Melgoza, DNP, Madai Holley, CNP, Meghan Rodriguez, CNP, Mary Aleman, CNP, Haleigh Francois, CNP, Kira Rosales, PA-C, Pippa Daily PA-C, Radha Mariee, CNP, Debbie Alegria, CNP, Michael Hurst, CNP, Meghna Holland, CNP, Viky White, CNP, Tammy Jarrett, CNS, Brooke Tipton, CNP, Carolyne Boston CNP, Tracy Schwab, CNP         [facility]   Nighttime In-House Provider      5/20/2024    3:29 AM    Name:   Coni Baker  MRN:     [id number]     Acct:      [id number]   Room:   2003/2003-01  IP Day:  4  Admit Date:  5/16/2024  3:45 PM    PCP:   Earlene Salcedo MD  Code Status:  Full Code    Called to the floor by staff to evaluate Coni Baker in 2003/2003-01 at 3:29 AM with rapid response of 310 am.      Assessment/Plan:   Bigemny/ bradycardia  Acute on chronic dhf with vhd.    Acute bronchitis/ copd exac      S/p 40 iv lasix x1, solumedrol 020psd8, started on bipap.      Stat ecg reviewed with cardio fellow.   Chest xray reviewed by me with chf changes.    Prior labs and chart reviewed independently myself independently by myself    D/w critical care.  D/w RT and nsg.  Will reassess in 60 minutes with low threshold to transfer to icu.      D/w cardio fellow, recommends stopping 
Occupational Therapy    [facility]  Occupational Therapy Not Seen Note    DATE: 2024    NAME: Coni Baker  MRN: [id number]   : 1950      Patient not seen this date for Occupational Therapy due to:    Strict Bedrest: Will await clearance    Next Scheduled Treatment: 2024    Electronically signed by MAXIME MORRISON on 2024 at 5:13 PM   
Occupational Therapy    [facility]  Occupational Therapy Not Seen Note    DATE: 2024    NAME: Coni Baker  MRN: [id number]   : 1950      Patient not seen this date for Occupational Therapy due to:    Strict Bedrest: SBR plus reported not appropriate per RN, going for cardiac ablation     Next Scheduled Treatment: Attempt again  as appropriate    Electronically signed by LUIS ANTONIO MARTIN S/AMY on 2024 at 11:06 AM    
Occupational Therapy    [facility]  Occupational Therapy Not Seen Note    DATE: 2024    NAME: Coni Baker  MRN: [id number]   : 1950      Patient not seen this date for Occupational Therapy due to:    Strict Bedrest: RN confirms pt to remain on bedrest this date    Next Scheduled Treatment: 2024    Electronically signed by MAXIME Park on 2024 at 10:53 AM    
Occupational Therapy    [facility]  Occupational Therapy Not Seen Note    DATE: 2024    NAME: Coni Baker  MRN: [id number]   : 1950      Patient not seen this date for Occupational Therapy due to:    Strict Bedrest:    Next Scheduled Treatment: 2024    Electronically signed by MAXIME Park on 2024 at 12:05 PM    
Occupational Therapy  Facility/Department: [facility] CAR 2- STEPDOWN  Occupational Therapy Initial Assessment    Name: Coni Baker  : 1950  MRN: [id number]  Date of Service: 2024    Discharge Recommendations:  Patient would benefit from continued therapy after discharge  OT Equipment Recommendations  Equipment Needed: No       Patient Diagnosis(es): The primary encounter diagnosis was Cardiomyopathy, unspecified type (HCC). Diagnoses of Angina pectoris (HCC), Shortness of breath, Ventricular tachycardia (HCC), and S/P coronary artery stent placement were also pertinent to this visit.  Past Medical History:  has a past medical history of CAD (coronary artery disease), Hx of myocardial infarction, Hyperlipidemia, Hypertension, and SOBOE (shortness of breath on exertion).  Past Surgical History:  has a past surgical history that includes Tubal ligation; Coronary angioplasty with stent; Colonoscopy; shoulder surgery (Right, 16); Colonoscopy (N/A, 3/24/2022); and Cardiac procedure (N/A, 2024).           Assessment   Performance deficits / Impairments: Decreased functional mobility ;Decreased ADL status;Decreased endurance;Decreased balance;Decreased high-level IADLs;Decreased posture  Assessment: Pt would continue to benefit from OT services to address deficits listed above and improve overall functional performance prior to discharge.  Prognosis: Good  Decision Making: Medium Complexity  REQUIRES OT FOLLOW-UP: Yes  Activity Tolerance  Activity Tolerance: Patient Tolerated treatment well        Plan   Occupational Therapy Plan  Times Per Week: 3x/wk  Current Treatment Recommendations: Strengthening, Balance training, Functional mobility training, Endurance training, Pain management, Safety education & training, Patient/Caregiver education & training, Equipment evaluation, education, & procurement, Self-Care / ADL, Home management training 
PATIENT REFUSES TO WEAR BIPAP     [x] Risks and benefits explained to patient   [x] Patient refuses to wear Bipap stating breathing fine and don't want to wear it  [x] Patient verbalizes understanding of information presented.   
PATIENT REFUSES TO WEAR BIPAP     [x] Risks and benefits explained to patient   [x] Patient refuses to wear Bipap stating no  [x] Patient verbalizes understanding of information presented.   
PATIENT REFUSES TO WEAR BIPAP     [x] Risks and benefits explained to patient   [x] Patient refuses to wear Bipap stating pt does not like the way it feels on and it hard to sleep.  [x] Patient verbalizes understanding of information presented.   
Patient just suddenly woke up from sleep at 1255. Complained of SOB and nausea and sweating. Her heart rate at 80-90 Bigeminy during that time. Baseline since she came from cath lab. Vitals are with in normal range. Zofran given, nausea resolve, EKG done which shows sinus rhythm with frequent PVC in a pattern of bigeminy, septal infarct undetermined. SOB resolved. Patient went back to sleep. Dr johansen informed.    Patient due to void at 0400. Patient has not voided. Bladder scan was done. Noted 0ml in both scan, used 2 different machine which give the same amount. Given oral fluids. Informed Mary Aleman. Advised just to encourage oral fluids.[id number] Patient said that she accidentally pee when she cough, underpads was soaked, patient looks pass decent amount of urine bladder scan noted to have 70 ml in the bladder.  0645 bladder scan done noted 350 ml  
Patient returned to PCC room post procedure.  Assessment & VS obtained. Restrictions/Education reviewed with patient. Post procedure pathway initiated. Pt without complications.  Right radial site with 12 mls of air in vasc band. Air to be removed per protocol.  No hematoma noted. RN at bedside. Dopamine stopped after cath procedure. Pulses obtained and documented. Will continue to monitor.    Report called to CAR 1 RN. All questions answered. Will transport a.s.a.p. with all belongings. Lido bolus given and Lido gtt running per Dr. Hammonds.    
Physical Therapy        Physical Therapy Cancel Note      DATE: 2024    NAME: Coni Baker  MRN: [id number]   : 1950      Patient not seen this date for Physical Therapy due to:    Surgery/Procedure: Pt scheduled for ablation today and on strict bed rest. Check .      Electronically signed by DEMETRIUS Blank on 2024 at 12:12 PM   This treatment/evaluation completed by signing SPT. Signing PT agrees with treatment and documentation.     
Physical Therapy        Physical Therapy Cancel Note      DATE: 2024    NAME: Coni Baker  MRN: [id number]   : 1950      Patient not seen this date for Physical Therapy due to:    Patient is not appropriate for PT evaluation/treatment at this time d/t SBR in place, consideration for ablation per cardiology note. Ck       Electronically signed by Bijal Borges PT on 2024 at 11:58 AM    
Physical Therapy        Physical Therapy Cancel Note      DATE: 2024    NAME: Coni Baker  MRN: [id number]   : 1950      Patient not seen this date for Physical Therapy due to:    Strict Bedrest:SBR order still in place. Will await for removal of SBR and placement of appropriate activity order. Ck later today, or       Electronically signed by Bijal Borges PT on 2024 at 12:25 PM    
Physical Therapy  Facility/Department: [facility] CAR 2- STEPDOWN  Physical Therapy Initial Assessment    Name: Coni Baker  : 1950  MRN: [id number]  Date of Service: 2024    No chief complaint on file.      Discharge Recommendations:    Further therapy recommended at discharge.    PT Equipment Recommendations  Other: CTA      Patient Diagnosis(es): The primary encounter diagnosis was Cardiomyopathy, unspecified type (HCC). Diagnoses of Angina pectoris (HCC), Shortness of breath, Ventricular tachycardia (HCC), and S/P coronary artery stent placement were also pertinent to this visit.  Past Medical History:  has a past medical history of CAD (coronary artery disease), Hx of myocardial infarction, Hyperlipidemia, Hypertension, and SOBOE (shortness of breath on exertion).  Past Surgical History:  has a past surgical history that includes Tubal ligation; Coronary angioplasty with stent; Colonoscopy; shoulder surgery (Right, 16); Colonoscopy (N/A, 3/24/2022); and Cardiac procedure (N/A, 2024).    Assessment   Body Structures, Functions, Activity Limitations Requiring Skilled Therapeutic Intervention: Decreased functional mobility ;Decreased strength;Decreased endurance;Decreased balance;Decreased safe awareness  Assessment: Pt grossly Elvia to CGA, amb 1' R lateral no AD CGA. Pt reports dizziness limiting further. Pt would benefit from continued acute PT to address deficits.  Therapy Prognosis: Good  Decision Making: Medium Complexity  Requires PT Follow-Up: Yes  Activity Tolerance  Activity Tolerance: Patient limited by fatigue;Other (comment)  Activity Tolerance Comments: c/o dizziness limiting     Plan   Physical Therapy Plan  General Plan:  (5-6x/wk)  Current Treatment Recommendations: Strengthening, Balance training, Gait training, Functional mobility training, Stair training, Transfer training, Endurance training, Home exercise program, Safety education & training, Patient/Caregiver education & 
[facility]  Office: [phone]  Joey Smith DO, Miguel Machado, DO, Vikash Conklin DO, Alan Mcnulty, DO, Joseph Thomas MD, Elsy Lacy MD, Eduardo Maddox MD, Kindra Mello MD,  Stef Awad MD, Esperanza Gandhi MD, Andi Hull MD,  Nolberto Allison DO, Anju Ellis MD, Paco Solis MD, Girma Smith DO, Heydi Yu MD,  Rigo Holliday DO, Rajwinder Stafford MD, Ita Camacho MD, Berna Vides MD, Eloy Bronson MD,  Gigi Romero MD, Elis Mckeon MD, Alfred Conner MD, Steve Greer MD, Jaime Banegas MD, Sandhya Collins MD, Boris Frye DO, Ion Henry DO, Jass Wyman MD,  Caesar Leiva MD, Shirley Waterhouse, CNP,  Chastity Astorga CNP, Fran Fuentes, CNP,  Olive Melgoza, DNP, Madai Holley, CNP, Meghan Rodriguez, CNP, Mary Aleman, CNP, Haleigh Francois, CNP, Kira Rosales, PA-C, Pippa Daily, PA-C, Radha Mariee, CNP, Debbie Alegria, CNP, Michael Hurst, CNP, Meghna Holland, CNP, Viky White, CNP, Tammy Jarrett, CNS, Brooke Tipton, CNP, Carolyne Boston CNP, Tracy Schwab, CNP         [facility]   IN-PATIENT SERVICE   [facility]    Progress Note    5/19/2024    8:04 AM    Name:   Coni Bakre  MRN:     [id number]     Acct:      [id number]   Room:   2003/2003-01  IP Day:  3  Admit Date:  5/16/2024  3:45 PM    PCP:   Earlene Salcedo MD  Code Status:  Full Code    Subjective:     C/C: Shortness of Breath.    Interval History Status: not changed.     Vitals reviewed, afebrile and hemodynamically stable.  Saturating on 2 L nasal cannula.  Labs reviewed, stable.  LFTs are stable.  Urinalysis consistent with UTI.  Urine culture no growth.  Overnight patient had no significant events.    On examination patient complains of cough but otherwise denies any complaints.  Remains in ventricular bigeminy on monitor.  Plan for electrophysiology evaluation with possible ablation tomorrow.  N.p.o. after midnight.    Brief History:     This is 
[facility]  PROGRESS NOTE    Shift date: 5/17/2024  Shift day: Friday   Shift # 3    Room # 2003/2003-01   Name: Coni Baker                Oriental orthodox: Unknown   Place of Church: Unknown    Referral: Routine Visit    Admit Date & Time: 5/16/2024  3:45 PM    Assessment:  Coni Baker is a 73 y.o. female in the hospital because of \"Shortness of Breath, an irregular heart beat, and cold sweats.\" Patient was resting at time of 's visit. Patient woke upon  calling her name.    Intervention:   introduced herself to patient and inquired about her well-being. Patient shared about her symptoms and what brought her to the hospital. Patient indicated that her symptoms have continued in the hospital and she is \"slowly improving.\" Patient shared that she has good support in her children and grandchildren. Patient was open and receptive to 's visit.     Outcome:  Patient thanked  for visit and care.     Plan:  Chaplains will remain available to offer spiritual and emotional support as needed.       05/17/24 2122   Encounter Summary   Encounter Overview/Reason Initial Encounter   Service Provided For Patient   Referral/Consult From [facility]   Support System Children   Last Encounter  05/17/24   Complexity of Encounter Low   Begin Time 2122   End Time  2134   Total Time Calculated 12 min   Spiritual/Emotional needs   Type Spiritual Support   Assessment/Intervention/Outcome   Assessment Calm;Coping;Powerlessness   Intervention Active listening;Discussed illness injury and it’s impact;Explored/Affirmed feelings, thoughts, concerns;Explored Coping Skills/Resources;Nurtured Hope;Sustaining Presence/Ministry of presence   Outcome Comfort;Coping;Receptive   Plan and Referrals   Plan/Referrals Continue to visit, (comment)  (as needed)     Electronically signed by Chaplain Solange, on 5/17/2024 at 9:32 PM.  [facility]  [phone]      
[facility]  Office: [phone]  Joey Smith DO, Miguel Machado DO, Vikash Conklin DO, Alan Mcnulty DO, Joseph Thomas MD, Elsy Lacy MD, Eduardo Maddox MD, Kindra Mello MD,  Stef Awad MD, Esperanza Gandhi MD, Andi Hull MD,  Nolberto Allison DO, Anju Ellis MD, Paco Solis MD, Girma Smith DO, Heydi Yu MD,  Rigo Holliday DO, Rajwinder Stafford MD, Ita Camacho MD, Berna Vides MD, Eloy Bronson MD,  Gigi Romero MD, Elis Mckeon MD, Alfred Conner MD, Steve Greer MD, Jaime Banegas MD, Sandhya Collins MD, Boris Frye DO, Ion Henry DO, Jass Wyman MD,  Caesar Leiva MD, Shirley Waterhouse, CNP,  Chastity Astorga CNP, Fran Fuentes, CNP,  Olive Melgoza, DNP, Madai Holley, CNP, Meghan Rodriguez, CNP, Mary Aleman, CNP, Haleigh Francois, CNP, Kira Rosales, PA-C, Pippa Daily PA-C, Radha Mariee, CNP, Debbie Alegria, CNP, Michael Hurst, CNP, Meghna Holland, CNP, Viky White, CNP, Tammy Jarrett, CNS, Brooke Tipton, CNP, Carolyne Boston CNP, Tracy Schwab, CNP         [facility]   IN-PATIENT SERVICE   [facility]    Progress Note    5/21/2024    2:25 PM    Name:   Coni Baker  MRN:     [id number]     Acct:      [id number]   Room:   2003/2003-01  IP Day:  5  Admit Date:  5/16/2024  3:45 PM    PCP:   Earlene Salcedo MD  Code Status:  Full Code    Patient is in the cath lab for an EP study and ablation.  Missed seeing her today.     Elsy Lacy MD  5/21/2024  2:25 PM               
Pt c/o constipation, writer reached otu to NP on call orders written pt given suppository, awaiting results.   
Pt picked up 2 rx at window- no copay  
[facility]  PROGRESS NOTE    Shift date: 5/19/2024  Shift day: Sunday   Shift # 3    Room # 2003/2003-01   Name: Coni Baker                Gnosticist: Unknown   Place of Latter-day: Unknown    Referral: Rapid Response    Admit Date & Time: 5/16/2024  3:45 PM    Assessment:  Coni Baker is a 73 y.o. female in the hospital because of \"Shortness of Breath.\" Patient was paged out as an \"RRT Alert\" due to \"Shortness of Breath and Bradycardia.\" Upon entering the room writer observes patient being assessed by care team. Patient was awake and responding throughout encounter.     Intervention:   responded to page and gathered patient information outside room. Patient was placed on a \"Bipap\" to assist with her breathing. Patient's responding doctor confirmed that she can update patient's family by phone.     Outcome:  Patient was coping well and being tended to by nursing staff at end of visit.    Plan:  Chaplains will remain available to offer spiritual and emotional support as needed.       05/20/24 0307   Encounter Summary   Encounter Overview/Reason Crisis   Service Provided For Patient   Referral/Consult From Multi-disciplinary team  (RRT Alert)   Support System Children;Family members   Last Encounter  05/19/24   Complexity of Encounter Moderate   Begin Time 0307   End Time  0338   Total Time Calculated 31 min   Crisis   Type Rapid Response   Spiritual/Emotional needs   Type Spiritual Support   Assessment/Intervention/Outcome   Assessment Unable to assess   Intervention Sustaining Presence/Ministry of presence   Outcome Coping   Plan and Referrals   Plan/Referrals Continue to visit, (comment)  (as needed)     Electronically signed by Chaplain Solange, on 5/20/2024 at 6:38 AM.  [facility]  [phone]      
[facility]  Office: [phone]  Joey Smith DO, Miguel Machado, DO, Vikash Conklin DO, Alan Mcnulty, DO, Joseph Thomas MD, Elsy Lacy MD, Eduardo Maddox MD, Kindra Mello MD,  Stef Awad MD, Esperanza Gandhi MD, Andi Hull MD,  Nolberto Allison DO, Anju Ellis MD, Paco Solis MD, Girma Smith DO, Heydi Yu MD,  Rigo Holliday DO, Rajwinder Stafford MD, Ita Camacho MD, Berna Vides MD, Eloy Bronson MD,  Gigi Romero MD, Elis Mckeon MD, Alfred Conner MD, Steve Greer MD, Jaime Banegas MD, Sandhya Collins MD, Boris Frye DO, Ion Henry DO, Jass Wyman MD,  Caesar Leiva MD, Shirley Waterhouse, CNP,  Chastity Astorga CNP, Fran Fuentes, CNP,  Olive Melgoza, DNP, Madai Holley, CNP, Meghan Rodriguez, CNP, Mary Aleman, CNP, Haleigh Francois, CNP, Kira Rosales, PA-C, Pippa Daily, PA-C, Radha Mariee, CNP, Debbie Alegria, CNP, Michael Hurst, CNP, Meghna Holland, CNP, Viky White, CNP, Tammy Jarrett, CNS, Brooke Tipton, CNP, Carolyne Boston CNP, Tracy Schwab, CNP         [facility]   IN-PATIENT SERVICE   [facility]    Progress Note    5/24/2024    8:00 AM    Name:   Coni Baker  MRN:     [id number]     Acct:      [id number]   Room:   2003/2003-01  IP Day:  8  Admit Date:  5/16/2024  3:45 PM    PCP:   Earlene Salcedo MD  Code Status:  Full Code    Subjective:     C/C: SOB    Interval History Status: improved.     Patient is resting in bed.  She had an EP study on 5/21 and Cardiology had difficulty ablating the correct tract that's causing her PVCs.  She was started on Sotalol and Mexitil.  She has less SOB today.   Her bigeminy is better doing the day, but comes back at night with the PVCs. She feels weak and wants home care.    Brief History:     Per my partner:  \"This is a 73-year-old female with a significant past medical history of hypertension, hyperlipidemia, CAD status post stent placement who initially 
Writer reached to EP via perfect serve due to concerns of patient HR being 140-150s. Patient denies any chest pain, heart palpitations, or SOB. The patient verbalized to writer that \"she felt fine\". Dr. Cordero did get back in contact with unit charge nurse verbalizing via perfect serve no new orders at this time and that they were aware of what was going on.  Plan of care ongoing.  
BMI 35.13 kg/m²   General appearance: alert and cooperative with exam  HEENT: Normocephalic, atraumatic   Neck: no carotid bruit, no JVD, trachea midline and thyroid not enlarged  Lungs: clear to auscultation bilaterally  Heart: regular rate and rhythm, S1, S2 normal, no murmur  Abdomen: soft, bowel sounds normal  Extremities: no edema or swelling     EKG:   Results for orders placed or performed during the hospital encounter of 05/16/24   EKG 12 Lead   Result Value Ref Range    Ventricular Rate 83 BPM    Atrial Rate 83 BPM    P-R Interval 170 ms    QRS Duration 68 ms    Q-T Interval 364 ms    QTc Calculation (Bazett) 427 ms    P Axis 59 degrees    R Axis 8 degrees    T Axis 55 degrees    Narrative    Sinus rhythm with frequent Premature ventricular complexes in a pattern of bigeminy  Septal infarct (cited on or before 20-JUN-2002)  Abnormal ECG  When compared with ECG of 17-MAY-2024 00:01,  Questionable change in initial forces of Septal leads       Echo:  5/17/24    Left Ventricle: Mildly reduced left ventricular systolic function with a visually estimated EF of 45 - 50%. Left ventricle size is normal. Normal wall thickness. Grade II diastolic dysfunction with increased LAP.    Aortic Valve: Trileaflet valve.    Mitral Valve: Mild to moderate regurgitation.    Tricuspid Valve: Moderate regurgitation. Severely elevated RVSP, consistent with severe pulmonary hypertension. The estimated RVSP is 70 mmHg.    Left Atrium: Left atrium is mildly dilated. Left atrial volume index is mildly increased (35-41 mL/m2). LA Vol Index is  39 ml/m2.    Image quality is good.      LAST CATH:     05/16/24    CARDIAC PROCEDURE 05/16/2024  4:25 PM (Final)    Conclusion  Images from the original result were not included.  [facility]        Date:                            5/16/2024  Patient name:  Coni Baker  Date of admission:      5/16/2024  3:45 PM  MRN:                           [id number]  Date of Birth:            
throughout  CV:  S2 normal, regular rate with frequent extrasystole, no murmurs  Abdomen:  Soft, nontender, nondistended  Extremities:  2+ pulses, no edema  Neuro:  Grossly intact        Labs:   CBC:No results for input(s): \"WBC\", \"HGB\", \"HCT\", \"PLT\" in the last 72 hours.  Magnesium:  Recent Labs     05/22/24  0644   MG 2.1     BMP:  Recent Labs     05/23/24  0542      K 4.2   CALCIUM 7.8*   CO2 23   BUN 17   CREATININE 0.8   LABGLOM 78   GLUCOSE 91     BNP:No results for input(s): \"BNP\", \"PROBNP\" in the last 72 hours.  PT/INR:No results for input(s): \"PROTIME\", \"INR\" in the last 72 hours.  APTT:No results for input(s): \"APTT\" in the last 72 hours.  CARDIAC ENZYMES:No results for input(s): \"MYOGLOBIN\", \"CKTOTAL\", \"CKMB\", \"CKMBINDEX\", \"TROPHS\", \"TROPONINT\" in the last 72 hours.  FASTING LIPID PANEL:  Lab Results   Component Value Date/Time    HDL 57 07/03/2023 11:28 AM    TRIG 57 09/15/2022 10:00 AM     LIVER PROFILE:No results for input(s): \"AST\", \"ALT\", \"LABALBU\", \"ALKPHOS\", \"BILITOT\", \"BILIDIR\", \"IBILI\", \"PROT\", \"GLOB\", \"ALBUMIN\" in the last 72 hours.       Echo (5/17/2024 (: EF 45 to 50%, grade 2 diastolic dysfunction, mild left atrial enlargement mild to moderate MR, moderate TR, RVSP 70     Left heart cath (10/16/2024): LAD proximal stent patent, otherwise 20 to 30% luminal irregularities     EKG (5/17/2024): Sinus rhythm 70 bpm, frequent PVCs and bigeminy, probable anteroseptal MI old     EKG (5/17/2024 (: Sinus rhythm 70 bpm, frequent PVCs with couplets    ASSESSMENT:     1. Frequent PVCs and nonsustained ventricular tachycardia  2.  Unsuccessful ablation attempt on 5/21/2024 of a likely epicardial RVOT PVC source  #3 encounter for high risk medication/sotalol monitoring  4.  Coronary disease with prior MI and PCI's  5.  Ischemic cardiomyopathy EF 45 to 50% with anteroseptal hypokinesis.  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  Hypertension  7.  COPD  8.  Pulmonary hypertension by echo 
24*  --  19 24*   CREATININE 0.9  --  0.7 0.8   MG 2.2  --   --   --    ANIONGAP 10  --  9 7*   LABGLOM 67  --  85 75   CALCIUM 9.4  --  8.1* 8.5*   PROBNP 3,585*  --   --   --    TROPHS 22* 20*  --   --        Recent Labs     05/16/24  0250 05/18/24  0629   AST 58* 66*   ALT 68* 120*   ALKPHOS 96 84   BILITOT 0.4 0.4       ABG:No results found for: \"POCPH\", \"PHART\", \"PH\", \"POCPCO2\", \"FEZ8LYZ\", \"PCO2\", \"POCPO2\", \"PO2ART\", \"PO2\", \"POCHCO3\", \"HKI8ISX\", \"HCO3\", \"NBEA\", \"PBEA\", \"BEART\", \"BE\", \"THGBART\", \"THB\", \"POX5JKW\", \"YHZD6VAR\", \"O0BIJSTD\", \"O2SAT\", \"FIO2\"  No results found for: \"SPECIAL\"  Lab Results   Component Value Date/Time    CULTURE NO GROWTH 05/16/2024 09:22 PM       Radiology:  XR CHEST PORTABLE    Result Date: 5/16/2024  There is no evidence of acute chest disease.       Physical Examination:     General appearance:  alert, cooperative and no distress, on supplemental oxygen  Mental Status:  oriented to person, place and time and normal affect  Lungs:  clear to auscultation bilaterally, normal effort.  Positive for wheezing .  heart:  regular rate and rhythm, no murmur  Abdomen:  soft, nontender, nondistended, normal bowel sounds, no masses, hepatomegaly, splenomegaly  Extremities:  no edema, redness, tenderness in the calves  Skin:  no gross lesions, rashes, induration    Assessment:     Hospital Problems             Last Modified POA    * (Principal) Shortness of breath 5/17/2024 Yes    S/P cardiac cath 5/17/2024 Yes    Ischemic cardiomyopathy 5/17/2024 Yes    S/P coronary artery stent placement 5/17/2024 Yes    Mixed hyperlipidemia 5/17/2024 Yes    Primary hypertension 5/17/2024 Yes    Ventricular tachycardia (HCC) 5/17/2024 Yes    CAD in native artery 5/17/2024 Yes    Angina pectoris (HCC) 5/17/2024 Yes    Acute cystitis without hematuria 5/17/2024 Yes     Plan:     NSVT   -Continue amiodarone and lidocaine infusions per cardiology recommendation   -Cardiac cath (5/16) showing patent prior LAD stents 
Tentative plan for discharge after 6 total dose tomorrow     Continue aspirin 81 mg daily, Toprol-XL 12.5 mg twice daily for cardiomyopathy     Patient says she [facility] be [facility] epicardial PVC ablation.  This can be arranged at discharge      Juan Pablo Cordero MD  [facility] Cardiology - Electrophysiology    
\"PH\", \"POCPCO2\", \"TUN1CSQ\", \"PCO2\", \"POCPO2\", \"PO2ART\", \"PO2\", \"POCHCO3\", \"TGI1IFZ\", \"HCO3\", \"NBEA\", \"PBEA\", \"BEART\", \"BE\", \"THGBART\", \"THB\", \"CEX8VUB\", \"MDFJ2IJF\", \"F7MMSBWM\", \"O2SAT\", \"FIO2\"  No results found for: \"SPECIAL\"  No results found for: \"CULTURE\"    Radiology:  XR CHEST PORTABLE    Result Date: 5/16/2024  There is no evidence of acute chest disease.       Physical Examination:     General appearance:  alert, cooperative and no distress  Mental Status:  oriented to person, place and time and normal affect  Lungs:  clear to auscultation bilaterally, normal effort  Heart:  regular rate and rhythm, no murmur  Abdomen:  soft, nontender, nondistended, normal bowel sounds, no masses, hepatomegaly, splenomegaly  Extremities:  no edema, redness, tenderness in the calves  Skin:  no gross lesions, rashes, induration    Assessment:     Hospital Problems             Last Modified POA    * (Principal) Shortness of breath 5/17/2024 Yes    S/P cardiac cath 5/17/2024 Yes    Mixed hyperlipidemia 5/17/2024 Yes    Primary hypertension 5/17/2024 Yes    NSVT (nonsustained ventricular tachycardia) ([facility]) 5/16/2024 Yes    CAD (coronary artery disease) 5/16/2024 Yes    Angina pectoris ([facility]) 5/17/2024 Yes    Acute cystitis without hematuria 5/17/2024 Yes       Plan:     NSVT   -Continue amiodarone and lidocaine infusions per cardiology recommendation   -Cardiac cath (5/16) showing patent prior LAD stents   -Plan for EP study with possible VT ablation early next week   -Repeat echocardiogram pending     COPD   -The patient has not been previously diagnosed but has scattered wheezes and is a lifelong smoker   -Start prednisone 20 mg daily x 5-days   -Start bid Symbicort   -Recommend outpatient PFT's     HLD   -Holding home rosuvastatin 2/2 transaminitis       Paco Solis MD  5/17/2024  9:55 AM    
chloride flush, sodium chloride, potassium chloride **OR** potassium alternative oral replacement **OR** potassium chloride, magnesium sulfate, ondansetron **OR** ondansetron, polyethylene glycol, acetaminophen **OR** acetaminophen    Data:     Past Medical History:   has a past medical history of CAD (coronary artery disease), Hx of myocardial infarction, Hyperlipidemia, Hypertension, and SOBOE (shortness of breath on exertion).    Social History:   reports that she has been smoking cigarettes. She has a 50.0 pack-year smoking history. She has never used smokeless tobacco. She reports current alcohol use. She reports that she does not use drugs.     Family History:   Family History   Problem Relation Age of Onset    Cancer Father     Kidney Disease Mother        Vitals:  BP (!) 124/55   Pulse 96   Temp 97.5 °F (36.4 °C) (Axillary)   Resp 14   Ht 1.549 m (5' 0.98\")   Wt 84.3 kg (185 lb 12.8 oz)   SpO2 96%   BMI 35.13 kg/m²   Temp (24hrs), Av °F (36.7 °C), Min:97.5 °F (36.4 °C), Max:98.3 °F (36.8 °C)    Recent Labs     24  0323   POCGLU 119*       I/O (24Hr):    Intake/Output Summary (Last 24 hours) at 2024 0854  Last data filed at 2024 0400  Gross per 24 hour   Intake 0 ml   Output 605 ml   Net -605 ml       Labs:  Hematology:  Recent Labs     24  0536   WBC 10.6 10.0   RBC 3.92* 3.93*   HGB 12.2 12.3   HCT 39.6 37.0   .0 94.1   MCH 31.1 31.3   MCHC 30.8 33.2   RDW 14.6* 14.5*    119*   MPV 10.3 10.5     Chemistry:  Recent Labs     24  0323 24  0403 24  0536 24  0604 24  0644   NA  --  137 139  --   --    K  --  4.7 4.5  --   --    CL  --  108* 106  --   --    CO2  --  18* 23  --   --    GLUCOSE  --  104* 107*  --   --    BUN  --  24* 24*  --   --    CREATININE 0.9 0.8 0.8  --   --    MG  --   --  2.0 2.1 2.1   ANIONGAP  --  11 10  --   --    LABGLOM  --  73 74  --   --    CALCIUM  --  8.6 8.7  --   --    LACTACIDWB  --  1.8  
mexiletine 200 mg 3 times daily with meals to avoid nausea    Start sotalol 120 mg twice daily for inpatient load of 5-6 doses.  EKG 2 to 3 hours after every dose of sotalol to assess QTc.  If sotalol tolerated can uptitrate after 2-3 doses if PVCs persist    Continue aspirin 81 mg daily, Toprol-XL 12.5 mg twice daily for cardiomyopathy        Juan Pablo Cordero MD  [facility] Cardiology - Electrophysiology    
without hematuria 5/17/2024 Yes    Cardiomyopathy (HCC) 5/18/2024 Yes    Transaminitis 5/18/2024 Yes       Plan:        Nonsustained ventricular tachycardia-patient underwent cardiac cath 5/16/2020 for an ischemic etiology ruled out.  EP completed an electrophysiology study and had difficulty with the ablation and no suppression of PVCs.  She was started on sotalol and mexiletine 200 mg 3 times daily.  Cardiology wants frequent EKGs to assess for QTc, continue Toprol, monitor  Cardiomyopathy-continue beta-blocker and aspirin  Hypertension-BP stable on Toprol-XL  Suspected COPD exacerbation-continue Symbicort, Xopenex, s/p prednisone  CAD with history PCI-continue aspirin, hold statin due to elevated LFTs  GI/DVT prophylaxis  Up to chair, PT/OT once okay with Cardiology    Hopefully discharge planning tomorrow    Elsy Lacy MD  5/23/2024  8:56 AM

## 2024-06-05 ENCOUNTER — APPOINTMENT (OUTPATIENT)
Dept: CT IMAGING | Age: 74
DRG: 308 | End: 2024-06-05
Payer: MEDICARE

## 2024-06-05 ENCOUNTER — APPOINTMENT (OUTPATIENT)
Age: 74
DRG: 308 | End: 2024-06-05
Payer: MEDICARE

## 2024-06-05 ENCOUNTER — APPOINTMENT (OUTPATIENT)
Dept: GENERAL RADIOLOGY | Age: 74
DRG: 308 | End: 2024-06-05
Payer: MEDICARE

## 2024-06-05 ENCOUNTER — HOSPITAL ENCOUNTER (INPATIENT)
Age: 74
LOS: 4 days | Discharge: HOME | DRG: 308 | End: 2024-06-09
Attending: EMERGENCY MEDICINE | Admitting: INTERNAL MEDICINE
Payer: MEDICARE

## 2024-06-05 DIAGNOSIS — I31.39 PERICARDIAL EFFUSION: ICD-10-CM

## 2024-06-05 DIAGNOSIS — R00.1 BRADYCARDIA: Primary | ICD-10-CM

## 2024-06-05 LAB
ALBUMIN SERPL-MCNC: 4 G/DL (ref 3.5–5.2)
ALBUMIN/GLOB SERPL: 2 {RATIO} (ref 1–2.5)
ALP SERPL-CCNC: 106 U/L (ref 35–104)
ALT SERPL-CCNC: 75 U/L (ref 10–35)
ANION GAP SERPL CALCULATED.3IONS-SCNC: 12 MMOL/L (ref 9–16)
AST SERPL-CCNC: 46 U/L (ref 10–35)
B PARAP IS1001 DNA NPH QL NAA+NON-PROBE: NOT DETECTED
B PERT DNA SPEC QL NAA+PROBE: NOT DETECTED
BACTERIA URNS QL MICRO: ABNORMAL
BASOPHILS # BLD: 0.08 K/UL (ref 0–0.2)
BASOPHILS NFR BLD: 1 % (ref 0–2)
BILIRUB SERPL-MCNC: 1 MG/DL (ref 0–1.2)
BILIRUB UR QL STRIP: NEGATIVE
BNP SERPL-MCNC: 4829 PG/ML (ref 0–300)
BODY TEMPERATURE: 37
BUN SERPL-MCNC: 23 MG/DL (ref 8–23)
C PNEUM DNA NPH QL NAA+NON-PROBE: NOT DETECTED
CALCIUM SERPL-MCNC: 9.2 MG/DL (ref 8.6–10.4)
CASTS #/AREA URNS LPF: ABNORMAL /LPF (ref 0–8)
CHLORIDE SERPL-SCNC: 105 MMOL/L (ref 98–107)
CLARITY UR: CLEAR
CO2 SERPL-SCNC: 22 MMOL/L (ref 20–31)
COHGB MFR BLD: 3.3 % (ref 0–5)
COLOR UR: YELLOW
CREAT SERPL-MCNC: 1 MG/DL (ref 0.5–0.9)
EOSINOPHIL # BLD: 0.05 K/UL (ref 0–0.44)
EOSINOPHILS RELATIVE PERCENT: 1 % (ref 1–4)
EPI CELLS #/AREA URNS HPF: ABNORMAL /HPF (ref 0–5)
ERYTHROCYTE [DISTWIDTH] IN BLOOD BY AUTOMATED COUNT: 16.7 % (ref 11.8–14.4)
FIO2 ON VENT: ABNORMAL %
FIO2: 100
FLUAV RNA NPH QL NAA+NON-PROBE: NOT DETECTED
FLUBV RNA NPH QL NAA+NON-PROBE: NOT DETECTED
GFR, ESTIMATED: 63 ML/MIN/1.73M2
GLUCOSE BLD-MCNC: 128 MG/DL (ref 65–105)
GLUCOSE BLD-MCNC: 128 MG/DL (ref 74–100)
GLUCOSE SERPL-MCNC: 104 MG/DL (ref 74–99)
GLUCOSE UR STRIP-MCNC: NEGATIVE MG/DL
HADV DNA NPH QL NAA+NON-PROBE: NOT DETECTED
HCO3 VENOUS: 22.6 MMOL/L (ref 24–30)
HCOV 229E RNA NPH QL NAA+NON-PROBE: NOT DETECTED
HCOV HKU1 RNA NPH QL NAA+NON-PROBE: NOT DETECTED
HCOV NL63 RNA NPH QL NAA+NON-PROBE: NOT DETECTED
HCOV OC43 RNA NPH QL NAA+NON-PROBE: NOT DETECTED
HCT VFR BLD AUTO: 42.1 % (ref 36.3–47.1)
HGB BLD-MCNC: 13.1 G/DL (ref 11.9–15.1)
HGB UR QL STRIP.AUTO: ABNORMAL
HMPV RNA NPH QL NAA+NON-PROBE: NOT DETECTED
HPIV1 RNA NPH QL NAA+NON-PROBE: NOT DETECTED
HPIV2 RNA NPH QL NAA+NON-PROBE: NOT DETECTED
HPIV3 RNA NPH QL NAA+NON-PROBE: NOT DETECTED
HPIV4 RNA NPH QL NAA+NON-PROBE: NOT DETECTED
IMM GRANULOCYTES # BLD AUTO: 0.05 K/UL (ref 0–0.3)
IMM GRANULOCYTES NFR BLD: 1 %
KETONES UR STRIP-MCNC: NEGATIVE MG/DL
LEUKOCYTE ESTERASE UR QL STRIP: NEGATIVE
LYMPHOCYTES NFR BLD: 1.42 K/UL (ref 1.1–3.7)
LYMPHOCYTES RELATIVE PERCENT: 18 % (ref 24–43)
M PNEUMO DNA NPH QL NAA+NON-PROBE: NOT DETECTED
MCH RBC QN AUTO: 31 PG (ref 25.2–33.5)
MCHC RBC AUTO-ENTMCNC: 31.1 G/DL (ref 28.4–34.8)
MCV RBC AUTO: 99.8 FL (ref 82.6–102.9)
MONOCYTES NFR BLD: 0.95 K/UL (ref 0.1–1.2)
MONOCYTES NFR BLD: 12 % (ref 3–12)
NEGATIVE BASE EXCESS, ART: 2.5 MMOL/L (ref 0–2)
NEGATIVE BASE EXCESS, VEN: 5.4 MMOL/L (ref 0–2)
NEUTROPHILS NFR BLD: 67 % (ref 36–65)
NEUTS SEG NFR BLD: 5.38 K/UL (ref 1.5–8.1)
NITRITE UR QL STRIP: NEGATIVE
NRBC BLD-RTO: 0 PER 100 WBC
O2 SAT, VEN: 89.2 % (ref 60–85)
PCO2, VEN: 56.7 MM HG (ref 39–55)
PH UR STRIP: 5 [PH] (ref 5–8)
PH VENOUS: 7.22 (ref 7.32–7.42)
PLATELET # BLD AUTO: 185 K/UL (ref 138–453)
PMV BLD AUTO: 10.8 FL (ref 8.1–13.5)
PO2, VEN: 72 MM HG (ref 30–50)
POC HCO3: 26.3 MMOL/L (ref 21–28)
POC O2 SATURATION: 98.9 % (ref 94–98)
POC PCO2: 60.6 MM HG (ref 35–48)
POC PH: 7.25 (ref 7.35–7.45)
POC PO2: 151.5 MM HG (ref 83–108)
POTASSIUM SERPL-SCNC: 4.7 MMOL/L (ref 3.7–5.3)
PROCALCITONIN SERPL-MCNC: 0.15 NG/ML (ref 0–0.09)
PROT SERPL-MCNC: 6.2 G/DL (ref 6.6–8.7)
PROT UR STRIP-MCNC: ABNORMAL MG/DL
RBC # BLD AUTO: 4.22 M/UL (ref 3.95–5.11)
RBC # BLD: ABNORMAL 10*6/UL
RBC #/AREA URNS HPF: ABNORMAL /HPF (ref 0–4)
RSV RNA NPH QL NAA+NON-PROBE: NOT DETECTED
RV+EV RNA NPH QL NAA+NON-PROBE: NOT DETECTED
SARS-COV-2 RNA NPH QL NAA+NON-PROBE: NOT DETECTED
SODIUM SERPL-SCNC: 139 MMOL/L (ref 136–145)
SP GR UR STRIP: 1.01 (ref 1–1.03)
SPECIMEN DESCRIPTION: NORMAL
TROPONIN I SERPL HS-MCNC: 22 NG/L (ref 0–14)
TROPONIN I SERPL HS-MCNC: 29 NG/L (ref 0–14)
TSH SERPL DL<=0.05 MIU/L-ACNC: 1.29 UIU/ML (ref 0.27–4.2)
UROBILINOGEN UR STRIP-ACNC: NORMAL EU/DL (ref 0–1)
WBC #/AREA URNS HPF: ABNORMAL /HPF (ref 0–5)
WBC OTHER # BLD: 7.9 K/UL (ref 3.5–11.3)

## 2024-06-05 PROCEDURE — 84520 ASSAY OF UREA NITROGEN: CPT

## 2024-06-05 PROCEDURE — 36415 COLL VENOUS BLD VENIPUNCTURE: CPT

## 2024-06-05 PROCEDURE — 71045 X-RAY EXAM CHEST 1 VIEW: CPT

## 2024-06-05 PROCEDURE — 93308 TTE F-UP OR LMTD: CPT | Performed by: INTERNAL MEDICINE

## 2024-06-05 PROCEDURE — 6360000002 HC RX W HCPCS

## 2024-06-05 PROCEDURE — C8924 2D TTE W OR W/O FOL W/CON,FU: HCPCS

## 2024-06-05 PROCEDURE — 2500000003 HC RX 250 WO HCPCS

## 2024-06-05 PROCEDURE — 85025 COMPLETE CBC W/AUTO DIFF WBC: CPT

## 2024-06-05 PROCEDURE — 2700000000 HC OXYGEN THERAPY PER DAY

## 2024-06-05 PROCEDURE — 84484 ASSAY OF TROPONIN QUANT: CPT

## 2024-06-05 PROCEDURE — 0202U NFCT DS 22 TRGT SARS-COV-2: CPT

## 2024-06-05 PROCEDURE — 94002 VENT MGMT INPAT INIT DAY: CPT

## 2024-06-05 PROCEDURE — 84443 ASSAY THYROID STIM HORMONE: CPT

## 2024-06-05 PROCEDURE — 99223 1ST HOSP IP/OBS HIGH 75: CPT | Performed by: INTERNAL MEDICINE

## 2024-06-05 PROCEDURE — 0BH18EZ INSERTION OF ENDOTRACHEAL AIRWAY INTO TRACHEA, VIA NATURAL OR ARTIFICIAL OPENING ENDOSCOPIC: ICD-10-PCS | Performed by: INTERNAL MEDICINE

## 2024-06-05 PROCEDURE — 80051 ELECTROLYTE PANEL: CPT

## 2024-06-05 PROCEDURE — 2580000003 HC RX 258

## 2024-06-05 PROCEDURE — 2000000000 HC ICU R&B

## 2024-06-05 PROCEDURE — 94761 N-INVAS EAR/PLS OXIMETRY MLT: CPT

## 2024-06-05 PROCEDURE — 82565 ASSAY OF CREATININE: CPT

## 2024-06-05 PROCEDURE — 6360000002 HC RX W HCPCS: Performed by: INTERNAL MEDICINE

## 2024-06-05 PROCEDURE — 99285 EMERGENCY DEPT VISIT HI MDM: CPT

## 2024-06-05 PROCEDURE — 85014 HEMATOCRIT: CPT

## 2024-06-05 PROCEDURE — 6360000004 HC RX CONTRAST MEDICATION: Performed by: STUDENT IN AN ORGANIZED HEALTH CARE EDUCATION/TRAINING PROGRAM

## 2024-06-05 PROCEDURE — 36600 WITHDRAWAL OF ARTERIAL BLOOD: CPT

## 2024-06-05 PROCEDURE — 6360000002 HC RX W HCPCS: Performed by: STUDENT IN AN ORGANIZED HEALTH CARE EDUCATION/TRAINING PROGRAM

## 2024-06-05 PROCEDURE — 6370000000 HC RX 637 (ALT 250 FOR IP): Performed by: STUDENT IN AN ORGANIZED HEALTH CARE EDUCATION/TRAINING PROGRAM

## 2024-06-05 PROCEDURE — 82947 ASSAY GLUCOSE BLOOD QUANT: CPT

## 2024-06-05 PROCEDURE — 96374 THER/PROPH/DIAG INJ IV PUSH: CPT

## 2024-06-05 PROCEDURE — 82803 BLOOD GASES ANY COMBINATION: CPT

## 2024-06-05 PROCEDURE — 5A1935Z RESPIRATORY VENTILATION, LESS THAN 24 CONSECUTIVE HOURS: ICD-10-PCS | Performed by: INTERNAL MEDICINE

## 2024-06-05 PROCEDURE — 93325 DOPPLER ECHO COLOR FLOW MAPG: CPT | Performed by: INTERNAL MEDICINE

## 2024-06-05 PROCEDURE — 83605 ASSAY OF LACTIC ACID: CPT

## 2024-06-05 PROCEDURE — 81001 URINALYSIS AUTO W/SCOPE: CPT

## 2024-06-05 PROCEDURE — 82330 ASSAY OF CALCIUM: CPT

## 2024-06-05 PROCEDURE — 93005 ELECTROCARDIOGRAM TRACING: CPT

## 2024-06-05 PROCEDURE — 84145 PROCALCITONIN (PCT): CPT

## 2024-06-05 PROCEDURE — 82805 BLOOD GASES W/O2 SATURATION: CPT

## 2024-06-05 PROCEDURE — 96375 TX/PRO/DX INJ NEW DRUG ADDON: CPT

## 2024-06-05 PROCEDURE — 71260 CT THORAX DX C+: CPT

## 2024-06-05 PROCEDURE — 83880 ASSAY OF NATRIURETIC PEPTIDE: CPT

## 2024-06-05 PROCEDURE — 93005 ELECTROCARDIOGRAM TRACING: CPT | Performed by: STUDENT IN AN ORGANIZED HEALTH CARE EDUCATION/TRAINING PROGRAM

## 2024-06-05 PROCEDURE — 87641 MR-STAPH DNA AMP PROBE: CPT

## 2024-06-05 PROCEDURE — 80053 COMPREHEN METABOLIC PANEL: CPT

## 2024-06-05 PROCEDURE — 94640 AIRWAY INHALATION TREATMENT: CPT

## 2024-06-05 RX ORDER — ONDANSETRON 2 MG/ML
4 INJECTION INTRAMUSCULAR; INTRAVENOUS ONCE
Status: COMPLETED | OUTPATIENT
Start: 2024-06-05 | End: 2024-06-05

## 2024-06-05 RX ORDER — POTASSIUM CHLORIDE 20 MEQ/1
40 TABLET, EXTENDED RELEASE ORAL PRN
Status: DISCONTINUED | OUTPATIENT
Start: 2024-06-05 | End: 2024-06-09 | Stop reason: HOSPADM

## 2024-06-05 RX ORDER — DOPAMINE HYDROCHLORIDE 160 MG/100ML
1-20 INJECTION, SOLUTION INTRAVENOUS CONTINUOUS
Status: DISCONTINUED | OUTPATIENT
Start: 2024-06-05 | End: 2024-06-05

## 2024-06-05 RX ORDER — PROPOFOL 10 MG/ML
5-50 INJECTION, EMULSION INTRAVENOUS CONTINUOUS
Status: DISCONTINUED | OUTPATIENT
Start: 2024-06-05 | End: 2024-06-07

## 2024-06-05 RX ORDER — ALBUTEROL SULFATE 2.5 MG/3ML
2.5 SOLUTION RESPIRATORY (INHALATION)
Status: DISCONTINUED | OUTPATIENT
Start: 2024-06-05 | End: 2024-06-06

## 2024-06-05 RX ORDER — BUDESONIDE AND FORMOTEROL FUMARATE DIHYDRATE 160; 4.5 UG/1; UG/1
2 AEROSOL RESPIRATORY (INHALATION)
Status: DISCONTINUED | OUTPATIENT
Start: 2024-06-05 | End: 2024-06-09 | Stop reason: HOSPADM

## 2024-06-05 RX ORDER — LIDOCAINE HCL/PF 100 MG/5ML
1 SYRINGE (ML) INJECTION ONCE
Status: COMPLETED | OUTPATIENT
Start: 2024-06-05 | End: 2024-06-05

## 2024-06-05 RX ORDER — ENOXAPARIN SODIUM 100 MG/ML
40 INJECTION SUBCUTANEOUS DAILY
Status: DISCONTINUED | OUTPATIENT
Start: 2024-06-05 | End: 2024-06-09 | Stop reason: HOSPADM

## 2024-06-05 RX ORDER — ACETAMINOPHEN 325 MG/1
650 TABLET ORAL EVERY 6 HOURS PRN
Status: DISCONTINUED | OUTPATIENT
Start: 2024-06-05 | End: 2024-06-09 | Stop reason: HOSPADM

## 2024-06-05 RX ORDER — PROPOFOL 10 MG/ML
INJECTION, EMULSION INTRAVENOUS
Status: COMPLETED
Start: 2024-06-05 | End: 2024-06-05

## 2024-06-05 RX ORDER — MAGNESIUM SULFATE IN WATER 40 MG/ML
2000 INJECTION, SOLUTION INTRAVENOUS PRN
Status: DISCONTINUED | OUTPATIENT
Start: 2024-06-05 | End: 2024-06-09 | Stop reason: HOSPADM

## 2024-06-05 RX ORDER — ONDANSETRON 2 MG/ML
INJECTION INTRAMUSCULAR; INTRAVENOUS
Status: COMPLETED
Start: 2024-06-05 | End: 2024-06-05

## 2024-06-05 RX ORDER — MIDAZOLAM HYDROCHLORIDE 1 MG/ML
1-10 INJECTION, SOLUTION INTRAVENOUS CONTINUOUS
Status: DISCONTINUED | OUTPATIENT
Start: 2024-06-05 | End: 2024-06-07

## 2024-06-05 RX ORDER — MEXILETINE HYDROCHLORIDE 200 MG/1
200 CAPSULE ORAL
Status: DISCONTINUED | OUTPATIENT
Start: 2024-06-05 | End: 2024-06-07

## 2024-06-05 RX ORDER — MIDAZOLAM HYDROCHLORIDE 2 MG/2ML
2 INJECTION, SOLUTION INTRAMUSCULAR; INTRAVENOUS ONCE
Status: COMPLETED | OUTPATIENT
Start: 2024-06-05 | End: 2024-06-05

## 2024-06-05 RX ORDER — IPRATROPIUM BROMIDE AND ALBUTEROL SULFATE 2.5; .5 MG/3ML; MG/3ML
1 SOLUTION RESPIRATORY (INHALATION) EVERY 4 HOURS PRN
Status: DISCONTINUED | OUTPATIENT
Start: 2024-06-05 | End: 2024-06-06

## 2024-06-05 RX ORDER — SODIUM CHLORIDE 9 MG/ML
INJECTION, SOLUTION INTRAVENOUS PRN
Status: DISCONTINUED | OUTPATIENT
Start: 2024-06-05 | End: 2024-06-09 | Stop reason: HOSPADM

## 2024-06-05 RX ORDER — POTASSIUM CHLORIDE 7.45 MG/ML
10 INJECTION INTRAVENOUS PRN
Status: DISCONTINUED | OUTPATIENT
Start: 2024-06-05 | End: 2024-06-09 | Stop reason: HOSPADM

## 2024-06-05 RX ORDER — ALBUTEROL SULFATE 2.5 MG/3ML
15 SOLUTION RESPIRATORY (INHALATION)
Status: DISCONTINUED | OUTPATIENT
Start: 2024-06-05 | End: 2024-06-06

## 2024-06-05 RX ORDER — ACETAMINOPHEN 650 MG/1
650 SUPPOSITORY RECTAL EVERY 6 HOURS PRN
Status: DISCONTINUED | OUTPATIENT
Start: 2024-06-05 | End: 2024-06-09 | Stop reason: HOSPADM

## 2024-06-05 RX ORDER — GLUCAGON 1 MG/ML
2 KIT INJECTION ONCE
Status: COMPLETED | OUTPATIENT
Start: 2024-06-05 | End: 2024-06-05

## 2024-06-05 RX ORDER — POLYETHYLENE GLYCOL 3350 17 G/17G
17 POWDER, FOR SOLUTION ORAL DAILY PRN
Status: DISCONTINUED | OUTPATIENT
Start: 2024-06-05 | End: 2024-06-09 | Stop reason: HOSPADM

## 2024-06-05 RX ORDER — DOPAMINE HYDROCHLORIDE 160 MG/100ML
1-20 INJECTION, SOLUTION INTRAVENOUS CONTINUOUS
Status: DISCONTINUED | OUTPATIENT
Start: 2024-06-05 | End: 2024-06-07

## 2024-06-05 RX ORDER — LIDOCAINE HYDROCHLORIDE ANHYDROUS AND DEXTROSE MONOHYDRATE 5; 400 G/100ML; MG/100ML
2 INJECTION, SOLUTION INTRAVENOUS CONTINUOUS
Status: DISCONTINUED | OUTPATIENT
Start: 2024-06-05 | End: 2024-06-07

## 2024-06-05 RX ORDER — FUROSEMIDE 10 MG/ML
20 INJECTION INTRAMUSCULAR; INTRAVENOUS ONCE
Status: COMPLETED | OUTPATIENT
Start: 2024-06-05 | End: 2024-06-05

## 2024-06-05 RX ORDER — ONDANSETRON 4 MG/1
4 TABLET, ORALLY DISINTEGRATING ORAL EVERY 8 HOURS PRN
Status: DISCONTINUED | OUTPATIENT
Start: 2024-06-05 | End: 2024-06-09 | Stop reason: HOSPADM

## 2024-06-05 RX ORDER — ONDANSETRON 2 MG/ML
4 INJECTION INTRAMUSCULAR; INTRAVENOUS EVERY 6 HOURS PRN
Status: DISCONTINUED | OUTPATIENT
Start: 2024-06-05 | End: 2024-06-09 | Stop reason: HOSPADM

## 2024-06-05 RX ORDER — SODIUM CHLORIDE 0.9 % (FLUSH) 0.9 %
5-40 SYRINGE (ML) INJECTION EVERY 12 HOURS SCHEDULED
Status: DISCONTINUED | OUTPATIENT
Start: 2024-06-05 | End: 2024-06-09 | Stop reason: HOSPADM

## 2024-06-05 RX ORDER — SODIUM CHLORIDE 0.9 % (FLUSH) 0.9 %
5-40 SYRINGE (ML) INJECTION PRN
Status: DISCONTINUED | OUTPATIENT
Start: 2024-06-05 | End: 2024-06-09 | Stop reason: HOSPADM

## 2024-06-05 RX ORDER — MIDAZOLAM HYDROCHLORIDE 1 MG/ML
INJECTION INTRAMUSCULAR; INTRAVENOUS
Status: COMPLETED
Start: 2024-06-05 | End: 2024-06-05

## 2024-06-05 RX ADMIN — IPRATROPIUM BROMIDE AND ALBUTEROL SULFATE 1 DOSE: .5; 3 SOLUTION RESPIRATORY (INHALATION) at 18:25

## 2024-06-05 RX ADMIN — IOPAMIDOL 75 ML: 755 INJECTION, SOLUTION INTRAVENOUS at 19:57

## 2024-06-05 RX ADMIN — MIDAZOLAM HYDROCHLORIDE 2 MG: 2 INJECTION, SOLUTION INTRAMUSCULAR; INTRAVENOUS at 23:33

## 2024-06-05 RX ADMIN — PROPOFOL 20 MCG/KG/MIN: 10 INJECTION, EMULSION INTRAVENOUS at 18:44

## 2024-06-05 RX ADMIN — Medication 2 MG/HR: at 23:48

## 2024-06-05 RX ADMIN — MIDAZOLAM HYDROCHLORIDE 2 MG: 1 INJECTION, SOLUTION INTRAMUSCULAR; INTRAVENOUS at 23:33

## 2024-06-05 RX ADMIN — DOPAMINE HYDROCHLORIDE 5 MCG/KG/MIN: 160 INJECTION, SOLUTION INTRAVENOUS at 15:28

## 2024-06-05 RX ADMIN — SODIUM CHLORIDE, PRESERVATIVE FREE 10 ML: 5 INJECTION INTRAVENOUS at 21:56

## 2024-06-05 RX ADMIN — LIDOCAINE HYDROCHLORIDE 80 MG: 20 INJECTION INTRAVENOUS at 16:11

## 2024-06-05 RX ADMIN — PERFLUTREN 1.5 ML: 6.52 INJECTION, SUSPENSION INTRAVENOUS at 19:31

## 2024-06-05 RX ADMIN — LIDOCAINE HYDROCHLORIDE 1 MG/MIN: 4 INJECTION, SOLUTION INTRAVENOUS at 16:12

## 2024-06-05 RX ADMIN — ONDANSETRON 4 MG: 2 INJECTION INTRAMUSCULAR; INTRAVENOUS at 15:34

## 2024-06-05 RX ADMIN — PIPERACILLIN AND TAZOBACTAM 3375 MG: 3; .375 INJECTION, POWDER, LYOPHILIZED, FOR SOLUTION INTRAVENOUS at 23:20

## 2024-06-05 RX ADMIN — ONDANSETRON 4 MG: 2 INJECTION INTRAMUSCULAR; INTRAVENOUS at 17:00

## 2024-06-05 RX ADMIN — GLUCAGON 2 MG: 1 INJECTION, POWDER, LYOPHILIZED, FOR SOLUTION INTRAMUSCULAR; INTRAVENOUS at 15:27

## 2024-06-05 RX ADMIN — FUROSEMIDE 20 MG: 10 INJECTION, SOLUTION INTRAMUSCULAR; INTRAVENOUS at 21:55

## 2024-06-05 ASSESSMENT — PAIN SCALES - GENERAL: PAINLEVEL_OUTOF10: 0

## 2024-06-05 ASSESSMENT — PULMONARY FUNCTION TESTS
PIF_VALUE: 22
PIF_VALUE: 21

## 2024-06-05 NOTE — ED NOTES
20 mg etomidate given IV push by Radhika GARRETT per verbal order Dr Ellis. 100 mg rocc given IV push by Radhika GARRETT per verbal order Dr. Ellis

## 2024-06-05 NOTE — H&P
(176 lb 5.9 oz)   SpO2 91%   BMI 33.34 kg/m²     Body weight:   Wt Readings from Last 3 Encounters:   06/05/24 80 kg (176 lb 5.9 oz)   05/26/24 80.8 kg (178 lb 3.2 oz)   05/16/24 72.6 kg (160 lb)       Body Mass Index : Body mass index is 33.34 kg/m².        Physical Exam  Constitutional:       General: She is not in acute distress.     Appearance: Normal appearance. She is not ill-appearing.   Eyes:      Conjunctiva/sclera: Conjunctivae normal.      Pupils: Pupils are equal, round, and reactive to light.   Cardiovascular:      Rate and Rhythm: Normal rate and regular rhythm.      Pulses: Normal pulses.      Heart sounds: No murmur heard.  Pulmonary:      Effort: Pulmonary effort is normal. No respiratory distress.      Breath sounds: Normal breath sounds. No wheezing, rhonchi or rales.   Abdominal:      General: Bowel sounds are normal. There is no distension.      Palpations: Abdomen is soft.      Tenderness: There is abdominal tenderness.   Musculoskeletal:         General: Normal range of motion.      Cervical back: Normal range of motion and neck supple.      Right lower leg: Edema present.      Left lower leg: Edema present.   Skin:     Capillary Refill: Capillary refill takes less than 2 seconds.   Neurological:      General: No focal deficit present.      Mental Status: She is alert and oriented to person, place, and time. Mental status is at baseline.   Psychiatric:         Mood and Affect: Mood normal.         Behavior: Behavior normal.             Laboratory findings:-    CBC:   Recent Labs     06/05/24  1518   WBC 7.9   HGB 13.1        BMP:    Recent Labs     06/05/24  1518      K 4.7      CO2 22   BUN 23   CREATININE 1.0*   GLUCOSE 104*     S. Calcium:  Recent Labs     06/05/24  1518   CALCIUM 9.2     S. Ionized Calcium:Invalid input(s): \"IONCA\"  S. Magnesium:No results for input(s): \"MG\" in the last 72 hours.  S. Phosphorus:No results for input(s): \"PHOS\" in the last 72 hours.  S.  ablation 05/24.  Coronary artery disease s/p PCI to LAD  Hypertension  Hyperlipidemia  Acute hypoxic hypercapnic respiratory failure  COPD  Pulmonary hypertension      -Follow-up on cardiology recommendations for further evaluation and management of bradycardia, PVC and ventricular bigeminy.  Currently patient is on dopamine drip and lidocaine drip as per cardiology recommendations.  -F/u Repeat ECHO.   -Continue mechanical ventilation.  Spontaneous breathing trial daily and wean down as tolerated.  -Respiratory failure likely secondary to CHF exacerbation Gentle diuresis as tolerated.  Patient given 1 dose of Lasix IV 20 and responded well.  CT PE negative for pulmonary embolism.  -Moderate to severe right-sided pleural effusion.  Consider bedside versus IR guided thoracocentesis.  Patient is currently on Zosyn due to concern for aspiration pneumonitis/can be deescalated. F/u morning procal ,crp.   -Continue home breathing treatments.    -Resume home medications as tolerated.  -DVT prophylaxis is with Lovenox.      Chuck Walsh MD  Internal Medicine Resident, PGY-2  [facility]; [place], [place]  6/5/2024, 11:20 PM

## 2024-06-05 NOTE — PROCEDURES
PROCEDURE NOTE - EMERGENCY INTUBATION    PATIENT NAME: Coni Baker  MEDICAL RECORD NO. [id number]  DATE: 6/5/2024  ATTENDING PHYSICIAN:     PREOPERATIVE DIAGNOSIS:  Acute Respiratory Failure  POSTOPERATIVE DIAGNOSIS:  Same  PROCEDURE PERFORMED:  Emergency endotracheal intubation  PERFORMING PHYSICIAN: Chuck Walsh MD    MEDICATIONS: etomidate intravenously and rocuronium intravenously    DISCUSSION:  Coni Baker is a 73 y.o.-year-old female who requires intubation and ventilatory support due to impending respiratory failure.  The history and physical examination were reviewed and confirmed.      CONSENT: The patient provided verbal consent for this procedure.     PROCEDURE:  A timeout was initiated by the bedside nurse and was confirmed by those present.  The patient was placed in the appropriate position.  Cricoid pressure was not required.  Intubation was performed by Glidoscope and a 7.5 cuffed endotracheal tube.  The cuff was then inflated and the tube was secured appropriately at a distance of 22 cm to the dental ridge.  Initial confirmation of placement included bilateral breath sounds and an end tidal CO2 detector.  A chest x-ray to verify correct placement of the tube has been ordered but is still pending.    The patient tolerated the procedure well.     COMPLICATIONS:  None       Chuck Walsh MD  Internal Medicine Resident, PGY-2  [facility]; [place], [place]  6/5/2024, 7:09 PM

## 2024-06-05 NOTE — ED NOTES
Increased work of breathing, RR 40s-50s. pH is 7.2. pt states, \"I'm getting so tired.\" Preparing to intubate at this time

## 2024-06-05 NOTE — ED PROVIDER NOTES
[facility]     Emergency Department     Faculty Note/ Attestation      Pt Name: Coni Baker                                       MRN: [id number]  Birthdate 1950  Date of evaluation: 6/5/2024  Note Started: 3:17 PM EDT    Patients PCP:    Earlene Salcedo MD    Attestation  I performed a history and physical examination of the patient and discussed management with the resident. I reviewed the resident’s note and agree with the documented findings and plan of care. Any areas of disagreement are noted on the chart. I was personally present for the key portions of any procedures. I have documented in the chart those procedures where I was not present during the key portions. I have reviewed the emergency nurses triage note. I agree with the chief complaint, past medical history, past surgical history, allergies, medications, social and family history as documented unless otherwise noted below.    For Physician Assistant/ Nurse Practitioner cases/documentation I have personally evaluated this patient and have completed at least one if not all key elements of the E/M (history, physical exam, and MDM). Additional findings are as noted.    Initial Screens:        Sushil Coma Scale  Eye Opening: Spontaneous  Best Verbal Response: Oriented  Best Motor Response: Obeys commands  Sushil Coma Scale Score: 15    Vitals:    Vitals:    06/05/24 1452 06/05/24 1459 06/05/24 1500 06/05/24 1505   BP:   (!) 154/134    Pulse: (!) 40  94 56   Resp:  (!) 36 (!) 32 30   Temp:   97.4 °F (36.3 °C)    TempSrc: Oral  Oral    SpO2: 94%  92% 92%   Weight:   80 kg (176 lb 5.9 oz)        CHIEF COMPLAINT       Chief Complaint   Patient presents with    Bradycardia     Patient 73-year-old female on sotalol and metoprolol for frequent PVCs.  This is confirmed with electrophysiology's notes that this is what the patient is supposed to be on patient was extremely lightheaded and nurse at home noted her heart rate to  be in the 20s called 911 that are sent in.  Patient not having chest pain at this time.  Shortness of breath      EMERGENCY DEPARTMENT COURSE:     -------------------------  BP: (!) 154/134, Temp: 97.4 °F (36.3 °C), Pulse: 56, Respirations: 30  Physical Exam  Constitutional:       Appearance: She is well-developed. She is not diaphoretic.   HENT:      Head: Normocephalic and atraumatic.      Right Ear: External ear normal.      Left Ear: External ear normal.   Eyes:      General: No scleral icterus.        Right eye: No discharge.         Left eye: No discharge.   Neck:      Trachea: No tracheal deviation.   Pulmonary:      Effort: Respiratory distress present.      Breath sounds: No stridor.      Comments: Inceased RR  Musculoskeletal:         General: Normal range of motion.      Cervical back: Normal range of motion.   Skin:     General: Skin is warm and dry.   Neurological:      Mental Status: She is alert and oriented to person, place, and time.      Coordination: Coordination normal.   Psychiatric:         Behavior: Behavior normal.           Comments  Medical Decision Making  Amount and/or Complexity of Data Reviewed  Labs: ordered. Decision-making details documented in ED Course.  Radiology: ordered.  ECG/medicine tests: ordered.    Risk  Prescription drug management.  Decision regarding hospitalization.    Patient arrives with symptomatic bradycardia and distress patient respiratory rate and signs of heart failure present needing immediate care.  Patient alert and mental status appropriate with pharmacy at bedside who can rapidly start dopamine per cardiology recommendations we will titrate to heart rate effect.  Given the massive amounts of beta-blockers as patient is on if there is any chance she could have taken extra will provide glucagon attempt to see if we can increase heart rate with that medication.        ED Course as of 06/05/24 2317   Wed Jun 05, 2024   1513 EKG Interpretation    Interpreted by

## 2024-06-05 NOTE — ED PROVIDER NOTES
[facility] ED  Emergency Department Encounter  Emergency Medicine Resident     Pt Name:Coni Baker  MRN: [id number]  Birthdate 1950  Date of evaluation: 6/5/24  PCP:  Earlene Salcedo MD  Note Started: 3:00 PM EDT    CHIEF COMPLAINT       Chief Complaint   Patient presents with    Bradycardia     HISTORY OF PRESENT ILLNESS  (Location/Symptom, Timing/Onset, Context/Setting, Quality, Duration, Modifying Factors, Severity.)      Coni Baker is a 73 y.o. female with history of hypertension, hyperlipidemia, CAD post stents, ischemic cardiomyopathy with a EF of 45 to 50%, who presents with bradycardia with heart rate in the 30s upon arrival.  Apparently on check by [employer] 1 nurse at home, pulse was 27.  Her primary care physician was called who recommended she arrived by 911.  Patient stated that she was dizzy when the home nurse was there however at present she no longer feels dizzy.  She does feel some short of breath which she states is at her baseline.  She denies any chest pain, abdominal pain nausea vomiting.    Patient was admitted 5/16-26/2024 for similar complaint.  Initially had presented for shortness of breath and dizziness.  Found to be in nonsustained VT.  Started on amiodarone infusion but remained hypotensive requiring dopamine infusion.  Transferred to [facility] where she underwent cardiac cath on 5/16/2024.  Stents were patent at that time.  EP was consulted for VT ablation.  Ablation was performed on 5/21/2024 for likely epicardial RVOT PVC source but was unsuccessful.  Patient was also placed on IV amiodarone and sotalol but still had frequent PVCs.  Therefore she was medically managed with sotalol 120 mg twice daily,  mexiletine 200 mg 3 times daily and Toprol-XL 12.5 twice daily.  EP did advise follow-up outpatient with [facility] EP for evaluation of epicardial PVC ablation.    Patient states that she has been taking this medication and has not taken any extra  on the monitor [WK]   1818 Patient acutely worsening, did speak with Dr. Kinsey from the cardiology fellow team.  Also reached out to the critical care team to notify of the acutely worsening condition.  Requested both see patient at bedside.  Giving patient a breathing treatment. [CP]   1830 Called to bedside patient in room and extremis not breathing well sats low critical care team planning emergent intubation at this time wants to handle the procedure themselves ER here for backup and assistance [WK]      ED Course User Index  [CP] Abdullahi Scherer MD  [WK] Evan Fonseca DO     PROCEDURES:  none    CONSULTS:  IP CONSULT TO CARDIOLOGY  IP CONSULT TO ELECTROPHYSIOLOGY  IP CONSULT TO CRITICAL CARE  IP CONSULT TO CASE MANAGEMENT    CRITICAL CARE:  There was significant risk of life threatening deterioration of patient's condition requiring my direct management. Critical care time 55 minutes, excluding any documented procedures.    FINAL IMPRESSION      1. Bradycardia    2. Pericardial effusion        DISPOSITION / PLAN     DISPOSITION Admitted 06/05/2024 05:01:25 PM      PATIENT REFERRED TO:  No follow-up provider specified.    DISCHARGE MEDICATIONS:  New Prescriptions    No medications on file       Abdullahi Scherer MD  Emergency Medicine Resident    (Please note that portions of thisnote were completed with a voice recognition program.  Efforts were made to edit the dictations but occasionally words are mis-transcribed.)

## 2024-06-05 NOTE — ED NOTES
Pt intubated with 7.5 et tube, 22 cm at the lip. Positive color change, condensation in tube, bilateral breath sounds

## 2024-06-05 NOTE — PROGRESS NOTES
[facility] [place]  PROGRESS NOTE    Shift date: 06/05/2024  Shift day: Wednesday   Shift # 2    Room # 18/18   Name: Coni Baker                Synagogue: Other   Place of Pentecostal: un[place]    Referral:  perfect serve request from ED nurse    Admit Date & Time: 6/5/2024  2:55 PM    Assessment:  Coni Baker is a 73 y.o. female in the hospital because she has symptomatic bradycardia. Writer was asked to visit with family while Coni was having an ultrasound and CT scan. Upon entering the room writer observes Coni's adult children, quietly talking to each other.       Intervention:  Writer introduced self and title as  Writer offered space for the family  to express feelings, needs, and concerns and provided a ministry presence. Writer also inquired with nurse about how long until the family could re-join Coni on behalf of the family. Another family member arrived, and writer escorted her back to the [facility].     Outcome:  Family expressed gratitude and said would contact chaplains if needed. They also asked for continued prayers.     Plan:  Chaplains will remain available to offer spiritual and emotional support as needed.      Electronically signed by Chaplain BETZY, on 6/5/2024 at 7:38 PM.  [facility]  [phone]       06/05/24 1936   Encounter Summary   Encounter Overview/Reason Family Care   Service Provided For Family   Referral/Consult From Nurse   Support System Children;Family members   Last Encounter  06/05/24   Complexity of Encounter Moderate   Begin Time 1840   End Time  1923   Total Time Calculated 43 min   Assessment/Intervention/Outcome   Assessment Anxious;Sad;Tearful   Intervention Active listening;Sustaining Presence/Ministry of presence;Prayer (assurance of)/[place]   Outcome Comfort;Expressed Gratitude;Restored Hope

## 2024-06-05 NOTE — ED NOTES
The following labs were labeled with appropriate pt sticker and tubed to lab:     [x] Blue     [x] Lavender   [] on ice  [x] Green/yellow  [x] Green/black [] on ice  [] Grey  [] on ice  [] Yellow  [] Red  [] Pink  [] Type/ Screen  [] ABG  [] VBG    [] COVID-19 swab    [] Rapid  [] PCR  [] Flu swab  [x] Viral Panel     [x] Urine Sample  [] Fecal Sample  [] Pelvic Cultures  [] Blood Cultures  [] X 2  [] STREP Cultures  [] Wound Cultures

## 2024-06-05 NOTE — ED NOTES
Pt to ED for bradycardia and SOB. Pt states he home health nurse told her to come to the ER for her pulse being in the 30s at home. Pt states she has been increasingly SOB and weak for the past 2 days. Pt has a hx of CAD with prior MI, unsuccessful ablation attempted on 5/21/24. On arrival palpable radial pulse is in the 30s, however on the monitor pt's HR is 70s-80s. Patient alert and oriented x4. Patient placed on continuous cardiac monitoring, BP cuff, and pulse ox. EKG obtained, blood work obtained. Placed on [employer]. Call light in reach, all needs met at this time

## 2024-06-05 NOTE — CONSULTS
[facility]    Consult / H&P               Today's Date: 6/5/2024  Patient Name: Coni Baker  Date of admission: 6/5/2024  2:55 PM  Patient's age: 73 y.o., 1950  Admission Dx: No admission diagnoses are documented for this encounter.    Reason for Consult:  Cardiac evaluation    Requesting Physician: No admitting provider for patient encounter.    CHIEF COMPLAINT: Lightheadedness, symptomatic bradycardia     History Obtained From:  patient, electronic medical record    HISTORY OF PRESENT ILLNESS:      The patient is a 73 y.o.  female who is admitted to the hospital for dizziness and lightheadedness.  The patient has past medical history of CAD s/p PCI to LAD with patent stents as per [facility] on 5/16/2024 and minimal LCx and RCA disease, ischemic cardiomyopathy with EF 45% on last 2D echocardiogram, frequent PVCs s/p unsuccessful ablation attempt 5/22/2024 and was referred to [facility].  She has been on sotalol, mexiletine and Toprol-XL at home.  As per the patient and family, she was at home when she felt dizzy and lightheaded, her home health nurse checked her pulse and could not find it.  She then had a pulse with HR in 20s to 30s when the patient was brought to ER for further evaluation.  Currently, she is nauseous, symptomatic and lightheaded, blood pressure stable, HR in 50s to 60s with frequent PVCs.  Cardiology was consulted for further recommendations.  Twelve-lead EKG was obtained which revealed sinus bradycardia with frequent PVCs and bigeminy pattern, no acute ST-T changes.  I discussed with Dr. Cordero who recommended starting lidocaine bolus followed by gtt. as well as resuming her home dose of mexiletine at this time.  In the meantime, continue dopamine infusion and will hold off on PPM placement.    Past Medical History:   has a past medical history of Atrial fibrillation (HCC), CAD (coronary artery disease), CHF (congestive heart failure) ([facility]), Hx of myocardial  reviewed.  Will continue to follow.    Nadya Calle MD  Fellow, Cardiovascular Diseases    [facility]      Attending Cardiologist Addendum: I have reviewed and performed the history, physical, subjective, objective, assessment, and plan with the resident/fellow/NP and agree with the note. I performed the history and physical personally. I have made changes to the note above as needed.    Thank you for allowing me to participate in the care of this patient, please do not hesitate to call if you have any questions.    Jeanine Mei DO, [facility]  Board Certified Cardiologist  Fellow of the American College of Cardiology    Obesity & Weight Loss Medicine   of [facility] [place]   of [facility]  [facility]  [phone]

## 2024-06-06 PROBLEM — R00.1 BRADYCARDIA: Status: ACTIVE | Noted: 2024-06-06

## 2024-06-06 LAB
ALLEN TEST: ABNORMAL
ANION GAP SERPL CALCULATED.3IONS-SCNC: 10 MMOL/L (ref 9–16)
ANION GAP SERPL CALCULATED.3IONS-SCNC: 7 MMOL/L (ref 9–16)
ANION GAP SERPL CALCULATED.3IONS-SCNC: 9 MMOL/L (ref 9–16)
BASOPHILS # BLD: 0.03 K/UL (ref 0–0.2)
BASOPHILS NFR BLD: 0 % (ref 0–2)
BUN BLD-MCNC: 26 MG/DL (ref 8–26)
BUN SERPL-MCNC: 18 MG/DL (ref 8–23)
BUN SERPL-MCNC: 18 MG/DL (ref 8–23)
BUN SERPL-MCNC: 19 MG/DL (ref 8–23)
CA-I BLD-SCNC: 1.22 MMOL/L (ref 1.15–1.33)
CALCIUM SERPL-MCNC: 7.9 MG/DL (ref 8.6–10.4)
CALCIUM SERPL-MCNC: 8.1 MG/DL (ref 8.6–10.4)
CALCIUM SERPL-MCNC: 8.3 MG/DL (ref 8.6–10.4)
CHLORIDE BLD-SCNC: 106 MMOL/L (ref 98–107)
CHLORIDE SERPL-SCNC: 103 MMOL/L (ref 98–107)
CHLORIDE SERPL-SCNC: 103 MMOL/L (ref 98–107)
CHLORIDE SERPL-SCNC: 106 MMOL/L (ref 98–107)
CO2 BLD CALC-SCNC: 28 MMOL/L (ref 22–30)
CO2 SERPL-SCNC: 25 MMOL/L (ref 20–31)
CO2 SERPL-SCNC: 28 MMOL/L (ref 20–31)
CO2 SERPL-SCNC: 28 MMOL/L (ref 20–31)
CREAT SERPL-MCNC: 0.9 MG/DL (ref 0.5–0.9)
CREAT SERPL-MCNC: 1 MG/DL (ref 0.5–0.9)
CREAT SERPL-MCNC: 1 MG/DL (ref 0.5–0.9)
ECHO IVC PROX: 2.3 CM
ECHO LV EDV A4C: 54 ML
ECHO LV EDV INDEX A4C: 30 ML/M2
ECHO LV EJECTION FRACTION A4C: 35 %
ECHO LV ESV A4C: 35 ML
ECHO LV ESV INDEX A4C: 20 ML/M2
EGFR, POC: 77 ML/MIN/1.73M2
EOSINOPHIL # BLD: 0.04 K/UL (ref 0–0.44)
EOSINOPHILS RELATIVE PERCENT: 1 % (ref 1–4)
ERYTHROCYTE [DISTWIDTH] IN BLOOD BY AUTOMATED COUNT: 16.3 % (ref 11.8–14.4)
FIO2: 40
FIO2: 50
GFR, ESTIMATED: 59 ML/MIN/1.73M2
GFR, ESTIMATED: 61 ML/MIN/1.73M2
GFR, ESTIMATED: 67 ML/MIN/1.73M2
GLUCOSE BLD-MCNC: 125 MG/DL (ref 74–100)
GLUCOSE BLD-MCNC: 85 MG/DL (ref 74–100)
GLUCOSE SERPL-MCNC: 111 MG/DL (ref 74–99)
GLUCOSE SERPL-MCNC: 96 MG/DL (ref 74–99)
GLUCOSE SERPL-MCNC: 99 MG/DL (ref 74–99)
HCO3 VENOUS: 26.9 MMOL/L (ref 22–29)
HCT VFR BLD AUTO: 40.2 % (ref 36.3–47.1)
HCT VFR BLD AUTO: 48 % (ref 36–46)
HGB BLD-MCNC: 12.9 G/DL (ref 11.9–15.1)
IMM GRANULOCYTES # BLD AUTO: 0.03 K/UL (ref 0–0.3)
IMM GRANULOCYTES NFR BLD: 0 %
LYMPHOCYTES NFR BLD: 1.27 K/UL (ref 1.1–3.7)
LYMPHOCYTES RELATIVE PERCENT: 17 % (ref 24–43)
MCH RBC QN AUTO: 31.5 PG (ref 25.2–33.5)
MCHC RBC AUTO-ENTMCNC: 32.1 G/DL (ref 28.4–34.8)
MCV RBC AUTO: 98.3 FL (ref 82.6–102.9)
MODE: ABNORMAL
MONOCYTES NFR BLD: 0.88 K/UL (ref 0.1–1.2)
MONOCYTES NFR BLD: 12 % (ref 3–12)
MRSA, DNA, NASAL: NEGATIVE
NEGATIVE BASE EXCESS, ART: 0.2 MMOL/L (ref 0–2)
NEGATIVE BASE EXCESS, VEN: 2.1 MMOL/L (ref 0–2)
NEUTROPHILS NFR BLD: 70 % (ref 36–65)
NEUTS SEG NFR BLD: 5.1 K/UL (ref 1.5–8.1)
NRBC BLD-RTO: 0 PER 100 WBC
O2 DELIVERY DEVICE: ABNORMAL
O2 DELIVERY DEVICE: NORMAL
O2 SAT, VEN: 58 % (ref 60–85)
PCO2, VEN: 62 MM HG (ref 41–51)
PH VENOUS: 7.25 (ref 7.32–7.43)
PLATELET # BLD AUTO: 146 K/UL (ref 138–453)
PMV BLD AUTO: 10.1 FL (ref 8.1–13.5)
PO2, VEN: 36.1 MM HG (ref 30–50)
POC ANION GAP: 10 MMOL/L (ref 7–16)
POC CREATININE: 0.8 MG/DL (ref 0.51–1.19)
POC HCO3: 24.7 MMOL/L (ref 21–28)
POC HCO3: 27.4 MMOL/L (ref 21–28)
POC HEMOGLOBIN (CALC): 16.5 G/DL (ref 12–16)
POC LACTIC ACID: 1.2 MMOL/L (ref 0.56–1.39)
POC O2 SATURATION: 95.6 % (ref 94–98)
POC O2 SATURATION: 97.1 % (ref 94–98)
POC PCO2: 40.3 MM HG (ref 35–48)
POC PCO2: 46 MM HG (ref 35–48)
POC PH: 7.38 (ref 7.35–7.45)
POC PH: 7.39 (ref 7.35–7.45)
POC PO2: 79.9 MM HG (ref 83–108)
POC PO2: 93.9 MM HG (ref 83–108)
POSITIVE BASE EXCESS, ART: 1.7 MMOL/L (ref 0–3)
POTASSIUM BLD-SCNC: 5.4 MMOL/L (ref 3.5–4.5)
POTASSIUM SERPL-SCNC: 3.8 MMOL/L (ref 3.7–5.3)
POTASSIUM SERPL-SCNC: 3.9 MMOL/L (ref 3.7–5.3)
POTASSIUM SERPL-SCNC: 4 MMOL/L (ref 3.7–5.3)
RBC # BLD AUTO: 4.09 M/UL (ref 3.95–5.11)
RBC # BLD: ABNORMAL 10*6/UL
SAMPLE SITE: ABNORMAL
SAMPLE SITE: NORMAL
SODIUM BLD-SCNC: 143 MMOL/L (ref 138–146)
SODIUM SERPL-SCNC: 138 MMOL/L (ref 136–145)
SODIUM SERPL-SCNC: 140 MMOL/L (ref 136–145)
SODIUM SERPL-SCNC: 141 MMOL/L (ref 136–145)
SPECIMEN DESCRIPTION: NORMAL
WBC OTHER # BLD: 7.4 K/UL (ref 3.5–11.3)

## 2024-06-06 PROCEDURE — 4A133B1 MONITORING OF ARTERIAL PRESSURE, PERIPHERAL, PERCUTANEOUS APPROACH: ICD-10-PCS | Performed by: INTERNAL MEDICINE

## 2024-06-06 PROCEDURE — 85025 COMPLETE CBC W/AUTO DIFF WBC: CPT

## 2024-06-06 PROCEDURE — 6360000002 HC RX W HCPCS

## 2024-06-06 PROCEDURE — 2580000003 HC RX 258

## 2024-06-06 PROCEDURE — 82803 BLOOD GASES ANY COMBINATION: CPT

## 2024-06-06 PROCEDURE — 37799 UNLISTED PX VASCULAR SURGERY: CPT

## 2024-06-06 PROCEDURE — 99291 CRITICAL CARE FIRST HOUR: CPT | Performed by: INTERNAL MEDICINE

## 2024-06-06 PROCEDURE — 80048 BASIC METABOLIC PNL TOTAL CA: CPT

## 2024-06-06 PROCEDURE — 2700000000 HC OXYGEN THERAPY PER DAY

## 2024-06-06 PROCEDURE — 82947 ASSAY GLUCOSE BLOOD QUANT: CPT

## 2024-06-06 PROCEDURE — 36620 INSERTION CATHETER ARTERY: CPT

## 2024-06-06 PROCEDURE — 94640 AIRWAY INHALATION TREATMENT: CPT

## 2024-06-06 PROCEDURE — 2000000000 HC ICU R&B

## 2024-06-06 PROCEDURE — 6370000000 HC RX 637 (ALT 250 FOR IP): Performed by: STUDENT IN AN ORGANIZED HEALTH CARE EDUCATION/TRAINING PROGRAM

## 2024-06-06 PROCEDURE — 94761 N-INVAS EAR/PLS OXIMETRY MLT: CPT

## 2024-06-06 PROCEDURE — 94003 VENT MGMT INPAT SUBQ DAY: CPT

## 2024-06-06 PROCEDURE — 99233 SBSQ HOSP IP/OBS HIGH 50: CPT | Performed by: INTERNAL MEDICINE

## 2024-06-06 PROCEDURE — 04HY32Z INSERTION OF MONITORING DEVICE INTO LOWER ARTERY, PERCUTANEOUS APPROACH: ICD-10-PCS | Performed by: INTERNAL MEDICINE

## 2024-06-06 PROCEDURE — 6370000000 HC RX 637 (ALT 250 FOR IP)

## 2024-06-06 PROCEDURE — 6360000002 HC RX W HCPCS: Performed by: INTERNAL MEDICINE

## 2024-06-06 PROCEDURE — 4A133J1 MONITORING OF ARTERIAL PULSE, PERIPHERAL, PERCUTANEOUS APPROACH: ICD-10-PCS | Performed by: INTERNAL MEDICINE

## 2024-06-06 PROCEDURE — 36415 COLL VENOUS BLD VENIPUNCTURE: CPT

## 2024-06-06 RX ORDER — FUROSEMIDE 10 MG/ML
20 INJECTION INTRAMUSCULAR; INTRAVENOUS ONCE
Status: COMPLETED | OUTPATIENT
Start: 2024-06-06 | End: 2024-06-06

## 2024-06-06 RX ADMIN — MEXILETINE HYDROCHLORIDE 200 MG: 200 CAPSULE ORAL at 18:01

## 2024-06-06 RX ADMIN — PROPOFOL 30 MCG/KG/MIN: 10 INJECTION, EMULSION INTRAVENOUS at 01:17

## 2024-06-06 RX ADMIN — FUROSEMIDE 20 MG: 10 INJECTION, SOLUTION INTRAMUSCULAR; INTRAVENOUS at 10:14

## 2024-06-06 RX ADMIN — PIPERACILLIN AND TAZOBACTAM 3375 MG: 3; .375 INJECTION, POWDER, LYOPHILIZED, FOR SOLUTION INTRAVENOUS at 07:23

## 2024-06-06 RX ADMIN — DOPAMINE HYDROCHLORIDE 4 MCG/KG/MIN: 160 INJECTION, SOLUTION INTRAVENOUS at 02:27

## 2024-06-06 RX ADMIN — PIPERACILLIN AND TAZOBACTAM 3375 MG: 3; .375 INJECTION, POWDER, LYOPHILIZED, FOR SOLUTION INTRAVENOUS at 15:57

## 2024-06-06 RX ADMIN — PIPERACILLIN AND TAZOBACTAM 3375 MG: 3; .375 INJECTION, POWDER, LYOPHILIZED, FOR SOLUTION INTRAVENOUS at 23:18

## 2024-06-06 RX ADMIN — BUDESONIDE AND FORMOTEROL FUMARATE DIHYDRATE 2 PUFF: 160; 4.5 AEROSOL RESPIRATORY (INHALATION) at 19:56

## 2024-06-06 RX ADMIN — LIDOCAINE HYDROCHLORIDE 2 MG/MIN: 4 INJECTION, SOLUTION INTRAVENOUS at 19:42

## 2024-06-06 RX ADMIN — SODIUM CHLORIDE, PRESERVATIVE FREE 10 ML: 5 INJECTION INTRAVENOUS at 19:30

## 2024-06-06 RX ADMIN — ENOXAPARIN SODIUM 40 MG: 100 INJECTION SUBCUTANEOUS at 07:51

## 2024-06-06 RX ADMIN — MEXILETINE HYDROCHLORIDE 200 MG: 200 CAPSULE ORAL at 12:55

## 2024-06-06 RX ADMIN — PROPOFOL 30 MCG/KG/MIN: 10 INJECTION, EMULSION INTRAVENOUS at 00:33

## 2024-06-06 RX ADMIN — MEXILETINE HYDROCHLORIDE 200 MG: 200 CAPSULE ORAL at 07:50

## 2024-06-06 ASSESSMENT — PULMONARY FUNCTION TESTS
PIF_VALUE: 13
PIF_VALUE: 14
PIF_VALUE: 13

## 2024-06-06 ASSESSMENT — PAIN SCALES - GENERAL: PAINLEVEL_OUTOF10: 0

## 2024-06-06 NOTE — PROGRESS NOTES
Order obtained for extubation.  SpO2 of 96 on 30% FiO2.   Patient extubated and placed on 2 liters/min via nasal cannula.   Post extubation SpO2 is 93% with HR  70 bpm and RR 20 breaths/min.    Patient had mild cough that was productive of sputum.  Extubation Well tolerated by patient..       Mona Willis RCP   4:39 PM

## 2024-06-06 NOTE — FLOWSHEET NOTE
1634 pt extubated per order by Mona DAVILA w/ writer at bedside. Placed on 2L nasal cannula and tolerating well.

## 2024-06-06 NOTE — CARE COORDINATION
Case Management Assessment  Initial Evaluation    Date/Time of Evaluation: 6/6/2024 3:17 PM  Assessment Completed by: LUCY ROACH RN    If patient is discharged prior to next notation, then this note serves as note for discharge by case management.    Patient Name: Coni Baker                   YOB: 1950  Diagnosis: Bradycardia [R00.1]  Pericardial effusion [I31.39]  Symptomatic bradycardia [R00.1]                   Date / Time: 6/5/2024  2:55 PM    Patient Admission Status: Inpatient   Readmission Risk (Low < 19, Mod (19-27), High > 27): Readmission Risk Score: 18.4    Current PCP: Earlene Salcedo MD  PCP verified by CM? No    Chart Reviewed: Yes      History Provided by: Child/Family  Patient Orientation: Sedated    Patient Cognition: Other (see comment) (Sedated)    Hospitalization in the last 30 days (Readmission):  Yes    If yes, Readmission Assessment in CM Navigator will be completed.    Advance Directives:      Code Status: Full Code   Patient's Primary Decision Maker is: Legal Next of Kin    Primary Decision Maker: Girma Bateman - Child - [phone]    Secondary Decision Maker: Gi Chau - Child - [phone]    Discharge Planning:    Patient lives with: Family Members Type of Home: House  Primary Care Giver: Self  Patient Support Systems include: Children   Current Financial resources:    Current community resources:    Current services prior to admission: Durable Medical Equipment, Home Care            Current DME:              Type of Home Care services:  PT, OT, Nursing Services    ADLS  Prior functional level: Independent in ADLs/IADLs  Current functional level: Independent in ADLs/IADLs    PT AM-PAC:   /24  OT AM-PAC:   /24    Family can provide assistance at DC: Yes  Would you like Case Management to discuss the discharge plan with any other family members/significant others, and if so, who? Yes (Son and daughter)  Plans to Return to Present Housing: Yes  Other

## 2024-06-06 NOTE — PROGRESS NOTES
Per Dr Walsh restart dopamine at 5mcg/kg/min according to cardiology due to low hr at 49/ 50's and low bp.

## 2024-06-06 NOTE — PROGRESS NOTES
Critical Care -Progress Note    Patient's name:  Coni Baker  Medical Record Number: [id number]  Patient's account/billing number: [id number]  Patient's YOB: 1950  Age: 73 y.o.  Date of Admission: 6/5/2024  2:55 PM  Date of History and Physical Examination: 6/6/2024      Primary Care Physician: Earlene Salcedo MD  Attending Physician: Dr Boone    Code Status: Full Code    Chief complaint:   Chief Complaint   Patient presents with    Bradycardia       Today's Evaluation     Subjective Evaluation:  Alert, oriented, following commands off sedation, during SBT    Objective Evaluation:  Afebrile, hemodynamically stable, heart rate 60s to 70s, off pressors.  Intubated, vent settings 24/390/5/40  BMP unremarkable  proBNP 4.8K  Troponin 29 followed by 22  Glucose well-controlled  CBC unremarkable  Urine output 2.2 L in 24 hours, -1.8 L since admission    Speciality's Recommendations:  Cardiology and EP following, medication dose increased, dopamine dose reduced, BMP every 8 hours to evaluate for non-anion gap metabolic acidosis on lidocaine    Brief plan:  Diuresis, continue antibiotics, consider thoracentesis  Continue lidocaine and follow-up on EP recs    FAST HUGS BID    Feeding: Diet: Diet NPO    Fluids: None    Family:  Updated     Analgesic: acetaminophen    Sedation: versed and propofol    Thrombo-prophylaxis:  LMW heparin     Mobility: difficult to assess     Heads up:  30 Degrees    Ulcer prophylaxis:  [] Protonix [] Pepcid [] Sucralfate  [] Other:    Glycemic control: NA - well controlled    Spontaneous breathing trial:  Daily    Bowel regimen/urine output: UOP 2.3 L in 24 hours, -1.8 L since admission    Indwelling catheter/lines PIV, art line day 1, Nelson day 1    De-escalation ICU for now        HISTORY OF PRESENT ILLNESS:      History was obtained from chart review and the patient.       Coni Baker is a 73 y.o. female with past medical history of COPD, hypertension,

## 2024-06-06 NOTE — PROGRESS NOTES
Arterial line insertion attempted x 2 unsuccessfully. Pressure held until cessation of bleeding. No complications noted at this time. MD notified.

## 2024-06-06 NOTE — CARE COORDINATION
06/06/24 1448   Readmission Assessment   Number of Days since last admission? 8-30 days   Previous Disposition Home with Home Health   Who is being Interviewed Caregiver  (Girma, Son)   What was the patient's/caregiver's perception as to why they think they needed to return back to the hospital? Other (Comment)   Did you visit your Primary Care Physician after you left the hospital, before you returned this time? No   Why weren't you able to visit your PCP? Other (Comment)  (PCP appointment is scheduled for tomorrow.)   Did you see a specialist, such as Cardiac, Pulmonary, Orthopedic Physician, etc. after you left the hospital? No  (Appointment with the specialist is scheduled for the 23rd; however, he does not have the phone number to call notify them she is in the hospital.)   Who advised the patient to return to the hospital? Caregiver   Does the patient report anything that got in the way of taking their medications? No   In our efforts to provide the best possible care to you and others like you, can you think of anything that we could have done to help you after you left the hospital the first time, so that you might not have needed to return so soon? Other (Comment)  (Patients son states she needed a diuretic presecribed. She filled up with fluid and needed to return to the hospital.)

## 2024-06-06 NOTE — ED NOTES
Per BEN Kwan MD, Dopamine is to be titrated down every 5 minutes by 2.5mc/kg until off and to update Dr how HR responds after it is off.

## 2024-06-06 NOTE — CONSULTS
Comprehensive Nutrition Assessment    Type and Reason for Visit:  Initial, Consult    Nutrition Recommendations/Plan:   Recommend Peptide Based High Protein (Vital High Protein) with goal rate of 45 ml/hr (1080 kcals, 95 gm protein/day) for tube feeding while the patient is intubated with no propofol.  To monitor nutrition intake/tolerance, labs, weight, and plan of care.     Malnutrition Assessment:  Malnutrition Status:  Insufficient data (06/06/24 1505)      Nutrition Assessment:    72 yo female admitted for symptomatic bradycardia. PMH includes: COPD, hypertension, hyperlipidemia, CAD s/p PCI, ischemic cardiomyopathy with EF 45 to 50%, mild to moderate MR, severe pulmonary hypertension. At visit, patient intubated, with versed running, no pressors running, no visitors present. Patient is NPO, with OGT in place- received order for tube feed recommendation and management. MST not completed at this time. Documented weight history reviewed- would indicate 4% weight loss over 2 1/2 weeks. Labs include: POC +K 5.4 mmol/L (H). Meds reviewed.    Nutrition Related Findings:    NFPE deferred. +1/non-pitting edema to extremities, patient with emesis last night per chart review/MD note. Wound Type: None       Current Nutrition Intake & Therapies:    Average Meal Intake: NPO  Average Supplements Intake: NPO  Diet NPO    Anthropometric Measures:  Height: 154.9 cm (5' 0.98\")  Ideal Body Weight (IBW): 105 lbs (48 kg)       Current Body Weight: 80.9 kg (178 lb 5.6 oz), 169.9 % IBW. Weight Source: Bed Scale  Current BMI (kg/m2): 33.7        Weight Adjustment For: No Adjustment                 BMI Categories: Obese Class 1 (BMI 30.0-34.9)    Estimated Daily Nutrient Needs:  Energy Requirements Based On: Formula  Weight Used for Energy Requirements: Current  Energy (kcal/day): 890-1135 kcals/day (11-14 kcalkg for vent/ BMI>30)  Weight Used for Protein Requirements: Ideal  Protein (g/day):  gm.day (2-25 g/kg IBW for

## 2024-06-06 NOTE — PROGRESS NOTES
Physical Therapy        Physical Therapy Cancel Note      DATE: 2024    NAME: Coni Baker  MRN: [id number]   : 1950      Patient not seen this date for Physical Therapy due to:    Patient is not appropriate for PT evaluation/treatment at this time d/t pt intubated and sedated.      Electronically signed by Diamante Keith PT on 2024 at 9:47 AM

## 2024-06-06 NOTE — PROGRESS NOTES
06/06/24 0817   Vent Information   Vent Mode (S)  CPAP   Ventilator Settings   FiO2  (S)  40 %   Insp Time (sec) (S)  8 sec   PEEP/CPAP (cmH2O) (S)  5       Pt weaning.

## 2024-06-06 NOTE — PROGRESS NOTES
[facility]  PROGRESS NOTE    Shift date: 06/05/2024  Shift day: Wednesday   Shift # 2    Room # 3024/3024-01   Name: Coni Baker                Jainism:    Place of Muslim:     Referral:  Other     Admit Date & Time: 6/5/2024  2:55 PM    Assessment:  Coni Baker is a 73 y.o. female in the hospital. Family present appeared sad and coping. Other  asked writer to escort family across street. Writer met family in ED 25.      Intervention:  Writer introduced self and title as . Family appeared receptive of  presence and engaged in conversation. Family discussed patients health and stated they will drive across street and will not need assistance. Family asked for bedside prayer for patient.  provided a supportive presence through active listening and words of affirmation.    Outcome:  Family appeared receptive for listening presence and grateful for prayer.    Plan:  Chaplains will remain available to offer spiritual and emotional support as needed.      Electronically signed by Chaplain Irina, on 6/5/2024 at 9:50 PM.  [facility]  [facility]  [phone]

## 2024-06-06 NOTE — PROGRESS NOTES
needed to keep K> 4 and Mg> 2.  Recent 2D echocardiogram reviewed.  Pleural effusion and possible pneumonia management per ICU likely would benefit from right-sided thoracentesis if appropriate  Will continue to follow.    Girma Tomas DO  Internal Medicine PGY-3  Please Perfect Serve with any questions    Attending Cardiologist Addendum: I have reviewed and performed the history, physical, subjective, objective, assessment, and plan with the resident/fellow/NP and agree with the note. I performed the history and physical personally. I have made changes to the note above as needed.    Thank you for allowing me to participate in the care of this patient, please do not hesitate to call if you have any questions.    Jeanine Mei DO, [facility]  Board Certified Cardiologist  Fellow of the American College of Cardiology    Obesity & Weight Loss Medicine   of Medicine [facility]   of Medicine [facility]  [facility]  [phone]

## 2024-06-06 NOTE — CONSULTS
effusion, possible pneumonia.    From rhythm perspective would stop sotalol as may contribute to bradycardia.  We will increase lidocaine to 2 mcg/min for attempted PVC suppression.  Would increase to 3 times daily BMP to evaluate for nonanion gap metabolic acidosis while on lidocaine.    MAP currently stable on very low-dose dopamine.  No urgent indication for pacing at this time.    Would continue to optimize respiratory status including treatment of any pneumonia and consideration of thoracentesis if deemed appropriate    EP to follow.      Management plan was discussed with patient. Thank you for the consultation.    Electronically signed by Juan Pablo Cordero MD on 6/6/2024 at 7:36 AM     CC: Earlene Salcedo MD

## 2024-06-06 NOTE — PROCEDURES
PROCEDURE NOTE - ARTERIAL LINE PLACEMENT    PATIENT NAME: Coni Baker  MEDICAL RECORD NO. [id number]  DATE: 6/6/2024  ATTENDING PHYSICIAN:        PREOPERATIVE DIAGNOSIS:  Need for blood pressure monitoring  POSTOPERATIVE DIAGNOSIS:  Same  PROCEDURE PERFORMED: Left Femoral Arterial Line Insertion  PERFORMING PHYSICIAN:  Chuck Wlash MD  ESTIMATED BLOOD LOSS:  Less than 25 ml  COMPLICATIONS:  None immediately appreciated.     DISCUSSION:  Coni Baker is a 73 y.o. female who requires invasive pressure monitoring. The history and physical examination were reviewed and confirmed.      CONSENT: Unable to be obtained due to the emergent nature of this procedure.     PROCEDURE:  A timeout was initiated by the bedside nurse and was confirmed by those present.  The patient was placed in a supine position. The skin overlying the Left Femoral was prepped with chlorhexadine. Through this region, the introducer needle through catheter was inserted into  until pulsatile bright blood was seen in collection tubing. Guidewire was advanced with no resistance. Catheter was advanced into the artery, wire was pulled with brisk bleeding noted. Pressure monitoring setup was connected to the catheter, it aspirated and flushed easily.       No immediate complication was evident.  All sponge, instrument and needle counts were correct at the completion of the procedure.    Chuck Walsh MD  Internal Medicine Resident, PGY-2  [facility]; [place], [place]  6/6/2024, 1:09 AM

## 2024-06-06 NOTE — PLAN OF CARE
Problem: Safety - Adult  Goal: Free from fall injury  Outcome: Progressing     Problem: Discharge Planning  Goal: Discharge to home or other facility with appropriate resources  Outcome: Progressing  Flowsheets (Taken 6/6/2024 0800)  Discharge to home or other facility with appropriate resources: Identify barriers to discharge with patient and caregiver     Problem: Safety - Medical Restraint  Goal: Remains free of injury from restraints (Restraint for Interference with Medical Device)  Description: INTERVENTIONS:  1. Determine that other, less restrictive measures have been tried or would not be effective before applying the restraint  2. Evaluate the patient's condition at the time of restraint application  3. Inform patient/family regarding the reason for restraint  4. Q2H: Monitor safety, psychosocial status, comfort, nutrition and hydration  Outcome: Progressing  Flowsheets  Taken 6/6/2024 0800 by Thania Avina RN  Remains free of injury from restraints (restraint for interference with medical device): Every 2 hours: Monitor safety, psychosocial status, comfort, nutrition and hydration    Problem: Respiratory - Adult  Goal: Achieves optimal ventilation and oxygenation  6/6/2024 0826 by Thania Avina RN  Outcome: Progressing  Flowsheets (Taken 6/6/2024 0800)  Achieves optimal ventilation and oxygenation: Assess for changes in respiratory status     Problem: Skin/Tissue Integrity  Goal: Absence of new skin breakdown  Description: 1.  Monitor for areas of redness and/or skin breakdown  2.  Assess vascular access sites hourly  3.  Every 4-6 hours minimum:  Change oxygen saturation probe site  4.  Every 4-6 hours:  If on nasal continuous positive airway pressure, respiratory therapy assess nares and determine need for appliance change or resting period.  Outcome: Progressing     Problem: ABCDS Injury Assessment  Goal: Absence of physical injury  Outcome: Progressing     Problem: Pain  Goal: Verbalizes/displays

## 2024-06-06 NOTE — PLAN OF CARE
Problem: Safety - Medical Restraint  Goal: Remains free of injury from restraints (Restraint for Interference with Medical Device)  Description: INTERVENTIONS:  1. Determine that other, less restrictive measures have been tried or would not be effective before applying the restraint  2. Evaluate the patient's condition at the time of restraint application  3. Inform patient/family regarding the reason for restraint  4. Q2H: Monitor safety, psychosocial status, comfort, nutrition and hydration  6/6/2024 1804 by Thania Avina RN  Outcome: Completed  6/6/2024 0826 by Thania Avina RN  Outcome: Progressing  Flowsheets  Taken 6/6/2024 0800 by Thania Avina RN  Remains free of injury from restraints (restraint for interference with medical device): Every 2 hours: Monitor safety, psychosocial status, comfort, nutrition and hydration  Taken 6/6/2024 0600 by Otilio Hameed RN  Remains free of injury from restraints (restraint for interference with medical device): Every 2 hours: Monitor safety, psychosocial status, comfort, nutrition and hydration  Taken 6/6/2024 0400 by Otilio Hameed RN  Remains free of injury from restraints (restraint for interference with medical device): Every 2 hours: Monitor safety, psychosocial status, comfort, nutrition and hydration  Taken 6/6/2024 0200 by Otilio Hameed RN  Remains free of injury from restraints (restraint for interference with medical device): Every 2 hours: Monitor safety, psychosocial status, comfort, nutrition and hydration  Taken 6/6/2024 0000 by Otilio Hameed RN  Remains free of injury from restraints (restraint for interference with medical device): Every 2 hours: Monitor safety, psychosocial status, comfort, nutrition and hydration  Taken 6/5/2024 2200 by Otilio Hameed RN  Remains free of injury from restraints (restraint for interference with medical device): Every 2 hours: Monitor safety, psychosocial status, comfort, nutrition and hydration  Taken 6/5/2024 2136 by Yoseph  TAMI Yañez  Remains free of injury from restraints (restraint for interference with medical device): Every 2 hours: Monitor safety, psychosocial status, comfort, nutrition and hydration     Problem: Respiratory - Adult  Goal: Achieves optimal ventilation and oxygenation  6/6/2024 1804 by Thania Avina RN  Outcome: Completed  6/6/2024 0826 by Thania Avina RN  Outcome: Progressing  Flowsheets (Taken 6/6/2024 0800)  Achieves optimal ventilation and oxygenation: Assess for changes in respiratory status

## 2024-06-06 NOTE — PROGRESS NOTES
[facility]  Occupational Therapy Not Seen Note    DATE: 2024    NAME: Coni Baker  MRN: [id number]   : 1950      Patient not seen this date for Occupational Therapy due to:    Patient is not appropriate for active participation in OT evaluation/treatment at this time d/t intubated and sedated. OT will continue to follow for readiness.    Next Scheduled Treatment:     Electronically signed by MAXIME Avalos on 2024 at 8:40 AM

## 2024-06-07 ENCOUNTER — APPOINTMENT (OUTPATIENT)
Dept: GENERAL RADIOLOGY | Age: 74
DRG: 308 | End: 2024-06-07
Payer: MEDICARE

## 2024-06-07 LAB
ANION GAP SERPL CALCULATED.3IONS-SCNC: 8 MMOL/L (ref 9–16)
BASOPHILS # BLD: 0.04 K/UL (ref 0–0.2)
BASOPHILS NFR BLD: 1 % (ref 0–2)
BUN SERPL-MCNC: 15 MG/DL (ref 8–23)
BUN SERPL-MCNC: 15 MG/DL (ref 8–23)
BUN SERPL-MCNC: 17 MG/DL (ref 8–23)
CALCIUM SERPL-MCNC: 7.8 MG/DL (ref 8.6–10.4)
CALCIUM SERPL-MCNC: 7.9 MG/DL (ref 8.6–10.4)
CALCIUM SERPL-MCNC: 7.9 MG/DL (ref 8.6–10.4)
CHLORIDE SERPL-SCNC: 105 MMOL/L (ref 98–107)
CHLORIDE SERPL-SCNC: 106 MMOL/L (ref 98–107)
CHLORIDE SERPL-SCNC: 107 MMOL/L (ref 98–107)
CO2 SERPL-SCNC: 27 MMOL/L (ref 20–31)
CO2 SERPL-SCNC: 28 MMOL/L (ref 20–31)
CO2 SERPL-SCNC: 29 MMOL/L (ref 20–31)
CREAT SERPL-MCNC: 0.8 MG/DL (ref 0.5–0.9)
CREAT SERPL-MCNC: 0.8 MG/DL (ref 0.5–0.9)
CREAT SERPL-MCNC: 0.9 MG/DL (ref 0.5–0.9)
EKG ATRIAL RATE: 56 BPM
EKG ATRIAL RATE: 76 BPM
EKG P AXIS: 13 DEGREES
EKG P AXIS: 76 DEGREES
EKG P-R INTERVAL: 160 MS
EKG P-R INTERVAL: 180 MS
EKG Q-T INTERVAL: 412 MS
EKG Q-T INTERVAL: 434 MS
EKG QRS DURATION: 72 MS
EKG QRS DURATION: 76 MS
EKG QTC CALCULATION (BAZETT): 418 MS
EKG QTC CALCULATION (BAZETT): 463 MS
EKG R AXIS: -2 DEGREES
EKG R AXIS: 35 DEGREES
EKG T AXIS: 70 DEGREES
EKG T AXIS: 82 DEGREES
EKG VENTRICULAR RATE: 56 BPM
EKG VENTRICULAR RATE: 76 BPM
EOSINOPHIL # BLD: 0.05 K/UL (ref 0–0.44)
EOSINOPHILS RELATIVE PERCENT: 1 % (ref 1–4)
ERYTHROCYTE [DISTWIDTH] IN BLOOD BY AUTOMATED COUNT: 16.1 % (ref 11.8–14.4)
GFR, ESTIMATED: 67 ML/MIN/1.73M2
GFR, ESTIMATED: 75 ML/MIN/1.73M2
GFR, ESTIMATED: 80 ML/MIN/1.73M2
GLUCOSE SERPL-MCNC: 88 MG/DL (ref 74–99)
GLUCOSE SERPL-MCNC: 96 MG/DL (ref 74–99)
GLUCOSE SERPL-MCNC: 97 MG/DL (ref 74–99)
HCT VFR BLD AUTO: 36 % (ref 36.3–47.1)
HGB BLD-MCNC: 10.9 G/DL (ref 11.9–15.1)
IMM GRANULOCYTES # BLD AUTO: 0.03 K/UL (ref 0–0.3)
IMM GRANULOCYTES NFR BLD: 1 %
LYMPHOCYTES NFR BLD: 1.06 K/UL (ref 1.1–3.7)
LYMPHOCYTES RELATIVE PERCENT: 17 % (ref 24–43)
MCH RBC QN AUTO: 30.4 PG (ref 25.2–33.5)
MCHC RBC AUTO-ENTMCNC: 30.3 G/DL (ref 28.4–34.8)
MCV RBC AUTO: 100.6 FL (ref 82.6–102.9)
MONOCYTES NFR BLD: 0.87 K/UL (ref 0.1–1.2)
MONOCYTES NFR BLD: 14 % (ref 3–12)
NEUTROPHILS NFR BLD: 66 % (ref 36–65)
NEUTS SEG NFR BLD: 4.2 K/UL (ref 1.5–8.1)
NRBC BLD-RTO: 0 PER 100 WBC
PLATELET # BLD AUTO: 134 K/UL (ref 138–453)
PMV BLD AUTO: 9.4 FL (ref 8.1–13.5)
POTASSIUM SERPL-SCNC: 3.7 MMOL/L (ref 3.7–5.3)
POTASSIUM SERPL-SCNC: 4.1 MMOL/L (ref 3.7–5.3)
POTASSIUM SERPL-SCNC: 4.2 MMOL/L (ref 3.7–5.3)
RBC # BLD AUTO: 3.58 M/UL (ref 3.95–5.11)
RBC # BLD: ABNORMAL 10*6/UL
SODIUM SERPL-SCNC: 141 MMOL/L (ref 136–145)
SODIUM SERPL-SCNC: 142 MMOL/L (ref 136–145)
SODIUM SERPL-SCNC: 143 MMOL/L (ref 136–145)
WBC OTHER # BLD: 6.3 K/UL (ref 3.5–11.3)

## 2024-06-07 PROCEDURE — 94640 AIRWAY INHALATION TREATMENT: CPT

## 2024-06-07 PROCEDURE — 6370000000 HC RX 637 (ALT 250 FOR IP)

## 2024-06-07 PROCEDURE — 36415 COLL VENOUS BLD VENIPUNCTURE: CPT

## 2024-06-07 PROCEDURE — 71045 X-RAY EXAM CHEST 1 VIEW: CPT

## 2024-06-07 PROCEDURE — 99291 CRITICAL CARE FIRST HOUR: CPT | Performed by: INTERNAL MEDICINE

## 2024-06-07 PROCEDURE — 6360000002 HC RX W HCPCS

## 2024-06-07 PROCEDURE — 99233 SBSQ HOSP IP/OBS HIGH 50: CPT | Performed by: INTERNAL MEDICINE

## 2024-06-07 PROCEDURE — 6370000000 HC RX 637 (ALT 250 FOR IP): Performed by: INTERNAL MEDICINE

## 2024-06-07 PROCEDURE — 97535 SELF CARE MNGMENT TRAINING: CPT

## 2024-06-07 PROCEDURE — 2700000000 HC OXYGEN THERAPY PER DAY

## 2024-06-07 PROCEDURE — 80048 BASIC METABOLIC PNL TOTAL CA: CPT

## 2024-06-07 PROCEDURE — 2060000000 HC ICU INTERMEDIATE R&B

## 2024-06-07 PROCEDURE — 6370000000 HC RX 637 (ALT 250 FOR IP): Performed by: STUDENT IN AN ORGANIZED HEALTH CARE EDUCATION/TRAINING PROGRAM

## 2024-06-07 PROCEDURE — 85025 COMPLETE CBC W/AUTO DIFF WBC: CPT

## 2024-06-07 PROCEDURE — 2580000003 HC RX 258

## 2024-06-07 PROCEDURE — 97166 OT EVAL MOD COMPLEX 45 MIN: CPT

## 2024-06-07 PROCEDURE — 1200000000 HC SEMI PRIVATE

## 2024-06-07 RX ORDER — MEXILETINE HYDROCHLORIDE 250 MG/1
250 CAPSULE ORAL
Status: DISCONTINUED | OUTPATIENT
Start: 2024-06-07 | End: 2024-06-07

## 2024-06-07 RX ORDER — MEXILETINE HYDROCHLORIDE 200 MG/1
200 CAPSULE ORAL
Status: DISCONTINUED | OUTPATIENT
Start: 2024-06-07 | End: 2024-06-08

## 2024-06-07 RX ORDER — AMOXICILLIN AND CLAVULANATE POTASSIUM 875; 125 MG/1; MG/1
1 TABLET, FILM COATED ORAL EVERY 12 HOURS SCHEDULED
Status: DISCONTINUED | OUTPATIENT
Start: 2024-06-07 | End: 2024-06-09 | Stop reason: HOSPADM

## 2024-06-07 RX ADMIN — AMOXICILLIN AND CLAVULANATE POTASSIUM 1 TABLET: 875; 125 TABLET, FILM COATED ORAL at 12:55

## 2024-06-07 RX ADMIN — SODIUM CHLORIDE, PRESERVATIVE FREE 10 ML: 5 INJECTION INTRAVENOUS at 09:00

## 2024-06-07 RX ADMIN — MEXILETINE HYDROCHLORIDE 200 MG: 200 CAPSULE ORAL at 19:49

## 2024-06-07 RX ADMIN — AMOXICILLIN AND CLAVULANATE POTASSIUM 1 TABLET: 875; 125 TABLET, FILM COATED ORAL at 22:56

## 2024-06-07 RX ADMIN — BUDESONIDE AND FORMOTEROL FUMARATE DIHYDRATE 2 PUFF: 160; 4.5 AEROSOL RESPIRATORY (INHALATION) at 09:54

## 2024-06-07 RX ADMIN — SODIUM CHLORIDE, PRESERVATIVE FREE 10 ML: 5 INJECTION INTRAVENOUS at 20:57

## 2024-06-07 RX ADMIN — MEXILETINE HYDROCHLORIDE 200 MG: 200 CAPSULE ORAL at 12:58

## 2024-06-07 RX ADMIN — ENOXAPARIN SODIUM 40 MG: 100 INJECTION SUBCUTANEOUS at 10:42

## 2024-06-07 RX ADMIN — BUDESONIDE AND FORMOTEROL FUMARATE DIHYDRATE 2 PUFF: 160; 4.5 AEROSOL RESPIRATORY (INHALATION) at 20:38

## 2024-06-07 RX ADMIN — MEXILETINE HYDROCHLORIDE 200 MG: 200 CAPSULE ORAL at 10:45

## 2024-06-07 NOTE — CARE COORDINATION
Transitional Planning. Plan is home w/ family support and [facility] (current/verified) family will transport, has established PCP. PT/OT ordered.     Spoke to pt's son. He states pt's granddaughter stays and that he and his sister (pt's daughter) are over there often. Pt is current w/ [facility] and plans are to F/U w/ CC for ablation.     Spoke to Sanjana w/ [facility], she confirmed pt is current w/ their services and they can resume care when pt is dc'd. call [phone] when pt is dc'd.

## 2024-06-07 NOTE — DISCHARGE INSTR - COC
Continuity of Care Form    Patient Name: Coni Baker   :  1950  MRN:  [id number]    Admit date:  2024  Discharge date:  ***    Code Status Order: Full Code   Advance Directives:     Admitting Physician:  Clarence Martin MD  PCP: Earlene Salcedo MD    Discharging Nurse: ***  Discharging Hospital Unit/Room#: 3024/3024-01  Discharging Unit Phone Number: ***    Emergency Contact:   Extended Emergency Contact Information  Primary Emergency Contact: Girma Bateman  Home Phone: [phone]  Relation: Child  Secondary Emergency Contact: Gi Chau  Home Phone: [phone]  Relation: Child    Past Surgical History:  Past Surgical History:   Procedure Laterality Date    CARDIAC PROCEDURE N/A 2024    diane / Left heart cath / coronary angiography / op [facility] performed by Moises Dunbar MD at [facility] CARDIAC CATH LAB    COLONOSCOPY      WITH POLYPECTOMY     COLONOSCOPY N/A 3/24/2022    COLONOSCOPY WITH BIOPSY performed by Naldo Milton MD at [facility] ENDO    CORONARY ANGIOPLASTY WITH STENT PLACEMENT      STENTS X4    EP DEVICE PROCEDURE N/A 2024    elaine / Ablation V-tach / rm  performed by Juan Pablo Cordero MD at [facility] CARDIAC CATH LAB    SHOULDER SURGERY Right 16    arthroscopy& mini open rotator cuff repair    TUBAL LIGATION         Immunization History:   Immunization History   Administered Date(s) Administered    COVID-19, MODERNA BLUE border, Primary or Immunocompromised, (age 12y+), IM, 100 mcg/0.5mL 2021, 2021    COVID-19, US Vaccine, Vaccine Unspecified 2021, 2021    Pneumococcal, PCV20, PREVNAR 20, (age 6w+), IM, 0.5mL 2024    Pneumococcal, PPSV23, PNEUMOVAX 23, (age 2y+), SC/IM, 0.5mL 2021       Active Problems:  Patient Active Problem List   Diagnosis Code    Polyp of sigmoid colon K63.5    Mixed hyperlipidemia E78.2    Primary hypertension I10    Ventricular tachycardia (HCC) I47.20    Shortness of breath R06.02    S/P cardiac cath Z98.890     liquid thickness:53793}  Daily Fluid Restriction: {CHP DME Yes amt example:485444362}  Last Modified Barium Swallow with Video (Video Swallowing Test): {Done Not Done Date:}    Treatments at the Time of Hospital Discharge:   Respiratory Treatments: ***  Oxygen Therapy:  {Therapy; copd oxygen:13523}  Ventilator:    {[facility] Vent List:362445902}    Rehab Therapies: {THERAPEUTIC INTERVENTION:5337848316}  Weight Bearing Status/Restrictions: {[facility] Weight Bearin}  Other Medical Equipment (for information only, NOT a DME order):  {EQUIPMENT:678489762}  Other Treatments: ***    Patient's personal belongings (please select all that are sent with patient):  {CHP DME Belongings:330078786}    RN SIGNATURE:  {Esignature:346183150}    CASE MANAGEMENT/SOCIAL WORK SECTION    Inpatient Status Date: ***    Readmission Risk Assessment Score:  Readmission Risk              Risk of Unplanned Readmission:  17           Discharging to Facility/ Agency   Name: [address]  Address:  Phone: [phone]  Fax:    Dialysis Facility (if applicable)   Name:  Address:  Dialysis Schedule:  Phone:  Fax:    / signature: Electronically signed by BOLIVAR TRINIDAD RN on 24 at 1:19 PM EDT    PHYSICIAN SECTION    Prognosis: {Prognosis:3049993707}    Condition at Discharge: { Patient Condition:548897865}    Rehab Potential (if transferring to Rehab): {Prognosis:4366451469}    Recommended Labs or Other Treatments After Discharge: ***    Physician Certification: I certify the above information and transfer of Coni Baker  is necessary for the continuing treatment of the diagnosis listed and that she requires {Admit to Appropriate Level of Care:85670} for {GREATER/LESS:572358545} 30 days.     Update Admission H&P: {CHP DME Changes in HandP:337571604}    PHYSICIAN SIGNATURE:  {Esignature:443841835}

## 2024-06-07 NOTE — PLAN OF CARE
Problem: Safety - Adult  Goal: Free from fall injury  6/7/2024 0926 by Rigo Shafer RN  Outcome: Progressing  6/7/2024 0048 by Otilio Hameed RN  Outcome: Progressing     Problem: Discharge Planning  Goal: Discharge to home or other facility with appropriate resources  6/7/2024 0926 by Rigo Shafer RN  Outcome: Progressing  6/7/2024 0048 by Otilio Hameed RN  Outcome: Progressing     Problem: Skin/Tissue Integrity  Goal: Absence of new skin breakdown  Description: 1.  Monitor for areas of redness and/or skin breakdown  2.  Assess vascular access sites hourly  3.  Every 4-6 hours minimum:  Change oxygen saturation probe site  4.  Every 4-6 hours:  If on nasal continuous positive airway pressure, respiratory therapy assess nares and determine need for appliance change or resting period.  6/7/2024 0048 by Otilio Hameed RN  Outcome: Progressing     Problem: ABCDS Injury Assessment  Goal: Absence of physical injury  6/7/2024 0048 by Otilio Hameed RN  Outcome: Progressing     Problem: Pain  Goal: Verbalizes/displays adequate comfort level or baseline comfort level  6/7/2024 0048 by Otilio Hameed RN  Outcome: Progressing

## 2024-06-07 NOTE — PLAN OF CARE
Problem: Safety - Adult  Goal: Free from fall injury  Outcome: Progressing     Problem: Discharge Planning  Goal: Discharge to home or other facility with appropriate resources  Outcome: Progressing     Problem: ABCDS Injury Assessment  Goal: Absence of physical injury  Outcome: Progressing     Problem: Skin/Tissue Integrity  Goal: Absence of new skin breakdown  Description: 1.  Monitor for areas of redness and/or skin breakdown  2.  Assess vascular access sites hourly  3.  Every 4-6 hours minimum:  Change oxygen saturation probe site  4.  Every 4-6 hours:  If on nasal continuous positive airway pressure, respiratory therapy assess nares and determine need for appliance change or resting period.  Outcome: Progressing     Problem: Pain  Goal: Verbalizes/displays adequate comfort level or baseline comfort level  Outcome: Progressing     Problem: Chronic Conditions and Co-morbidities  Goal: Patient's chronic conditions and co-morbidity symptoms are monitored and maintained or improved  Outcome: Progressing     Problem: Nutrition Deficit:  Goal: Optimize nutritional status  6/7/2024 0048 by Otilio Hameed RN  Outcome: Progressing

## 2024-06-07 NOTE — PROGRESS NOTES
Occupational Therapy  Facility/Department: [facility]  Occupational Therapy Initial Assessment    Name: Coni Baker  : 1950  MRN: [id number]  Date of Service: 2024    Chief Complaint   Patient presents with    Bradycardia       Discharge Recommendations:  Patient would benefit from continued therapy after discharge  OT Equipment Recommendations  Equipment Needed: No       Patient Diagnosis(es): The primary encounter diagnosis was Bradycardia. A diagnosis of Pericardial effusion was also pertinent to this visit.  Past Medical History:  has a past medical history of Atrial fibrillation (HCC), CAD (coronary artery disease), CHF (congestive heart failure) (HCC), Hx of myocardial infarction, Hyperlipidemia, Hypertension, and SOBOE (shortness of breath on exertion).  Past Surgical History:  has a past surgical history that includes Tubal ligation; Coronary angioplasty with stent; Colonoscopy; shoulder surgery (Right, 15); Colonoscopy (N/A, 3/24/2022); Cardiac procedure (N/A, 2024); and ep device procedure (N/A, 2024).           Assessment   Performance deficits / Impairments: Decreased functional mobility ;Decreased ADL status;Decreased high-level IADLs;Decreased balance;Decreased endurance  Assessment: Pt limited by above deficits impacting functional performance. Pt would benefit from continued therapy to increase IND and safety in ADL tasks prior to return home from acute setting  Prognosis: Good  Decision Making: Medium Complexity  REQUIRES OT FOLLOW-UP: Yes  Activity Tolerance  Activity Tolerance: Patient Tolerated treatment well        Plan   Occupational Therapy Plan  Times Per Week: 3-5x/week  Current Treatment Recommendations: Balance training, Functional mobility training, Endurance training, Safety education & training, Patient/Caregiver education & training, Self-Care / ADL, Home management training, Equipment evaluation, education, & procurement    Timed Code Treatment Minutes: 38 Minutes    Session performed in split session d/t RN removal of femoral art line prior to mobility.    Martha Ng, OTR/L

## 2024-06-07 NOTE — PROGRESS NOTES
thyroid not enlarged  Lungs: clear to auscultation bilaterally  Heart: regular rate and rhythm, S1, S2 normal, no murmur  Abdomen: soft, bowel sounds normal  Extremities: no edema or swelling     EKG:  Sinus bradycardia with frequent PVCs    ECHO: 06/06/2024    Left Ventricle: Moderately reduced left ventricular systolic function with a visually estimated EF of 35 - 40%. See diagram for wall motion findings.    Right Ventricle: Reduced systolic function.    Aortic Valve: Valve structure is normal.    Mitral Valve: Mild regurgitation.    Pericardium: No pericardial effusion.    Image quality is adequate. Contrast used: Definity    CATH: 05/16/2024  Left main: Normal with 0% stenosis.     LAD: Patent proximal and mid stents with luminal irregularities of 20 to 30%     LCX: Luminal irregularities of 20 to 30%     RCA: Normal with 0% stenosis.     The LV gram was performed in the GUNTER 30 position.   LVEF: 35%. LV Wall Motion: Anteroapical akinesis     Conclusions:  Patent prior LAD stents.  Minimal LCx and RCA disease  Moderately reduced LV systolic function      Assessment:   Symptomatic severe bradycardia  Frequent PVCs s/p unsuccessful ablation 05/24, was referred to [facility]  History of CAD s/p PCI to LAD with patent stents as per last [facility] 05/24  Mild cardiomyopathy with EF 45 to 50%, ischemic versus previous induced  Hypertension  Hyperlipidemia  Bilateral pleural effusion possible right-sided pneumonia      Plan:   Currently off of dopamine.  Lidocaine per EP and monitor BMP 3 times daily  Continue home dose of mexiletine 200 mg 3 times daily.  Will hold off on TPM placement for now, monitor very closely and call cardiology with any hemodynamic changes. Admit to ICU.   Replace electrolytes as needed to keep K> 4 and Mg> 2.  Recent 2D echocardiogram reviewed, EF 35-40%, recent cardiac cath on 05/16/2024 showed patent prior LAD stents and minimal LCx and RCA disease.  Pleural effusion and possible pneumonia

## 2024-06-07 NOTE — PROGRESS NOTES
Section of Cardiology  Electrophysiology Progress Note      Date:  6/7/2024  Patient: Coni Baker  Admission:  6/5/2024  2:55 PM  Admit DX: Bradycardia [R00.1]  Pericardial effusion [I31.39]  Symptomatic bradycardia [R00.1]  Age:  73 y.o., 1950     LOS: 2 days     Reason for evaluation:   Premature ventricular contraction      SUBJECTIVE:     Over past 24 hours patient extubated.  Off all pressors.  Lidocaine discontinued earlier today    Review of telemetry shows no PVCs since 7:30 AM yesterday when lidocaine was increased from 0.5 mg/min up to 2 mg/min    Patient says she is feeling better.  Says that she was sent to the hospital because home nursing could not get a pulse.  Was not having shortness of breath, cough, fevers    OBJECTIVE:        Current Inpatient Medications:   amoxicillin-clavulanate  1 tablet Oral 2 times per day    mexiletine  250 mg Oral TID WC    budesonide-formoterol  2 puff Inhalation BID RT    sodium chloride flush  5-40 mL IntraVENous 2 times per day    enoxaparin  40 mg SubCUTAneous Daily       IV Infusions (if any):   [Held by provider] lidocaine Stopped (06/07/24 1106)    sodium chloride             EXAM:   Vitals:    VITALS:  /64   Pulse 74   Temp 97.9 °F (36.6 °C) (Axillary)   Resp 22   Ht 1.549 m (5' 0.98\")   Wt 79.3 kg (174 lb 13.2 oz)   SpO2 91%   BMI 33.05 kg/m²   24HR INTAKE/OUTPUT:    Intake/Output Summary (Last 24 hours) at 6/7/2024 1310  Last data filed at 6/7/2024 1108  Gross per 24 hour   Intake 1044.41 ml   Output 1310 ml   Net -265.59 ml       General: Sitting up in chair in no acute distress, alert oriented x3  HEENT:  Anicteric, Fair dentition  Neck:  No JVD  Chest: Breath sounds diminished at right base  CV:  S2 normal, regular rate, no murmurs  Abdomen:  Soft, nontender, nondistended  Extremities:  2+ pulses, no edema  Neuro:  Grossly intact        Labs:   CBC:  Recent Labs     06/06/24  0454 06/07/24  0610   WBC 7.4 6.3   HGB 12.9 10.9*   HCT

## 2024-06-07 NOTE — PROGRESS NOTES
Critical Care -Progress Note    Patient's name:  Coni Baker  Medical Record Number: [id number]  Patient's account/billing number: [id number]  Patient's YOB: 1950  Age: 73 y.o.  Date of Admission: 6/5/2024  2:55 PM  Date of History and Physical Examination: 6/7/2024      Primary Care Physician: Earlene Salcedo MD  Attending Physician: Dr Boone    Code Status: Full Code    Chief complaint:   Chief Complaint   Patient presents with    Bradycardia       Today's Evaluation     Subjective Evaluation:  Alert, oriented, following commands, extubated, no active chest pain, palpitations, breathing improved, throat sore    Objective Evaluation:  Afebrile, hemodynamically stable, heart rate 60s to 70s, off pressors.  Labs unremarkable  Glucose well-controlled  Urine output 2.3 L in 24 hours, -3.4 L since admission    Speciality's Recommendations:  Cardiology and EP following, lidocaine IV, BMP every 8 hours to evaluate for non-anion gap metabolic acidosis on lidocaine    Brief plan:  Diuresis, continue antibiotics  Not enough fluid on ultrasound for thoracentesis  Continue lidocaine and follow-up on EP and cardio recs  Remove friedman and art line    FAST HUGS BID    Feeding: Diet: ADULT DIET; Regular    Fluids: None    Family:  Updated     Analgesic: acetaminophen    Sedation: NA    Thrombo-prophylaxis:  LMW heparin     Mobility: fair    Heads up:  45 Degrees    Ulcer prophylaxis:  [] Protonix [] Pepcid [] Sucralfate  [] Other: NA    Glycemic control: NA - well controlled    Spontaneous breathing trial:  NA    Bowel regimen/urine output: Urine output 2.3 L in 24 hours, -3.4 L since admission    Indwelling catheter/lines PIV    De-escalation Step down pending cardiology and EP recommendations      HISTORY OF PRESENT ILLNESS:      History was obtained from chart review and the patient.       Coni Baker is a 73 y.o. female with past medical history of COPD, hypertension, hyperlipidemia, CAD s/p PCI,  pleural effusion with consolidation VS atelectatic lung  # COPD  # Pulmonary hypertension  -Extubated 6/6  -Diuresis as blood pressure tolerates  -Not enough fluid for thoracentesis  -Continue inhalers and RT protocol    RENAL/FLUID/ELECTROLYTE:  -Renal function and electrolytes stable  -Continue to monitor    GI/NUTRITION:  ADULT DIET; Regular    ID:  # Imaging evidence of right-sided lower lobe pneumonia  -Continued on Zosyn  -White count normal, afebrile    HEME:    stable, no evidence of bleeding    ENDOCRINE:  - Continue to monitor blood glucose, goal <180  -Has not needed insulin    OTHER:  - PT/OT/ST consult  - Code Status: Full Code    PROPHYLAXIS:  - Stress ulcer: PPI  - DVT: SCDs, Lovenox     Chirag Granger MD  Department of Internal Medicine/ Critical care  [place], [place]  6/7/2024 8:52 AM    The critical care team assigned to the patient will be following up the patient in the intensive care unit. I have discussed the current plan with the critical care attending.The above mentioned assessment and plan will be reviewed again in detail by the critical care attending at bedside, and can be further changed or modified accordingly by the attending physician.

## 2024-06-08 PROBLEM — I31.39 PERICARDIAL EFFUSION: Status: ACTIVE | Noted: 2024-06-08

## 2024-06-08 LAB
ANION GAP SERPL CALCULATED.3IONS-SCNC: 8 MMOL/L (ref 9–16)
BASOPHILS # BLD: <0.03 K/UL (ref 0–0.2)
BASOPHILS NFR BLD: 0 % (ref 0–2)
BUN SERPL-MCNC: 13 MG/DL (ref 8–23)
CALCIUM SERPL-MCNC: 8.4 MG/DL (ref 8.6–10.4)
CHLORIDE SERPL-SCNC: 107 MMOL/L (ref 98–107)
CO2 SERPL-SCNC: 26 MMOL/L (ref 20–31)
CREAT SERPL-MCNC: 0.7 MG/DL (ref 0.5–0.9)
EOSINOPHIL # BLD: 0.08 K/UL (ref 0–0.44)
EOSINOPHILS RELATIVE PERCENT: 2 % (ref 1–4)
ERYTHROCYTE [DISTWIDTH] IN BLOOD BY AUTOMATED COUNT: 16.2 % (ref 11.8–14.4)
GFR, ESTIMATED: >90 ML/MIN/1.73M2
GLUCOSE SERPL-MCNC: 78 MG/DL (ref 74–99)
HCT VFR BLD AUTO: 36.6 % (ref 36.3–47.1)
HGB BLD-MCNC: 11.1 G/DL (ref 11.9–15.1)
IMM GRANULOCYTES # BLD AUTO: 0.06 K/UL (ref 0–0.3)
IMM GRANULOCYTES NFR BLD: 1 %
LYMPHOCYTES NFR BLD: 0.87 K/UL (ref 1.1–3.7)
LYMPHOCYTES RELATIVE PERCENT: 18 % (ref 24–43)
MCH RBC QN AUTO: 30.6 PG (ref 25.2–33.5)
MCHC RBC AUTO-ENTMCNC: 30.3 G/DL (ref 28.4–34.8)
MCV RBC AUTO: 100.8 FL (ref 82.6–102.9)
MONOCYTES NFR BLD: 0.58 K/UL (ref 0.1–1.2)
MONOCYTES NFR BLD: 12 % (ref 3–12)
NEUTROPHILS NFR BLD: 67 % (ref 36–65)
NEUTS SEG NFR BLD: 3.17 K/UL (ref 1.5–8.1)
NRBC BLD-RTO: 0 PER 100 WBC
PLATELET # BLD AUTO: 152 K/UL (ref 138–453)
PMV BLD AUTO: 10.1 FL (ref 8.1–13.5)
POTASSIUM SERPL-SCNC: 4.1 MMOL/L (ref 3.7–5.3)
RBC # BLD AUTO: 3.63 M/UL (ref 3.95–5.11)
RBC # BLD: ABNORMAL 10*6/UL
SODIUM SERPL-SCNC: 141 MMOL/L (ref 136–145)
WBC OTHER # BLD: 4.8 K/UL (ref 3.5–11.3)

## 2024-06-08 PROCEDURE — 1200000000 HC SEMI PRIVATE

## 2024-06-08 PROCEDURE — 6370000000 HC RX 637 (ALT 250 FOR IP)

## 2024-06-08 PROCEDURE — 2580000003 HC RX 258

## 2024-06-08 PROCEDURE — 2700000000 HC OXYGEN THERAPY PER DAY

## 2024-06-08 PROCEDURE — 85025 COMPLETE CBC W/AUTO DIFF WBC: CPT

## 2024-06-08 PROCEDURE — 6360000002 HC RX W HCPCS

## 2024-06-08 PROCEDURE — 99233 SBSQ HOSP IP/OBS HIGH 50: CPT | Performed by: INTERNAL MEDICINE

## 2024-06-08 PROCEDURE — 80048 BASIC METABOLIC PNL TOTAL CA: CPT

## 2024-06-08 PROCEDURE — 6370000000 HC RX 637 (ALT 250 FOR IP): Performed by: STUDENT IN AN ORGANIZED HEALTH CARE EDUCATION/TRAINING PROGRAM

## 2024-06-08 PROCEDURE — 99291 CRITICAL CARE FIRST HOUR: CPT | Performed by: INTERNAL MEDICINE

## 2024-06-08 PROCEDURE — 36415 COLL VENOUS BLD VENIPUNCTURE: CPT

## 2024-06-08 PROCEDURE — 94761 N-INVAS EAR/PLS OXIMETRY MLT: CPT

## 2024-06-08 PROCEDURE — 94640 AIRWAY INHALATION TREATMENT: CPT

## 2024-06-08 RX ORDER — MEXILETINE HYDROCHLORIDE 250 MG/1
250 CAPSULE ORAL
Status: DISCONTINUED | OUTPATIENT
Start: 2024-06-10 | End: 2024-06-09 | Stop reason: HOSPADM

## 2024-06-08 RX ORDER — MEXILETINE HYDROCHLORIDE 200 MG/1
200 CAPSULE ORAL
Status: DISCONTINUED | OUTPATIENT
Start: 2024-06-08 | End: 2024-06-09 | Stop reason: HOSPADM

## 2024-06-08 RX ORDER — METOPROLOL SUCCINATE 25 MG/1
12.5 TABLET, EXTENDED RELEASE ORAL 2 TIMES DAILY
Status: DISCONTINUED | OUTPATIENT
Start: 2024-06-08 | End: 2024-06-09 | Stop reason: HOSPADM

## 2024-06-08 RX ADMIN — MEXILETINE HYDROCHLORIDE 200 MG: 200 CAPSULE ORAL at 08:02

## 2024-06-08 RX ADMIN — MEXILETINE HYDROCHLORIDE 200 MG: 200 CAPSULE ORAL at 12:02

## 2024-06-08 RX ADMIN — BUDESONIDE AND FORMOTEROL FUMARATE DIHYDRATE 2 PUFF: 160; 4.5 AEROSOL RESPIRATORY (INHALATION) at 20:40

## 2024-06-08 RX ADMIN — SODIUM CHLORIDE, PRESERVATIVE FREE 10 ML: 5 INJECTION INTRAVENOUS at 09:04

## 2024-06-08 RX ADMIN — AMOXICILLIN AND CLAVULANATE POTASSIUM 1 TABLET: 875; 125 TABLET, FILM COATED ORAL at 08:03

## 2024-06-08 RX ADMIN — SODIUM CHLORIDE, PRESERVATIVE FREE 10 ML: 5 INJECTION INTRAVENOUS at 20:52

## 2024-06-08 RX ADMIN — BUDESONIDE AND FORMOTEROL FUMARATE DIHYDRATE 2 PUFF: 160; 4.5 AEROSOL RESPIRATORY (INHALATION) at 08:32

## 2024-06-08 RX ADMIN — METOPROLOL SUCCINATE 12.5 MG: 25 TABLET, EXTENDED RELEASE ORAL at 20:52

## 2024-06-08 RX ADMIN — AMOXICILLIN AND CLAVULANATE POTASSIUM 1 TABLET: 875; 125 TABLET, FILM COATED ORAL at 20:52

## 2024-06-08 RX ADMIN — METOPROLOL SUCCINATE 12.5 MG: 25 TABLET, EXTENDED RELEASE ORAL at 13:03

## 2024-06-08 RX ADMIN — MEXILETINE HYDROCHLORIDE 200 MG: 200 CAPSULE ORAL at 16:49

## 2024-06-08 NOTE — PROGRESS NOTES
[facility]   Progress Note                   Date:   6/8/2024  Patient name: Coni Baker  Date of admission:  6/5/2024  2:55 PM  MRN:   [id number]  YOB: 1950  PCP: Earlene Salcedo MD    Reason for consultation: Symptomatic sinus bradycardia with PVCs    Subjective:     Patient seen and examined at bedside: No acute issues overnight. In NSR. No PVCs on telemetry.     Medications:   Scheduled Meds:   [START ON 6/10/2024] mexiletine  250 mg Oral TID WC    mexiletine  200 mg Oral TID WC    amoxicillin-clavulanate  1 tablet Oral 2 times per day    budesonide-formoterol  2 puff Inhalation BID RT    sodium chloride flush  5-40 mL IntraVENous 2 times per day    enoxaparin  40 mg SubCUTAneous Daily     Continuous Infusions:   sodium chloride       CBC:   Recent Labs     06/06/24  0454 06/07/24  0610 06/08/24  0528   WBC 7.4 6.3 4.8   HGB 12.9 10.9* 11.1*    134* 152       BMP:    Recent Labs     06/07/24  1605 06/07/24  2206 06/08/24  0528    141 141   K 4.2 4.1 4.1    106 107   CO2 28 27 26   BUN 15 17 13   CREATININE 0.8 0.8 0.7   GLUCOSE 97 96 78       Hepatic:   Recent Labs     06/05/24  1518   AST 46*   ALT 75*   BILITOT 1.0   ALKPHOS 106*       Troponin: No results for input(s): \"TROPONINI\" in the last 72 hours.  BNP: No results for input(s): \"BNP\" in the last 72 hours.  Lipids: No results for input(s): \"CHOL\", \"HDL\" in the last 72 hours.    Invalid input(s): \"LDLCALCU\"  INR: No results for input(s): \"INR\" in the last 72 hours.    Objective:   Vitals: /61   Pulse 72   Temp 97.5 °F (36.4 °C) (Axillary)   Resp 19   Ht 1.549 m (5' 0.98\")   Wt 79.3 kg (174 lb 13.2 oz)   SpO2 93%   BMI 33.05 kg/m²   General appearance: alert and cooperative with exam  HEENT: Normocephalic, atraumatic   Neck: no carotid bruit, no JVD, trachea midline and thyroid not enlarged  Lungs: clear to auscultation bilaterally  Heart: regular rate and rhythm, S1, S2 normal, no

## 2024-06-08 NOTE — PLAN OF CARE
Problem: Respiratory - Adult  Goal: Achieves optimal ventilation and oxygenation  6/7/2024 2058 by Steph Osuna RCP  Outcome: Progressing   BRONCHOSPASM/BRONCHOCONSTRICTION     [x]         IMPROVE AERATION/BREATH SOUNDS  [x]   ADMINISTER BRONCHODILATOR THERAPY AS APPROPRIATE  [x]   ASSESS BREATH SOUNDS  []   IMPLEMENT AEROSOL/MDI PROTOCOL  [x]   PATIENT EDUCATION AS NEEDED  PROVIDE ADEQUATE OXYGENATION WITH ACCEPTABLE SP02/ABG'S    [x]  IDENTIFY APPROPRIATE OXYGEN THERAPY  [x]   MONITOR SP02/ABG'S AS NEEDED   [x]   PATIENT EDUCATION AS NEEDED

## 2024-06-08 NOTE — PLAN OF CARE
Problem: Safety - Adult  Goal: Free from fall injury  Outcome: Progressing     Problem: Discharge Planning  Goal: Discharge to home or other facility with appropriate resources  Outcome: Progressing     Problem: Skin/Tissue Integrity  Goal: Absence of new skin breakdown  Description: 1.  Monitor for areas of redness and/or skin breakdown  2.  Assess vascular access sites hourly  3.  Every 4-6 hours minimum:  Change oxygen saturation probe site  4.  Every 4-6 hours:  If on nasal continuous positive airway pressure, respiratory therapy assess nares and determine need for appliance change or resting period.  Outcome: Progressing     Problem: ABCDS Injury Assessment  Goal: Absence of physical injury  Outcome: Progressing     Problem: Pain  Goal: Verbalizes/displays adequate comfort level or baseline comfort level  Outcome: Progressing     Problem: Chronic Conditions and Co-morbidities  Goal: Patient's chronic conditions and co-morbidity symptoms are monitored and maintained or improved  Outcome: Progressing     Problem: Nutrition Deficit:  Goal: Optimize nutritional status  Outcome: Progressing     Problem: Respiratory - Adult  Goal: Achieves optimal ventilation and oxygenation  6/8/2024 0437 by Iesha Melgra RN  Outcome: Progressing  6/7/2024 2058 by Steph Osuna RCP  Outcome: Progressing

## 2024-06-08 NOTE — PLAN OF CARE
Problem: Safety - Adult  Goal: Free from fall injury  6/8/2024 1715 by Marisol Webb RN  Outcome: Progressing     Problem: Discharge Planning  Goal: Discharge to home or other facility with appropriate resources  6/8/2024 1715 by Marisol Webb RN  Outcome: Progressing  Flowsheets (Taken 6/8/2024 1547)  Discharge to home or other facility with appropriate resources: Identify barriers to discharge with patient and caregiver     Problem: Skin/Tissue Integrity  Goal: Absence of new skin breakdown  Description: 1.  Monitor for areas of redness and/or skin breakdown  2.  Assess vascular access sites hourly  3.  Every 4-6 hours minimum:  Change oxygen saturation probe site  4.  Every 4-6 hours:  If on nasal continuous positive airway pressure, respiratory therapy assess nares and determine need for appliance change or resting period.  6/8/2024 1715 by Marisol Webb RN  Outcome: Progressing     Problem: Pain  Goal: Verbalizes/displays adequate comfort level or baseline comfort level  6/8/2024 1715 by Marisol Webb RN  Outcome: Progressing     Problem: Chronic Conditions and Co-morbidities  Goal: Patient's chronic conditions and co-morbidity symptoms are monitored and maintained or improved  6/8/2024 1715 by Marisol Webb RN  Outcome: Progressing  Flowsheets (Taken 6/8/2024 1547)  Care Plan - Patient's Chronic Conditions and Co-Morbidity Symptoms are Monitored and Maintained or Improved: Monitor and assess patient's chronic conditions and comorbid symptoms for stability, deterioration, or improvement     Problem: Respiratory - Adult  Goal: Achieves optimal ventilation and oxygenation  6/8/2024 1715 by Marisol Webb RN  Outcome: Progressing  Flowsheets (Taken 6/8/2024 1547)  Achieves optimal ventilation and oxygenation: Assess for changes in respiratory status

## 2024-06-08 NOTE — PLAN OF CARE
Problem: Safety - Adult  Goal: Free from fall injury  6/8/2024 0931 by Colton Barnhart RN  Outcome: Progressing  Flowsheets (Taken 6/8/2024 0931)  Free From Fall Injury: Instruct family/caregiver on patient safety  6/8/2024 0437 by Iesha Melgar RN  Outcome: Progressing     Problem: Discharge Planning  Goal: Discharge to home or other facility with appropriate resources  6/8/2024 0931 by Colton Barnhart RN  Outcome: Progressing  Flowsheets (Taken 6/8/2024 0800)  Discharge to home or other facility with appropriate resources:   Identify barriers to discharge with patient and caregiver   Refer to discharge planning if patient needs post-hospital services based on physician order or complex needs related to functional status, cognitive ability or social support system  6/8/2024 0437 by Iesha Melgar RN  Outcome: Progressing     Problem: Skin/Tissue Integrity  Goal: Absence of new skin breakdown  Description: 1.  Monitor for areas of redness and/or skin breakdown  2.  Assess vascular access sites hourly  3.  Every 4-6 hours minimum:  Change oxygen saturation probe site  4.  Every 4-6 hours:  If on nasal continuous positive airway pressure, respiratory therapy assess nares and determine need for appliance change or resting period.  6/8/2024 0931 by Colton Barnhart RN  Outcome: Progressing  6/8/2024 0437 by Iesha Melgar RN  Outcome: Progressing     Problem: ABCDS Injury Assessment  Goal: Absence of physical injury  6/8/2024 0931 by Colton Barnhart RN  Outcome: Progressing  Flowsheets (Taken 6/8/2024 0931)  Absence of Physical Injury: Implement safety measures based on patient assessment  6/8/2024 0437 by Iesha Melgar RN  Outcome: Progressing     Problem: Pain  Goal: Verbalizes/displays adequate comfort level or baseline comfort level  6/8/2024 0931 by Colton Barnhart RN  Outcome: Progressing  Flowsheets (Taken 6/8/2024 0800)  Verbalizes/displays adequate comfort level or baseline comfort level: Encourage

## 2024-06-08 NOTE — PROGRESS NOTES
Critical Care -Progress Note    Patient's name:  Coni Baker  Medical Record Number: [id number]  Patient's account/billing number: [id number]  Patient's YOB: 1950  Age: 73 y.o.  Date of Admission: 6/5/2024  2:55 PM  Date of History and Physical Examination: 6/8/2024      Primary Care Physician: Earlene Salcedo MD  Attending Physician: Dr Boone    Code Status: Full Code    Chief complaint:   Chief Complaint   Patient presents with    Bradycardia       Today's Evaluation       Alert, oriented, following commands, extubated, no active chest pain, palpitations, breathing improved, throat sore  Afebrile, hemodynamically stable, heart rate 60s to 70s, off pressors.  Labs unremarkable  Glucose well-controlled  Urine output 700cc  24 hours, -2,6 L since admission  Cardiology and EP following      FAST HUGS BID    Feeding: Diet: ADULT DIET; Regular    Fluids: None    Family:  Updated     Analgesic: acetaminophen    Sedation: NA    Thrombo-prophylaxis:  LMW heparin     Mobility: fair    Heads up:  45 Degrees    Ulcer prophylaxis:  [] Protonix [] Pepcid [] Sucralfate  [] Other: Not indicated.     Glycemic control: NA - well controlled    Spontaneous breathing trial:  NA    Bowel regimen/urine output: Urine output 700 L in 24 hours, -2.5 L since admission    Indwelling catheter/lines PIV    De-escalation  : Transfer out .       HISTORY OF PRESENT ILLNESS:      History was obtained from chart review and the patient.       Coni Baker is a 73 y.o. female with past medical history of COPD, hypertension, hyperlipidemia, CAD s/p PCI, ischemic cardiomyopathy with EF 45 to 50%, mild to moderate MR, severe pulmonary hypertension with RVSP of 70, presented to the ED after being sent by her PCP when her medication nurse found her pulse rate to be 27 today.     Patient reports that she was feeling well otherwise.  Denies any chest pain, cough, shortness of breath, lightheadedness, dizziness.  On arrival to  goal <180  -Has not needed insulin    OTHER:  - PT/OT/ST consult  - Code Status: Full Code    PROPHYLAXIS:  - Stress ulcer: PPI  - DVT: SCDs, Lovenox       Chuck Walsh MD  Department of Internal Medicine/ Critical care  [place], [place]  6/8/2024 6:38 AM

## 2024-06-09 VITALS
HEART RATE: 66 BPM | DIASTOLIC BLOOD PRESSURE: 86 MMHG | WEIGHT: 171.3 LBS | TEMPERATURE: 97.2 F | BODY MASS INDEX: 32.34 KG/M2 | HEIGHT: 61 IN | SYSTOLIC BLOOD PRESSURE: 157 MMHG | RESPIRATION RATE: 28 BRPM | OXYGEN SATURATION: 92 %

## 2024-06-09 LAB
ANION GAP SERPL CALCULATED.3IONS-SCNC: 7 MMOL/L (ref 9–16)
BASOPHILS # BLD: 0.04 K/UL (ref 0–0.2)
BASOPHILS NFR BLD: 1 % (ref 0–2)
BUN SERPL-MCNC: 9 MG/DL (ref 8–23)
CALCIUM SERPL-MCNC: 8.1 MG/DL (ref 8.6–10.4)
CHLORIDE SERPL-SCNC: 107 MMOL/L (ref 98–107)
CO2 SERPL-SCNC: 25 MMOL/L (ref 20–31)
CREAT SERPL-MCNC: 0.6 MG/DL (ref 0.5–0.9)
EOSINOPHIL # BLD: 0.08 K/UL (ref 0–0.44)
EOSINOPHILS RELATIVE PERCENT: 2 % (ref 1–4)
ERYTHROCYTE [DISTWIDTH] IN BLOOD BY AUTOMATED COUNT: 16.1 % (ref 11.8–14.4)
GFR, ESTIMATED: >90 ML/MIN/1.73M2
GLUCOSE SERPL-MCNC: 76 MG/DL (ref 74–99)
HCT VFR BLD AUTO: 38 % (ref 36.3–47.1)
HGB BLD-MCNC: 11.4 G/DL (ref 11.9–15.1)
IMM GRANULOCYTES # BLD AUTO: <0.03 K/UL (ref 0–0.3)
IMM GRANULOCYTES NFR BLD: 0 %
LYMPHOCYTES NFR BLD: 0.96 K/UL (ref 1.1–3.7)
LYMPHOCYTES RELATIVE PERCENT: 20 % (ref 24–43)
MCH RBC QN AUTO: 30.7 PG (ref 25.2–33.5)
MCHC RBC AUTO-ENTMCNC: 30 G/DL (ref 28.4–34.8)
MCV RBC AUTO: 102.4 FL (ref 82.6–102.9)
MONOCYTES NFR BLD: 0.61 K/UL (ref 0.1–1.2)
MONOCYTES NFR BLD: 12 % (ref 3–12)
NEUTROPHILS NFR BLD: 65 % (ref 36–65)
NEUTS SEG NFR BLD: 3.19 K/UL (ref 1.5–8.1)
NRBC BLD-RTO: 0 PER 100 WBC
PLATELET # BLD AUTO: 154 K/UL (ref 138–453)
PMV BLD AUTO: 9.9 FL (ref 8.1–13.5)
POTASSIUM SERPL-SCNC: 3.8 MMOL/L (ref 3.7–5.3)
RBC # BLD AUTO: 3.71 M/UL (ref 3.95–5.11)
RBC # BLD: ABNORMAL 10*6/UL
SODIUM SERPL-SCNC: 139 MMOL/L (ref 136–145)
WBC OTHER # BLD: 4.9 K/UL (ref 3.5–11.3)

## 2024-06-09 PROCEDURE — 6370000000 HC RX 637 (ALT 250 FOR IP)

## 2024-06-09 PROCEDURE — 6370000000 HC RX 637 (ALT 250 FOR IP): Performed by: STUDENT IN AN ORGANIZED HEALTH CARE EDUCATION/TRAINING PROGRAM

## 2024-06-09 PROCEDURE — 85025 COMPLETE CBC W/AUTO DIFF WBC: CPT

## 2024-06-09 PROCEDURE — 99239 HOSP IP/OBS DSCHRG MGMT >30: CPT | Performed by: INTERNAL MEDICINE

## 2024-06-09 PROCEDURE — 94640 AIRWAY INHALATION TREATMENT: CPT

## 2024-06-09 PROCEDURE — 2700000000 HC OXYGEN THERAPY PER DAY

## 2024-06-09 PROCEDURE — 99233 SBSQ HOSP IP/OBS HIGH 50: CPT | Performed by: INTERNAL MEDICINE

## 2024-06-09 PROCEDURE — 36415 COLL VENOUS BLD VENIPUNCTURE: CPT

## 2024-06-09 PROCEDURE — 94761 N-INVAS EAR/PLS OXIMETRY MLT: CPT

## 2024-06-09 PROCEDURE — 2580000003 HC RX 258

## 2024-06-09 PROCEDURE — 80048 BASIC METABOLIC PNL TOTAL CA: CPT

## 2024-06-09 RX ORDER — MEXILETINE HYDROCHLORIDE 250 MG/1
250 CAPSULE ORAL
Qty: 90 CAPSULE | Refills: 1 | Status: SHIPPED | OUTPATIENT
Start: 2024-06-10 | End: 2024-06-09

## 2024-06-09 RX ORDER — FUROSEMIDE 20 MG/1
20 TABLET ORAL 2 TIMES DAILY
Status: DISCONTINUED | OUTPATIENT
Start: 2024-06-09 | End: 2024-06-09 | Stop reason: HOSPADM

## 2024-06-09 RX ORDER — FUROSEMIDE 20 MG/1
20 TABLET ORAL 2 TIMES DAILY
Status: DISCONTINUED | OUTPATIENT
Start: 2024-06-09 | End: 2024-06-09

## 2024-06-09 RX ORDER — FUROSEMIDE 20 MG/1
20 TABLET ORAL DAILY
Status: DISCONTINUED | OUTPATIENT
Start: 2024-06-16 | End: 2024-06-09

## 2024-06-09 RX ORDER — AMOXICILLIN AND CLAVULANATE POTASSIUM 875; 125 MG/1; MG/1
1 TABLET, FILM COATED ORAL EVERY 12 HOURS SCHEDULED
Qty: 5 TABLET | Refills: 0 | Status: SHIPPED | OUTPATIENT
Start: 2024-06-09 | End: 2024-06-12

## 2024-06-09 RX ORDER — FUROSEMIDE 20 MG/1
20 TABLET ORAL DAILY
Status: DISCONTINUED | OUTPATIENT
Start: 2024-06-11 | End: 2024-06-09 | Stop reason: HOSPADM

## 2024-06-09 RX ORDER — MEXILETINE HYDROCHLORIDE 250 MG/1
250 CAPSULE ORAL
Qty: 90 CAPSULE | Refills: 2 | Status: SHIPPED | OUTPATIENT
Start: 2024-06-10

## 2024-06-09 RX ADMIN — SODIUM CHLORIDE, PRESERVATIVE FREE 10 ML: 5 INJECTION INTRAVENOUS at 08:19

## 2024-06-09 RX ADMIN — AMOXICILLIN AND CLAVULANATE POTASSIUM 1 TABLET: 875; 125 TABLET, FILM COATED ORAL at 08:19

## 2024-06-09 RX ADMIN — BUDESONIDE AND FORMOTEROL FUMARATE DIHYDRATE 2 PUFF: 160; 4.5 AEROSOL RESPIRATORY (INHALATION) at 10:48

## 2024-06-09 RX ADMIN — METOPROLOL SUCCINATE 12.5 MG: 25 TABLET, EXTENDED RELEASE ORAL at 08:19

## 2024-06-09 RX ADMIN — FUROSEMIDE 20 MG: 20 TABLET ORAL at 08:19

## 2024-06-09 RX ADMIN — BENZOCAINE AND MENTHOL 1 LOZENGE: 15; 3.6 LOZENGE ORAL at 04:52

## 2024-06-09 RX ADMIN — BENZOCAINE AND MENTHOL 1 LOZENGE: 15; 3.6 LOZENGE ORAL at 07:11

## 2024-06-09 RX ADMIN — MEXILETINE HYDROCHLORIDE 200 MG: 200 CAPSULE ORAL at 08:19

## 2024-06-09 RX ADMIN — MEXILETINE HYDROCHLORIDE 200 MG: 200 CAPSULE ORAL at 11:38

## 2024-06-09 ASSESSMENT — PAIN SCALES - GENERAL: PAINLEVEL_OUTOF10: 0

## 2024-06-09 ASSESSMENT — PAIN SCALES - WONG BAKER: WONGBAKER_NUMERICALRESPONSE: NO HURT

## 2024-06-09 NOTE — DISCHARGE SUMMARY
Patient discharged home with Son via wheelchair. Discharge summary and medications reviewed with patient and son at this time. All belongings with patient at this time.

## 2024-06-09 NOTE — PROGRESS NOTES
Critical Care -Progress Note    Patient's name:  Coni Baker  Medical Record Number: [id number]  Patient's account/billing number: [id number]  Patient's YOB: 1950  Age: 73 y.o.  Date of Admission: 6/5/2024  2:55 PM  Date of History and Physical Examination: 6/9/2024      Primary Care Physician: Earlene Salcedo MD  Attending Physician: Dr Boone    Code Status: Full Code    Chief complaint:   Chief Complaint   Patient presents with    Bradycardia       Today's Evaluation     Alert, oriented, following commands, no active chest pain, palpitations, no complaints. Asking to be discharged home.     Afebrile, hemodynamically stable, heart rate 60s to 70s, off pressors.  Labs unremarkable  Glucose well-controlled  Urine output 500cc + 5 unmeasured in 24 hours, -2,7 L since admission  Cardiology and EP following    FAST HUGS BID    Feeding: Diet: ADULT DIET; Regular  Fluids: None  Family:  Updated, son at bedside   Analgesic: acetaminophen  Sedation: NA  Thrombo-prophylaxis:  lovenox    Mobility: fair  Heads up:  45 Degrees  Ulcer prophylaxis:  [] Protonix [] Pepcid [] Sucralfate  [] Other: Not indicated.   Glycemic control: NA - well controlled  Spontaneous breathing trial:  NA on Room air, placed on 2L via NC overnight for desaturation overnight.   Bowel regimen/urine output: Urine output 500 mL plus 5 unmeasured in 24 hours, -2.7 L since admission  Indwelling catheter/lines PIV  De-escalation  : Transfer out.     HISTORY OF PRESENT ILLNESS:      History was obtained from chart review and the patient.       Coni Baker is a 73 y.o. female with past medical history of COPD, hypertension, hyperlipidemia, CAD s/p PCI, ischemic cardiomyopathy with EF 45 to 50%, mild to moderate MR, severe pulmonary hypertension with RVSP of 70, presented to the ED after being sent by her PCP when her medication nurse found her pulse rate to be 27 today.     Patient reports that she was feeling well otherwise.   enough fluid for thoracentesis  -Continue inhalers and RT protocol    RENAL/FLUID/ELECTROLYTE:  -Renal function and electrolytes stable  -Continue to monitor    GI/NUTRITION:  ADULT DIET; Regular    ID:  # Imaging evidence of right-sided lower lobe pneumonia  -Continued on Augmentin. End 6/12/2024   -White count normal, afebrile    HEME:    stable, no evidence of bleeding    ENDOCRINE:  - Continue to monitor blood glucose, goal <180  -Has not needed insulin    OTHER:  - PT/OT/ST consult  - Code Status: Full Code    PROPHYLAXIS:  - Stress ulcer: PPI  - DVT: SCDs, Lovenox       Nevin Holbrook MD  Department of Internal Medicine/ Critical care  [place], [place]  6/9/2024 10:17 AM

## 2024-06-09 NOTE — DISCHARGE INSTRUCTIONS
Please ensure you follow-up with your cardiologist at your side appointment or call and make an appointment to follow-up in the next week.  Please follow-up with your primary care physician for reassessment.      - Go to the emergency room should you have any chest pain, shortness of breath or any new symptoms of concern.      - Your Mexitil is being increased to 250 mg 3 times a day.      - You are also being discharged with Augmentin, antibiotic, to be taken twice a day for 5 more doses to complete your course of antibiotics.  Please ensure you do not drink while on antibiotics.

## 2024-06-09 NOTE — PLAN OF CARE
Problem: Safety - Adult  Goal: Free from fall injury  6/8/2024 2257 by Jerome Richards RN  Outcome: Progressing  6/8/2024 1715 by Marisol Webb RN  Outcome: Progressing  6/8/2024 0931 by Colton Barnhart RN  Outcome: Progressing  Flowsheets (Taken 6/8/2024 0931)  Free From Fall Injury: Instruct family/caregiver on patient safety     Problem: Discharge Planning  Goal: Discharge to home or other facility with appropriate resources  6/8/2024 2257 by Jerome Richards RN  Outcome: Progressing  6/8/2024 1715 by Marisol Webb RN  Outcome: Progressing  Flowsheets (Taken 6/8/2024 1547)  Discharge to home or other facility with appropriate resources: Identify barriers to discharge with patient and caregiver  6/8/2024 0931 by Colton Barnhart RN  Outcome: Progressing  Flowsheets (Taken 6/8/2024 0800)  Discharge to home or other facility with appropriate resources:   Identify barriers to discharge with patient and caregiver   Refer to discharge planning if patient needs post-hospital services based on physician order or complex needs related to functional status, cognitive ability or social support system     Problem: Skin/Tissue Integrity  Goal: Absence of new skin breakdown  Description: 1.  Monitor for areas of redness and/or skin breakdown  2.  Assess vascular access sites hourly  3.  Every 4-6 hours minimum:  Change oxygen saturation probe site  4.  Every 4-6 hours:  If on nasal continuous positive airway pressure, respiratory therapy assess nares and determine need for appliance change or resting period.  6/8/2024 2257 by Jerome Richards RN  Outcome: Progressing  6/8/2024 1715 by Marisol Webb RN  Outcome: Progressing  6/8/2024 0931 by Colton Barnhart RN  Outcome: Progressing     Problem: ABCDS Injury Assessment  Goal: Absence of physical injury  6/8/2024 2257 by Jerome Richards RN  Outcome: Progressing  6/8/2024 1715 by Marisol Webb RN  Outcome: Progressing  6/8/2024  0931 by Colton Barnhart RN  Outcome: Progressing  Flowsheets (Taken 6/8/2024 0931)  Absence of Physical Injury: Implement safety measures based on patient assessment     Problem: Pain  Goal: Verbalizes/displays adequate comfort level or baseline comfort level  6/8/2024 2257 by Jerome Richards RN  Outcome: Progressing  6/8/2024 1715 by Marisol Webb RN  Outcome: Progressing  6/8/2024 0931 by Colton Barnhart RN  Outcome: Progressing  Flowsheets (Taken 6/8/2024 0800)  Verbalizes/displays adequate comfort level or baseline comfort level: Encourage patient to monitor pain and request assistance     Problem: Chronic Conditions and Co-morbidities  Goal: Patient's chronic conditions and co-morbidity symptoms are monitored and maintained or improved  6/8/2024 2257 by Jerome Richards RN  Outcome: Progressing  6/8/2024 1715 by Marisol Webb RN  Outcome: Progressing  Flowsheets (Taken 6/8/2024 1547)  Care Plan - Patient's Chronic Conditions and Co-Morbidity Symptoms are Monitored and Maintained or Improved: Monitor and assess patient's chronic conditions and comorbid symptoms for stability, deterioration, or improvement  6/8/2024 0931 by Colton Barnhart RN  Outcome: Progressing  Flowsheets (Taken 6/8/2024 0800)  Care Plan - Patient's Chronic Conditions and Co-Morbidity Symptoms are Monitored and Maintained or Improved:   Monitor and assess patient's chronic conditions and comorbid symptoms for stability, deterioration, or improvement   Update acute care plan with appropriate goals if chronic or comorbid symptoms are exacerbated and prevent overall improvement and discharge     Problem: Nutrition Deficit:  Goal: Optimize nutritional status  6/8/2024 2257 by Jerome Richards RN  Outcome: Progressing  6/8/2024 1715 by Marisol Webb RN  Outcome: Progressing  6/8/2024 0931 by Colton Barnhart RN  Outcome: Progressing     Problem: Respiratory - Adult  Goal: Achieves optimal ventilation and

## 2024-06-09 NOTE — PLAN OF CARE
Problem: Safety - Adult  Goal: Free from fall injury  Outcome: Completed  Flowsheets (Taken 6/9/2024 0800)  Free From Fall Injury: Instruct family/caregiver on patient safety     Problem: Discharge Planning  Goal: Discharge to home or other facility with appropriate resources  Outcome: Completed  Flowsheets (Taken 6/9/2024 0800)  Discharge to home or other facility with appropriate resources: Identify barriers to discharge with patient and caregiver     Problem: Skin/Tissue Integrity  Goal: Absence of new skin breakdown  Description: 1.  Monitor for areas of redness and/or skin breakdown  2.  Assess vascular access sites hourly  3.  Every 4-6 hours minimum:  Change oxygen saturation probe site  4.  Every 4-6 hours:  If on nasal continuous positive airway pressure, respiratory therapy assess nares and determine need for appliance change or resting period.  Outcome: Completed     Problem: ABCDS Injury Assessment  Goal: Absence of physical injury  Outcome: Completed  Flowsheets (Taken 6/9/2024 0800)  Absence of Physical Injury: Implement safety measures based on patient assessment     Problem: Pain  Goal: Verbalizes/displays adequate comfort level or baseline comfort level  Outcome: Completed  Flowsheets (Taken 6/9/2024 0800)  Verbalizes/displays adequate comfort level or baseline comfort level: Assess pain using appropriate pain scale     Problem: Chronic Conditions and Co-morbidities  Goal: Patient's chronic conditions and co-morbidity symptoms are monitored and maintained or improved  Outcome: Completed  Flowsheets (Taken 6/9/2024 0800)  Care Plan - Patient's Chronic Conditions and Co-Morbidity Symptoms are Monitored and Maintained or Improved: Monitor and assess patient's chronic conditions and comorbid symptoms for stability, deterioration, or improvement     Problem: Respiratory - Adult  Goal: Achieves optimal ventilation and oxygenation  Outcome: Completed  Flowsheets (Taken 6/9/2024 0800)  Achieves optimal  ventilation and oxygenation: Assess for changes in respiratory status

## 2024-06-09 NOTE — DISCHARGE SUMMARY
[facility]     Department of Internal Medicine - Critical Care Service    INPATIENT DISCHARGE SUMMARY      PATIENT IDENTIFICATION:  NAME:  Coni Baker   :   1950  MRN:    [id number]     Acct:    [id number]   Admit Date:  2024  Discharge date:  2024  3:00 PM   Attending Provider: Dr. Owens                            Principal Problem:    Symptomatic bradycardia  Active Problems:    S/P coronary artery stent placement    Pericardial effusion    Primary hypertension    Ventricular tachycardia (HCC)    Shortness of breath    Bradycardia  Resolved Problems:    * No resolved hospital problems. *       REASON FOR HOSPITALIZATION:   Chief Complaint   Patient presents with    Bradycardia          Hospital Course    Coni Baker is a 73 y.o. female with past medical history of COPD, hypertension, hyperlipidemia, CAD s/p PCI, ischemic cardiomyopathy with EF 45 to 50%, mild to moderate MR, severe pulmonary hypertension with RVSP of 70, presented to the ED after being sent by her PCP when her medication nurse found her pulse rate to be 27 today.     Patient reports that she was feeling well otherwise.  Denies any chest pain, cough, shortness of breath, lightheadedness, dizziness.  On arrival to the ER, patient was found to be significantly bradycardic with heart rate in 30s to 40s.  EKG showed significant PVC burden which was being picked up by the monitor.  Cardiology was consulted, who recommended her to be started on dopamine as well as lidocaine bolus followed by infusion.     Of the note, patient was admitted recently for similar complaint.  She had initially presented for shortness of breath and dizziness, found to be in an SVT.  She was started on amiodarone infusion but remained hypotensive requiring dopamine.  She was transferred to [place] and underwent cardiac cath on  which was unremarkable.  EP was consulted for VT ablation.  Ablation was done on  but it was

## 2024-06-09 NOTE — PROGRESS NOTES
Home Oxygen Evaluation completed.    Patient is on room air  Resting SpO2 on room air = 94%    SpO2 on room air with exercise = 90%      Serena Ruiz RCP  1:12 PM

## 2024-06-09 NOTE — PROGRESS NOTES
not enlarged  Lungs: clear to auscultation bilaterally  Heart: regular rate and rhythm, S1, S2 normal, no murmur  Abdomen: soft, bowel sounds normal  Extremities: no edema or swelling     EKG:  Sinus bradycardia with frequent PVCs    ECHO: 06/06/2024    Left Ventricle: Moderately reduced left ventricular systolic function with a visually estimated EF of 35 - 40%. See diagram for wall motion findings.    Right Ventricle: Reduced systolic function.    Aortic Valve: Valve structure is normal.    Mitral Valve: Mild regurgitation.    Pericardium: No pericardial effusion.    Image quality is adequate. Contrast used: Definity    CATH: 05/16/2024  Left main: Normal with 0% stenosis.     LAD: Patent proximal and mid stents with luminal irregularities of 20 to 30%     LCX: Luminal irregularities of 20 to 30%     RCA: Normal with 0% stenosis.     The LV gram was performed in the GUNTER 30 position.   LVEF: 35%. LV Wall Motion: Anteroapical akinesis     Conclusions:  Patent prior LAD stents.  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 Cordero  History of CAD s/p PCI to LAD with patent stents as per last [facility] 05/24  Moderate cardiomyopathy with EF 35 to 40%, ischemic versus PVCs induced  Hypertension  Hyperlipidemia  Bilateral pleural effusion possible right-sided pneumonia    Plan:   Continue mexiletine and BB per EP recs. Being followed by Dr. Cordero  Close monitoring of electrolytes. Keep K> 4 and Mg> 2.      Estuardo Birch MD  Fellow, cardiovascular diseases  [facility]  6/9/2024, 6:20 AM  Attending note.   The patient was seen and examined agree with the evaluation and plan as documented by Dr. Brand.  She denies any chest pain or shortness of breath.  In normal sinus rhythm.  Will continue mexiletine and BB as per EP recommendations.   History of dilated cardiomyopathy with no signs of acute CHF.  Continue current medications.  Maintain potassium above 4 and magnesium above 2.  We will follow.

## 2024-06-10 ENCOUNTER — TELEPHONE (OUTPATIENT)
Dept: PULMONOLOGY | Age: 74
End: 2024-06-10

## 2024-06-10 NOTE — TELEPHONE ENCOUNTER
----- Message from Long Boone MD sent at 6/9/2024  6:26 PM EDT -----   Please have the patient see me in the office in 6-8 weeks.  Thank you

## 2024-06-13 ENCOUNTER — APPOINTMENT (OUTPATIENT)
Dept: CT IMAGING | Age: 74
DRG: 309 | End: 2024-06-13
Payer: MEDICARE

## 2024-06-13 ENCOUNTER — APPOINTMENT (OUTPATIENT)
Dept: GENERAL RADIOLOGY | Age: 74
DRG: 309 | End: 2024-06-13
Payer: MEDICARE

## 2024-06-13 ENCOUNTER — HOSPITAL ENCOUNTER (INPATIENT)
Age: 74
LOS: 2 days | Discharge: HOME | DRG: 309 | End: 2024-06-15
Attending: EMERGENCY MEDICINE | Admitting: FAMILY MEDICINE
Payer: MEDICARE

## 2024-06-13 DIAGNOSIS — R00.1 BRADYCARDIA: ICD-10-CM

## 2024-06-13 DIAGNOSIS — J90 PLEURAL EFFUSION: Primary | ICD-10-CM

## 2024-06-13 DIAGNOSIS — J18.9 PNEUMONIA OF RIGHT LOWER LOBE DUE TO INFECTIOUS ORGANISM: ICD-10-CM

## 2024-06-13 DIAGNOSIS — R79.89 ELEVATED D-DIMER: ICD-10-CM

## 2024-06-13 LAB
ALBUMIN SERPL-MCNC: 3.8 G/DL (ref 3.5–5.2)
ALBUMIN/GLOB SERPL: 1 {RATIO} (ref 1–2.5)
ALP SERPL-CCNC: 111 U/L (ref 35–104)
ALT SERPL-CCNC: 50 U/L (ref 10–35)
ANION GAP SERPL CALCULATED.3IONS-SCNC: 8 MMOL/L (ref 9–16)
AST SERPL-CCNC: 40 U/L (ref 10–35)
BASOPHILS # BLD: 0.07 K/UL (ref 0–0.2)
BASOPHILS NFR BLD: 1 % (ref 0–2)
BILIRUB SERPL-MCNC: 0.7 MG/DL (ref 0–1.2)
BNP SERPL-MCNC: 2776 PG/ML (ref 0–300)
BUN SERPL-MCNC: 19 MG/DL (ref 8–23)
CALCIUM SERPL-MCNC: 8.6 MG/DL (ref 8.6–10.4)
CHLORIDE SERPL-SCNC: 107 MMOL/L (ref 98–107)
CO2 SERPL-SCNC: 23 MMOL/L (ref 20–31)
CREAT SERPL-MCNC: 0.8 MG/DL (ref 0.5–0.9)
D DIMER PPP FEU-MCNC: 3.82 UG/ML FEU (ref 0–0.57)
EOSINOPHIL # BLD: 0.09 K/UL (ref 0–0.44)
EOSINOPHILS RELATIVE PERCENT: 1 % (ref 1–4)
ERYTHROCYTE [DISTWIDTH] IN BLOOD BY AUTOMATED COUNT: 15.7 % (ref 11.8–14.4)
GFR, ESTIMATED: 78 ML/MIN/1.73M2
GLUCOSE SERPL-MCNC: 100 MG/DL (ref 74–99)
HCT VFR BLD AUTO: 44.5 % (ref 36.3–47.1)
HGB BLD-MCNC: 13.5 G/DL (ref 11.9–15.1)
IMM GRANULOCYTES # BLD AUTO: 0.06 K/UL (ref 0–0.3)
IMM GRANULOCYTES NFR BLD: 1 %
LACTIC ACID, WHOLE BLOOD: 1.6 MMOL/L (ref 0.7–2.1)
LYMPHOCYTES NFR BLD: 1.67 K/UL (ref 1.1–3.7)
LYMPHOCYTES RELATIVE PERCENT: 18 % (ref 24–43)
MAGNESIUM SERPL-MCNC: 2 MG/DL (ref 1.6–2.4)
MCH RBC QN AUTO: 30.4 PG (ref 25.2–33.5)
MCHC RBC AUTO-ENTMCNC: 30.3 G/DL (ref 28.4–34.8)
MCV RBC AUTO: 100.2 FL (ref 82.6–102.9)
MONOCYTES NFR BLD: 1.19 K/UL (ref 0.1–1.2)
MONOCYTES NFR BLD: 13 % (ref 3–12)
NEUTROPHILS NFR BLD: 67 % (ref 36–65)
NEUTS SEG NFR BLD: 6.34 K/UL (ref 1.5–8.1)
NRBC BLD-RTO: 0 PER 100 WBC
PLATELET # BLD AUTO: 182 K/UL (ref 138–453)
PMV BLD AUTO: 9.4 FL (ref 8.1–13.5)
POTASSIUM SERPL-SCNC: 4.6 MMOL/L (ref 3.7–5.3)
PROCALCITONIN SERPL-MCNC: 0.06 NG/ML (ref 0–0.09)
PROT SERPL-MCNC: 6.5 G/DL (ref 6.6–8.7)
RBC # BLD AUTO: 4.44 M/UL (ref 3.95–5.11)
RBC # BLD: ABNORMAL 10*6/UL
SODIUM SERPL-SCNC: 138 MMOL/L (ref 136–145)
TROPONIN I SERPL HS-MCNC: 19 NG/L (ref 0–14)
WBC OTHER # BLD: 9.4 K/UL (ref 3.5–11.3)

## 2024-06-13 PROCEDURE — 82042 OTHER SOURCE ALBUMIN QUAN EA: CPT

## 2024-06-13 PROCEDURE — 71260 CT THORAX DX C+: CPT

## 2024-06-13 PROCEDURE — 84145 PROCALCITONIN (PCT): CPT

## 2024-06-13 PROCEDURE — 96365 THER/PROPH/DIAG IV INF INIT: CPT

## 2024-06-13 PROCEDURE — 83986 ASSAY PH BODY FLUID NOS: CPT

## 2024-06-13 PROCEDURE — 83735 ASSAY OF MAGNESIUM: CPT

## 2024-06-13 PROCEDURE — 85025 COMPLETE CBC W/AUTO DIFF WBC: CPT

## 2024-06-13 PROCEDURE — 83605 ASSAY OF LACTIC ACID: CPT

## 2024-06-13 PROCEDURE — 88305 TISSUE EXAM BY PATHOLOGIST: CPT

## 2024-06-13 PROCEDURE — 87040 BLOOD CULTURE FOR BACTERIA: CPT

## 2024-06-13 PROCEDURE — 99285 EMERGENCY DEPT VISIT HI MDM: CPT

## 2024-06-13 PROCEDURE — 93005 ELECTROCARDIOGRAM TRACING: CPT | Performed by: NURSE PRACTITIONER

## 2024-06-13 PROCEDURE — 6360000002 HC RX W HCPCS: Performed by: EMERGENCY MEDICINE

## 2024-06-13 PROCEDURE — 2060000000 HC ICU INTERMEDIATE R&B

## 2024-06-13 PROCEDURE — 99223 1ST HOSP IP/OBS HIGH 75: CPT | Performed by: PHYSICIAN ASSISTANT

## 2024-06-13 PROCEDURE — 96367 TX/PROPH/DG ADDL SEQ IV INF: CPT

## 2024-06-13 PROCEDURE — 84157 ASSAY OF PROTEIN OTHER: CPT

## 2024-06-13 PROCEDURE — 84484 ASSAY OF TROPONIN QUANT: CPT

## 2024-06-13 PROCEDURE — 83880 ASSAY OF NATRIURETIC PEPTIDE: CPT

## 2024-06-13 PROCEDURE — 2580000003 HC RX 258: Performed by: EMERGENCY MEDICINE

## 2024-06-13 PROCEDURE — 96375 TX/PRO/DX INJ NEW DRUG ADDON: CPT

## 2024-06-13 PROCEDURE — 80053 COMPREHEN METABOLIC PANEL: CPT

## 2024-06-13 PROCEDURE — 71045 X-RAY EXAM CHEST 1 VIEW: CPT

## 2024-06-13 PROCEDURE — 83615 LACTATE (LD) (LDH) ENZYME: CPT

## 2024-06-13 PROCEDURE — 84311 SPECTROPHOTOMETRY: CPT

## 2024-06-13 PROCEDURE — 93005 ELECTROCARDIOGRAM TRACING: CPT | Performed by: EMERGENCY MEDICINE

## 2024-06-13 PROCEDURE — 6360000004 HC RX CONTRAST MEDICATION: Performed by: EMERGENCY MEDICINE

## 2024-06-13 PROCEDURE — 88112 CYTOPATH CELL ENHANCE TECH: CPT

## 2024-06-13 PROCEDURE — 36415 COLL VENOUS BLD VENIPUNCTURE: CPT

## 2024-06-13 PROCEDURE — 85379 FIBRIN DEGRADATION QUANT: CPT

## 2024-06-13 PROCEDURE — 87641 MR-STAPH DNA AMP PROBE: CPT

## 2024-06-13 PROCEDURE — 6370000000 HC RX 637 (ALT 250 FOR IP): Performed by: PHYSICIAN ASSISTANT

## 2024-06-13 PROCEDURE — 2580000003 HC RX 258: Performed by: PHYSICIAN ASSISTANT

## 2024-06-13 PROCEDURE — 82945 GLUCOSE OTHER FLUID: CPT

## 2024-06-13 RX ORDER — ACETAMINOPHEN 325 MG/1
650 TABLET ORAL EVERY 6 HOURS PRN
Status: DISCONTINUED | OUTPATIENT
Start: 2024-06-13 | End: 2024-06-15 | Stop reason: HOSPADM

## 2024-06-13 RX ORDER — SODIUM CHLORIDE 0.9 % (FLUSH) 0.9 %
5-40 SYRINGE (ML) INJECTION EVERY 12 HOURS SCHEDULED
Status: DISCONTINUED | OUTPATIENT
Start: 2024-06-13 | End: 2024-06-15 | Stop reason: HOSPADM

## 2024-06-13 RX ORDER — MAGNESIUM SULFATE IN WATER 40 MG/ML
2000 INJECTION, SOLUTION INTRAVENOUS ONCE
Status: COMPLETED | OUTPATIENT
Start: 2024-06-13 | End: 2024-06-13

## 2024-06-13 RX ORDER — SODIUM CHLORIDE 0.9 % (FLUSH) 0.9 %
5-40 SYRINGE (ML) INJECTION PRN
Status: DISCONTINUED | OUTPATIENT
Start: 2024-06-13 | End: 2024-06-15 | Stop reason: HOSPADM

## 2024-06-13 RX ORDER — ASPIRIN 81 MG/1
81 TABLET ORAL DAILY
Status: DISCONTINUED | OUTPATIENT
Start: 2024-06-14 | End: 2024-06-15 | Stop reason: HOSPADM

## 2024-06-13 RX ORDER — MEXILETINE HYDROCHLORIDE 250 MG/1
250 CAPSULE ORAL
Status: DISCONTINUED | OUTPATIENT
Start: 2024-06-14 | End: 2024-06-13

## 2024-06-13 RX ORDER — CALCIUM CARBONATE/VITAMIN D3 600 MG-10
1 TABLET ORAL
Status: DISCONTINUED | OUTPATIENT
Start: 2024-06-14 | End: 2024-06-15 | Stop reason: HOSPADM

## 2024-06-13 RX ORDER — POLYETHYLENE GLYCOL 3350 17 G/17G
17 POWDER, FOR SOLUTION ORAL DAILY PRN
Status: DISCONTINUED | OUTPATIENT
Start: 2024-06-13 | End: 2024-06-15 | Stop reason: HOSPADM

## 2024-06-13 RX ORDER — SODIUM CHLORIDE 9 MG/ML
INJECTION, SOLUTION INTRAVENOUS PRN
Status: DISCONTINUED | OUTPATIENT
Start: 2024-06-13 | End: 2024-06-15 | Stop reason: HOSPADM

## 2024-06-13 RX ORDER — MEXILETINE HYDROCHLORIDE 250 MG/1
250 CAPSULE ORAL
Status: DISCONTINUED | OUTPATIENT
Start: 2024-06-13 | End: 2024-06-15 | Stop reason: HOSPADM

## 2024-06-13 RX ORDER — ONDANSETRON 4 MG/1
4 TABLET, ORALLY DISINTEGRATING ORAL EVERY 8 HOURS PRN
Status: DISCONTINUED | OUTPATIENT
Start: 2024-06-13 | End: 2024-06-15 | Stop reason: HOSPADM

## 2024-06-13 RX ORDER — BUDESONIDE AND FORMOTEROL FUMARATE DIHYDRATE 160; 4.5 UG/1; UG/1
2 AEROSOL RESPIRATORY (INHALATION)
Status: DISCONTINUED | OUTPATIENT
Start: 2024-06-13 | End: 2024-06-15 | Stop reason: HOSPADM

## 2024-06-13 RX ORDER — ACETAMINOPHEN 650 MG/1
650 SUPPOSITORY RECTAL EVERY 6 HOURS PRN
Status: DISCONTINUED | OUTPATIENT
Start: 2024-06-13 | End: 2024-06-15 | Stop reason: HOSPADM

## 2024-06-13 RX ORDER — ONDANSETRON 2 MG/ML
4 INJECTION INTRAMUSCULAR; INTRAVENOUS ONCE
Status: COMPLETED | OUTPATIENT
Start: 2024-06-13 | End: 2024-06-13

## 2024-06-13 RX ORDER — ONDANSETRON 2 MG/ML
4 INJECTION INTRAMUSCULAR; INTRAVENOUS EVERY 6 HOURS PRN
Status: DISCONTINUED | OUTPATIENT
Start: 2024-06-13 | End: 2024-06-15 | Stop reason: HOSPADM

## 2024-06-13 RX ORDER — ENOXAPARIN SODIUM 100 MG/ML
40 INJECTION SUBCUTANEOUS DAILY
Status: DISCONTINUED | OUTPATIENT
Start: 2024-06-14 | End: 2024-06-15 | Stop reason: HOSPADM

## 2024-06-13 RX ORDER — POTASSIUM CHLORIDE 7.45 MG/ML
10 INJECTION INTRAVENOUS PRN
Status: DISCONTINUED | OUTPATIENT
Start: 2024-06-13 | End: 2024-06-15 | Stop reason: HOSPADM

## 2024-06-13 RX ORDER — POTASSIUM CHLORIDE 20 MEQ/1
40 TABLET, EXTENDED RELEASE ORAL PRN
Status: DISCONTINUED | OUTPATIENT
Start: 2024-06-13 | End: 2024-06-15 | Stop reason: HOSPADM

## 2024-06-13 RX ORDER — MAGNESIUM SULFATE IN WATER 40 MG/ML
2000 INJECTION, SOLUTION INTRAVENOUS PRN
Status: DISCONTINUED | OUTPATIENT
Start: 2024-06-13 | End: 2024-06-15 | Stop reason: HOSPADM

## 2024-06-13 RX ADMIN — CEFEPIME 1000 MG: 1 INJECTION, POWDER, FOR SOLUTION INTRAMUSCULAR; INTRAVENOUS at 18:16

## 2024-06-13 RX ADMIN — IOPAMIDOL 75 ML: 755 INJECTION, SOLUTION INTRAVENOUS at 18:31

## 2024-06-13 RX ADMIN — ONDANSETRON 4 MG: 2 INJECTION INTRAMUSCULAR; INTRAVENOUS at 17:45

## 2024-06-13 RX ADMIN — SODIUM CHLORIDE 1500 MG: 9 INJECTION, SOLUTION INTRAVENOUS at 21:22

## 2024-06-13 RX ADMIN — AZITHROMYCIN MONOHYDRATE 500 MG: 500 INJECTION, POWDER, LYOPHILIZED, FOR SOLUTION INTRAVENOUS at 19:31

## 2024-06-13 RX ADMIN — MEXILETINE HYDROCHLORIDE 250 MG: 250 CAPSULE ORAL at 22:52

## 2024-06-13 RX ADMIN — SODIUM CHLORIDE, PRESERVATIVE FREE 10 ML: 5 INJECTION INTRAVENOUS at 22:29

## 2024-06-13 RX ADMIN — MAGNESIUM SULFATE HEPTAHYDRATE 2000 MG: 40 INJECTION, SOLUTION INTRAVENOUS at 16:34

## 2024-06-13 ASSESSMENT — ENCOUNTER SYMPTOMS
SHORTNESS OF BREATH: 0
COUGH: 1
SORE THROAT: 0
VOMITING: 0
SHORTNESS OF BREATH: 1
WHEEZING: 0
ABDOMINAL PAIN: 0
RHINORRHEA: 0
NAUSEA: 0

## 2024-06-13 ASSESSMENT — PAIN - FUNCTIONAL ASSESSMENT: PAIN_FUNCTIONAL_ASSESSMENT: NONE - DENIES PAIN

## 2024-06-13 NOTE — ED PROVIDER NOTES
[facility] ED  Emergency Department Encounter  Emergency Medicine Resident     Pt Name:Coni Baker  MRN: [id number]  Birthdate 1950  Date of evaluation: 6/13/24  PCP:  Earlene Salcedo MD  Note Started: 4:00 PM EDT      CHIEF COMPLAINT       Chief Complaint   Patient presents with    Bradycardia       HISTORY OF PRESENT ILLNESS  (Location/Symptom, Timing/Onset, Context/Setting, Quality, Duration, Modifying Factors, Severity.)      Coni Baker is a 73 y.o. female who presents with low heart rate at home.  Patient states she was taking her vitals at home and her heart rate was in the 40s to 50s.  She states she was not lightheaded, dizzy, having any chest pain or shortness of breath.  Now she is in the emergency department she says she does have some mild shortness of breath. She states she called her primary care doctor and was directed to the emergency department.  Patient states she was recently admitted to the hospital for similar concerns and was found to have a heart rate of 27.  She states she was supposed to follow-up with a cardiologist but has not yet been seen, has an appointment scheduled for the 21st.  Patient does not have a pacemaker. No blood thinners.     Per chart review patient with complicated cardiac history.  Presented with SVT previously on amiodarone infusion and became hypotensive requiring dopamine.  Had cardiac cath on 5/16 which was unremarkable.  Patient had a cardiac ablation done on 5/21 which was unsuccessful.  She was then placed on IV amnio and sotalol but having frequent PVCs and was eventually discharged home on sotalol Mexiletine and metoprolol.  She was advised to follow-up with [facility] for further ablation.  She states she has not yet had time to follow-up with them as she Armand presented back to the hospital on 6/6 with heart rate in the 50s to 60s however monitor was reading at 100 at that time.  She was started on dopamine and lidocaine  of antibiotics would be detrimental  to the patient.        If Lactate >2.0 MUST repeat within 6 hours    If elevated, is elevated lactate from a likely infectious source?:    .      IV Fluid Bolus:  Is lactate > 4.0:  No    ED Course as of 06/13/24 2015   Thu Jun 13, 2024   1641 Lactic Acid, Whole Blood: 1.6 [SK]   1643 WBC: 9.4 [SK]   1643 Hemoglobin Quant: 13.5 [SK]   1643 Platelet Count: 182 [SK]   1703 D-Dimer, Quant(!): 3.82 [SK]   1708 WBC: 9.4 [SK]   1708 Hemoglobin Quant: 13.5 [SK]   1708 Platelet Count: 182 [SK]   1735 Called to bedside as patient is having vomiting. She spit up small amount of saliva into a cup. States she is having some nausea now. HR 80s on monitor, 40s on pulse. Will repeat EKG and give zofran  [SK]   1736 Troponin, High Sensitivity(!): 19  Baseline for patient.  [SK]   1736 Pro-BNP(!): 2,776 [SK]   1736 Magnesium: 2.0 [SK]   1736 Sodium: 138 [SK]   1736 Potassium: 4.6 [SK]   1736 BUN,BUNPL: 19 [SK]   1736 Creatinine: 0.8 [SK]   1736 Alkaline Phosphatase(!): 111 [SK]   1736 ALT(!): 50 [SK]   1736 AST(!): 40 [SK]   1736 Glucose(!): 100 [SK]   1736 XR CHEST PORTABLE  Right pleural effusion with persistent opacity at the right lung base that  could represent atelectasis or infection   [SK]   1936 CT CHEST PULMONARY EMBOLISM W CONTRAST  . No evidence of pulmonary embolism.  2. Moderate-sized right pleural effusion with lower lobe atelectasis.  3. Trace left pleural effusion with left basilar atelectasis.   [SK]   1958 Cardiology recommends admission and will evaluate. Recommend holding beta blockers at this time. Recommend dopamine gtt if bradycardic and if persistent PVCs recommend contacting them and starting lidocaine gtt.     Reevaluated patient and she is out of bigeminy, states she is feeling better. Agreeable to admission  [SK]   [facility]   [SK]      ED Course User Index  [SK] Eda Romero DO       PROCEDURES:      CONSULTS:  PHARMACY TO DOSE

## 2024-06-13 NOTE — PROGRESS NOTES
[facility]   Pharmacy Pharmacokinetic Monitoring Service - Vancomycin     Coni Baker is a 73 y.o. female starting on vancomycin therapy for HAP. Pharmacy consulted by Dr. Romero for monitoring and adjustment.    Target Concentration: Goal AUC/BEATRIZ 400-600 mg*hr/L    Additional Antimicrobials: Cefepime, azithromycin    Pertinent Laboratory Values:   Wt Readings from Last 1 Encounters:   06/09/24 77.7 kg (171 lb 4.8 oz)     Temp Readings from Last 1 Encounters:   06/13/24 98.2 °F (36.8 °C) (Oral)     Estimated Creatinine Clearance: 58 mL/min (based on SCr of 0.8 mg/dL).  Recent Labs     06/13/24  1625   CREATININE 0.8   BUN 19   WBC 9.4     Pertinent Cultures:  Culture Date Source Results   Pending     MRSA Nasal Swab: not ordered. Order placed by pharmacy.    Plan:  Dosing recommendations based on Bayesian software and PK calculations  Start vancomycin 1500 mg Q24  Anticipated AUC of ~500 and trough concentration of ~12 at steady state  Renal labs as indicated   Vancomycin concentration not ordered yet  Pharmacy will continue to monitor patient and adjust therapy as indicated    Thank you for the consult,  Nelda Arrieta RPH  6/13/2024 5:47 PM

## 2024-06-13 NOTE — ED TRIAGE NOTES
[facility]     Emergency Department     Faculty Attestation    I performed a history and physical examination of the patient and discussed management with the resident. I reviewed the resident´s note and agree with the documented findings and plan of care. Any areas of disagreement are noted on the chart. I was personally present for the key portions of any procedures. I have documented in the chart those procedures where I was not present during the key portions. I have reviewed the emergency nurses triage note. I agree with the chief complaint, past medical history, past surgical history, allergies, medications, social and family history as documented unless otherwise noted below. For Physician Assistant/ Nurse Practitioner cases/documentation I have personally evaluated this patient and have completed at least one if not all key elements of the E/M (history, physical exam, and MDM). Additional findings are as noted.         EKG Interpretation    Interpreted by emergency department physician    Rhythm: normal sinus /bigeminy  Rate: 100  Axis: normal -6  Ectopy: Bigeminy  Conduction: Bigeminy  ST Segments: no acute change  T Waves: no acute change  Q Waves: Septal    Clinical Impression: Abnormal EKG    Gage Wheeler III

## 2024-06-13 NOTE — ED NOTES
The following labs were labeled with appropriate pt sticker and tubed to lab:     [] Blue     [] Lavender   [] on ice  [] Green/yellow  [] Green/black [] on ice  [] Grey  [] on ice  [] Yellow  [] Red  [] Pink  [] Type/ Screen  [] ABG  [] VBG    [] COVID-19 swab    [] Rapid  [] PCR  [] Flu swab  [] Peds Viral Panel     [] Urine Sample  [] Fecal Sample  [] Pelvic Cultures  [x] Blood Cultures  [] X 2  [] STREP Cultures  [] Wound Cultures  s

## 2024-06-13 NOTE — ED PROVIDER NOTES
[facility]                                      Emergency Department                                      Faculty Attestation     I performed a history and physical examination of the patient and discussed management with the resident. I reviewed the resident´s note and agree with the documented findings and plan of care. Any areas of disagreement are noted on the chart. I was personally present for the key portions of any procedures. I have documented in the chart those procedures where I was not present during the key portions. I have reviewed the emergency nurses triage note. I agree with the chief complaint, past medical history, past surgical history, allergies, medications, social and family history as documented unless otherwise noted below. For Physician Assistant/ Nurse Practitioner cases/documentation I have personally evaluated this patient and have completed at least one if not all key elements of the E/M (history, physical exam, and MDM). Additional findings are as noted.           EKG Interpretation     Interpreted by emergency department physician     Rhythm: normal sinus /bigeminy  Rate: 100  Axis: normal -6  Ectopy: Bigeminy  Conduction: Bigeminy  ST Segments: no acute change  T Waves: no acute change  Q Waves: Septal     Clinical Impression: Abnormal EKG     Gage Wheeler, III     Gage Wheeler MD  06/13/24 5051

## 2024-06-13 NOTE — ED TRIAGE NOTES
PT arrived to ED 04 via triage.   Pt was sent in by home nurse for bradycardia.   Nurse called the cardiologist and he sent them in.   PT denies any CP or SOB on arrival.  Pt states that she was starting to feel better after being admitted for 19 day.   Pt is resting on stretcher with call light within reach.  Breathing is non labored and no acute distress is noted.   Will continue to follow plan of care

## 2024-06-13 NOTE — ED NOTES
ED to inpatient nurses report      Chief Complaint:  Chief Complaint   Patient presents with    Bradycardia     Present to ED from: Home    MOA:     LOC: alert and orientated to name, place, date  Mobility: Requires assistance * 1  Oxygen Baseline: RA    Current needs required: None   Pending ED orders: None  Present condition: Stable    Why did the patient come to the ED? PT arrived to ED 04 via triage.   Pt was sent in by home nurse for bradycardia.   Nurse called the cardiologist and he sent them in.   PT denies any CP or SOB on arrival.  Pt states that she was starting to feel better after being admitted for 19 day.   Pt is resting on stretcher with call light within reach.  Breathing is non labored and no acute distress is noted.   What is the plan? Admit for cardiology consult  Any procedures or intervention occur? CT, labs, x-ray, blood cultures, antibiotics  Any safety concerns?? None    Mental Status:  Level of Consciousness: Alert (0)    Psych Assessment:   Psychosocial  Psychosocial (WDL): Within Defined Limits  Vital signs   Vitals:    06/13/24 1730 06/13/24 1745 06/13/24 1800 06/13/24 1815   BP: (!) 151/69 (!) 152/94 (!) 143/88 139/76   Pulse: 96 89 87 89   Resp: 13 27 19 17   Temp:       TempSrc:       SpO2: 99% 96% 92% 91%        Vitals:  Patient Vitals for the past 24 hrs:   BP Temp Temp src Pulse Resp SpO2   06/13/24 1815 139/76 -- -- 89 17 91 %   06/13/24 1800 (!) 143/88 -- -- 87 19 92 %   06/13/24 1745 (!) 152/94 -- -- 89 27 96 %   06/13/24 1730 (!) 151/69 -- -- 96 13 99 %   06/13/24 1715 (!) 146/76 -- -- 90 -- 94 %   06/13/24 1700 (!) 147/70 -- -- 86 20 94 %   06/13/24 1630 (!) 142/75 -- -- 92 30 95 %   06/13/24 1615 (!) 149/70 -- -- 98 14 94 %   06/13/24 1603 (!) 144/90 98.2 °F (36.8 °C) Oral (!) 107 20 95 %   06/13/24 1557 (!) 147/68 -- -- -- -- --   06/13/24 1555 -- -- -- 100 22 96 %      Visit Vitals  /76   Pulse 89   Temp 98.2 °F (36.8 °C) (Oral)   Resp 17   SpO2 91%        LDAs:  MD BELLE at [facility] CARDIAC CATH LAB    COLONOSCOPY      WITH POLYPECTOMY     COLONOSCOPY N/A 3/24/2022    COLONOSCOPY WITH BIOPSY performed by Naldo Milton MD at [facility] ENDO    CORONARY ANGIOPLASTY WITH STENT PLACEMENT      STENTS X4    EP DEVICE PROCEDURE N/A 5/21/2024    elaine / Ablation V-tach / rm 2003 performed by Juan Pablo Cordero MD at [facility] CARDIAC CATH LAB    SHOULDER SURGERY Right 2/1/16    arthroscopy& mini open rotator cuff repair    TUBAL LIGATION         PAST MEDICAL HISTORY       Past Medical History:   Diagnosis Date    Atrial fibrillation (HCC)     CAD (coronary artery disease)     CHF (congestive heart failure) (HCC)     Hx of myocardial infarction 01/01/2008    Hyperlipidemia     Hypertension     SOBOE (shortness of breath on exertion)        Labs:  Labs Reviewed   CBC WITH AUTO DIFFERENTIAL - Abnormal; Notable for the following components:       Result Value    RDW 15.7 (*)     Neutrophils % 67 (*)     Lymphocytes % 18 (*)     Monocytes % 13 (*)     Immature Granulocytes % 1 (*)     All other components within normal limits   COMPREHENSIVE METABOLIC PANEL - Abnormal; Notable for the following components:    Anion Gap 8 (*)     Glucose 100 (*)     Total Protein 6.5 (*)     Alkaline Phosphatase 111 (*)     ALT 50 (*)     AST 40 (*)     All other components within normal limits   TROPONIN - Abnormal; Notable for the following components:    Troponin, High Sensitivity 19 (*)     All other components within normal limits   BRAIN NATRIURETIC PEPTIDE - Abnormal; Notable for the following components:    Pro-BNP 2,776 (*)     All other components within normal limits   D-DIMER, QUANTITATIVE - Abnormal; Notable for the following components:    D-Dimer, Quant 3.82 (*)     All other components within normal limits   CULTURE, BLOOD 1   CULTURE, BLOOD 2   MRSA DNA PROBE, NASAL   LACTIC ACID   MAGNESIUM       Electronically signed by Jose Price RN on 6/13/2024 at 7:55 PM

## 2024-06-14 ENCOUNTER — APPOINTMENT (OUTPATIENT)
Dept: ULTRASOUND IMAGING | Age: 74
DRG: 309 | End: 2024-06-14
Payer: MEDICARE

## 2024-06-14 LAB
ALBUMIN FLD-MCNC: 1.5 G/DL
ANION GAP SERPL CALCULATED.3IONS-SCNC: 9 MMOL/L (ref 9–16)
BUN SERPL-MCNC: 12 MG/DL (ref 8–23)
CALCIUM SERPL-MCNC: 8.2 MG/DL (ref 8.6–10.4)
CHLORIDE SERPL-SCNC: 107 MMOL/L (ref 98–107)
CO2 SERPL-SCNC: 24 MMOL/L (ref 20–31)
CREAT SERPL-MCNC: 0.6 MG/DL (ref 0.5–0.9)
ERYTHROCYTE [DISTWIDTH] IN BLOOD BY AUTOMATED COUNT: 15.7 % (ref 11.8–14.4)
GFR, ESTIMATED: >90 ML/MIN/1.73M2
GLUCOSE FLD-MCNC: 89 MG/DL
GLUCOSE SERPL-MCNC: 84 MG/DL (ref 74–99)
HCT VFR BLD AUTO: 39.4 % (ref 36.3–47.1)
HGB BLD-MCNC: 12.2 G/DL (ref 11.9–15.1)
LDH FLD L TO P-CCNC: 113 U/L
MCH RBC QN AUTO: 30.9 PG (ref 25.2–33.5)
MCHC RBC AUTO-ENTMCNC: 31 G/DL (ref 28.4–34.8)
MCV RBC AUTO: 99.7 FL (ref 82.6–102.9)
MRSA, DNA, NASAL: NEGATIVE
NRBC BLD-RTO: 0 PER 100 WBC
PH FLUID: 8
PLATELET # BLD AUTO: 159 K/UL (ref 138–453)
PMV BLD AUTO: 9 FL (ref 8.1–13.5)
POTASSIUM SERPL-SCNC: 4 MMOL/L (ref 3.7–5.3)
PROT FLD-MCNC: 1.9 G/DL
RBC # BLD AUTO: 3.95 M/UL (ref 3.95–5.11)
SODIUM SERPL-SCNC: 140 MMOL/L (ref 136–145)
SPECIMEN DESCRIPTION: NORMAL
SPECIMEN TYPE: NORMAL
WBC OTHER # BLD: 6.8 K/UL (ref 3.5–11.3)

## 2024-06-14 PROCEDURE — 85027 COMPLETE CBC AUTOMATED: CPT

## 2024-06-14 PROCEDURE — 80048 BASIC METABOLIC PNL TOTAL CA: CPT

## 2024-06-14 PROCEDURE — 6360000002 HC RX W HCPCS: Performed by: PHYSICIAN ASSISTANT

## 2024-06-14 PROCEDURE — 6370000000 HC RX 637 (ALT 250 FOR IP): Performed by: PHYSICIAN ASSISTANT

## 2024-06-14 PROCEDURE — 99223 1ST HOSP IP/OBS HIGH 75: CPT | Performed by: INTERNAL MEDICINE

## 2024-06-14 PROCEDURE — 36415 COLL VENOUS BLD VENIPUNCTURE: CPT

## 2024-06-14 PROCEDURE — 0W993ZZ DRAINAGE OF RIGHT PLEURAL CAVITY, PERCUTANEOUS APPROACH: ICD-10-PCS | Performed by: RADIOLOGY

## 2024-06-14 PROCEDURE — 6370000000 HC RX 637 (ALT 250 FOR IP): Performed by: STUDENT IN AN ORGANIZED HEALTH CARE EDUCATION/TRAINING PROGRAM

## 2024-06-14 PROCEDURE — 99232 SBSQ HOSP IP/OBS MODERATE 35: CPT | Performed by: STUDENT IN AN ORGANIZED HEALTH CARE EDUCATION/TRAINING PROGRAM

## 2024-06-14 PROCEDURE — 2709999900 US THORACENTESIS

## 2024-06-14 PROCEDURE — 2580000003 HC RX 258: Performed by: PHYSICIAN ASSISTANT

## 2024-06-14 PROCEDURE — 2060000000 HC ICU INTERMEDIATE R&B

## 2024-06-14 PROCEDURE — 93005 ELECTROCARDIOGRAM TRACING: CPT | Performed by: EMERGENCY MEDICINE

## 2024-06-14 RX ADMIN — Medication 1 TABLET: at 15:45

## 2024-06-14 RX ADMIN — SODIUM CHLORIDE, PRESERVATIVE FREE 10 ML: 5 INJECTION INTRAVENOUS at 20:10

## 2024-06-14 RX ADMIN — MEXILETINE HYDROCHLORIDE 250 MG: 250 CAPSULE ORAL at 10:44

## 2024-06-14 RX ADMIN — ENOXAPARIN SODIUM 40 MG: 100 INJECTION SUBCUTANEOUS at 08:04

## 2024-06-14 RX ADMIN — SODIUM CHLORIDE, PRESERVATIVE FREE 10 ML: 5 INJECTION INTRAVENOUS at 08:05

## 2024-06-14 RX ADMIN — Medication 1 TABLET: at 05:57

## 2024-06-14 RX ADMIN — Medication 1 TABLET: at 10:44

## 2024-06-14 RX ADMIN — MEXILETINE HYDROCHLORIDE 250 MG: 250 CAPSULE ORAL at 18:14

## 2024-06-14 RX ADMIN — AZITHROMYCIN MONOHYDRATE 500 MG: 500 INJECTION, POWDER, LYOPHILIZED, FOR SOLUTION INTRAVENOUS at 18:12

## 2024-06-14 RX ADMIN — LIDOCAINE HYDROCHLORIDE: 20 SOLUTION ORAL at 15:43

## 2024-06-14 RX ADMIN — ASPIRIN 81 MG: 81 TABLET, COATED ORAL at 08:05

## 2024-06-14 RX ADMIN — Medication 1000 MG: at 08:04

## 2024-06-14 ASSESSMENT — ENCOUNTER SYMPTOMS
SHORTNESS OF BREATH: 0
EYE ITCHING: 0
DIARRHEA: 0
EYE DISCHARGE: 0
WHEEZING: 0
ABDOMINAL PAIN: 1
NAUSEA: 1
COLOR CHANGE: 0
FACIAL SWELLING: 0
CHEST TIGHTNESS: 0
ABDOMINAL DISTENTION: 0
BACK PAIN: 0
APNEA: 0
VOMITING: 0
CONSTIPATION: 1

## 2024-06-14 ASSESSMENT — PAIN SCALES - GENERAL: PAINLEVEL_OUTOF10: 0

## 2024-06-14 NOTE — CARE COORDINATION
Case Management Assessment  Initial Evaluation    Date/Time of Evaluation: 6/14/2024 4:28 PM  Assessment Completed by: Jihan Sagastume RN    If patient is discharged prior to next notation, then this note serves as note for discharge by case management.    Patient Name: Coni Baker                   YOB: 1950  Diagnosis: Bradycardia [R00.1]  Pleural effusion [J90]  Elevated d-dimer [R79.89]  Pneumonia of right lower lobe due to infectious organism [J18.9]                   Date / Time: 6/13/2024  3:58 PM    Patient Admission Status: Inpatient   Readmission Risk (Low < 19, Mod (19-27), High > 27): Readmission Risk Score: 18.5    Current PCP: Earlene Salcedo MD  PCP verified by CM? (P) Yes    Chart Reviewed: Yes      History Provided by: (P) Patient  Patient Orientation: (P) Alert and Oriented    Patient Cognition: (P) Alert    Hospitalization in the last 30 days (Readmission):  Yes    If yes, Readmission Assessment in CM Navigator will be completed.    Advance Directives:      Code Status: Full Code   Patient's Primary Decision Maker is: (P) Legal Next of Kin    Primary Decision Maker: Girma Bateman - Child - [phone]    Secondary Decision Maker: Gi Chau - Child - [phone]    Discharge Planning:    Patient lives with: (P) Family Members Type of Home: (P) House  Primary Care Giver: (P) Self  Patient Support Systems include: (P) Children   Current Financial resources: (P) Medicare  Current community resources:    Current services prior to admission: (P) Durable Medical Equipment, Home Care            Current DME: (P) Shower Chair, Walker, Wheelchair (grab bars)            Type of Home Care services:  (P) Nursing Services, OT, PT    ADLS  Prior functional level: (P) Independent in ADLs/IADLs  Current functional level: (P) Independent in ADLs/IADLs    PT AM-PAC:   /24  OT AM-PAC:   /24    Family can provide assistance at DC: (P) Yes  Would you like Case Management to discuss the

## 2024-06-14 NOTE — PROGRESS NOTES
Physician Progress Note      PATIENT:               NIALL IWN  CSN #:                  [id number]  :                       1950  ADMIT DATE:       2024 3:58 PM  DISCH DATE:  RESPONDING  PROVIDER #:        Andi Roche MD          QUERY TEXT:    Patient admitted with Bradycardia. Documentation reflects Pneumonia per ED   provider notes.  If possible, please document in the progress notes and   discharge summary if Pneumonia was:    The medical record reflects the following:  Risk Factors: CHF, Pleural effusion  Clinical Indicators: 74 yo F presented with Bradycardia.Recent admission for   same with pna-previously treated with Zosyn/Augmentin. CXR this admission-   Opacity concerning for atelectasis vs infection. Right pleural effusion. Per   ED physician notes -' Impression-Pneumonia of right lower lobe due to   infectious organism.'   Per H&P- ' Currently afebrile /no leukocytosis.She has   mild cough w/o congestion, chills or SOB.  HFrEF - No concern for acute   exacerbation.'  Sats 90s on room air.  MRSA swab pending. Bnp 2776  Treatment: IV Rocephin/Azithromycin. Planned thoracentesis    Thank you,  Baldemar [email or web address]  [email or web address]  office hours  m-f 7-3  Options provided:  -- Pneumonia confirmed after study  -- Pneumonia ruled out after study  -- Other - I will add my own diagnosis  -- Disagree - Not applicable / Not valid  -- Disagree - Clinically unable to determine / Unknown  -- Refer to Clinical Documentation Reviewer    PROVIDER RESPONSE TEXT:    Pneumonia ruled out after study.    Query created by: Ela Church on 2024 8:46 AM      Electronically signed by:  Andi Roche MD 2024 2:45   PM

## 2024-06-14 NOTE — PROGRESS NOTES
Patient here for ultrasound thoracentesis. Dr Harrington in room and consent signed. Ultrasound of right back done. Back prepped, draped and numbed with lidocaine. Needle in without difficulty and 800 ml of yellow fluid drained. Needle out and dressing on. Post scan has been completed. Specimen to be sent to lab. Patient tolerated well. Discharged to room with floor RN.

## 2024-06-14 NOTE — PROGRESS NOTES
[facility]  Office: [phone]  Joey Smith DO, Miguel Machado DO, Vikash Conklin DO, Alan Mcnulty DO, Joseph Thomas MD, Elsy Lacy MD, Eduardo Maddox MD, Kindra Mello MD,  Stef Awad MD, Esperanza Gandhi MD, Andi Hull MD,  Nolberto Allisno DO, Anju Ellis MD, Paco Solis MD, Girma Smith DO, Heydi Yu MD,  Rigo Holliday DO, Rajwinder Stafford MD, Ita Camacho MD, Berna Vides MD, Eloy Bronson MD,  Gigi Romero MD, Elis Mckeon MD, Alfred Conner MD, Steve Greer MD, Jaime Banegas MD, Sandhya Collins MD, Boris Frye DO, Ion Henry DO, Jass Wyman MD,  Caesar Leiva MD, Shirley Waterhouse, CNP,  Chastity Astorga CNP, Fran Fuentes, CNP,  Olive Melgoza, DNP, Madai Holley, CNP, Meghan Rodriguez, CNP, Mary Aleman CNP, Haleigh Francois, CNP, Kira Rosales, PA-C, Pippa Daily PA-C, Radha Mariee, CNP, Debbie Alegria, CNP, Michael Hurst, CNP, Meghna Holland, CNP, Viky White, CNP, Tammy Jarrett, CNS, Brooke Tipton, CNP, Carolyne Boston CNP, Tracy Schwab, CNP         [facility]   IN-PATIENT SERVICE   [facility]    Progress Note    6/14/2024    11:56 AM    Name:   oCni Baker  MRN:     [id number]     Acct:      [id number]   Room:   2026/2026-01   Day:  1  Admit Date:  6/13/2024  3:58 PM    PCP:   Earlene Salcedo MD  Code Status:  Full Code    Subjective:     C/C:   Chief Complaint   Patient presents with    Bradycardia     Interval History Status: improved.     Patient seen examined at bedside.  Family also at bedside.  Status post thoracentesis with 800 mL drained from chest.  No other acute issues at this time.    Brief History:     32-year-old female past medical history of COPD, hypertension, hyperlipidemia, coronary disease status post PCI, mildly reduced EF 45 to 50% with moderate MR, pulmonary hypertension, history of bradycardia with beta-blocker discontinued and was continued on mexiletine.   24.7 06/06/2024 04:45 AM    NBEA 0.2 06/06/2024 04:45 AM    PBEA 1.7 06/06/2024 01:17 AM    POHF7WYT 95.6 06/06/2024 04:45 AM    FIO2 40.0 06/06/2024 04:45 AM     Lab Results   Component Value Date/Time    SPECIAL R FA 6ML 06/13/2024 06:12 PM     Lab Results   Component Value Date/Time    CULTURE NO GROWTH 12 HOURS 06/13/2024 06:12 PM       Radiology:  US THORACENTESIS Which side should the procedure be performed? Right    Result Date: 6/14/2024  Successful ultrasound guided right thoracentesis.     CT CHEST PULMONARY EMBOLISM W CONTRAST    Result Date: 6/13/2024  1. No evidence of pulmonary embolism. 2. Moderate-sized right pleural effusion with lower lobe atelectasis. 3. Trace left pleural effusion with left basilar atelectasis.     XR CHEST PORTABLE    Result Date: 6/13/2024  Right pleural effusion with persistent opacity at the right lung base that could represent atelectasis or infection       Physical Examination:        Physical Exam  Constitutional:       Appearance: She is not ill-appearing or diaphoretic.   HENT:      Head: Normocephalic.      Right Ear: External ear normal.      Left Ear: External ear normal.      Nose: Nose normal.      Mouth/Throat:      Mouth: Mucous membranes are dry.   Eyes:      Extraocular Movements: Extraocular movements intact.      Pupils: Pupils are equal, round, and reactive to light.   Cardiovascular:      Rate and Rhythm: Normal rate and regular rhythm.      Heart sounds: Murmur heard.   Pulmonary:      Comments: Diminished breath sounds bilaterally, worse on the left side  Abdominal:      General: There is no distension.      Palpations: Abdomen is soft.      Tenderness: There is no abdominal tenderness.   Musculoskeletal:      Cervical back: Neck supple.      Right lower leg: No edema.      Left lower leg: No edema.   Skin:     General: Skin is warm and dry.      Capillary Refill: Capillary refill takes less than 2 seconds.   Neurological:      General: No focal deficit

## 2024-06-14 NOTE — H&P
[facility]  Office: [phone]  Joey Smith DO, Miguel Machado DO, Vikash Conklin DO, Alan Mcnulty DO, Joseph Thomas MD, Elsy Lacy MD, Eduardo Maddox MD, Kindra Mello MD,  Stef Awad MD, Esperanza Gandhi MD, Andi Hull MD,  Nolberto Allison DO, Anju Ellis MD, Paco Solis MD, Girma Smith DO, Heydi Yu MD,  Rigo Holliday DO, Rajwinder Stafford MD, Ita Camacho MD, Berna Vides MD, Eloy Bronson MD,  Gigi Romero MD, Elis Mckeon MD, Alfred Conner MD, Steve Greer MD, Jaime Banegas MD, Sandhya Collins MD, Boris Frye DO, Ion Henry DO, Jass Wyman MD,  Caesar Leiva MD, Shirley Waterhouse, CNP,  Chastity Astorga, CNP, Fran Fuentes, CNP,  Olive Melgoza, BOBBY, Madai Holley, CNP, Meghan Rodriguez, CNP, Viky White CNP, Mary Aleman, CNP, Haleigh Francois, CNP, Kira Rosales, PA-C, Pippa Daily PA-C, Meghna Holland, CNP, Tammy Jarrett, CNS, Brooke Tipton, CNP, Carolyne Boston, CNP, Tracy Schwab, CNP         [facility]   IN-PATIENT SERVICE   [facility]    HISTORY AND PHYSICAL EXAMINATION            Date:   6/13/2024  Patient name:  Coni Baker  Date of admission:  6/13/2024  3:58 PM  MRN:   [id number]  Account:  [id number]  YOB: 1950  PCP:    Earlene Salcedo MD  Room:   04/04  Code Status:    Prior    Chief Complaint:     Chief Complaint   Patient presents with    Bradycardia       History Obtained From:     patient, electronic medical record    History of Present Illness:     Coni Baker is a 73 y.o. Non- / non  female who presents with Bradycardia   and is admitted to the hospital for the management of Bradycardia.  Patient has history significant for COPD, hypertension, hyperlipidemia, CAD, ischemic cardiomyopathy.    Patient was recently admitted to the MICU 6/5/2024 for symptomatic bradycardia with a heart rate of 27.  Her EKG showed significant PVCs and she was started on  dopamine and lidocaine drips.  Patient was subsequently intubated due to respiratory distress.  Patient also noted to have pleural effusion which was likely contributory. Unable to perform thoracentesis as there was not enough fluid. There was also concern for pneumonia and patient was started on Zosyn, then de-escalated to Augmentin. Patient was extubated and discharged on mexiletine and metoprolol 6/9/2024.     Today patient noticed that her heart rate was in the 40s.  She was asymptomatic at that time.  She attempted to walk around and increase her HR but it remained in the 40s. She denies dizziness, lightheadedness, chest pain, or shortness of breath.     In the ED, EKG with bigeminy and PVCs, heart rate in the 40s.  Patient remained asymptomatic.  All other vital signs stable.  Lab workup grossly unremarkable.  BNP 2776 (around baseline), D-dimer elevated, troponin 19 (at baseline), and magnesium 2.0.  Blood cultures drawn.  Chest x-ray shows right pleural effusion with persistent opacity that could represent atelectasis or infection.  CT chest reiterates moderate size right pleural effusion.  Cardiology consulted.  Patient admitted to internal medicine for management.    Past Medical History:     Past Medical History:   Diagnosis Date    Atrial fibrillation (HCC)     CAD (coronary artery disease)     CHF (congestive heart failure) (HCC)     Hx of myocardial infarction 01/01/2008    Hyperlipidemia     Hypertension     SOBOE (shortness of breath on exertion)         Past Surgical History:     Past Surgical History:   Procedure Laterality Date    CARDIAC PROCEDURE N/A 5/16/2024    diane / [facility] CARDIAC CATH LAB    COLONOSCOPY      WITH POLYPECTOMY     COLONOSCOPY N/A 3/24/2022    COLONOSCOPY WITH BIOPSY performed by Naldo Milton MD at [facility] ENDO    CORONARY ANGIOPLASTY WITH STENT PLACEMENT      STENTS X4    EP DEVICE

## 2024-06-14 NOTE — DISCHARGE INSTR - LAB
Dr Leland Hernandez  [facility] Electrophysiology  Electrophysiology Services  Cardiovascular Center  [address]  [address] [place] [address] [phone]

## 2024-06-14 NOTE — CONSULTS
[facility]   Consult Note                 Date:   6/14/2024  Patient name: Coni Baker  Date of admission:  6/13/2024  3:58 PM  MRN:   [id number]  YOB: 1950    Reason for Consult:  cardiac eval    CHIEF COMPLAINT: Bradycardia    History Obtained From:  Patient     HISTORY OF PRESENT ILLNESS:    The patient is a 73 y.o. female hospital due to bradycardia.  Patient noted her heart to be in the 40s, asymptomatic, which did not improve with ambulation on exertion.  She reports no symptoms develop.  proBNP 2776, D-dimer elevated, troponin negative magnesium 2.  Number.  Chest x-ray obtained shows right pleural effusion with persistent opacity.  CT chest obtained showed pleural effusion.  Cardiac consulted    Of note patient was recently admitted to symptomatic bradycardia with heart rate 27 and she was started on dopamine and lidocaine, intubated during that hospitalization.  An emergent respiratory distress.  She did have pleural effusion at that time patient and pneumonia which was managed with empiric abx and was discharged 6/9 on mexiletine and metoprolol        Past Medical History:   has a past medical history of Atrial fibrillation (HCC), CAD (coronary artery disease), CHF (congestive heart failure) ([facility]), Hx of myocardial infarction, Hyperlipidemia, Hypertension, and SOBOE (shortness of breath on exertion).    Past Surgical History:   has a past surgical history that includes Tubal ligation; Coronary angioplasty with stent; Colonoscopy; shoulder surgery (Right, 2/1/16); Colonoscopy (N/A, 3/24/2022); Cardiac procedure (N/A, 5/16/2024); and ep device procedure (N/A, 5/21/2024).     Home Medications:    Prior to Admission medications    Medication Sig Start Date End Date Taking? Authorizing Provider   mexiletine (MEXITIL) 250 MG capsule Take 1 capsule by mouth 3 times daily (with meals) 6/10/24   Luis Ruelas MD   budesonide-formoterol (SYMBICORT) 160-4.5 MCG/ACT AERO Inhale  Moderately reduced left ventricular systolic function with a visually estimated EF of 35 - 40%. See diagram for wall motion findings.    Right Ventricle: Reduced systolic function.    Aortic Valve: Valve structure is normal.    Mitral Valve: Mild regurgitation.    Pericardium: No pericardial effusion.    Image quality is adequate. Contrast used: Definity.      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 Cordero  History of CAD s/p PCI to LAD with patent stents as per last [facility] 05/24  Moderate cardiomyopathy with EF 35 to 40%, ischemic versus PVCs induced  Hypertension  Hyperlipidemia  Bilateral pleural effusion possible right-sided pneumonia, s/p IR thoracentesis 800 cc right sided.       RECOMMENDATIONS:  On Mexilitine and BB. Hold BB for now.  Consult Dr Cordero for evaluation  Telemonitoring  Orthostats.   Further recommendations to follow      Discussed with patient and nursing.    Heidi Dillard MD  Fellow, Cardiovascular Diseases   [facility]   Pager - [phone]    Attending note.   The patient was seen and examined, agree with evaluation and plan as documented above by Dr. Gordon.  Denies any exertional chest pain or shortness of breath.  Does have sinus bradycardia with frequent PVCs with failed ablation before.  Has been on mexiletine.  History of coronary artery disease and PCI and stable.  Continue current medications.  Will consult Dr. Cordero for further assessment and plan.

## 2024-06-14 NOTE — CONSULTS
magnesium sulfate 2000 mg in 50 mL IVPB premix  2,000 mg IntraVENous PRN Pippa Daily PA        enoxaparin (LOVENOX) injection 40 mg  40 mg SubCUTAneous Daily Pippa Daily PA   40 mg at 06/14/24 0804    ondansetron (ZOFRAN-ODT) disintegrating tablet 4 mg  4 mg Oral Q8H PRN Pippa Daily PA        Or    ondansetron (ZOFRAN) injection 4 mg  4 mg IntraVENous Q6H PRN Pippa Daily PA        polyethylene glycol (GLYCOLAX) packet 17 g  17 g Oral Daily PRN Pippa Daily PA        acetaminophen (TYLENOL) tablet 650 mg  650 mg Oral Q6H PRN Pippa Daily PA        Or    acetaminophen (TYLENOL) suppository 650 mg  650 mg Rectal Q6H PRN Pippa Daily PA        aspirin EC tablet 81 mg  81 mg Oral Daily Pippa Daily PA   81 mg at 06/14/24 0805    budesonide-formoterol (SYMBICORT) 160-4.5 MCG/ACT inhaler 2 puff  2 puff Inhalation BID RT Pippa Daily PA        calcium carb-cholecalciferol 600-10 MG-MCG per tab 1 tablet  1 tablet Oral TID AC Pippa Daily PA   1 tablet at 06/14/24 1545    cefTRIAXone (ROCEPHIN) 1000 mg in sterile water 10 mL IV syringe  1,000 mg IntraVENous Q24H Pippa Daily PA   1,000 mg at 06/14/24 0804    azithromycin (ZITHROMAX) 500 mg in sodium chloride 0.9 % 250 mL IVPB ([id number])  500 mg IntraVENous Q24H Pippa Daily PA        mexiletine (MEXITIL) capsule 250 mg  250 mg Oral TID WC Pippa Daily PA   250 mg at 06/14/24 1044    vancomycin (VANCOCIN) 1,500 mg in sodium chloride 0.9 % 250 mL IVPB ([id number])  1,500 mg IntraVENous Once Pippa Daily PA   Held at 06/13/24 2229     Allergies:  Ciprofloxacin    Social History:    Social History     Socioeconomic History    Marital status:      Spouse name: Not on file    Number of children: Not on file    Years of education: Not on file    Highest education level: Not on file   Occupational History    Not on file   Tobacco Use    Smoking status: Every Day     Current packs/day: 1.00     Average packs/day: 1 pack/day for 50.0 years (50.0 ttl pk-yrs)     06/14/2024 07:06 AM     06/14/2024 07:06 AM    MPV 9.0 06/14/2024 07:06 AM     CMP:  Lab Results   Component Value Date/Time     06/14/2024 07:06 AM    K 4.0 06/14/2024 07:06 AM     06/14/2024 07:06 AM    CO2 24 06/14/2024 07:06 AM    BUN 12 06/14/2024 07:06 AM    CREATININE 0.6 06/14/2024 07:06 AM    GFRAA >60 02/14/2022 08:29 AM    LABGLOM >90 06/14/2024 07:06 AM    LABGLOM >60 07/03/2023 11:28 AM    GLUCOSE 84 06/14/2024 07:06 AM    CALCIUM 8.2 06/14/2024 07:06 AM     Magnesium:    Lab Results   Component Value Date/Time    MG 2.0 06/13/2024 04:25 PM     PTT:  No results found for: \"APTT\"  TSH:    Lab Results   Component Value Date/Time    TSH 1.29 06/05/2024 07:23 PM     BNP:   Recent Labs     06/13/24  1625   PROBNP 2,776*     BMP:  Lab Results   Component Value Date/Time     06/14/2024 07:06 AM    K 4.0 06/14/2024 07:06 AM     06/14/2024 07:06 AM    CO2 24 06/14/2024 07:06 AM    BUN 12 06/14/2024 07:06 AM    CREATININE 0.6 06/14/2024 07:06 AM    CALCIUM 8.2 06/14/2024 07:06 AM    GFRAA >60 02/14/2022 08:29 AM    LABGLOM >90 06/14/2024 07:06 AM    LABGLOM >60 07/03/2023 11:28 AM    GLUCOSE 84 06/14/2024 07:06 AM     LIVER PROFILE:  Recent Labs     06/13/24  1625   AST 40*   ALT 50*   ALKPHOS 111*   BILITOT 0.7   ALBUMIN 3.8     FLP:    Lab Results   Component Value Date/Time    CHOL 134 09/15/2022 10:00 AM    TRIG 57 09/15/2022 10:00 AM    HDL 57 07/03/2023 11:28 AM       Echo (6/06/2024): EF 35-40%, mild MR, no effusion    Echo (5/17/2024 (: EF 45 to 50%, grade 2 diastolic dysfunction, mild left atrial enlargement mild to moderate MR, moderate TR, RVSP 70     Left heart cath (10/16/2024): LAD proximal stent patent, otherwise 20 to 30% luminal irregularities     EKG (5/17/2024): Sinus rhythm 70 bpm, frequent PVCs and bigeminy, probable anteroseptal MI old     EKG (5/17/2024 (: Sinus rhythm 70 bpm, frequent PVCs with couplets        ASSESSMENT:      Frequent monomorphic PVCs  2.

## 2024-06-14 NOTE — BRIEF OP NOTE
Brief Postoperative Note    Coni Baker  YOB: 1950  [id number]    Pre-operative Diagnosis: Right pleural effusion    Post-operative Diagnosis: Same    Procedure: Thoracentesis    Anesthesia: Local    Surgeons/Assistants: Girma Harrington MD     Estimated Blood Loss: minimal    Complications: none immediate    Specimens: were obtained    Findings: U/S guided right thoracentesis yielding 800 mL jameson fluid.      Electronically signed by Girma Harrington MD on 6/14/2024 at 9:20 AM

## 2024-06-14 NOTE — PROGRESS NOTES
Writer in for bedside shift report, patient and family stated they thought she was going home tonight. Writer looked into patient notes and messaged on call NP Shirley Waterhouse regarding this. NP stated that they were planning to keep her another night since she is still on IV antibiotics and reevaluate tomorrow. Patient and family made aware and are understanding of plan. Plan of care to continue.

## 2024-06-14 NOTE — ED PROVIDER NOTES
[facility] ED  Emergency Department  Faculty Sign-Out Addendum     Care of Coni Baker was assumed from previous attending and is being seen for Bradycardia  .  The patient's initial evaluation and plan have been discussed with the prior provider who initially evaluated the patient.      I reviewed the resident’s note and agree with the documented findings and plan of care. Any areas of disagreement are noted on the chart. I was personally present for the key portions of any procedures. I have documented in the chart those procedures where I was not present during the key portions. I have reviewed the emergency nurses triage note. I agree with the chief complaint, past medical history, past surgical history, allergies, medications, social and family history as documented unless otherwise noted below.      EMERGENCY DEPARTMENT COURSE / MEDICAL DECISION MAKING:       MEDICATIONS GIVEN:  Orders Placed This Encounter   Medications    magnesium sulfate 2000 mg in 50 mL IVPB premix    iopamidol (ISOVUE-370) 76 % injection 75 mL    ondansetron (ZOFRAN) injection 4 mg    azithromycin (ZITHROMAX) 500 mg in sodium chloride 0.9 % 250 mL IVPB ([id number])     Order Specific Question:   Antimicrobial Indications     Answer:   Pneumonia (HAP)    cefepime (MAXIPIME) 1,000 mg in sodium chloride 0.9 % 50 mL IVPB (mini-bag)     Order Specific Question:   Antimicrobial Indications     Answer:   Pneumonia (HAP)    vancomycin (VANCOCIN) 1,500 mg in sodium chloride 0.9 % 250 mL IVPB ([id number])     Order Specific Question:   Antimicrobial Indications     Answer:   Pneumonia (HAP)    vancomycin (VANCOCIN) intermittent dosing (placeholder)     Order Specific Question:   Antimicrobial Indications     Answer:   Pneumonia (HAP)       LABS / RADIOLOGY:     Labs Reviewed   CBC WITH AUTO DIFFERENTIAL - Abnormal; Notable for the following components:       Result Value    RDW 15.7 (*)     Neutrophils % 67 (*)     Lymphocytes % 18  for evaluation.  No evidence of intraluminal filling defect to suggest pulmonary embolism.  Main pulmonary artery is normal in caliber. Mediastinum: No evidence of mediastinal lymphadenopathy.  The heart and pericardium demonstrate no acute abnormality.  There is no acute abnormality of the thoracic aorta. Lungs/pleura: The lungs are without acute process.  No focal consolidation or pulmonary edema.  There is moderate right-sided and mild left-sided pleural effusion.  Endotracheal tube and feeding tube are optimally positioned. Upper Abdomen: Limited images of the upper abdomen are unremarkable. Soft Tissues/Bones: No acute bone or soft tissue abnormality.     No evidence of pulmonary embolism or acute pulmonary abnormality. Bilateral pleural effusions right greater than left.     XR CHEST PORTABLE    Result Date: 6/5/2024  EXAMINATION: ONE XRAY VIEW OF THE CHEST 6/5/2024 6:47 pm COMPARISON: 06/05/2024, 03:43 p.m. HISTORY: ORDERING SYSTEM PROVIDED HISTORY: confirm placement TECHNOLOGIST PROVIDED HISTORY: confirm placement FINDINGS: There is significant worsening of pulmonary opacification, with pulmonary vascular congestion evidence of pleural effusions.  The findings are in keeping with CHF.  ET tube is somewhat low in lower position and should be withdrawn by 3 cm.     1. ET tube somewhat low in position and should be withdrawn by 3 cm. 2. Significant worsening of CHF, with evidence of pleural effusions.     XR CHEST PORTABLE    Result Date: 6/5/2024  EXAMINATION: ONE XRAY VIEW OF THE CHEST 6/5/2024 2:49 pm COMPARISON: 05/16/2024 and 05/24/2024. HISTORY: ORDERING SYSTEM PROVIDED HISTORY: symptomatic bradycardia, short of breath TECHNOLOGIST PROVIDED HISTORY: symptomatic bradycardia, short of breath FINDINGS: There is new right basilar airspace consolidation and of small to moderate right pleural effusion.  The left lung and costophrenic angle are clear.  The cardiomediastinal silhouette, pulmonary vessels and

## 2024-06-14 NOTE — CARE COORDINATION
06/14/24 1628   Readmission Assessment   Number of Days since last admission? 1-7 days   Previous Disposition Home with Home Health   Who is being Interviewed Patient   What was the patient's/caregiver's perception as to why they think they needed to return back to the hospital? Other (Comment)  (bradycardia)   Did you visit your Primary Care Physician after you left the hospital, before you returned this time? No   Why weren't you able to visit your PCP? Could not get an appointment   Did you see a specialist, such as Cardiac, Pulmonary, Orthopedic Physician, etc. after you left the hospital? No  (attempted to call cardiologist)   Who advised the patient to return to the hospital? Home Health Staff   Does the patient report anything that got in the way of taking their medications? No   In our efforts to provide the best possible care to you and others like you, can you think of anything that we could have done to help you after you left the hospital the first time, so that you might not have needed to return so soon? Other (Comment)  (fix bradycardia)

## 2024-06-15 VITALS
WEIGHT: 166.89 LBS | SYSTOLIC BLOOD PRESSURE: 137 MMHG | OXYGEN SATURATION: 94 % | DIASTOLIC BLOOD PRESSURE: 79 MMHG | HEIGHT: 61 IN | BODY MASS INDEX: 31.51 KG/M2 | HEART RATE: 90 BPM | TEMPERATURE: 98.2 F | RESPIRATION RATE: 18 BRPM

## 2024-06-15 LAB
ANION GAP SERPL CALCULATED.3IONS-SCNC: 7 MMOL/L (ref 9–16)
BUN SERPL-MCNC: 11 MG/DL (ref 8–23)
CALCIUM SERPL-MCNC: 8.8 MG/DL (ref 8.6–10.4)
CHLORIDE SERPL-SCNC: 107 MMOL/L (ref 98–107)
CO2 SERPL-SCNC: 27 MMOL/L (ref 20–31)
CREAT SERPL-MCNC: 0.8 MG/DL (ref 0.5–0.9)
EKG ATRIAL RATE: 100 BPM
EKG ATRIAL RATE: 91 BPM
EKG ATRIAL RATE: 91 BPM
EKG P AXIS: 56 DEGREES
EKG P AXIS: 62 DEGREES
EKG P AXIS: 62 DEGREES
EKG P-R INTERVAL: 148 MS
EKG P-R INTERVAL: 164 MS
EKG P-R INTERVAL: 174 MS
EKG Q-T INTERVAL: 356 MS
EKG Q-T INTERVAL: 360 MS
EKG Q-T INTERVAL: 370 MS
EKG QRS DURATION: 80 MS
EKG QRS DURATION: 86 MS
EKG QRS DURATION: 86 MS
EKG QTC CALCULATION (BAZETT): 442 MS
EKG QTC CALCULATION (BAZETT): 455 MS
EKG QTC CALCULATION (BAZETT): 459 MS
EKG R AXIS: -6 DEGREES
EKG R AXIS: -6 DEGREES
EKG R AXIS: 1 DEGREES
EKG T AXIS: 59 DEGREES
EKG T AXIS: 66 DEGREES
EKG T AXIS: 97 DEGREES
EKG VENTRICULAR RATE: 100 BPM
EKG VENTRICULAR RATE: 91 BPM
EKG VENTRICULAR RATE: 91 BPM
ERYTHROCYTE [DISTWIDTH] IN BLOOD BY AUTOMATED COUNT: 15.5 % (ref 11.8–14.4)
GFR, ESTIMATED: 82 ML/MIN/1.73M2
GLUCOSE SERPL-MCNC: 113 MG/DL (ref 74–99)
HCT VFR BLD AUTO: 40.7 % (ref 36.3–47.1)
HGB BLD-MCNC: 12.7 G/DL (ref 11.9–15.1)
LDH SERPL-CCNC: 296 U/L (ref 135–214)
MCH RBC QN AUTO: 31.1 PG (ref 25.2–33.5)
MCHC RBC AUTO-ENTMCNC: 31.2 G/DL (ref 28.4–34.8)
MCV RBC AUTO: 99.8 FL (ref 82.6–102.9)
NRBC BLD-RTO: 0 PER 100 WBC
PLATELET # BLD AUTO: 172 K/UL (ref 138–453)
PMV BLD AUTO: 9 FL (ref 8.1–13.5)
POTASSIUM SERPL-SCNC: 4.1 MMOL/L (ref 3.7–5.3)
RBC # BLD AUTO: 4.08 M/UL (ref 3.95–5.11)
SODIUM SERPL-SCNC: 141 MMOL/L (ref 136–145)
WBC OTHER # BLD: 5.6 K/UL (ref 3.5–11.3)

## 2024-06-15 PROCEDURE — 94640 AIRWAY INHALATION TREATMENT: CPT

## 2024-06-15 PROCEDURE — 6370000000 HC RX 637 (ALT 250 FOR IP): Performed by: PHYSICIAN ASSISTANT

## 2024-06-15 PROCEDURE — 94761 N-INVAS EAR/PLS OXIMETRY MLT: CPT

## 2024-06-15 PROCEDURE — 85027 COMPLETE CBC AUTOMATED: CPT

## 2024-06-15 PROCEDURE — 2580000003 HC RX 258: Performed by: PHYSICIAN ASSISTANT

## 2024-06-15 PROCEDURE — 99233 SBSQ HOSP IP/OBS HIGH 50: CPT | Performed by: NURSE PRACTITIONER

## 2024-06-15 PROCEDURE — 99239 HOSP IP/OBS DSCHRG MGMT >30: CPT | Performed by: INTERNAL MEDICINE

## 2024-06-15 PROCEDURE — 83615 LACTATE (LD) (LDH) ENZYME: CPT

## 2024-06-15 PROCEDURE — 36415 COLL VENOUS BLD VENIPUNCTURE: CPT

## 2024-06-15 PROCEDURE — 6360000002 HC RX W HCPCS: Performed by: PHYSICIAN ASSISTANT

## 2024-06-15 PROCEDURE — 80048 BASIC METABOLIC PNL TOTAL CA: CPT

## 2024-06-15 RX ADMIN — SODIUM CHLORIDE, PRESERVATIVE FREE 10 ML: 5 INJECTION INTRAVENOUS at 08:11

## 2024-06-15 RX ADMIN — BUDESONIDE AND FORMOTEROL FUMARATE DIHYDRATE 2 PUFF: 160; 4.5 AEROSOL RESPIRATORY (INHALATION) at 08:14

## 2024-06-15 RX ADMIN — MEXILETINE HYDROCHLORIDE 250 MG: 250 CAPSULE ORAL at 12:07

## 2024-06-15 RX ADMIN — Medication 1000 MG: at 08:14

## 2024-06-15 RX ADMIN — ASPIRIN 81 MG: 81 TABLET, COATED ORAL at 08:10

## 2024-06-15 RX ADMIN — Medication 1 TABLET: at 08:11

## 2024-06-15 RX ADMIN — MEXILETINE HYDROCHLORIDE 250 MG: 250 CAPSULE ORAL at 08:10

## 2024-06-15 NOTE — PROGRESS NOTES
Patients heart rate reading 105 on monitor however when taking radial pulse her heart rate is 49. Cardiology and primary notified via secure message regarding this. Plan of care to continue.

## 2024-06-15 NOTE — DISCHARGE INSTRUCTIONS
-Make an appoint with your primary care physician within 1 week of discharge for evaluation of your symptoms.  Follow-up with your cardiologist for management of your bradycardia and for management of your reduced ejection fraction.  If you notice recurrent lower extremity edema or shortness of breath speak to your doctor. About Lasix.  Check your weights daily.  Recommend following up with pulmonologist for repeat imaging and follow-up for pleural effusion.  Stop taking your beta-blocker on discharge

## 2024-06-15 NOTE — CARE COORDINATION
Patient's son expressed concern with the patient possibly being discharged on today. States he wants her to have a pacemaker placed. Informed the patients son that she is her own decision maker; therefore, she will determine her care.    0930 Met with the patient. States her granddaughter lives with her and provides support. Also has a daughter that helps her. States she has medical equipment at home - denies needs at this time.    1654  Called Demetrice with [facility]. Left voice message informing her the established patient is being discharged on today.    1701 AVS faxed to [facility].

## 2024-06-15 NOTE — PLAN OF CARE
Problem: Discharge Planning  Goal: Discharge to home or other facility with appropriate resources  6/15/2024 1715 by Sandhya Houston RN  Outcome: Completed  6/15/2024 0942 by Sandhya Houston RN  Outcome: Progressing  Flowsheets (Taken 6/15/2024 0810)  Discharge to home or other facility with appropriate resources: Identify barriers to discharge with patient and caregiver  6/15/2024 0609 by Iveth Dale RN  Outcome: Progressing     Problem: Skin/Tissue Integrity  Goal: Absence of new skin breakdown  Description: 1.  Monitor for areas of redness and/or skin breakdown  2.  Assess vascular access sites hourly  3.  Every 4-6 hours minimum:  Change oxygen saturation probe site  4.  Every 4-6 hours:  If on nasal continuous positive airway pressure, respiratory therapy assess nares and determine need for appliance change or resting period.  6/15/2024 1715 by Sandhya Houston RN  Outcome: Completed  6/15/2024 0942 by Sandhya Houston RN  Outcome: Progressing  6/15/2024 0609 by Iveth Dale RN  Outcome: Progressing     Problem: Safety - Adult  Goal: Free from fall injury  6/15/2024 1715 by Sandhya Houston RN  Outcome: Completed  6/15/2024 0942 by Sandhya Houston RN  Outcome: Progressing  6/15/2024 0609 by Iveth Dale RN  Outcome: Progressing     Problem: ABCDS Injury Assessment  Goal: Absence of physical injury  6/15/2024 1715 by Sandhya Houston RN  Outcome: Completed  6/15/2024 0942 by Sandhya Houston RN  Outcome: Progressing  6/15/2024 0609 by Iveth Dale RN  Outcome: Progressing     Problem: Respiratory - Adult  Goal: Achieves optimal ventilation and oxygenation  6/15/2024 1715 by Sandhya Houston RN  Outcome: Completed  6/15/2024 0942 by Sandhya Houston RN  Outcome: Progressing  6/15/2024 0817 by Dayana Dawson RCP  Outcome: Progressing

## 2024-06-15 NOTE — PLAN OF CARE
Problem: Discharge Planning  Goal: Discharge to home or other facility with appropriate resources  6/15/2024 0942 by Sandhya Houston RN  Outcome: Progressing  Flowsheets (Taken 6/15/2024 0810)  Discharge to home or other facility with appropriate resources: Identify barriers to discharge with patient and caregiver  6/15/2024 0609 by Iveth Dale RN  Outcome: Progressing     Problem: Skin/Tissue Integrity  Goal: Absence of new skin breakdown  Description: 1.  Monitor for areas of redness and/or skin breakdown  2.  Assess vascular access sites hourly  3.  Every 4-6 hours minimum:  Change oxygen saturation probe site  4.  Every 4-6 hours:  If on nasal continuous positive airway pressure, respiratory therapy assess nares and determine need for appliance change or resting period.  6/15/2024 0942 by Sandhya Houston RN  Outcome: Progressing  6/15/2024 0609 by Iveth Dale RN  Outcome: Progressing     Problem: Safety - Adult  Goal: Free from fall injury  6/15/2024 0942 by Sandhya Houston RN  Outcome: Progressing  6/15/2024 0609 by Iveth Dale RN  Outcome: Progressing     Problem: ABCDS Injury Assessment  Goal: Absence of physical injury  6/15/2024 0942 by Sandhya Houston RN  Outcome: Progressing  6/15/2024 0609 by Iveth Dale RN  Outcome: Progressing     Problem: Respiratory - Adult  Goal: Achieves optimal ventilation and oxygenation  6/15/2024 0942 by Sandhya Houston RN  Outcome: Progressing  6/15/2024 0817 by Dayana Dawson RCP  Outcome: Progressing

## 2024-06-15 NOTE — PROGRESS NOTES
Discharge complete. IV and telemetry removed per protocol. AVS reviewed. Patient verbalizes understanding. Questions answered.Patient transported to private vehicle via wheelchair in stable condition.

## 2024-06-15 NOTE — DISCHARGE INSTR - COC
cardiac cath Z98.890    Acute cystitis without hematuria N30.00    Ischemic cardiomyopathy I25.5    S/P coronary artery stent placement Z95.5    Cardiomyopathy (HCC) I42.9    Transaminitis R74.01    Symptomatic bradycardia R00.1    Bradycardia R00.1    Pericardial effusion I31.39    Pleural effusion J90       Isolation/Infection:   Isolation            No Isolation          Patient Infection Status       None to display                     Nurse Assessment:  Last Vital Signs: /67   Pulse 82   Temp 98 °F (36.7 °C) (Oral)   Resp 18   Ht 1.549 m (5' 0.98\")   Wt 75.7 kg (166 lb 14.2 oz)   SpO2 94%   BMI 31.55 kg/m²     Last documented pain score (0-10 scale): Pain Level: 0  Last Weight:   Wt Readings from Last 1 Encounters:   06/14/24 75.7 kg (166 lb 14.2 oz)     Mental Status:  oriented and alert    IV Access:  - None    Nursing Mobility/ADLs:  Walking   Assisted  Transfer  Assisted  Bathing  Independent  Dressing  Independent  Toileting  Independent  Feeding  Independent  Med Admin  Independent  Med Delivery   whole    Wound Care Documentation and Therapy:        Elimination:  Continence:   Bowel: Yes  Bladder: Yes  Urinary Catheter: None   Colostomy/Ileostomy/Ileal Conduit: No       Date of Last BM:     Intake/Output Summary (Last 24 hours) at 6/15/2024 1629  Last data filed at 6/15/2024 1536  Gross per 24 hour   Intake 236 ml   Output 350 ml   Net -114 ml     No intake/output data recorded.    Safety Concerns:     [place] [place][id number][place]    Impairments/Disabilities:      None    Nutrition Therapy:  Current Nutrition Therapy:   - Oral Diet:  General    Routes of Feeding: Oral  Liquids: No Restrictions  Daily Fluid Restriction: no  Last Modified Barium Swallow with Video (Video Swallowing Test): not done    Treatments at the Time of Hospital Discharge:   Respiratory Treatments: none  Oxygen Therapy:  is not on home oxygen therapy.  Ventilator:    - No ventilator support    Rehab Therapies:

## 2024-06-15 NOTE — PROGRESS NOTES
[facility]   Progress Note                   Date:   6/15/2024  Patient name: Coni Baker  Date of admission:  6/13/2024  3:58 PM  MRN:   [id number]  YOB: 1950  PCP: Earlene Salcedo MD    Reason for Admission: Bradycardia [R00.1]  Pleural effusion [J90]  Elevated d-dimer [R79.89]  Pneumonia of right lower lobe due to infectious organism [J18.9]    Subjective:       Clinical Changes / Abnormalities: Pt seen and examined in the room.  Patient resting in bed with family at bedside. Pt denies any CP or sob.  Labs, vitals and tele reviewed- SR with PVCs    Medications:   Scheduled Meds:   sodium chloride flush  5-40 mL IntraVENous 2 times per day    enoxaparin  40 mg SubCUTAneous Daily    aspirin  81 mg Oral Daily    budesonide-formoterol  2 puff Inhalation BID RT    calcium carb-cholecalciferol  1 tablet Oral TID AC    cefTRIAXone (ROCEPHIN) IV  1,000 mg IntraVENous Q24H    azithromycin  500 mg IntraVENous Q24H    mexiletine  250 mg Oral TID WC     Continuous Infusions:   sodium chloride       CBC:   Recent Labs     06/13/24  1625 06/14/24  0706 06/15/24  0703   WBC 9.4 6.8 5.6   HGB 13.5 12.2 12.7    159 172     BMP:    Recent Labs     06/13/24  1625 06/14/24  0706 06/15/24  0703    140 141   K 4.6 4.0 4.1    107 107   CO2 23 24 27   BUN 19 12 11   CREATININE 0.8 0.6 0.8   GLUCOSE 100* 84 113*     Hepatic:   Recent Labs     06/13/24  1625   AST 40*   ALT 50*   BILITOT 0.7   ALKPHOS 111*     Troponin:   Recent Labs     06/13/24  1625   TROPHS 19*     BNP: No results for input(s): \"BNP\" in the last 72 hours.  Lipids: No results for input(s): \"CHOL\", \"HDL\" in the last 72 hours.    Invalid input(s): \"LDLCALCU\"  INR: No results for input(s): \"INR\" in the last 72 hours.    Objective:   Vitals: /67   Pulse 88   Temp 97.9 °F (36.6 °C) (Oral)   Resp 18   Ht 1.549 m (5' 0.98\")   Wt 75.7 kg (166 lb 14.2 oz)   SpO2 94%   BMI 31.55 kg/m²     General appearance:  alert and cooperative with exam  HEENT: Head: Normocephalic, no lesions, without obvious abnormality.  Neck: no JVD, trachea midline, no adenopathy  Lungs: Clear to auscultation  Heart: Regular rate and rhythm, s1/s2 auscultated, no murmurs  Abdomen: soft, non-tender, bowel sounds active  Extremities: no edema  Neurologic: not done    Cath  5/16/2024  Left main: Normal with 0% stenosis.     LAD: Patent proximal and mid stents with luminal irregularities of 20 to 30%     LCX: Luminal irregularities of 20 to 30%     RCA: Normal with 0% stenosis.     The LV gram was performed in the GUNTER 30 position.   LVEF: 35%. LV Wall Motion: Anteroapical akinesis     Conclusions:  Patent prior LAD stents.  Minimal LCx and RCA disease  Moderately reduced LV systolic function        Echo  6/5/2024 TTE    Left Ventricle: Moderately reduced left ventricular systolic function with a visually estimated EF of 35 - 40%. See diagram for wall motion findings.    Right Ventricle: Reduced systolic function.    Aortic Valve: Valve structure is normal.    Mitral Valve: Mild regurgitation.    Pericardium: No pericardial effusion.    Image quality is adequate. Contrast used: Definity.    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 Cordero  History of CAD s/p PCI to LAD with patent stents as per last [facility] 05/24  Moderate cardiomyopathy with EF 35 to 40%, ischemic versus PVCs induced  Hypertension  Hyperlipidemia  Bilateral pleural effusion possible right-sided pneumonia, s/p IR thoracentesis 800 cc right sided.    Patient Active Problem List:     Polyp of sigmoid colon     Mixed hyperlipidemia     Primary hypertension     Ventricular tachycardia (HCC)     Shortness of breath     S/P cardiac cath     Acute cystitis without hematuria     Ischemic cardiomyopathy     S/P coronary artery stent placement     Cardiomyopathy (HCC)     Transaminitis

## 2024-06-15 NOTE — DISCHARGE SUMMARY
[facility]  Office: [phone]  Joey Smith DO, Miguel Machado DO, Vikash Conklin DO, Alan Mcnulty DO, Joseph Thomas MD, Elsy Lacy MD, Eduardo Maddox MD, Kindra Mello MD,  Stef Awad MD, Esperanza Gandhi MD, Andi Hull MD,  Nolberto Allison DO, Anju Ellis MD, Paco Solis MD, Girma Smith DO, Heydi Yu MD,  Rigo Holliday DO, Rajwinder Stafford MD, Ita Camacho MD, Berna Vides MD, Eloy Bronson MD,  Gigi Romero MD, Elis Mckeon MD, Alfred Conner MD, Steve Greer MD, Jaime Banegas MD, Sandhya Collins MD, Boris Frye DO, Ion Henry DO, Jass Wyman MD,  Caesar Leiva MD, Shirley Waterhouse, CNP,  Chastity Astorga CNP, Fran Fuentes, CNP,  Olive Melgoza, DNP, Madai Holley, CNP, Meghan Rodriguez, CNP, Mary Aleman CNP, Haleigh Francois, CNP, Kira Rosales, PA-C, Pippa Daily PA-C, Radha Mariee, CNP, Debbie Alegria, CNP, Michael Hurst, CNP, Meghna Holland, CNP, Viky White, CNP, Tammy Jarrett, CNS, Brooke Tipton, CNP, Carolyne Boston CNP, Tracy Schwab, CNP         [facility]   IN-PATIENT SERVICE   [facility]    Discharge Summary     Patient ID: Coni Baker  :  1950   MRN: [id number]     ACCOUNT:  [id number]   Patient's PCP: Earlene Salcedo MD  Admit Date: 2024   Discharge Date: 6/15/2024     Length of Stay: 2  Code Status:  Full Code  Admitting Physician: Eduardo Maddox MD  Discharge Physician: Mohammad I Mashaleh, DO     Active Discharge Diagnoses:     Hospital Problem Lists:  Principal Problem:    Bradycardia  Active Problems:    Ischemic cardiomyopathy    Mixed hyperlipidemia    Primary hypertension    Shortness of breath    Transaminitis    Pleural effusion  Resolved Problems:    * No resolved hospital problems. *      Admission Condition:  stable     Discharged Condition: stable    Hospital Stay:     Hospital Course:  Coni Baker is a 73-year-old female who initially presented to our

## 2024-06-15 NOTE — PROGRESS NOTES
K 4.6 4.0 4.1    107 107   CO2 23 24 27   GLUCOSE 100* 84 113*   BUN 19 12 11   CREATININE 0.8 0.6 0.8   MG 2.0  --   --    ANIONGAP 8* 9 7*   LABGLOM 78 >90 82   CALCIUM 8.6 8.2* 8.8   PROBNP 2,776*  --   --    TROPHS 19*  --   --    LACTACIDWB 1.6  --   --      Recent Labs     06/13/24  1625 06/15/24  0703   AST 40*  --    ALT 50*  --    LDH  --  296*   ALKPHOS 111*  --    BILITOT 0.7  --      ABG:  Lab Results   Component Value Date/Time    POCPH 7.395 06/06/2024 04:45 AM    POCPCO2 40.3 06/06/2024 04:45 AM    POCPO2 79.9 06/06/2024 04:45 AM    POCHCO3 24.7 06/06/2024 04:45 AM    NBEA 0.2 06/06/2024 04:45 AM    PBEA 1.7 06/06/2024 01:17 AM    AFJQ2XRN 95.6 06/06/2024 04:45 AM    FIO2 40.0 06/06/2024 04:45 AM     Lab Results   Component Value Date/Time    SPECIAL R FA 6ML 06/13/2024 06:12 PM     Lab Results   Component Value Date/Time    CULTURE NO GROWTH 1 DAY 06/13/2024 06:12 PM       Radiology:  US THORACENTESIS Which side should the procedure be performed? Right    Result Date: 6/14/2024  Successful ultrasound guided right thoracentesis.     CT CHEST PULMONARY EMBOLISM W CONTRAST    Result Date: 6/13/2024  1. No evidence of pulmonary embolism. 2. Moderate-sized right pleural effusion with lower lobe atelectasis. 3. Trace left pleural effusion with left basilar atelectasis.     XR CHEST PORTABLE    Result Date: 6/13/2024  Right pleural effusion with persistent opacity at the right lung base that could represent atelectasis or infection       Physical Examination:     General appearance:  alert, cooperative and no distress  Mental Status:  oriented to person, place and time and normal affect  Lungs:  clear to auscultation bilaterally, normal effort  Heart:  regular rate and irregular rhythm, no murmur  Abdomen:  soft, nontender, nondistended, normal bowel sounds, no masses, hepatomegaly, splenomegaly  Extremities:  no edema, redness, tenderness in the calves  Skin:  no gross lesions, rashes,  induration    Assessment:     Hospital Problems             Last Modified POA    * (Principal) Bradycardia 6/13/2024 Yes    Ischemic cardiomyopathy 6/13/2024 Yes    Mixed hyperlipidemia 6/13/2024 Yes    Primary hypertension 6/13/2024 Yes    Shortness of breath 6/13/2024 Yes    Transaminitis 6/13/2024 Yes    Pleural effusion 6/13/2024 Yes       Plan:     Moderate sized right pleural effusion with lower lobe atelectasis  Status post paracentesis with removal of 800 cc of dark yellow/jameson fluid  So far transudative by lights criteria .  Follow-up with pulmonology outpatient-she says she has an appointment.  Cultures so far negative  Pro calcitonin, MRSA nasal swab, Blood cultures  negative  bradycardia while on BB  Toprol was discontinued.  Continue mexiletine 250 3 times daily with meals  Ischemic cardiomyopathy with ejection fraction of 35 to 40%  Will discharge with low-dose Lasix.  Continue follow-up with cardiologist Dr. Ny in 2 to 4 weeks.  Beta-blocker was held due to bradycardia.  Goal-directed medical therapy per cardiology recommendation  Frequent PVCs status post unsuccessful ablation attempt with epicardial RVOT on 5/22/2024  Follow-up outpatient with [facility]  Primary hypertension  COPD without exacerbation    Medical Decision Making: Raegan Allison DO  6/15/2024  3:49 PM

## 2024-06-16 LAB
MICROORGANISM SPEC CULT: NORMAL
MICROORGANISM SPEC CULT: NORMAL
SERVICE CMNT-IMP: NORMAL
SERVICE CMNT-IMP: NORMAL
SPECIMEN DESCRIPTION: NORMAL
SPECIMEN DESCRIPTION: NORMAL

## 2024-06-17 LAB
CHOLEST FLD-MCNC: 25 MG/DL
SPECIMEN SOURCE: NORMAL

## 2024-06-18 LAB
MICROORGANISM SPEC CULT: NORMAL
MICROORGANISM SPEC CULT: NORMAL
SERVICE CMNT-IMP: NORMAL
SERVICE CMNT-IMP: NORMAL
SPECIMEN DESCRIPTION: NORMAL
SPECIMEN DESCRIPTION: NORMAL
SURGICAL PATHOLOGY REPORT: NORMAL

## 2024-06-18 NOTE — PROGRESS NOTES
Physician Progress Note      PATIENT:               NIALL WIN  [facility] #:                  [id number]  :                       1950  ADMIT DATE:       2024 2:55 PM  DISCH DATE:        2024 3:00 PM  RESPONDING  PROVIDER #:        KIAN ZAMUDIO          QUERY TEXT:    Patient admitted with Bradycardia. Documentation reflects Pneumonia per   H&P/daily progress notes, but not present on DC Summary.  If possible, please   document in the progress notes and discharge summary if Pneumonia was:    Thank you,  [email or web address]  office hours  m- 7-3  The medical record reflects the following:  Risk Factors: COPD,Afib,CAD  Clinical Indicators: Per H&P- ' Acute hypoxemic and hypercarbic respiratory   failure with respiratory distress requiring intubation, possibly from   aspiration causing right lower lobe Pneumonia.' Per Cardiology progress note   -'Bilateral pleural effusion possible right-sided Pneumonia.' Per    Critical care progress note-'Imaging evidence of right-sided lower lobe   Pneumonia.' CXR-Ground-glass change in the lung bases, which may represent   atelectasis, pneumonia or possible small posterior layering pleural effusions.  Treatment: IV Zosyn/Vanco, Mechanical ventilation with ICU monitoring,   Thoracentesis  Options provided:  -- Pneumonia confirmed after study  -- Pneumonia treated and resolved  -- Pneumonia ruled out after study  -- Other - I will add my own diagnosis  -- Disagree - Not applicable / Not valid  -- Disagree - Clinically unable to determine / Unknown  -- Refer to Clinical Documentation Reviewer    PROVIDER RESPONSE TEXT:    Pneumonia confirmed after study.    Query created by: Ela Church on 2024 1:54 PM      Electronically signed by:  KIAN ZAMUDIO 2024 8:03 AM

## 2024-07-16 ENCOUNTER — APPOINTMENT (OUTPATIENT)
Dept: GENERAL RADIOLOGY | Age: 74
End: 2024-07-16
Payer: MEDICARE

## 2024-07-16 ENCOUNTER — HOSPITAL ENCOUNTER (INPATIENT)
Age: 74
LOS: 4 days | Discharge: ANOTHER HOSPITAL | End: 2024-07-20
Attending: EMERGENCY MEDICINE | Admitting: INTERNAL MEDICINE
Payer: MEDICARE

## 2024-07-16 DIAGNOSIS — I47.20 VENTRICULAR TACHYCARDIA (HCC): Primary | ICD-10-CM

## 2024-07-16 PROBLEM — I47.29 NSVT (NONSUSTAINED VENTRICULAR TACHYCARDIA) (HCC): Status: ACTIVE | Noted: 2024-07-16

## 2024-07-16 LAB
ANION GAP SERPL CALCULATED.3IONS-SCNC: 10 MMOL/L (ref 9–16)
BASOPHILS # BLD: 0.06 K/UL (ref 0–0.2)
BASOPHILS NFR BLD: 1 % (ref 0–2)
BUN BLD-MCNC: 17 MG/DL (ref 8–26)
BUN SERPL-MCNC: 16 MG/DL (ref 8–23)
CA-I BLD-SCNC: 1.25 MMOL/L (ref 1.15–1.33)
CALCIUM SERPL-MCNC: 9.5 MG/DL (ref 8.6–10.4)
CHLORIDE BLD-SCNC: 107 MMOL/L (ref 98–107)
CHLORIDE SERPL-SCNC: 107 MMOL/L (ref 98–107)
CO2 BLD CALC-SCNC: 29 MMOL/L (ref 22–30)
CO2 SERPL-SCNC: 23 MMOL/L (ref 20–31)
CREAT SERPL-MCNC: 0.9 MG/DL (ref 0.5–0.9)
EGFR, POC: 67 ML/MIN/1.73M2
EOSINOPHIL # BLD: 0.08 K/UL (ref 0–0.44)
EOSINOPHILS RELATIVE PERCENT: 1 % (ref 1–4)
ERYTHROCYTE [DISTWIDTH] IN BLOOD BY AUTOMATED COUNT: 13.8 % (ref 11.8–14.4)
GFR, ESTIMATED: 67 ML/MIN/1.73M2
GLUCOSE BLD-MCNC: 95 MG/DL (ref 74–100)
GLUCOSE SERPL-MCNC: 94 MG/DL (ref 74–99)
HCO3 VENOUS: 28.7 MMOL/L (ref 22–29)
HCT VFR BLD AUTO: 47.1 % (ref 36.3–47.1)
HCT VFR BLD AUTO: 50 % (ref 36–46)
HGB BLD-MCNC: 15.2 G/DL (ref 11.9–15.1)
IMM GRANULOCYTES # BLD AUTO: <0.03 K/UL (ref 0–0.3)
IMM GRANULOCYTES NFR BLD: 0 %
LYMPHOCYTES NFR BLD: 1.96 K/UL (ref 1.1–3.7)
LYMPHOCYTES RELATIVE PERCENT: 26 % (ref 24–43)
MAGNESIUM SERPL-MCNC: 2.1 MG/DL (ref 1.6–2.4)
MCH RBC QN AUTO: 31 PG (ref 25.2–33.5)
MCHC RBC AUTO-ENTMCNC: 32.3 G/DL (ref 28.4–34.8)
MCV RBC AUTO: 96.1 FL (ref 82.6–102.9)
MONOCYTES NFR BLD: 0.56 K/UL (ref 0.1–1.2)
MONOCYTES NFR BLD: 7 % (ref 3–12)
NEUTROPHILS NFR BLD: 65 % (ref 36–65)
NEUTS SEG NFR BLD: 4.85 K/UL (ref 1.5–8.1)
NRBC BLD-RTO: 0 PER 100 WBC
O2 SAT, VEN: 38.7 % (ref 60–85)
PCO2 VENOUS: 52 MM HG (ref 41–51)
PH VENOUS: 7.35 (ref 7.32–7.43)
PLATELET # BLD AUTO: 165 K/UL (ref 138–453)
PMV BLD AUTO: 9.6 FL (ref 8.1–13.5)
PO2 VENOUS: 24 MM HG (ref 30–50)
POC ANION GAP: 11 MMOL/L (ref 7–16)
POC CREATININE: 0.9 MG/DL (ref 0.51–1.19)
POC HEMOGLOBIN (CALC): 17 G/DL (ref 12–16)
POC LACTIC ACID: 1.7 MMOL/L (ref 0.56–1.39)
POSITIVE BASE EXCESS, VEN: 1.8 MMOL/L (ref 0–3)
POTASSIUM BLD-SCNC: 4.8 MMOL/L (ref 3.5–4.5)
POTASSIUM SERPL-SCNC: 4.8 MMOL/L (ref 3.7–5.3)
RBC # BLD AUTO: 4.9 M/UL (ref 3.95–5.11)
SODIUM BLD-SCNC: 146 MMOL/L (ref 138–146)
SODIUM SERPL-SCNC: 140 MMOL/L (ref 136–145)
TROPONIN I SERPL HS-MCNC: <6 NG/L (ref 0–14)
WBC OTHER # BLD: 7.5 K/UL (ref 3.5–11.3)

## 2024-07-16 PROCEDURE — 82565 ASSAY OF CREATININE: CPT

## 2024-07-16 PROCEDURE — 80051 ELECTROLYTE PANEL: CPT

## 2024-07-16 PROCEDURE — 83735 ASSAY OF MAGNESIUM: CPT

## 2024-07-16 PROCEDURE — 93005 ELECTROCARDIOGRAM TRACING: CPT

## 2024-07-16 PROCEDURE — 84520 ASSAY OF UREA NITROGEN: CPT

## 2024-07-16 PROCEDURE — 6370000000 HC RX 637 (ALT 250 FOR IP): Performed by: FAMILY MEDICINE

## 2024-07-16 PROCEDURE — 87641 MR-STAPH DNA AMP PROBE: CPT

## 2024-07-16 PROCEDURE — 80048 BASIC METABOLIC PNL TOTAL CA: CPT

## 2024-07-16 PROCEDURE — 6360000002 HC RX W HCPCS: Performed by: FAMILY MEDICINE

## 2024-07-16 PROCEDURE — 82947 ASSAY GLUCOSE BLOOD QUANT: CPT

## 2024-07-16 PROCEDURE — 99291 CRITICAL CARE FIRST HOUR: CPT | Performed by: INTERNAL MEDICINE

## 2024-07-16 PROCEDURE — 2000000000 HC ICU R&B

## 2024-07-16 PROCEDURE — 5A2204Z RESTORATION OF CARDIAC RHYTHM, SINGLE: ICD-10-PCS | Performed by: EMERGENCY MEDICINE

## 2024-07-16 PROCEDURE — 2500000003 HC RX 250 WO HCPCS

## 2024-07-16 PROCEDURE — 92960 CARDIOVERSION ELECTRIC EXT: CPT

## 2024-07-16 PROCEDURE — 6360000002 HC RX W HCPCS

## 2024-07-16 PROCEDURE — 96375 TX/PRO/DX INJ NEW DRUG ADDON: CPT

## 2024-07-16 PROCEDURE — 84484 ASSAY OF TROPONIN QUANT: CPT

## 2024-07-16 PROCEDURE — 85025 COMPLETE CBC W/AUTO DIFF WBC: CPT

## 2024-07-16 PROCEDURE — 82803 BLOOD GASES ANY COMBINATION: CPT

## 2024-07-16 PROCEDURE — 82330 ASSAY OF CALCIUM: CPT

## 2024-07-16 PROCEDURE — 71045 X-RAY EXAM CHEST 1 VIEW: CPT

## 2024-07-16 PROCEDURE — 2580000003 HC RX 258: Performed by: FAMILY MEDICINE

## 2024-07-16 PROCEDURE — 83605 ASSAY OF LACTIC ACID: CPT

## 2024-07-16 PROCEDURE — 96365 THER/PROPH/DIAG IV INF INIT: CPT

## 2024-07-16 PROCEDURE — 85014 HEMATOCRIT: CPT

## 2024-07-16 PROCEDURE — 2500000003 HC RX 250 WO HCPCS: Performed by: FAMILY MEDICINE

## 2024-07-16 PROCEDURE — 93005 ELECTROCARDIOGRAM TRACING: CPT | Performed by: STUDENT IN AN ORGANIZED HEALTH CARE EDUCATION/TRAINING PROGRAM

## 2024-07-16 PROCEDURE — 99285 EMERGENCY DEPT VISIT HI MDM: CPT

## 2024-07-16 RX ORDER — ROSUVASTATIN CALCIUM 20 MG/1
20 TABLET, COATED ORAL NIGHTLY
Status: DISCONTINUED | OUTPATIENT
Start: 2024-07-16 | End: 2024-07-20 | Stop reason: HOSPADM

## 2024-07-16 RX ORDER — ACETAMINOPHEN 650 MG/1
650 SUPPOSITORY RECTAL EVERY 6 HOURS PRN
Status: DISCONTINUED | OUTPATIENT
Start: 2024-07-16 | End: 2024-07-20 | Stop reason: HOSPADM

## 2024-07-16 RX ORDER — BUDESONIDE AND FORMOTEROL FUMARATE DIHYDRATE 160; 4.5 UG/1; UG/1
2 AEROSOL RESPIRATORY (INHALATION)
Status: DISCONTINUED | OUTPATIENT
Start: 2024-07-16 | End: 2024-07-20 | Stop reason: HOSPADM

## 2024-07-16 RX ORDER — SODIUM CHLORIDE 9 MG/ML
INJECTION, SOLUTION INTRAVENOUS PRN
Status: DISCONTINUED | OUTPATIENT
Start: 2024-07-16 | End: 2024-07-20 | Stop reason: HOSPADM

## 2024-07-16 RX ORDER — ACETAMINOPHEN 325 MG/1
650 TABLET ORAL EVERY 6 HOURS PRN
Status: DISCONTINUED | OUTPATIENT
Start: 2024-07-16 | End: 2024-07-20 | Stop reason: HOSPADM

## 2024-07-16 RX ORDER — MAGNESIUM SULFATE IN WATER 40 MG/ML
2000 INJECTION, SOLUTION INTRAVENOUS ONCE
Status: COMPLETED | OUTPATIENT
Start: 2024-07-16 | End: 2024-07-16

## 2024-07-16 RX ORDER — ASPIRIN 81 MG/1
81 TABLET ORAL DAILY
Status: DISCONTINUED | OUTPATIENT
Start: 2024-07-16 | End: 2024-07-20 | Stop reason: HOSPADM

## 2024-07-16 RX ORDER — ONDANSETRON 2 MG/ML
4 INJECTION INTRAMUSCULAR; INTRAVENOUS ONCE
Status: COMPLETED | OUTPATIENT
Start: 2024-07-16 | End: 2024-07-16

## 2024-07-16 RX ORDER — ONDANSETRON 2 MG/ML
4 INJECTION INTRAMUSCULAR; INTRAVENOUS EVERY 6 HOURS PRN
Status: DISCONTINUED | OUTPATIENT
Start: 2024-07-16 | End: 2024-07-20 | Stop reason: HOSPADM

## 2024-07-16 RX ORDER — SODIUM CHLORIDE 0.9 % (FLUSH) 0.9 %
5-40 SYRINGE (ML) INJECTION PRN
Status: DISCONTINUED | OUTPATIENT
Start: 2024-07-16 | End: 2024-07-20 | Stop reason: HOSPADM

## 2024-07-16 RX ORDER — SODIUM CHLORIDE 0.9 % (FLUSH) 0.9 %
5-40 SYRINGE (ML) INJECTION EVERY 12 HOURS SCHEDULED
Status: DISCONTINUED | OUTPATIENT
Start: 2024-07-16 | End: 2024-07-20 | Stop reason: HOSPADM

## 2024-07-16 RX ORDER — POLYETHYLENE GLYCOL 3350 17 G/17G
17 POWDER, FOR SOLUTION ORAL DAILY PRN
Status: DISCONTINUED | OUTPATIENT
Start: 2024-07-16 | End: 2024-07-20 | Stop reason: HOSPADM

## 2024-07-16 RX ORDER — ONDANSETRON 4 MG/1
4 TABLET, ORALLY DISINTEGRATING ORAL EVERY 8 HOURS PRN
Status: DISCONTINUED | OUTPATIENT
Start: 2024-07-16 | End: 2024-07-20 | Stop reason: HOSPADM

## 2024-07-16 RX ORDER — LIDOCAINE HCL/PF 100 MG/5ML
1.5 SYRINGE (ML) INJECTION ONCE
Status: COMPLETED | OUTPATIENT
Start: 2024-07-16 | End: 2024-07-16

## 2024-07-16 RX ORDER — ENOXAPARIN SODIUM 100 MG/ML
40 INJECTION SUBCUTANEOUS DAILY
Status: DISCONTINUED | OUTPATIENT
Start: 2024-07-16 | End: 2024-07-20 | Stop reason: HOSPADM

## 2024-07-16 RX ADMIN — ONDANSETRON 4 MG: 2 INJECTION INTRAMUSCULAR; INTRAVENOUS at 14:02

## 2024-07-16 RX ADMIN — Medication 0.5 MG/MIN: at 23:23

## 2024-07-16 RX ADMIN — LIDOCAINE HYDROCHLORIDE 113.6 MG: 20 INJECTION, SOLUTION INTRAVENOUS at 13:11

## 2024-07-16 RX ADMIN — ENOXAPARIN SODIUM 40 MG: 100 INJECTION SUBCUTANEOUS at 18:21

## 2024-07-16 RX ADMIN — ACETAMINOPHEN 650 MG: 325 TABLET ORAL at 18:21

## 2024-07-16 RX ADMIN — MAGNESIUM SULFATE HEPTAHYDRATE 2000 MG: 40 INJECTION, SOLUTION INTRAVENOUS at 13:13

## 2024-07-16 RX ADMIN — SODIUM CHLORIDE, PRESERVATIVE FREE 10 ML: 5 INJECTION INTRAVENOUS at 20:06

## 2024-07-16 RX ADMIN — ASPIRIN 81 MG: 81 TABLET, COATED ORAL at 18:21

## 2024-07-16 RX ADMIN — Medication 1 MG/MIN: at 13:56

## 2024-07-16 RX ADMIN — Medication 0.5 MG/MIN: at 20:02

## 2024-07-16 RX ADMIN — ROSUVASTATIN CALCIUM 20 MG: 20 TABLET, FILM COATED ORAL at 20:33

## 2024-07-16 ASSESSMENT — PAIN - FUNCTIONAL ASSESSMENT: PAIN_FUNCTIONAL_ASSESSMENT: NONE - DENIES PAIN

## 2024-07-16 ASSESSMENT — ENCOUNTER SYMPTOMS
NAUSEA: 0
COUGH: 0
SORE THROAT: 0
SHORTNESS OF BREATH: 0
VOMITING: 0

## 2024-07-16 ASSESSMENT — LIFESTYLE VARIABLES
HOW OFTEN DO YOU HAVE A DRINK CONTAINING ALCOHOL: NEVER
HOW MANY STANDARD DRINKS CONTAINING ALCOHOL DO YOU HAVE ON A TYPICAL DAY: PATIENT DOES NOT DRINK

## 2024-07-16 NOTE — ED NOTES
Pt to ED for c/o being at home with visiting nurse when she noticed \"floaters\" in the L visual field. Pt reports nurse visiting advised pt to be seen for her low HR and BP. When pt was connected to monitor, pt was in vtach. Pt denies CP, SOB, or dizziness. Pt asymptomatic during vtach episodes.

## 2024-07-16 NOTE — ED NOTES
ED to inpatient nurses report      Chief Complaint:  Chief Complaint   Patient presents with    Irregular Heart Beat     Present to ED from: home    MOA:     LOC: alert and orientated to name, place, date  Mobility: Independent  Oxygen Baseline: RA    Current needs required: NA  Pending ED orders: admission   Present condition: stable    Why did the patient come to the ED?     Pt to ED for c/o being at home with visiting nurse when she noticed \"floaters\" in the L visual field. Pt reports nurse visiting advised pt to be seen for her low HR and BP. When pt was connected to monitor, pt was in vtach. Pt denies CP, SOB, or dizziness. Pt asymptomatic during vtach episodes.      What is the plan? Admit, cards to see  Any procedures or intervention occur? Lido and Mg given for Vtach   Any safety concerns??    Mental Status:  Level of Consciousness: Alert (0)    Psych Assessment:   Psychosocial  Psychosocial (WDL): Within Defined Limits  Vital signs   Vitals:    07/16/24 1358 07/16/24 1413 07/16/24 1430 07/16/24 1517   BP: (!) 132/56 122/71 120/63    Pulse: (!) 117 95 88 (!) 117   Resp: 21 17 22 20   Temp:       TempSrc:       SpO2: 97% 95% 95% 97%   Weight:            Vitals:  Patient Vitals for the past 24 hrs:   BP Temp Temp src Pulse Resp SpO2 Weight   07/16/24 1517 -- -- -- (!) 117 20 97 % --   07/16/24 1430 120/63 -- -- 88 22 95 % --   07/16/24 1413 122/71 -- -- 95 17 95 % --   07/16/24 1358 (!) 132/56 -- -- (!) 117 21 97 % --   07/16/24 1343 (!) 138/51 -- -- (!) 105 26 95 % --   07/16/24 1328 137/66 -- -- 98 19 97 % --   07/16/24 1316 -- -- -- 99 18 97 % --   07/16/24 1315 (!) 152/72 -- -- (!) 107 29 98 % --   07/16/24 1314 (!) 149/104 -- -- (!) 109 22 97 % --   07/16/24 1313 -- -- -- (!) 154 13 97 % --   07/16/24 1311 -- -- -- (!) 160 20 98 % --   07/16/24 1309 (!) 208/108 -- -- (!) 152 18 98 % --   07/16/24 1259 -- 96.8 °F (36 °C) Oral (!) 42 18 96 % --   07/16/24 1257 -- -- -- -- -- -- 75.7 kg (166 lb 14.2 oz)     01/01/2008    Hyperlipidemia     Hypertension     SOBOE (shortness of breath on exertion)        Labs:  Labs Reviewed   ELECTROLYTES PLUS - Abnormal; Notable for the following components:       Result Value    POC Potassium 4.8 (*)     All other components within normal limits   HGB/HCT - Abnormal; Notable for the following components:    POC Hemoglobin (calc) 17.0 (*)     POC Hematocrit 50 (*)     All other components within normal limits   CBC WITH AUTO DIFFERENTIAL - Abnormal; Notable for the following components:    Hemoglobin 15.2 (*)     All other components within normal limits   VENOUS BLOOD GAS, POINT OF CARE - Abnormal; Notable for the following components:    pCO2, Titi 52.0 (*)     PO2, Titi 24.0 (*)     O2 Sat, Titi 38.7 (*)     All other components within normal limits   LACTIC ACID,POINT OF CARE - Abnormal; Notable for the following components:    POC Lactic Acid 1.7 (*)     All other components within normal limits   CALCIUM, IONIC (POC)   BASIC METABOLIC PANEL   TROPONIN   CREATININE W/GFR POINT OF CARE   UREA N (POC)   POCT GLUCOSE       Electronically signed by Mehnaz Avalos RN on 7/16/2024 at 3:22 PM

## 2024-07-16 NOTE — ED PROVIDER NOTES
[facility] ED  Emergency Department Encounter  Emergency Medicine Resident     Pt Name:Coni Baker  MRN: [id number]  Birthdate 1950  Date of evaluation: 7/16/24  PCP:  Earlene Salcedo MD  Note Started: 1:24 PM EDT      CHIEF COMPLAINT       Chief Complaint   Patient presents with    Irregular Heart Beat       HISTORY OF PRESENT ILLNESS  (Location/Symptom, Timing/Onset, Context/Setting, Quality, Duration, Modifying Factors, Severity.)      Coni Baker is a 73 y.o. female with history of ischemic cardiomyopathy status post multiple stents with EF of 35 to 40% and recent admission to the hospital in June for bradycardia who was sent in by her home nurse for evaluation as patient was complaining of floaters in the left side of her peripheral vision and elevated blood pressure at home.  Patient denies any dizziness or lightheadedness.  She denies any chest pain or shortness of breath.  Denies any lower extremity swelling.  Patient states that she is supposed to go to [facility] for an ablation.  She has been taking her newly prescribed medication mexiletine as prescribed.  She denies any fevers or chills.  No upper respiratory symptoms.  Patient is not sure if she is on any blood thinners.    PAST MEDICAL / SURGICAL / SOCIAL / FAMILY HISTORY      has a past medical history of Atrial fibrillation (HCC), CAD (coronary artery disease), CHF (congestive heart failure) (HCC), Hx of myocardial infarction, Hyperlipidemia, Hypertension, and SOBOE (shortness of breath on exertion).     has a past surgical history that includes Tubal ligation; Coronary angioplasty with stent; Colonoscopy; shoulder surgery (Right, 2/1/16); Colonoscopy (N/A, 3/24/2022); Cardiac procedure (N/A, 5/16/2024); and ep device procedure (N/A, 5/21/2024).      Social History     Socioeconomic History    Marital status:      Spouse name: Not on file    Number of children: Not on file    Years of education: Not on  the right lung base, improved as compared  to prior examination.   [KR]   1421 Critical care evaluated patient.  They feel that she is stable enough to go to car 2 for stepdown.  Will speak with internal medicine.  If they do not feel comfortable with patient in stepdown, they will accept. [KR]   1501 [facility] to admit. [KR]   1521 Critical care called back and stated they would like to admit this patient.  They plan to observe overnight and likely transfer to [facility] tomorrow and work on transfer to [facility]. [KR]      ED Course User Index  [KR] Amos Armstrong MD     CONSULTS:  IP CONSULT TO CARDIOLOGY  IP CONSULT TO HOSPITALIST  IP CONSULT TO CRITICAL CARE    CRITICAL CARE:  There was significant risk of life threatening deterioration of patient's condition requiring my direct management. Critical care time 30 minutes, excluding any documented procedures.    FINAL IMPRESSION      1. Ventricular tachycardia (HCC)          DISPOSITION / PLAN     DISPOSITION Admitted 07/16/2024 03:19:34 PM      PATIENT REFERRED TO:  No follow-up provider specified.    DISCHARGE MEDICATIONS:  New Prescriptions    No medications on file       Amos Armstrong MD  Emergency Medicine Resident    (Please note that portions of this note were completed with a voice recognition program.  Efforts were made to edit the dictations but occasionally words are mis-transcribed.)

## 2024-07-16 NOTE — H&P
Critical Care - History and Physical Examination    Patient's name:  Coni Baker  Medical Record Number: [id number]  Patient's account/billing number: [id number]  Patient's YOB: 1950  Age: 73 y.o.  Date of Admission: 7/16/2024  1:03 PM  Date of History and Physical Examination: 7/16/2024    Primary Care Physician: Earlene Salcedo MD  Attending Physician: Scooter Camacho MD     Code Status: Full Code    Chief Complaint:   Floaters in side on left peripheral vision   Elevated blood pressure    Reason for ICU Admission:  Nonsustained Ventricular Tachycardia, For overnight Observation      History Of Present Illness:   History was obtained from chart review and the patient.      Coni Baker is a 73 y.o. female with history of ischemic cardiac disease with 4 previous stents with EF of 35 to 40%  , past medical history of Atrial fibrillation , CAD (coronary artery disease), CHF (congestive heart failure) ([facility]),  Hyperlipidemia and Hypertension, and had  recent admission for bradycardia and \"PVCs\" and was scheduled to go to the [facility] for ablation but also started on mexiletine. Patient today began having \"slow heart rates again\" and is here for evaluation.  Patient is feeling dizzy and lightheaded intermittently as well. Patient was sent in by her home nurse for evaluation as patient was complaining of floaters in the left side of her peripheral vision and elevated blood pressure at home. Patient denies any dizziness or lightheadedness. She denies any chest pain or shortness of breath. Denies any lower extremity swelling. Patient states that she is supposed to go to [facility] for an ablation. She has been taking her newly prescribed medication mexiletine as prescribed. She denies any fevers or chills. No upper respiratory symptoms. Patient is not sure if she is on any blood thinners. Patient is not in acute distress. Patient was hypertensive with systolic blood pressure in the 200s  6/10/24   Luis Ruelas MD   budesonide-formoterol (SYMBICORT) 160-4.5 MCG/ACT AERO Inhale 2 puffs into the lungs in the morning and 2 puffs in the evening. 5/26/24   Eloy Bronson MD   Calcium-Vitamin D 600-200 MG-UNIT TABS Take 1 tablet by mouth 3 times daily (before meals) 5/26/21   Earlene Salcedo MD   rosuvastatin (CRESTOR) 20 MG tablet  2/22/21   ProviderDeshawn MD   aspirin 81 MG EC tablet Take 1 tablet by mouth daily    ProviderDeshawn MD   Handicap Placard MISC by Does not apply route 5 years, cannot walk over 200 ft. 3/16/21   Earlene Salcedo MD       Social History:   TOBACCO:   reports that she quit smoking about 6 months ago. Her smoking use included cigarettes. She started smoking about 59 years ago. She has a 59.0 pack-year smoking history. She has been exposed to tobacco smoke. She has never used smokeless tobacco.  ETOH:   reports current alcohol use.  DRUGS:  reports no history of drug use.  OCCUPATION:      Family History:       Problem Relation Age of Onset    Cancer Father     Kidney Disease Mother        REVIEW OF SYSTEMS (ROS):  Review of Systems - Negative except mentioned in HPI   General ROS: negative  Psychological ROS: negative  Ophthalmic ROS: Floaters in left side of visual field  ENT: negative  Hematological and Lymphatic ROS: negative  Endocrine ROS: negative  Breast ROS: negative  Respiratory ROS: negative  Cardiovascular ROS: negative  Gastrointestinal ROS:negative  Genito-Urinary ROS: negative  Musculoskeletal ROS: negative  Neurological ROS: negative   Dermatological ROS: negative      Physical Exam:    Vitals: BP (!) 108/49   Pulse 83   Temp 96.8 °F (36 °C) (Oral)   Resp 18   Wt 75.7 kg (166 lb 14.2 oz)   SpO2 96%   BMI 31.53 kg/m²     Body weight:   Wt Readings from Last 3 Encounters:   07/16/24 75.7 kg (166 lb 14.2 oz)   06/18/24 74.2 kg (163 lb 8 oz)   06/14/24 75.7 kg (166 lb 14.2 oz)       Body Mass Index : Body mass index is 31.53

## 2024-07-16 NOTE — ED PROVIDER NOTES
[facility] ED     Emergency Department     Faculty Attestation        I performed a history and physical examination of the patient and discussed management with the resident. I reviewed the resident’s note and agree with the documented findings and plan of care. Any areas of disagreement are noted on the chart. I was personally present for the key portions of any procedures. I have documented in the chart those procedures where I was not present during the key portions. I have reviewed the emergency nurses triage note. I agree with the chief complaint, past medical history, past surgical history, allergies, medications, social and family history as documented unless otherwise noted below.  For Physician Assistant/ Nurse Practitioner cases/documentation I have personally evaluated this patient and have completed at least one if not all key elements of the E/M (history, physical exam, and MDM). Additional findings are as noted.      Vital Signs: BP: (!) 152/72  Pulse: 99  Respirations: 18  Temp: 96.8 °F (36 °C) SpO2: 97 %  PCP:  Earlene Salcedo MD  Note Started: 7/16/24, 1:26 PM EDT    Pertinent Comments:     Patient is a 73-year-old female with history of ischemic cardiac disease with 4 previous stents.   Patient had recent admission for bradycardia and \"PVCs\" and was scheduled to go to the [facility] for ablation but also started on mexiletine.    Patient today began having \"slow heart rates again\" and is here for evaluation.   Feeling dizzy and lightheaded intermittently as well.   Pulse ox heart rate was 29 bpm however on EKG shows intermittent monomorphic V. tach with occasional sinus beats.   Patient was given 1.5 mg/kg of lidocaine IV as well as magnesium 2 g IV with alternating now between bigeminy and sinus rhythm.    Blood pressure stayed normal and not hypotensive.    No chest pain or shortness of breath but was lightheaded.    EKG

## 2024-07-17 LAB
ANION GAP SERPL CALCULATED.3IONS-SCNC: 8 MMOL/L (ref 9–16)
BASOPHILS # BLD: 0.05 K/UL (ref 0–0.2)
BASOPHILS NFR BLD: 1 % (ref 0–2)
BUN SERPL-MCNC: 20 MG/DL (ref 8–23)
CA-I BLD-SCNC: 1.13 MMOL/L (ref 1.13–1.33)
CALCIUM SERPL-MCNC: 8.7 MG/DL (ref 8.6–10.4)
CHLORIDE SERPL-SCNC: 108 MMOL/L (ref 98–107)
CO2 SERPL-SCNC: 24 MMOL/L (ref 20–31)
CREAT SERPL-MCNC: 0.9 MG/DL (ref 0.5–0.9)
EKG ATRIAL RATE: 26 BPM
EKG ATRIAL RATE: 65 BPM
EKG P AXIS: 118 DEGREES
EKG P AXIS: 61 DEGREES
EKG P-R INTERVAL: 164 MS
EKG P-R INTERVAL: 176 MS
EKG Q-T INTERVAL: 354 MS
EKG Q-T INTERVAL: 416 MS
EKG QRS DURATION: 94 MS
EKG QRS DURATION: 94 MS
EKG QTC CALCULATION (BAZETT): 432 MS
EKG QTC CALCULATION (BAZETT): 536 MS
EKG R AXIS: 13 DEGREES
EKG R AXIS: 166 DEGREES
EKG T AXIS: 125 DEGREES
EKG T AXIS: 51 DEGREES
EKG VENTRICULAR RATE: 138 BPM
EKG VENTRICULAR RATE: 65 BPM
EOSINOPHIL # BLD: 0.1 K/UL (ref 0–0.44)
EOSINOPHILS RELATIVE PERCENT: 2 % (ref 1–4)
ERYTHROCYTE [DISTWIDTH] IN BLOOD BY AUTOMATED COUNT: 14 % (ref 11.8–14.4)
GFR, ESTIMATED: 70 ML/MIN/1.73M2
GLUCOSE SERPL-MCNC: 124 MG/DL (ref 74–99)
HCT VFR BLD AUTO: 41.8 % (ref 36.3–47.1)
HGB BLD-MCNC: 12.8 G/DL (ref 11.9–15.1)
IMM GRANULOCYTES # BLD AUTO: <0.03 K/UL (ref 0–0.3)
IMM GRANULOCYTES NFR BLD: 0 %
INR PPP: 1.1
LYMPHOCYTES NFR BLD: 1.63 K/UL (ref 1.1–3.7)
LYMPHOCYTES RELATIVE PERCENT: 31 % (ref 24–43)
MAGNESIUM SERPL-MCNC: 2.4 MG/DL (ref 1.6–2.4)
MAGNESIUM SERPL-MCNC: 3.1 MG/DL (ref 1.6–2.4)
MCH RBC QN AUTO: 30.5 PG (ref 25.2–33.5)
MCHC RBC AUTO-ENTMCNC: 30.6 G/DL (ref 28.4–34.8)
MCV RBC AUTO: 99.8 FL (ref 82.6–102.9)
MONOCYTES NFR BLD: 0.55 K/UL (ref 0.1–1.2)
MONOCYTES NFR BLD: 10 % (ref 3–12)
MRSA, DNA, NASAL: NEGATIVE
NEUTROPHILS NFR BLD: 56 % (ref 36–65)
NEUTS SEG NFR BLD: 2.98 K/UL (ref 1.5–8.1)
NRBC BLD-RTO: 0 PER 100 WBC
PLATELET # BLD AUTO: 134 K/UL (ref 138–453)
PMV BLD AUTO: 10.4 FL (ref 8.1–13.5)
POTASSIUM SERPL-SCNC: 4.9 MMOL/L (ref 3.7–5.3)
PROTHROMBIN TIME: 13.9 SEC (ref 11.7–14.9)
RBC # BLD AUTO: 4.19 M/UL (ref 3.95–5.11)
SODIUM SERPL-SCNC: 140 MMOL/L (ref 136–145)
SPECIMEN DESCRIPTION: NORMAL
TROPONIN I SERPL HS-MCNC: 7 NG/L (ref 0–14)
WBC OTHER # BLD: 5.3 K/UL (ref 3.5–11.3)

## 2024-07-17 PROCEDURE — 85025 COMPLETE CBC W/AUTO DIFF WBC: CPT

## 2024-07-17 PROCEDURE — 83735 ASSAY OF MAGNESIUM: CPT

## 2024-07-17 PROCEDURE — 6360000002 HC RX W HCPCS: Performed by: STUDENT IN AN ORGANIZED HEALTH CARE EDUCATION/TRAINING PROGRAM

## 2024-07-17 PROCEDURE — 2580000003 HC RX 258: Performed by: FAMILY MEDICINE

## 2024-07-17 PROCEDURE — 6360000002 HC RX W HCPCS: Performed by: FAMILY MEDICINE

## 2024-07-17 PROCEDURE — 36415 COLL VENOUS BLD VENIPUNCTURE: CPT

## 2024-07-17 PROCEDURE — 6370000000 HC RX 637 (ALT 250 FOR IP): Performed by: FAMILY MEDICINE

## 2024-07-17 PROCEDURE — 6360000002 HC RX W HCPCS

## 2024-07-17 PROCEDURE — 85610 PROTHROMBIN TIME: CPT

## 2024-07-17 PROCEDURE — 2500000003 HC RX 250 WO HCPCS

## 2024-07-17 PROCEDURE — 82330 ASSAY OF CALCIUM: CPT

## 2024-07-17 PROCEDURE — 99223 1ST HOSP IP/OBS HIGH 75: CPT | Performed by: INTERNAL MEDICINE

## 2024-07-17 PROCEDURE — 99291 CRITICAL CARE FIRST HOUR: CPT | Performed by: INTERNAL MEDICINE

## 2024-07-17 PROCEDURE — 80048 BASIC METABOLIC PNL TOTAL CA: CPT

## 2024-07-17 PROCEDURE — 2000000000 HC ICU R&B

## 2024-07-17 PROCEDURE — 2500000003 HC RX 250 WO HCPCS: Performed by: FAMILY MEDICINE

## 2024-07-17 PROCEDURE — 84484 ASSAY OF TROPONIN QUANT: CPT

## 2024-07-17 RX ORDER — METOPROLOL TARTRATE 1 MG/ML
5 INJECTION, SOLUTION INTRAVENOUS ONCE
Status: COMPLETED | OUTPATIENT
Start: 2024-07-17 | End: 2024-07-17

## 2024-07-17 RX ORDER — METOPROLOL TARTRATE 1 MG/ML
INJECTION, SOLUTION INTRAVENOUS
Status: COMPLETED
Start: 2024-07-17 | End: 2024-07-17

## 2024-07-17 RX ORDER — LIDOCAINE HCL/PF 100 MG/5ML
50 SYRINGE (ML) INJECTION ONCE
Status: COMPLETED | OUTPATIENT
Start: 2024-07-17 | End: 2024-07-17

## 2024-07-17 RX ORDER — MAGNESIUM SULFATE IN WATER 40 MG/ML
2000 INJECTION, SOLUTION INTRAVENOUS ONCE
Status: COMPLETED | OUTPATIENT
Start: 2024-07-17 | End: 2024-07-17

## 2024-07-17 RX ORDER — LIDOCAINE HYDROCHLORIDE ANHYDROUS AND DEXTROSE MONOHYDRATE 5; 400 G/100ML; MG/100ML
1 INJECTION, SOLUTION INTRAVENOUS CONTINUOUS
Status: DISCONTINUED | OUTPATIENT
Start: 2024-07-17 | End: 2024-07-20 | Stop reason: HOSPADM

## 2024-07-17 RX ADMIN — Medication 0.5 MG/MIN: at 11:17

## 2024-07-17 RX ADMIN — Medication 150 MG: at 05:47

## 2024-07-17 RX ADMIN — METOPROLOL TARTRATE 5 MG: 5 INJECTION INTRAVENOUS at 02:51

## 2024-07-17 RX ADMIN — METOPROLOL TARTRATE 5 MG: 1 INJECTION, SOLUTION INTRAVENOUS at 02:51

## 2024-07-17 RX ADMIN — ROSUVASTATIN CALCIUM 20 MG: 20 TABLET, FILM COATED ORAL at 22:00

## 2024-07-17 RX ADMIN — LIDOCAINE HYDROCHLORIDE 1 MG/MIN: 4 INJECTION, SOLUTION INTRAVENOUS at 08:03

## 2024-07-17 RX ADMIN — SODIUM CHLORIDE: 9 INJECTION, SOLUTION INTRAVENOUS at 00:32

## 2024-07-17 RX ADMIN — LIDOCAINE HYDROCHLORIDE 50 MG: 20 INJECTION, SOLUTION INTRAVENOUS at 08:11

## 2024-07-17 RX ADMIN — SODIUM CHLORIDE, PRESERVATIVE FREE 10 ML: 5 INJECTION INTRAVENOUS at 22:01

## 2024-07-17 RX ADMIN — ENOXAPARIN SODIUM 40 MG: 100 INJECTION SUBCUTANEOUS at 08:20

## 2024-07-17 RX ADMIN — ASPIRIN 81 MG: 81 TABLET, COATED ORAL at 08:19

## 2024-07-17 RX ADMIN — METOPROLOL TARTRATE 5 MG: 5 INJECTION INTRAVENOUS at 00:42

## 2024-07-17 RX ADMIN — SODIUM CHLORIDE, PRESERVATIVE FREE 10 ML: 5 INJECTION INTRAVENOUS at 08:17

## 2024-07-17 RX ADMIN — METOPROLOL TARTRATE 5 MG: 1 INJECTION, SOLUTION INTRAVENOUS at 00:42

## 2024-07-17 RX ADMIN — MAGNESIUM SULFATE HEPTAHYDRATE 2000 MG: 40 INJECTION, SOLUTION INTRAVENOUS at 00:34

## 2024-07-17 NOTE — PROGRESS NOTES
SPIRITUAL HEALTH   [facility]  PROGRESS NOTE    Shift date: 7.16.2024  Shift day: Tuesday   Shift # 2    Room # 3004/3004-01        Name: Coni Baker MRN: [id number]    Age: 73 y.o.     Sex: female   Language: English   Jain: Other   NSVT (nonsustained ventricular tachycardia) (HCC)     Referral: Routine Visit    Date: 7/17/2024            Total Time Calculated: (P) 15 min              Spiritual Assessment began in [facility] CAR 3- MICU        Referral/Consult From: (P) Rounding   Encounter Overview/Reason: (P) Initial Encounter  Service Provided For: (P) Patient    Admit Date & Time: 7/16/2024  1:03 PM    Emergency Contacts Listed:  Extended Emergency Contact Information  Primary Emergency Contact: Girma Bateman  Home Phone: [phone]  Relation: Child  Secondary Emergency Contact: Gi Chau  Home Phone: [phone]  Relation: Child      Assessment:  Coni Baker is a 73 y.o. female in the hospital because NSVT (nonsustained ventricular tachycardia) (HCC).     Upon entering the room writer observes Patient appearing Calm and Coping. Patient discussed family dynamics and struggles.  States that she is going to be transferred to [facility] for a heart procedure.  Appears to be overwhelmed with family issues but trying to remain positive.      Perceived Need:  Build a relationship of care and support, Receive care and concern, and Recieve hospitality    Intervention:  Writer introduced self and title as  and provided Provided space for feelings, thoughts, and concerns, Actively listened, Listened empathetically, Acknowledged current situation, Acknowledged difficult experiences, Asked guided questions, and Cultivated a relationship of care and support.    Outcome:  Built relationships of care and support, Calming presence provided, and Debriefed experience     Plan:  Chaplains will follow up as needed       Electronically signed by SMITA Roldan on 7/17/2024 at 12:15

## 2024-07-17 NOTE — CONSULTS
ELECTROPHYSIOLOGY CONSULT NOTE    Reason for Consult:  Nonsustained VT  Requesting Physician: Scooter Camacho MD    CHIEF COMPLAINT:  \"My heart rate kept dropping\"    History Obtained From:  patient    HISTORY OF PRESENT ILLNESS:      The patient is a 73 y.o. female with significant past medical history of coronary disease with prior LAD PCI, ischemic cardiomyopathy EF 35-45%, HTN, COPD, frequent PVCs and nonsustained VT.  She had an unsuccessful ablation attempt on 5/22/2024 of an RVOT PVC source which was felt to be epicardial.  Was also treated for PNA with recent thoracentesis 6/13/2024    Patient returns due to low heart rates.  She has been taking BP and pulse ox frequently at home.  Says that HR frequently reads in the 30-40s bpm.  She had been instructed by myself to come in the hospital if she were feeling unwell and not solely by cuff readings.  She said this \"bought me 3 weeks at home\".  She comes back due to a HR read of 28 bpm and mild headache.     Telemetry shows underlying sinus rhythm 60 bpm with episodes of frequent bigeminy.  Additional episodic NSVT of 3-6 beats.     Started on IV amiodarone and lidocaine.  Had brief periods of amiodarone use on prior admissions.     Past Medical History:    Past Medical History:   Diagnosis Date    Atrial fibrillation (HCC)     CAD (coronary artery disease)     CHF (congestive heart failure) (HCC)     Hx of myocardial infarction 01/01/2008    Hyperlipidemia     Hypertension     SOBOE (shortness of breath on exertion)      Past Surgical History:    Past Surgical History:   Procedure Laterality Date    CARDIAC PROCEDURE N/A 5/16/2024    diane / Left heart cath / coronary angiography / op [facility] performed by Moises Dunbar MD at [facility] CARDIAC CATH LAB    COLONOSCOPY      WITH POLYPECTOMY     COLONOSCOPY N/A 3/24/2022    COLONOSCOPY WITH BIOPSY performed by Naldo Milton MD at [facility] ENDO    CORONARY ANGIOPLASTY WITH STENT PLACEMENT      STENTS X4    EP  attempt.  She has outpatient appointment scheduled for next month.      Recommendation at this time is to discontinue mexiletine due to ineffectiveness.  Agree with plan for in-hospital transfer to [facility] given multiple recurrent admissions.  If this could not be arranged, then we will plan for aggressive amiodarone load as next antiarrhythmic attempt    Continue lidocaine 1 mg/min and amiodarone 0.5 mg/min for now.  Monitor daily BMP on lidocaine to assess for acidosis.   We can adjust medications accordingly pending hospital transfer plans - would prefer not to completely suppress PVCs with amiodarone if ablation might be considered in the short term.      No need for strict bedrest; can ambulate with assistance.  Would remove [place] as no sustained VT has been documented over multiple hospitalization.  Stable for transfer from ICU from EP cardiology perspective.           Management plan was discussed with patient. Thank you for the consultation.    Electronically signed by Juan Pablo Cordero MD on 7/17/2024 at 4:52 PM     CC: Earlene Salcedo MD

## 2024-07-17 NOTE — PLAN OF CARE
Patient has known arrhythmia and has been following with cardiology, per their most recent note, patient was to [facility], on discussion with patient and her family she was not able to get appointment with [facility] and is scheduled to see [facility] for this possible procedure.    [facility] called and transfer initiated to [facility] for continuity of care.    Michael Faulkner MD  Internal Medicine Resident, PGY-3  [facility]  07/17/24  12:10 PM    Received call back from [facility] with [facility] cardiologist Dr. Delarosa, case was discussed, He did believe patient would benefit with transfer, given her current needs and being initiated on lidocaine gtt today he believed patient would be best suited in ICU, discussed case with critical care fellow Dr. Keyon Lee, Dr. Delarosa reached out to and joined call to verify level of care, and given that patient still having arrhythmia burden and requiring initiation of lidocaine today Dr. Delarosa did believe that patient should be ICU status for acceptance, Further discussion with Crit care fellow regarding his recommendations, they are very limited on ICU beds and had multiple urgent patients already on their way so getting a bed soon would be difficult. The recommendation was to try to stabilize better and step the patient down, once step down will be able to get bed sooner.    -Access case remains open and on hold at this time  -Continue to monitor, If patient is stable on current drips, will stepdown tomorrow and reach out to access to reach out about step down bed for transfer.    Michael Faulkner MD  Internal Medicine Resident, PGY-3  [facility]  07/17/24  1:42 PM

## 2024-07-17 NOTE — PLAN OF CARE
Problem: Discharge Planning  Goal: Discharge to home or other facility with appropriate resources  7/17/2024 0837 by Luis Groves RN  Outcome: Progressing  7/17/2024 0222 by Imani Hauser RN  Outcome: Progressing     Problem: Safety - Adult  Goal: Free from fall injury  7/17/2024 0837 by Luis Groves RN  Outcome: Progressing  7/17/2024 0222 by Imani Hauser RN  Outcome: Progressing

## 2024-07-17 NOTE — CARE COORDINATION
Case Management Assessment  Initial Evaluation    Date/Time of Evaluation: 7/17/2024 5:28 PM  Assessment Completed by: BOLIVAR TRINIDAD RN    If patient is discharged prior to next notation, then this note serves as note for discharge by case management.    Patient Name: Coni Baker                   YOB: 1950  Diagnosis: Ventricular tachycardia (HCC) [I47.20]  NSVT (nonsustained ventricular tachycardia) (HCC) [I47.29]                   Date / Time: 7/16/2024  1:03 PM    Patient Admission Status: Inpatient   Readmission Risk (Low < 19, Mod (19-27), High > 27): Readmission Risk Score: 19.4    Current PCP: Earlene Salcedo MD  PCP verified by CM? (P) Yes (Earlene Salcedo MD)    Chart Reviewed: Yes      History Provided by: (P) Patient, Child/Family  Patient Orientation: Alert and Oriented    Patient Cognition: (P) Alert    Hospitalization in the last 30 days (Readmission):  No    If yes, Readmission Assessment in CM Navigator will be completed.    Advance Directives:      Code Status: Full Code   Patient's Primary Decision Maker is:      Primary Decision Maker: Girma Bateman - Child - [phone]    Secondary Decision Maker: Gi Chau - Child - [phone]    Discharge Planning:    Patient lives with:   Type of Home: (P) Other (Comment) (plan is to dc to [facility])  Primary Care Giver: (P) Self  Patient Support Systems include: (P) Children   Current Financial resources: (P) Medicare ([facility] Medicare)  Current community resources: (P) ECF/Home Care  Current services prior to admission: (P) Durable Medical Equipment            Current DME: (P) Walker            Type of Home Care services:       ADLS  Prior functional level: (P) Independent in ADLs/IADLs  Current functional level: (P) Independent in ADLs/IADLs    PT AM-PAC:   /24  OT AM-PAC:   /24    Family can provide assistance at DC: (P) Other (comment) (dependent on needs)  Would you like Case Management to discuss the  provided with a choice of provider and agrees with the discharge plan. Freedom of choice list with basic dialogue that supports the patient's individualized plan of care/goals and shares the quality data associated with the providers was provided to: (P) Patient   Patient Representative Name:       The Patient and/or Patient Representative Agree with the Discharge Plan? (P) Yes (plan is to dc to [facility] for higher level of care)    BOLIVAR TRINIDAD RN  Case Management Department  Ph: [phone] Fax: [phone]

## 2024-07-17 NOTE — PROGRESS NOTES
Patient with frequent runs of vtach - cardiology fellow at bedside - lidocaine gtt started and 50 mg lido push - Hr improved after

## 2024-07-17 NOTE — CONSULTS
[facility]    Consult / H&P               Today's Date: 7/17/2024  Patient Name: Coni Baker  Date of admission: 7/16/2024  1:03 PM  Patient's age: 73 y.o., 1950  Admission Dx: Ventricular tachycardia (HCC) [I47.20]  NSVT (nonsustained ventricular tachycardia) (HCC) [I47.29]    Requesting Physician: Scooter Camacho MD    Cardiac Evaluation Reason:  Intermittent Vtach    History Obtained From: patient/chart review     History of Present Illness:    This patient 73 y.o. years old female with past medical history as below.     Patient was sent to ER by home health nurse with complaints of floaters in the left side peripheral vision and episodes of low heart rate over the last few days.  She has been experiencing headaches along with floaters in her vision but denies chest pain, SOB, dizziness and lightheadedness.  She has also noticed some shakiness mostly in her upper extremities.  She says that recently she was started on mexiletine and since then she has noticed worsening of the symptoms.    On initial evaluation in ER patient was noted to be hypertensive with blood pressure in 200s with intermittent NSVT.  She was given lidocaine 1.5 mg/kg as well as 2 g of magnesium with some improvement in her heart rate and a follow-up EKG showed NSR.  She was admitted to MICU for closer monitoring overnight and plan for possible transfer to [facility] later.    She has history of ischemic cardiomyopathy with EF 35 to 45% and frequent PVCs for which she recently had an unsuccessful attempt of ablation of RVOT PVCs source in May.  She was again admitted last month for pneumonia requiring intubation and at that time she was noted to be in sinus bradycardia with frequent PVCs.  On discharge her sotalol was discontinued and she was sent home on Mexiletine and Toprol.  She was seen by Dr. Cordero at [facility] and referred [facility]  for possible ablation.  Her Toprol was discontinued and she was only continued on  \"CKMBINDEX\", \"TROPONINI\" in the last 72 hours.      DIAGNOSTIC DATA:    EKG:   Results for orders placed or performed during the hospital encounter of 07/16/24   EKG 12 Lead   Result Value Ref Range    Ventricular Rate 138 BPM    Atrial Rate 26 BPM    P-R Interval 176 ms    QRS Duration 94 ms    Q-T Interval 354 ms    QTc Calculation (Bazett) 536 ms    P Axis 61 degrees    R Axis 13 degrees    T Axis 51 degrees    Narrative    Marked sinus bradycardia with frequent , and consecutive Premature ventricular complexes  Possible Left atrial enlargement  Incomplete right bundle branch block  Cannot rule out Anteroseptal infarct (cited on or before 20-JUN-2002)  Abnormal ECG  When compared with ECG of 14-JUN-2024 21:12,  Vent. rate has increased BY  47 BPM       Echo:    No results found for this or any previous visit.    06/05/24    ECHO (TTE) LIMITED (PRN CONTRAST/BUBBLE/STRAIN/3D) 06/06/2024 12:36 PM (Final)    Interpretation Summary    Left Ventricle: Moderately reduced left ventricular systolic function with a visually estimated EF of 35 - 40%. See diagram for wall motion findings.    Right Ventricle: Reduced systolic function.    Aortic Valve: Valve structure is normal.    Mitral Valve: Mild regurgitation.    Pericardium: No pericardial effusion.    Image quality is adequate. Contrast used: Definity.    Signed by: Dereje Ureña MD on 6/6/2024 12:36 PM    Stress Test:    06/13/22    NM LEXISCAN STRESS TEST W/ MYOCARDIAL PERFUSION  (Final)      LAST CATH:     05/16/24    CARDIAC PROCEDURE 05/16/2024  4:25 PM (Final)    Conclusion  Images from the original result were not included.  [facility]        Date:                            5/16/2024  Patient name:  Coni Baker  Date of admission:      5/16/2024  3:45 PM  MRN:                           [id number]  YOB: 1950    CARDIAC CATHETERIZATION    Operators:  Primary: Moises Dunbar MD.  Assistant:    Indications for cath:

## 2024-07-18 LAB
ANION GAP SERPL CALCULATED.3IONS-SCNC: 9 MMOL/L (ref 9–16)
BASOPHILS # BLD: 0.06 K/UL (ref 0–0.2)
BASOPHILS NFR BLD: 1 % (ref 0–2)
BUN SERPL-MCNC: 19 MG/DL (ref 8–23)
CALCIUM SERPL-MCNC: 8.4 MG/DL (ref 8.6–10.4)
CHLORIDE SERPL-SCNC: 108 MMOL/L (ref 98–107)
CO2 SERPL-SCNC: 20 MMOL/L (ref 20–31)
CREAT SERPL-MCNC: 0.9 MG/DL (ref 0.5–0.9)
EOSINOPHIL # BLD: 0.08 K/UL (ref 0–0.44)
EOSINOPHILS RELATIVE PERCENT: 1 % (ref 1–4)
ERYTHROCYTE [DISTWIDTH] IN BLOOD BY AUTOMATED COUNT: 14 % (ref 11.8–14.4)
GFR, ESTIMATED: 72 ML/MIN/1.73M2
GLUCOSE SERPL-MCNC: 94 MG/DL (ref 74–99)
HCT VFR BLD AUTO: 41.4 % (ref 36.3–47.1)
HGB BLD-MCNC: 12.8 G/DL (ref 11.9–15.1)
IMM GRANULOCYTES # BLD AUTO: <0.03 K/UL (ref 0–0.3)
IMM GRANULOCYTES NFR BLD: 0 %
LYMPHOCYTES NFR BLD: 1.79 K/UL (ref 1.1–3.7)
LYMPHOCYTES RELATIVE PERCENT: 27 % (ref 24–43)
MAGNESIUM SERPL-MCNC: 2.5 MG/DL (ref 1.6–2.4)
MCH RBC QN AUTO: 30.8 PG (ref 25.2–33.5)
MCHC RBC AUTO-ENTMCNC: 30.9 G/DL (ref 28.4–34.8)
MCV RBC AUTO: 99.5 FL (ref 82.6–102.9)
MONOCYTES NFR BLD: 0.64 K/UL (ref 0.1–1.2)
MONOCYTES NFR BLD: 10 % (ref 3–12)
NEUTROPHILS NFR BLD: 61 % (ref 36–65)
NEUTS SEG NFR BLD: 3.94 K/UL (ref 1.5–8.1)
NRBC BLD-RTO: 0 PER 100 WBC
PLATELET # BLD AUTO: 126 K/UL (ref 138–453)
PMV BLD AUTO: 10.2 FL (ref 8.1–13.5)
POTASSIUM SERPL-SCNC: 4.6 MMOL/L (ref 3.7–5.3)
RBC # BLD AUTO: 4.16 M/UL (ref 3.95–5.11)
SODIUM SERPL-SCNC: 137 MMOL/L (ref 136–145)
WBC OTHER # BLD: 6.5 K/UL (ref 3.5–11.3)

## 2024-07-18 PROCEDURE — 6370000000 HC RX 637 (ALT 250 FOR IP): Performed by: FAMILY MEDICINE

## 2024-07-18 PROCEDURE — 2060000000 HC ICU INTERMEDIATE R&B

## 2024-07-18 PROCEDURE — 2500000003 HC RX 250 WO HCPCS: Performed by: FAMILY MEDICINE

## 2024-07-18 PROCEDURE — 80048 BASIC METABOLIC PNL TOTAL CA: CPT

## 2024-07-18 PROCEDURE — 2580000003 HC RX 258: Performed by: FAMILY MEDICINE

## 2024-07-18 PROCEDURE — 6360000002 HC RX W HCPCS: Performed by: FAMILY MEDICINE

## 2024-07-18 PROCEDURE — 83735 ASSAY OF MAGNESIUM: CPT

## 2024-07-18 PROCEDURE — 85025 COMPLETE CBC W/AUTO DIFF WBC: CPT

## 2024-07-18 PROCEDURE — 99233 SBSQ HOSP IP/OBS HIGH 50: CPT | Performed by: INTERNAL MEDICINE

## 2024-07-18 PROCEDURE — 99291 CRITICAL CARE FIRST HOUR: CPT | Performed by: INTERNAL MEDICINE

## 2024-07-18 PROCEDURE — 94761 N-INVAS EAR/PLS OXIMETRY MLT: CPT

## 2024-07-18 PROCEDURE — 36415 COLL VENOUS BLD VENIPUNCTURE: CPT

## 2024-07-18 PROCEDURE — 6360000002 HC RX W HCPCS: Performed by: STUDENT IN AN ORGANIZED HEALTH CARE EDUCATION/TRAINING PROGRAM

## 2024-07-18 RX ADMIN — SODIUM CHLORIDE, PRESERVATIVE FREE 10 ML: 5 INJECTION INTRAVENOUS at 10:45

## 2024-07-18 RX ADMIN — ASPIRIN 81 MG: 81 TABLET, COATED ORAL at 10:46

## 2024-07-18 RX ADMIN — LIDOCAINE HYDROCHLORIDE 1 MG/MIN: 4 INJECTION, SOLUTION INTRAVENOUS at 06:06

## 2024-07-18 RX ADMIN — ENOXAPARIN SODIUM 40 MG: 100 INJECTION SUBCUTANEOUS at 10:46

## 2024-07-18 RX ADMIN — ROSUVASTATIN CALCIUM 20 MG: 20 TABLET, FILM COATED ORAL at 21:09

## 2024-07-18 RX ADMIN — SODIUM CHLORIDE, PRESERVATIVE FREE 10 ML: 5 INJECTION INTRAVENOUS at 21:05

## 2024-07-18 RX ADMIN — Medication 0.5 MG/MIN: at 01:44

## 2024-07-18 RX ADMIN — Medication 0.5 MG/MIN: at 16:26

## 2024-07-18 ASSESSMENT — PAIN SCALES - GENERAL
PAINLEVEL_OUTOF10: 0
PAINLEVEL_OUTOF10: 0

## 2024-07-18 NOTE — PROGRESS NOTES
[facility]   Progress Note                   Date:   7/18/2024  Patient name: Coni Baker  Date of admission:  7/16/2024  1:03 PM  MRN:   [id number]  YOB: 1950  PCP: Earlene Salcedo MD    Reason for Admission:     Subjective:   Seen and examined at bedside.  Patient is hemodynamically stable with a blood pressure of 133/48 with a MAP of 71, telemetry showing intermittent bigeminy.  Patient is currently on lidocaine running at 1 and amiodarone infusion running at 0.5.  Dr. Cordero saw the patient yesterday.  Patient is excepted at [facility] as a stepdown status because of lack of availability oF bed in medical ICU.  Last potassium was 4.9, last magnesium was 3.1.  Patient failed ablation in the past on 5/22/2024 as likely source of nonsustained V. tach was epicardial in origin.  Patient was supposed to follow-up at [facility] and later decision was made to transfer the patient to [facility].  However patient had an outpatient appointment next month and had to come to the emergency room due to intermittent sinus bradycardia with heart rate of 30-40 and flutters in the eyes.  After the ablation patient was discharged on sotalol and mexiletine.  Both of the medications were discontinued yesterday.    Medications:   Scheduled Meds:   aspirin  81 mg Oral Daily    [Held by provider] budesonide-formoterol  2 puff Inhalation BID RT    rosuvastatin  20 mg Oral Nightly    sodium chloride flush  5-40 mL IntraVENous 2 times per day    enoxaparin  40 mg SubCUTAneous Daily     Continuous Infusions:   lidocaine 1 mg/min (07/17/24 2305)    amiodarone 0.5 mg/min (07/18/24 0144)    sodium chloride Stopped (07/17/24 2210)     CBC:   Recent Labs     07/16/24  1325 07/17/24  0247   WBC 7.5 5.3   HGB 15.2* 12.8    134*     BMP:    Recent Labs     07/16/24  1314 07/16/24  1325 07/17/24  0035   NA  --  140 140   K  --  4.8 4.9   CL  --  107 108*   CO2  --  23 24   BUN  --  16 20

## 2024-07-18 NOTE — CARE COORDINATION
Transitional planning. Jeanette Canas gtt, transferred to stepdown @ 10:15 am today. Will need to call [employer] [facility] for [facility] Step[facility] transfer @ 10:15 am tomorrow (once pt has be stable for 24 hrs -as a stepdown pt) Pt already has case on hold at [facility]. Inform [employer] that there is an open case for pt. Once pt has been accepted, call for imaging to be sent to [facility].

## 2024-07-18 NOTE — PROGRESS NOTES
INTENSIVE CARE UNIT  Resident Physician Progress Note    Patient - Coni Baker  Date of Admission -  7/16/2024  1:03 PM  Date of Evaluation -  7/18/2024  Room and Bed Number -  3004/3004-01   Hospital Day - 2  Chief Complaint   Patient presents with    Irregular Heart Beat      SUBJECTIVE:   HISTORY OF PRESENT ILLNESS:    Coni Baker is a 73 y.o. female with history of ischemic cardiac disease with 4 previous stents with EF of 35 to 40%  , past medical history of Atrial fibrillation , CAD (coronary artery disease), CHF (congestive heart failure) ([facility]),  Hyperlipidemia and Hypertension, and had  recent admission for bradycardia and \"PVCs\" and was scheduled to go to the [facility] for ablation but also started on mexiletine. Patient today began having \"slow heart rates again\" and is here for evaluation.  Patient is feeling dizzy and lightheaded intermittently as well. Patient was sent in by her home nurse for evaluation as patient was complaining of floaters in the left side of her peripheral vision and elevated blood pressure at home. Patient denies any dizziness or lightheadedness. She denies any chest pain or shortness of breath. Denies any lower extremity swelling. Patient states that she is supposed to go to [facility] for an ablation. She has been taking her newly prescribed medication mexiletine as prescribed. She denies any fevers or chills. No upper respiratory symptoms. Patient is not sure if she is on any blood thinners. Patient is not in acute distress. Patient was hypertensive with systolic blood pressure in the 200s on arrival. She was noted to be an intermittent nonsustained Ventricular tachycardia on initial EKG. Patient was saturating well on room air. Given the intermittent nonsustained Ventricular tachycardia, 1.5 mg/kg of lidocaine as well as 2 g of magnesium were given with improvement in the patient's heart rate. Repeat EKG showing normal sinus rhythm.   Critical care and cardiology  Results   Component Value Date/Time    MG 3.1 07/17/2024 02:47 AM    MG 2.4 07/17/2024 12:35 AM    MG 2.1 07/16/2024 01:25 PM     Phosphorus: No results found for: \"PHOS\"  Ionized Calcium:   Lab Results   Component Value Date/Time    CAION 1.13 07/17/2024 12:35 AM        Urinalysis:   Lab Results   Component Value Date/Time    NITRU NEGATIVE 06/05/2024 07:20 PM    COLORU Yellow 06/05/2024 07:20 PM    PHUR 5.0 06/05/2024 07:20 PM    WBCUA None 06/05/2024 07:20 PM    RBCUA 5 TO 10 06/05/2024 07:20 PM    BACTERIA None 06/05/2024 07:20 PM    LEUKOCYTESUR NEGATIVE 06/05/2024 07:20 PM    UROBILINOGEN Normal 06/05/2024 07:20 PM    BILIRUBINUR NEGATIVE 06/05/2024 07:20 PM    GLUCOSEU NEGATIVE 06/05/2024 07:20 PM    KETUA NEGATIVE 06/05/2024 07:20 PM       HgBA1c:  No results found for: \"LABA1C\"  TSH:    Lab Results   Component Value Date/Time    TSH 1.29 06/05/2024 07:23 PM     Lactic Acid: No results found for: \"LACTA\"   Troponin: No results for input(s): \"TROPONINI\" in the last 72 hours.    Radiology/Imaging:  XR CHEST PORTABLE   Final Result   Mild residual increased markings in the right lung base, improved as compared   to prior examination.             ASSESSMENT:     Patient Active Problem List    Diagnosis Date Noted    Pericardial effusion 06/08/2024    Ischemic cardiomyopathy 05/17/2024    S/P coronary artery stent placement 05/17/2024    S/P cardiac cath 05/16/2024    NSVT (nonsustained ventricular tachycardia) (HCC) 07/16/2024    Pleural effusion 06/13/2024    Bradycardia 06/06/2024    Symptomatic bradycardia 06/05/2024    Cardiomyopathy (HCC) 05/18/2024    Transaminitis 05/18/2024    Acute cystitis without hematuria 05/17/2024    Ventricular tachycardia (HCC) 05/16/2024    Shortness of breath 05/16/2024    Mixed hyperlipidemia 03/28/2022    Primary hypertension 03/28/2022    Polyp of sigmoid colon         PLAN:     PLAN/MEDICAL DECISION MAKING:  Neurologic:   - Neuro intact  - Neuro checks per protocol  -

## 2024-07-18 NOTE — TRANSITION OF CARE
Critical care team - Resident sign-out to medicine service      Date and time: 7/18/2024 10:26 AM  Patient's name:  Coni Baker  Medical Record Number: [id number]  Patient's account/billing number: [id number]  Patient's YOB: 1950  Age: 73 y.o.  Date of Admission: 7/16/2024  1:03 PM  Length of stay during current admission: 2    Primary Care Physician: Earlene Salcedo MD    Code Status: Full Code    Mode of physician to physician communication:        [] Via telephone   [] In person     Date and time of sign-out: 7/18/2024 10:26 AM    Accepting Internal Medicine resident: Radha RODRIGUEZ    Accepting Medicine team: [facility]    Accepting team's attending: Dr. Mello    Patient's current ICU Bed:  3004     Patient's assigned bed on floor:  2003        [] Med-Surg Monitored [x] Step-down       [] Psychiatry ICU       [] Psych floor     Reason for ICU admission:     Nonsustained Vtach     ICU course summary:     Coni Baker is a 73 y.o. female with history of ischemic cardiac disease with 4 previous stents with EF of 35 to 40%  , past medical history of Atrial fibrillation , CAD (coronary artery disease), CHF (congestive heart failure) ([facility]),  Hyperlipidemia and Hypertension, and had  recent admission for bradycardia and \"PVCs\" and was scheduled to go to the [facility] for ablation but also started on mexiletine. Patient today began having \"slow heart rates again\" and is here for evaluation.  Patient is feeling dizzy and lightheaded intermittently as well. Patient was sent in by her home nurse for evaluation as patient was complaining of floaters in the left side of her peripheral vision and elevated blood pressure at home. Patient denies any dizziness or lightheadedness. She denies any chest pain or shortness of breath. Denies any lower extremity swelling. Patient states that she is supposed to go to [facility] for an ablation. She has been taking her newly prescribed medication mexiletine as

## 2024-07-18 NOTE — PLAN OF CARE
Problem: Discharge Planning  Goal: Discharge to home or other facility with appropriate resources  7/18/2024 1513 by Zakia Garcia RN  Outcome: Progressing  Flowsheets (Taken 7/18/2024 1430)  Discharge to home or other facility with appropriate resources: Identify barriers to discharge with patient and caregiver  7/18/2024 0150 by Imani Hauser RN  Outcome: Progressing     Problem: Safety - Adult  Goal: Free from fall injury  7/18/2024 1513 by Zakia Garcia RN  Outcome: Progressing  7/18/2024 0150 by Imani Hauser RN  Outcome: Progressing     Problem: Chronic Conditions and Co-morbidities  Goal: Patient's chronic conditions and co-morbidity symptoms are monitored and maintained or improved  7/18/2024 1513 by Zakia Garcia RN  Outcome: Progressing  Flowsheets (Taken 7/18/2024 1430)  Care Plan - Patient's Chronic Conditions and Co-Morbidity Symptoms are Monitored and Maintained or Improved: Monitor and assess patient's chronic conditions and comorbid symptoms for stability, deterioration, or improvement  7/18/2024 0150 by Imani Hauser RN  Outcome: Progressing

## 2024-07-18 NOTE — PROGRESS NOTES
Section of Cardiology  Electrophysiology progress Note      Date:  7/18/2024  Patient: Coni Baker  Admission:  7/16/2024  1:03 PM  Admit DX: Ventricular tachycardia (HCC) [I47.20]  NSVT (nonsustained ventricular tachycardia) (HCC) [I47.29]  Age:  73 y.o., 1950     LOS: 2 days     Reason for evaluation:   Premature ventricular contractions      SUBJECTIVE:     No acute events overnight.  Telemetry shows sinus rhythm around 60 bpm with ventricular bigeminy for majority of the evening.  1 brief episode of frequent couplets    Patient daughter at bedside.  She is complaining of \"floaters\" at home.  Was having hand shaking on home mexiletine which has stopped here in the hospital.  She denies presyncope or syncope.  Has been ambulating in the room with assistance.    OBJECTIVE:        REVIEW OF SYMPTOMS:  ROS no change from admission H&P except for: See above      Current Inpatient Medications:   aspirin  81 mg Oral Daily    [Held by provider] budesonide-formoterol  2 puff Inhalation BID RT    rosuvastatin  20 mg Oral Nightly    sodium chloride flush  5-40 mL IntraVENous 2 times per day    enoxaparin  40 mg SubCUTAneous Daily       IV Infusions (if any):   lidocaine 1 mg/min (07/18/24 0609)    amiodarone 0.5 mg/min (07/18/24 0609)    sodium chloride Stopped (07/17/24 2210)           EXAM:   Vitals:    VITALS:  BP (!) 111/58   Pulse 76   Temp 97.3 °F (36.3 °C) (Oral)   Resp 23   Wt 75.7 kg (166 lb 14.2 oz)   SpO2 95%   BMI 31.53 kg/m²   24HR INTAKE/OUTPUT:    Intake/Output Summary (Last 24 hours) at 7/18/2024 0925  Last data filed at 7/18/2024 0609  Gross per 24 hour   Intake 770.15 ml   Output 300 ml   Net 470.15 ml       General: Sitting up in chair in no acute distress, alert oriented x3  HEENT:  Anicteric, Fair dentition  Neck:  No JVD  Chest:  Clear to auscultation bilaterally  CV:  S2 normal, regular rate with frequent extrasystole, no murmurs  Abdomen:  Soft, nontender, nondistended  Extremities:

## 2024-07-19 PROBLEM — I49.8 BIGEMINY: Status: ACTIVE | Noted: 2024-07-19

## 2024-07-19 PROBLEM — I16.1 HYPERTENSIVE EMERGENCY: Status: ACTIVE | Noted: 2024-07-19

## 2024-07-19 PROBLEM — J96.01 ACUTE RESPIRATORY FAILURE WITH HYPOXIA (HCC): Status: ACTIVE | Noted: 2024-07-19

## 2024-07-19 LAB
ANION GAP SERPL CALCULATED.3IONS-SCNC: 8 MMOL/L (ref 9–16)
BASOPHILS # BLD: 0.04 K/UL (ref 0–0.2)
BASOPHILS NFR BLD: 1 % (ref 0–2)
BUN SERPL-MCNC: 18 MG/DL (ref 8–23)
CALCIUM SERPL-MCNC: 8.5 MG/DL (ref 8.6–10.4)
CHLORIDE SERPL-SCNC: 108 MMOL/L (ref 98–107)
CO2 SERPL-SCNC: 24 MMOL/L (ref 20–31)
CREAT SERPL-MCNC: 0.8 MG/DL (ref 0.5–0.9)
EKG ATRIAL RATE: 85 BPM
EKG ATRIAL RATE: 98 BPM
EKG P AXIS: 44 DEGREES
EKG P AXIS: 52 DEGREES
EKG P-R INTERVAL: 114 MS
EKG P-R INTERVAL: 176 MS
EKG Q-T INTERVAL: 300 MS
EKG Q-T INTERVAL: 342 MS
EKG QRS DURATION: 144 MS
EKG QRS DURATION: 90 MS
EKG QTC CALCULATION (BAZETT): 436 MS
EKG QTC CALCULATION (BAZETT): 489 MS
EKG R AXIS: 0 DEGREES
EKG R AXIS: 114 DEGREES
EKG T AXIS: -61 DEGREES
EKG T AXIS: 65 DEGREES
EKG VENTRICULAR RATE: 160 BPM
EKG VENTRICULAR RATE: 98 BPM
EOSINOPHIL # BLD: 0.14 K/UL (ref 0–0.44)
EOSINOPHILS RELATIVE PERCENT: 2 % (ref 1–4)
ERYTHROCYTE [DISTWIDTH] IN BLOOD BY AUTOMATED COUNT: 14.1 % (ref 11.8–14.4)
GFR, ESTIMATED: 73 ML/MIN/1.73M2
GLUCOSE SERPL-MCNC: 84 MG/DL (ref 74–99)
HCT VFR BLD AUTO: 40.9 % (ref 36.3–47.1)
HGB BLD-MCNC: 12.8 G/DL (ref 11.9–15.1)
IMM GRANULOCYTES # BLD AUTO: <0.03 K/UL (ref 0–0.3)
IMM GRANULOCYTES NFR BLD: 0 %
LYMPHOCYTES NFR BLD: 1.73 K/UL (ref 1.1–3.7)
LYMPHOCYTES RELATIVE PERCENT: 28 % (ref 24–43)
MAGNESIUM SERPL-MCNC: 2.2 MG/DL (ref 1.6–2.4)
MCH RBC QN AUTO: 30.8 PG (ref 25.2–33.5)
MCHC RBC AUTO-ENTMCNC: 31.3 G/DL (ref 28.4–34.8)
MCV RBC AUTO: 98.3 FL (ref 82.6–102.9)
MONOCYTES NFR BLD: 0.57 K/UL (ref 0.1–1.2)
MONOCYTES NFR BLD: 9 % (ref 3–12)
NEUTROPHILS NFR BLD: 60 % (ref 36–65)
NEUTS SEG NFR BLD: 3.61 K/UL (ref 1.5–8.1)
NRBC BLD-RTO: 0 PER 100 WBC
PLATELET # BLD AUTO: 125 K/UL (ref 138–453)
PMV BLD AUTO: 10.1 FL (ref 8.1–13.5)
POTASSIUM SERPL-SCNC: 4.4 MMOL/L (ref 3.7–5.3)
RBC # BLD AUTO: 4.16 M/UL (ref 3.95–5.11)
SODIUM SERPL-SCNC: 140 MMOL/L (ref 136–145)
WBC OTHER # BLD: 6.1 K/UL (ref 3.5–11.3)

## 2024-07-19 PROCEDURE — 6370000000 HC RX 637 (ALT 250 FOR IP): Performed by: FAMILY MEDICINE

## 2024-07-19 PROCEDURE — 36415 COLL VENOUS BLD VENIPUNCTURE: CPT

## 2024-07-19 PROCEDURE — 2580000003 HC RX 258: Performed by: FAMILY MEDICINE

## 2024-07-19 PROCEDURE — 99233 SBSQ HOSP IP/OBS HIGH 50: CPT | Performed by: FAMILY MEDICINE

## 2024-07-19 PROCEDURE — 99233 SBSQ HOSP IP/OBS HIGH 50: CPT

## 2024-07-19 PROCEDURE — 2500000003 HC RX 250 WO HCPCS: Performed by: FAMILY MEDICINE

## 2024-07-19 PROCEDURE — 99232 SBSQ HOSP IP/OBS MODERATE 35: CPT | Performed by: STUDENT IN AN ORGANIZED HEALTH CARE EDUCATION/TRAINING PROGRAM

## 2024-07-19 PROCEDURE — 83735 ASSAY OF MAGNESIUM: CPT

## 2024-07-19 PROCEDURE — 94761 N-INVAS EAR/PLS OXIMETRY MLT: CPT

## 2024-07-19 PROCEDURE — 85025 COMPLETE CBC W/AUTO DIFF WBC: CPT

## 2024-07-19 PROCEDURE — 2060000000 HC ICU INTERMEDIATE R&B

## 2024-07-19 PROCEDURE — 6360000002 HC RX W HCPCS: Performed by: FAMILY MEDICINE

## 2024-07-19 PROCEDURE — 6360000002 HC RX W HCPCS: Performed by: STUDENT IN AN ORGANIZED HEALTH CARE EDUCATION/TRAINING PROGRAM

## 2024-07-19 PROCEDURE — 80048 BASIC METABOLIC PNL TOTAL CA: CPT

## 2024-07-19 RX ADMIN — ENOXAPARIN SODIUM 40 MG: 100 INJECTION SUBCUTANEOUS at 08:17

## 2024-07-19 RX ADMIN — ROSUVASTATIN CALCIUM 20 MG: 20 TABLET, FILM COATED ORAL at 20:49

## 2024-07-19 RX ADMIN — SODIUM CHLORIDE, PRESERVATIVE FREE 10 ML: 5 INJECTION INTRAVENOUS at 08:18

## 2024-07-19 RX ADMIN — LIDOCAINE HYDROCHLORIDE 1 MG/MIN: 4 INJECTION, SOLUTION INTRAVENOUS at 12:26

## 2024-07-19 RX ADMIN — Medication 0.5 MG/MIN: at 09:53

## 2024-07-19 RX ADMIN — ASPIRIN 81 MG: 81 TABLET, COATED ORAL at 08:18

## 2024-07-19 ASSESSMENT — PAIN SCALES - GENERAL: PAINLEVEL_OUTOF10: 0

## 2024-07-19 ASSESSMENT — ENCOUNTER SYMPTOMS
CONSTIPATION: 0
ABDOMINAL PAIN: 0
VOMITING: 0
BLOOD IN STOOL: 0
DIARRHEA: 0
COUGH: 0
SHORTNESS OF BREATH: 0
CHEST TIGHTNESS: 0
NAUSEA: 0
STRIDOR: 0
WHEEZING: 0

## 2024-07-19 NOTE — PROGRESS NOTES
Section of Cardiology  Electrophysiology Progress Note      Date:  7/19/2024  Patient: Coni Baker  Admission:  7/16/2024  1:03 PM  Admit DX: Ventricular tachycardia (HCC) [I47.20]  NSVT (nonsustained ventricular tachycardia) (HCC) [I47.29]  Age:  73 y.o., 1950     LOS: 3 days     Reason for evaluation:   PVCs, NSVT      SUBJECTIVE:     No acute events overnight. Telemetry shows NSR around 60 bpm with ventricular bigeminy over the past 24 hours.  No additional NSVT    No new complaints today. Awaiting transfer    OBJECTIVE:        REVIEW OF SYMPTOMS:  ROS no change from admission H&P except for: see above      Current Inpatient Medications:   aspirin  81 mg Oral Daily    [Held by provider] budesonide-formoterol  2 puff Inhalation BID RT    rosuvastatin  20 mg Oral Nightly    sodium chloride flush  5-40 mL IntraVENous 2 times per day    enoxaparin  40 mg SubCUTAneous Daily       IV Infusions (if any):   lidocaine 1 mg/min (07/19/24 1226)    sodium chloride Stopped (07/17/24 2210)           EXAM:   Vitals:    VITALS:  /62   Pulse 64   Temp 97.5 °F (36.4 °C) (Oral)   Resp 19   Wt 75.7 kg (166 lb 14.2 oz)   SpO2 95%   BMI 31.53 kg/m²   24HR INTAKE/OUTPUT:  No intake or output data in the 24 hours ending 07/19/24 1643    General: Lying in bed in no acute distress, alert oriented x3  HEENT:  Anicteric, Fair dentition  Neck:  No JVD  Chest:  Clear to auscultation bilaterally  CV:  S2 normal, regular rate with frequent extrasystole, no murmurs  Abdomen:  Soft, nontender, nondistended  Extremities:  2+ pulses, no edema  Neuro:  Grossly intact    Labs:   CBC:  Recent Labs     07/18/24  0708 07/19/24  0708   WBC 6.5 6.1   HGB 12.8 12.8   HCT 41.4 40.9   * 125*     Magnesium:  Recent Labs     07/18/24  0708 07/19/24  0708   MG 2.5* 2.2     BMP:  Recent Labs     07/18/24  0708 07/19/24  0708    140   K 4.6 4.4   CALCIUM 8.4* 8.5*   CO2 20 24   BUN 19 18   CREATININE 0.9 0.8   LABGLOM 72 73

## 2024-07-19 NOTE — H&P
[facility]  Office: [phone]  Joey Smith DO, Miguel Machado DO, Vikash Conklin DO, Alan Mcnulty DO, Joseph Thomas MD, Elsy Lacy MD, Eduardo Maddox MD, Kindra Mello MD,  Stef Awad MD, Esperanza Gandhi MD, Andi Hull MD,  Nolberto Allison DO, Anju Ellis MD, Paco Solis MD, Girma Smith DO, Heydi Yu MD,  Rigo Holliday DO, Rajwinder Stafford MD, Ita Camacho MD, Berna Vides MD, Eloy Bronson MD,  Gigi Romero MD, Elis Mckeon MD, Alfred Conner MD, Steve Greer MD, Jaime Banegas MD, Sandhya Collins MD, Boris Frye DO, Ion Henry DO, Jass Wyman MD,  Caesar Leiva MD, Shirley Waterhouse, CNP,  Chastity Astorga CNP, Fran Fuentes, CNP,  Olive Melgoza, DNP, Madai Holley, CNP, Meghan Rodriguez, CNP, Mary Aleman CNP, Haleigh Francois, CNP, Kira Rosales, PA-C, Pippa Daily PA-C, Radha Mariee, CNP, Debbie Alegria, CNP, Michael Hurst, CNP, Meghna Holland, CNP, Viky White, CNP, Tammy Jarrett, CNS, Brooke Tipton, CNP, Carolyne Boston CNP, Tracy Schwab, CNP         [facility]   IN-PATIENT SERVICE   [facility]    HISTORY AND PHYSICAL EXAMINATION            Date:   7/19/2024  Patient name:  Coni Baker  Date of admission:  7/16/2024  1:03 PM  MRN:   [id number]  Account:  [id number]  YOB: 1950  PCP:    Earlene Salcedo MD  Room:   2003/2003-01  Code Status:    Full Code    Chief Complaint:     Chief Complaint   Patient presents with    Irregular Heart Beat       History Obtained From:     patient, electronic medical record    History of Present Illness:     Care transitioned from ICU to our service on 7/18.  Course per ICU documentation   Coni Baker is a 73 y.o. female with history of ischemic cardiac disease with 4 previous stents with EF of 35 to 40%  , past medical history of Atrial fibrillation , CAD (coronary artery disease), CHF (congestive heart failure) (HCC),  Hyperlipidemia and

## 2024-07-19 NOTE — PROGRESS NOTES
Pulmonary progress note  Patient - Coni Baker  Date of Admission -  7/16/2024  1:03 PM  Date of Evaluation -  7/19/2024  Room and Bed Number -  2003/2003-01   Hospital Day - 3  Chief Complaint   Patient presents with    Irregular Heart Beat      SUBJECTIVE:   HISTORY OF PRESENT ILLNESS:    Coni Baker is a 73 y.o. female with history of ischemic cardiac disease with 4 previous stents with EF of 35 to 40%  , past medical history of Atrial fibrillation , CAD (coronary artery disease), CHF (congestive heart failure) ([facility]),  Hyperlipidemia and Hypertension, and had recent admission for bradycardia and \"PVCs\" and was scheduled to go to the [facility] for ablation but also started on mexiletine.   On the day of admission the patient started to have bradycardia dizziness.  Patient also brought to the emergency room on 7/16/2024 and was found to have very high blood pressure with intermittent no sustained ventricular tachycardia patient initially was treated with 1.5 mg with magnesium and she was admitted to the ICU for further management.    Patient stayed in the ICU for  7/16 - 7/18 and was eventually transferred to the floor on 7/18.  During her stay in the ICU she kept running multiple episodes of nonsustained V. tach and she was kept on lidocaine and amiodarone drip.  She was transferred to the floor pending to be transferred to [facility] for further management.    The patient was seen and examined at bedside in the presence of her family.  During my evaluation she was on lidocaine and amiodarone drip.  No acute events overnight.  Sinus rhythm with multiple PVCs [could be bigeminy] on telemetry.  Patient is awaiting to be transferred to [facility] for further EP intervention.    OBJECTIVE:   VITAL SIGNS:  Patient Vitals for the past 8 hrs:   BP Temp Temp src Pulse Resp SpO2   07/19/24 1122 124/62 97.5 °F (36.4 °C) Oral 64 19 95 %       Last Body weight:   Wt Readings from Last 3 Encounters:

## 2024-07-19 NOTE — PROGRESS NOTES
Writer to room to perform Q4 hr assessment on PT. Writer noticed Pt arm beginning to look swollen near IV insertion sit. Area was also red and tender to touch. Writer stopped IV amio and lidocaine infusion and moved the drips to a new IV location. Writer removed IV, noted the swollen area with a marker, and elevated the affected arm. Plan of care ongoing.

## 2024-07-19 NOTE — PROGRESS NOTES
Cardiac monitor alarming. HR noted to be in 30's-40's ventricular bigeminy. Writer in to assess patient. Denies any cardiac symptoms. BP and other VS WNL. Peripheral IV noted to be red with streaks and painful per patient. Writer able to flush with good blood return, however patient reports burning. Amiodarone and lidocaine gtt shut off. Attempts to obtain peripheral IV access x2 unsuccessful. IV team consult placed. Writer reached out to cardiology team via perfect serve regarding bradycardia with ongoing verntricular bigeminy. Per cardiology fellow and NP, okay to continue amiodarone and lidocaine gtt once new peripheral IV access achieved. IV team successfully placed peripheral IV in left forearm. Per Javan, Vascular Access RN- no further attempts should be made to obtain lab or IV access in right arm. Amiodarone and lidocaine gtt resumed per order. Writer reached out to  regarding [facility] transfer. No updates regarding bed assignment and transportation. Patient and family updated. Verbalized understanding. Plan of care ongoing.

## 2024-07-19 NOTE — CARE COORDINATION
Patient accepted at [facility] by Dr Makenna Marte. Awaiting bed. Patient updated. Spoke to Mona in radiology and Echo in cath lab to request imaging to be shared

## 2024-07-19 NOTE — PROGRESS NOTES
future as patient is already Accepted at [facility]  EP is managing antiarrhythmic medications. Patient to be transferred to [facility].  Electrolyte replacement as per protocol, keep potassium more than 4 and mag more than 2  Lost of IV access - Awaiting IV team. Raphael on tele       HARI Fisher - CNP  [facility]  [phone]

## 2024-07-20 VITALS
TEMPERATURE: 97.9 F | OXYGEN SATURATION: 94 % | BODY MASS INDEX: 31.49 KG/M2 | DIASTOLIC BLOOD PRESSURE: 57 MMHG | SYSTOLIC BLOOD PRESSURE: 138 MMHG | WEIGHT: 166.67 LBS | HEART RATE: 81 BPM | RESPIRATION RATE: 18 BRPM

## 2024-07-20 LAB
ANION GAP SERPL CALCULATED.3IONS-SCNC: 7 MMOL/L (ref 9–16)
BASOPHILS # BLD: 0.04 K/UL (ref 0–0.2)
BASOPHILS NFR BLD: 1 % (ref 0–2)
BUN SERPL-MCNC: 15 MG/DL (ref 8–23)
CALCIUM SERPL-MCNC: 8.4 MG/DL (ref 8.6–10.4)
CHLORIDE SERPL-SCNC: 110 MMOL/L (ref 98–107)
CO2 SERPL-SCNC: 23 MMOL/L (ref 20–31)
CREAT SERPL-MCNC: 0.8 MG/DL (ref 0.5–0.9)
EOSINOPHIL # BLD: 0.18 K/UL (ref 0–0.44)
EOSINOPHILS RELATIVE PERCENT: 3 % (ref 1–4)
ERYTHROCYTE [DISTWIDTH] IN BLOOD BY AUTOMATED COUNT: 14.2 % (ref 11.8–14.4)
GFR, ESTIMATED: 75 ML/MIN/1.73M2
GLUCOSE SERPL-MCNC: 95 MG/DL (ref 74–99)
HCT VFR BLD AUTO: 40.2 % (ref 36.3–47.1)
HGB BLD-MCNC: 12.7 G/DL (ref 11.9–15.1)
IMM GRANULOCYTES # BLD AUTO: <0.03 K/UL (ref 0–0.3)
IMM GRANULOCYTES NFR BLD: 0 %
LYMPHOCYTES NFR BLD: 1.62 K/UL (ref 1.1–3.7)
LYMPHOCYTES RELATIVE PERCENT: 30 % (ref 24–43)
MCH RBC QN AUTO: 30.7 PG (ref 25.2–33.5)
MCHC RBC AUTO-ENTMCNC: 31.6 G/DL (ref 28.4–34.8)
MCV RBC AUTO: 97.1 FL (ref 82.6–102.9)
MONOCYTES NFR BLD: 0.48 K/UL (ref 0.1–1.2)
MONOCYTES NFR BLD: 9 % (ref 3–12)
NEUTROPHILS NFR BLD: 57 % (ref 36–65)
NEUTS SEG NFR BLD: 3.06 K/UL (ref 1.5–8.1)
NRBC BLD-RTO: 0 PER 100 WBC
PLATELET # BLD AUTO: NORMAL K/UL (ref 138–453)
PLATELET, FLUORESCENCE: 140 K/UL (ref 138–453)
PLATELETS.RETICULATED NFR BLD AUTO: 2 % (ref 1.1–10.3)
POTASSIUM SERPL-SCNC: 4.2 MMOL/L (ref 3.7–5.3)
RBC # BLD AUTO: 4.14 M/UL (ref 3.95–5.11)
SODIUM SERPL-SCNC: 140 MMOL/L (ref 136–145)
WBC OTHER # BLD: 5.4 K/UL (ref 3.5–11.3)

## 2024-07-20 PROCEDURE — 85055 RETICULATED PLATELET ASSAY: CPT

## 2024-07-20 PROCEDURE — 6360000002 HC RX W HCPCS: Performed by: FAMILY MEDICINE

## 2024-07-20 PROCEDURE — 85025 COMPLETE CBC W/AUTO DIFF WBC: CPT

## 2024-07-20 PROCEDURE — 80048 BASIC METABOLIC PNL TOTAL CA: CPT

## 2024-07-20 PROCEDURE — 6370000000 HC RX 637 (ALT 250 FOR IP): Performed by: FAMILY MEDICINE

## 2024-07-20 PROCEDURE — 36415 COLL VENOUS BLD VENIPUNCTURE: CPT

## 2024-07-20 RX ADMIN — ASPIRIN 81 MG: 81 TABLET, COATED ORAL at 07:45

## 2024-07-20 RX ADMIN — ENOXAPARIN SODIUM 40 MG: 100 INJECTION SUBCUTANEOUS at 07:45

## 2024-07-20 ASSESSMENT — PAIN SCALES - GENERAL
PAINLEVEL_OUTOF10: 0
PAINLEVEL_OUTOF10: 0

## 2024-07-20 NOTE — DISCHARGE SUMMARY
[facility]  Office: [phone]  Joey Smith DO, Miguel Machado DO, Vikash Conklin DO, Alan Mcnulty DO, Joseph Thomas MD, Elsy Lacy MD, Eduardo Maddox MD, Kindra Mello MD,  Stef Awad MD, Esperanza Gandhi MD, Andi Hull MD,  Nolberto Allison DO, Anju Ellis MD, Paco Solis MD, Girma Smith DO, Heydi Yu MD,  Rigo Holliday DO, Rajwinder Stafford MD, Ita Camacho MD, Berna Vides MD, Eloy Bronson MD,  Gigi Romero MD, Elis Mckeon MD, Alfred Conner MD, Steve Greer MD, Jaime Banegas MD, Sandhya Collins MD, Boris Frye DO, Ion Henry DO, Jass Wyman MD,  Caesar Leiva MD, Shirley Waterhouse, CNP,  Chastity Astorga CNP, Fran Fuentes, CNP,  Olive Melgoza, BOBBY, Madai Holley, CNP, Meghan Rodriguez, CNP, Mary Aleman CNP, Haleigh Francois CNP, Kira Rosales, PA-C, Pippa Daily PA-C, Radha Mariee, CNP, Debbie Alegria, CNP, Michael Hurst, CNP, Meghna Holland, CNP, Viky White CNP, Tammy Jarrett, CNS, Brooke Tipton, CNP, Carolyne Boston CNP, Tracy Schwab, CNP         [facility]   IN-PATIENT SERVICE   [facility]    Discharge Summary     Patient ID: Coni Baker  :  1950   MRN: [id number]     ACCOUNT:  [id number]   Patient's PCP: Earlene Salcedo MD  Admit Date: 2024   Discharge Date: 2024     Length of Stay: 4  Code Status:  Prior  Admitting Physician: Scooter Camacho MD  Discharge Physician: Radha Miester, APRN - NP     Active Discharge Diagnoses:     Hospital Problem Lists:  Principal Problem:    NSVT (nonsustained ventricular tachycardia) ([facility])  Active Problems:    Ischemic cardiomyopathy    S/P coronary artery stent placement    Mixed hyperlipidemia    Bigeminy    Acute respiratory failure with hypoxia ([facility])    Hypertensive emergency  Resolved Problems:    * No resolved hospital problems. *      Admission Condition:  serious     Discharged Condition: fair    Hospital Stay:     Hospital  Results   Component Value Date/Time    COLORU Yellow 06/05/2024 07:20 PM    TURBIDITY Clear 06/05/2024 07:20 PM    HGBUR TRACE 06/05/2024 07:20 PM    PHUR 5.0 06/05/2024 07:20 PM    PROTEINU TRACE 06/05/2024 07:20 PM    GLUCOSEU NEGATIVE 06/05/2024 07:20 PM    KETUA NEGATIVE 06/05/2024 07:20 PM    BILIRUBINUR NEGATIVE 06/05/2024 07:20 PM    UROBILINOGEN Normal 06/05/2024 07:20 PM    NITRU NEGATIVE 06/05/2024 07:20 PM    LEUKOCYTESUR NEGATIVE 06/05/2024 07:20 PM     TSH:    Lab Results   Component Value Date/Time    TSH 1.29 06/05/2024 07:23 PM        Radiology:  XR CHEST PORTABLE    Result Date: 7/16/2024  Mild residual increased markings in the right lung base, improved as compared to prior examination.       Consultations:    Consults:     Final Specialist Recommendations/Findings:   IP CONSULT TO CARDIOLOGY  IP CONSULT TO HOSPITALIST  IP CONSULT TO CRITICAL CARE  IP CONSULT TO ELECTROPHYSIOLOGY  IP CONSULT TO VASCULAR ACCESS TEAM      The patient was seen and examined on day of discharge and this discharge summary is in conjunction with any daily progress note from day of discharge.    Discharge plan:     Disposition: To a non-[facility] facility/[facility]          Diet: cardiac diet    Activity: As tolerated      Discharge Medications:      Medication List        CONTINUE taking these medications      Handicap Placard Misc  by Does not apply route 5 years, cannot walk over 200 ft.            ASK your doctor about these medications      aspirin 81 MG EC tablet     budesonide-formoterol 160-4.5 MCG/ACT Aero  Commonly known as: SYMBICORT  Inhale 2 puffs into the lungs in the morning and 2 puffs in the evening.     Calcium-Vitamin D 600-200 MG-UNIT Tabs  Take 1 tablet by mouth 3 times daily (before meals)     mexiletine 250 MG capsule  Commonly known as: MEXITIL  Take 1 capsule by mouth 3 times daily (with meals)     rosuvastatin 20 MG tablet  Commonly known as: CRESTOR              No discharge procedures on

## 2024-07-20 NOTE — PROGRESS NOTES
The patient is being transferred to [facility] with [facility].  Alert and orientated.  Family has the patient belongings.  Lidocaine gtt continues.

## 2024-07-20 NOTE — PROGRESS NOTES
Received call from [facility] stating the pt. Has a bed at [facility] now.  [id number], number to call unit for report is [phone].  Tentative  time per transport is 0830 this morning.  Pt. And daughter updated.  Verbalized understanding.

## 2024-08-05 PROBLEM — I27.20 SEVERE PULMONARY HYPERTENSION (HCC): Status: ACTIVE | Noted: 2024-08-05

## 2024-08-05 PROBLEM — E66.811 OBESITY, CLASS I, BMI 30-34.9: Status: ACTIVE | Noted: 2024-07-27

## 2024-08-05 PROBLEM — F17.200 CURRENT SMOKER: Status: ACTIVE | Noted: 2024-08-05

## 2024-08-05 PROBLEM — J44.9 COPD (CHRONIC OBSTRUCTIVE PULMONARY DISEASE) (HCC): Status: ACTIVE | Noted: 2024-07-19

## 2024-08-05 PROBLEM — Z95.5 STENTED CORONARY ARTERY: Status: ACTIVE | Noted: 2024-08-05

## 2024-08-05 PROBLEM — I25.2 OLD MYOCARDIAL INFARCTION: Status: ACTIVE | Noted: 2024-08-05

## 2024-08-05 PROBLEM — I50.22 CHRONIC SYSTOLIC CONGESTIVE HEART FAILURE (HCC): Status: ACTIVE | Noted: 2024-07-27

## 2024-11-14 PROBLEM — I42.9 CARDIOMYOPATHY (HCC): Status: RESOLVED | Noted: 2024-05-18 | Resolved: 2024-11-14

## 2024-12-17 ENCOUNTER — HOSPITAL ENCOUNTER (OUTPATIENT)
Age: 74
Discharge: HOME | End: 2024-12-17
Payer: MEDICARE

## 2024-12-17 DIAGNOSIS — E78.2 MIXED HYPERLIPIDEMIA: ICD-10-CM

## 2024-12-17 DIAGNOSIS — I10 PRIMARY HYPERTENSION: ICD-10-CM

## 2024-12-17 LAB
ALBUMIN SERPL-MCNC: 4 G/DL (ref 3.5–5.2)
ALP SERPL-CCNC: 100 U/L (ref 35–104)
ALT SERPL-CCNC: 15 U/L (ref 10–35)
ANION GAP SERPL CALCULATED.3IONS-SCNC: 9 MMOL/L (ref 9–16)
AST SERPL-CCNC: 24 U/L (ref 10–35)
BASOPHILS # BLD: 0 K/UL (ref 0–0.2)
BASOPHILS NFR BLD: 0 % (ref 0–2)
BILIRUB SERPL-MCNC: 0.8 MG/DL (ref 0–1.2)
BUN SERPL-MCNC: 17 MG/DL (ref 8–23)
CALCIUM SERPL-MCNC: 9.1 MG/DL (ref 8.6–10.4)
CHLORIDE SERPL-SCNC: 109 MMOL/L (ref 98–107)
CHOLEST SERPL-MCNC: 131 MG/DL (ref 0–199)
CHOLESTEROL/HDL RATIO: 2.1
CO2 SERPL-SCNC: 26 MMOL/L (ref 20–31)
CREAT SERPL-MCNC: 0.7 MG/DL (ref 0.7–1.2)
EOSINOPHIL # BLD: 0.11 K/UL (ref 0–0.4)
EOSINOPHILS RELATIVE PERCENT: 2 % (ref 0–4)
ERYTHROCYTE [DISTWIDTH] IN BLOOD BY AUTOMATED COUNT: 13.9 % (ref 11.5–14.9)
GFR, ESTIMATED: >90 ML/MIN/1.73M2
GLUCOSE SERPL-MCNC: 87 MG/DL (ref 74–99)
HCT VFR BLD AUTO: 41.5 % (ref 36–46)
HDLC SERPL-MCNC: 62 MG/DL
HGB BLD-MCNC: 13.9 G/DL (ref 12–16)
LDLC SERPL CALC-MCNC: 58 MG/DL (ref 0–100)
LYMPHOCYTES NFR BLD: 1.22 K/UL (ref 1–4.8)
LYMPHOCYTES RELATIVE PERCENT: 23 % (ref 24–44)
MCH RBC QN AUTO: 31.1 PG (ref 26–34)
MCHC RBC AUTO-ENTMCNC: 33.6 G/DL (ref 31–37)
MCV RBC AUTO: 92.6 FL (ref 80–100)
MONOCYTES NFR BLD: 0.48 K/UL (ref 0.1–1.3)
MONOCYTES NFR BLD: 9 % (ref 1–7)
MORPHOLOGY: NORMAL
NEUTROPHILS NFR BLD: 66 % (ref 36–66)
NEUTS SEG NFR BLD: 3.49 K/UL (ref 1.3–9.1)
PLATELET # BLD AUTO: 171 K/UL (ref 150–450)
PMV BLD AUTO: 6.7 FL (ref 6–12)
POTASSIUM SERPL-SCNC: 4.4 MMOL/L (ref 3.7–5.3)
PROT SERPL-MCNC: 6.7 G/DL (ref 6.6–8.7)
RBC # BLD AUTO: 4.48 M/UL (ref 4–5.2)
SODIUM SERPL-SCNC: 144 MMOL/L (ref 136–145)
TRIGL SERPL-MCNC: 57 MG/DL (ref 0–149)
WBC OTHER # BLD: 5.3 K/UL (ref 3.5–11)

## 2024-12-17 PROCEDURE — 85025 COMPLETE CBC W/AUTO DIFF WBC: CPT

## 2024-12-17 PROCEDURE — 80053 COMPREHEN METABOLIC PANEL: CPT

## 2024-12-17 PROCEDURE — 80061 LIPID PANEL: CPT

## 2024-12-17 PROCEDURE — 36415 COLL VENOUS BLD VENIPUNCTURE: CPT

## 2024-12-23 ENCOUNTER — APPOINTMENT (OUTPATIENT)
Dept: GENERAL RADIOLOGY | Age: 74
End: 2024-12-23
Payer: MEDICARE

## 2024-12-23 ENCOUNTER — HOSPITAL ENCOUNTER (OUTPATIENT)
Age: 74
Setting detail: OBSERVATION
Discharge: HOME | End: 2024-12-24
Attending: STUDENT IN AN ORGANIZED HEALTH CARE EDUCATION/TRAINING PROGRAM | Admitting: EMERGENCY MEDICINE
Payer: MEDICARE

## 2024-12-23 DIAGNOSIS — R00.2 PALPITATIONS: ICD-10-CM

## 2024-12-23 DIAGNOSIS — I49.8 BIGEMINY: Primary | ICD-10-CM

## 2024-12-23 LAB
ALBUMIN SERPL-MCNC: 4 G/DL (ref 3.5–5.2)
ALBUMIN/GLOB SERPL: 1.5 {RATIO} (ref 1–2.5)
ALP SERPL-CCNC: 105 U/L (ref 35–104)
ALT SERPL-CCNC: 16 U/L (ref 10–35)
ANION GAP SERPL CALCULATED.3IONS-SCNC: 8 MMOL/L (ref 9–16)
AST SERPL-CCNC: 26 U/L (ref 10–35)
BILIRUB SERPL-MCNC: 0.6 MG/DL (ref 0–1.2)
BNP SERPL-MCNC: 961 PG/ML (ref 0–450)
BUN SERPL-MCNC: 20 MG/DL (ref 8–23)
CALCIUM SERPL-MCNC: 9.4 MG/DL (ref 8.6–10.4)
CHLORIDE SERPL-SCNC: 110 MMOL/L (ref 98–107)
CO2 SERPL-SCNC: 25 MMOL/L (ref 20–31)
CREAT SERPL-MCNC: 0.9 MG/DL (ref 0.6–0.9)
D DIMER PPP FEU-MCNC: 0.85 UG/ML FEU (ref 0–0.57)
ERYTHROCYTE [DISTWIDTH] IN BLOOD BY AUTOMATED COUNT: 13.8 % (ref 11.8–14.4)
GFR, ESTIMATED: 67 ML/MIN/1.73M2
GLUCOSE SERPL-MCNC: 107 MG/DL (ref 74–99)
HCT VFR BLD AUTO: 39.4 % (ref 36.3–47.1)
HGB BLD-MCNC: 13.2 G/DL (ref 11.9–15.1)
MAGNESIUM SERPL-MCNC: 2 MG/DL (ref 1.6–2.4)
MCH RBC QN AUTO: 30.8 PG (ref 25.2–33.5)
MCHC RBC AUTO-ENTMCNC: 33.5 G/DL (ref 28.4–34.8)
MCV RBC AUTO: 91.8 FL (ref 82.6–102.9)
NRBC BLD-RTO: 0 PER 100 WBC
PLATELET # BLD AUTO: 162 K/UL (ref 138–453)
PMV BLD AUTO: 9.4 FL (ref 8.1–13.5)
POTASSIUM SERPL-SCNC: 4.5 MMOL/L (ref 3.7–5.3)
PROT SERPL-MCNC: 6.7 G/DL (ref 6.6–8.7)
RBC # BLD AUTO: 4.29 M/UL (ref 3.95–5.11)
SODIUM SERPL-SCNC: 143 MMOL/L (ref 136–145)
TROPONIN I SERPL HS-MCNC: 12 NG/L (ref 0–14)
TROPONIN I SERPL HS-MCNC: 13 NG/L (ref 0–14)
WBC OTHER # BLD: 6.7 K/UL (ref 3.5–11.3)

## 2024-12-23 PROCEDURE — G0378 HOSPITAL OBSERVATION PER HR: HCPCS

## 2024-12-23 PROCEDURE — 71045 X-RAY EXAM CHEST 1 VIEW: CPT

## 2024-12-23 PROCEDURE — 99285 EMERGENCY DEPT VISIT HI MDM: CPT

## 2024-12-23 PROCEDURE — 83880 ASSAY OF NATRIURETIC PEPTIDE: CPT

## 2024-12-23 PROCEDURE — 85379 FIBRIN DEGRADATION QUANT: CPT

## 2024-12-23 PROCEDURE — 85027 COMPLETE CBC AUTOMATED: CPT

## 2024-12-23 PROCEDURE — 93005 ELECTROCARDIOGRAM TRACING: CPT

## 2024-12-23 PROCEDURE — 80053 COMPREHEN METABOLIC PANEL: CPT

## 2024-12-23 PROCEDURE — 84484 ASSAY OF TROPONIN QUANT: CPT

## 2024-12-23 PROCEDURE — 83735 ASSAY OF MAGNESIUM: CPT

## 2024-12-23 RX ORDER — MEXILETINE HYDROCHLORIDE 150 MG/1
150 CAPSULE ORAL ONCE
Status: DISCONTINUED | OUTPATIENT
Start: 2024-12-23 | End: 2024-12-24

## 2024-12-23 RX ORDER — METOPROLOL SUCCINATE 25 MG/1
TABLET, EXTENDED RELEASE ORAL
COMMUNITY
Start: 2024-12-06

## 2024-12-23 ASSESSMENT — ENCOUNTER SYMPTOMS
ABDOMINAL PAIN: 0
SHORTNESS OF BREATH: 1
VOMITING: 0
NAUSEA: 0

## 2024-12-23 ASSESSMENT — PAIN - FUNCTIONAL ASSESSMENT: PAIN_FUNCTIONAL_ASSESSMENT: NONE - DENIES PAIN

## 2024-12-24 VITALS
WEIGHT: 177 LBS | OXYGEN SATURATION: 97 % | TEMPERATURE: 98.1 F | RESPIRATION RATE: 20 BRPM | BODY MASS INDEX: 33.42 KG/M2 | HEIGHT: 61 IN | HEART RATE: 81 BPM | DIASTOLIC BLOOD PRESSURE: 78 MMHG | SYSTOLIC BLOOD PRESSURE: 128 MMHG

## 2024-12-24 PROBLEM — I47.29 RVOT VENTRICULAR TACHYCARDIA (HCC): Status: ACTIVE | Noted: 2024-12-24

## 2024-12-24 LAB
EKG ATRIAL RATE: 111 BPM
EKG ATRIAL RATE: 87 BPM
EKG P AXIS: 63 DEGREES
EKG P AXIS: 69 DEGREES
EKG P-R INTERVAL: 176 MS
EKG P-R INTERVAL: 180 MS
EKG Q-T INTERVAL: 344 MS
EKG Q-T INTERVAL: 390 MS
EKG QRS DURATION: 74 MS
EKG QRS DURATION: 76 MS
EKG QTC CALCULATION (BAZETT): 467 MS
EKG QTC CALCULATION (BAZETT): 469 MS
EKG R AXIS: 39 DEGREES
EKG R AXIS: 5 DEGREES
EKG T AXIS: 36 DEGREES
EKG T AXIS: 81 DEGREES
EKG VENTRICULAR RATE: 111 BPM
EKG VENTRICULAR RATE: 87 BPM

## 2024-12-24 PROCEDURE — 6370000000 HC RX 637 (ALT 250 FOR IP)

## 2024-12-24 PROCEDURE — 99223 1ST HOSP IP/OBS HIGH 75: CPT | Performed by: INTERNAL MEDICINE

## 2024-12-24 PROCEDURE — G0378 HOSPITAL OBSERVATION PER HR: HCPCS

## 2024-12-24 PROCEDURE — 93010 ELECTROCARDIOGRAM REPORT: CPT | Performed by: INTERNAL MEDICINE

## 2024-12-24 PROCEDURE — 94640 AIRWAY INHALATION TREATMENT: CPT

## 2024-12-24 PROCEDURE — 93005 ELECTROCARDIOGRAM TRACING: CPT | Performed by: EMERGENCY MEDICINE

## 2024-12-24 PROCEDURE — 2500000003 HC RX 250 WO HCPCS

## 2024-12-24 RX ORDER — ACETAMINOPHEN 325 MG/1
650 TABLET ORAL EVERY 4 HOURS PRN
Status: DISCONTINUED | OUTPATIENT
Start: 2024-12-24 | End: 2024-12-24 | Stop reason: HOSPADM

## 2024-12-24 RX ORDER — SODIUM CHLORIDE 0.9 % (FLUSH) 0.9 %
5-40 SYRINGE (ML) INJECTION EVERY 12 HOURS SCHEDULED
Status: DISCONTINUED | OUTPATIENT
Start: 2024-12-24 | End: 2024-12-24 | Stop reason: HOSPADM

## 2024-12-24 RX ORDER — ASPIRIN 81 MG/1
81 TABLET ORAL DAILY
Status: DISCONTINUED | OUTPATIENT
Start: 2024-12-24 | End: 2024-12-24 | Stop reason: HOSPADM

## 2024-12-24 RX ORDER — SODIUM CHLORIDE 9 MG/ML
INJECTION, SOLUTION INTRAVENOUS PRN
Status: DISCONTINUED | OUTPATIENT
Start: 2024-12-24 | End: 2024-12-24 | Stop reason: HOSPADM

## 2024-12-24 RX ORDER — BUDESONIDE AND FORMOTEROL FUMARATE DIHYDRATE 160; 4.5 UG/1; UG/1
2 AEROSOL RESPIRATORY (INHALATION)
Status: DISCONTINUED | OUTPATIENT
Start: 2024-12-24 | End: 2024-12-24 | Stop reason: HOSPADM

## 2024-12-24 RX ORDER — ENOXAPARIN SODIUM 100 MG/ML
40 INJECTION SUBCUTANEOUS DAILY
Status: DISCONTINUED | OUTPATIENT
Start: 2024-12-24 | End: 2024-12-24 | Stop reason: HOSPADM

## 2024-12-24 RX ORDER — ROSUVASTATIN CALCIUM 20 MG/1
20 TABLET, COATED ORAL DAILY
Status: DISCONTINUED | OUTPATIENT
Start: 2024-12-24 | End: 2024-12-24 | Stop reason: HOSPADM

## 2024-12-24 RX ORDER — SODIUM CHLORIDE 0.9 % (FLUSH) 0.9 %
5-40 SYRINGE (ML) INJECTION PRN
Status: DISCONTINUED | OUTPATIENT
Start: 2024-12-24 | End: 2024-12-24 | Stop reason: HOSPADM

## 2024-12-24 RX ORDER — VALSARTAN 40 MG/1
20 TABLET ORAL DAILY
Status: DISCONTINUED | OUTPATIENT
Start: 2024-12-24 | End: 2024-12-24 | Stop reason: HOSPADM

## 2024-12-24 RX ORDER — ONDANSETRON 2 MG/ML
4 INJECTION INTRAMUSCULAR; INTRAVENOUS EVERY 6 HOURS PRN
Status: DISCONTINUED | OUTPATIENT
Start: 2024-12-24 | End: 2024-12-24 | Stop reason: HOSPADM

## 2024-12-24 RX ORDER — ONDANSETRON 4 MG/1
4 TABLET, ORALLY DISINTEGRATING ORAL EVERY 8 HOURS PRN
Status: DISCONTINUED | OUTPATIENT
Start: 2024-12-24 | End: 2024-12-24 | Stop reason: HOSPADM

## 2024-12-24 RX ORDER — MEXILETINE HYDROCHLORIDE 150 MG/1
150 CAPSULE ORAL EVERY 8 HOURS SCHEDULED
Status: DISCONTINUED | OUTPATIENT
Start: 2024-12-24 | End: 2024-12-24 | Stop reason: HOSPADM

## 2024-12-24 RX ADMIN — ASPIRIN 81 MG: 81 TABLET, COATED ORAL at 08:11

## 2024-12-24 RX ADMIN — SODIUM CHLORIDE, PRESERVATIVE FREE 10 ML: 5 INJECTION INTRAVENOUS at 08:11

## 2024-12-24 RX ADMIN — VALSARTAN 20 MG: 80 TABLET, FILM COATED ORAL at 08:11

## 2024-12-24 RX ADMIN — BUDESONIDE AND FORMOTEROL FUMARATE DIHYDRATE 2 PUFF: 160; 4.5 AEROSOL RESPIRATORY (INHALATION) at 08:35

## 2024-12-24 RX ADMIN — MEXILETINE HYDROCHLORIDE 150 MG: 150 CAPSULE ORAL at 05:37

## 2024-12-24 ASSESSMENT — PAIN SCALES - GENERAL: PAINLEVEL_OUTOF10: 0

## 2024-12-24 NOTE — PLAN OF CARE
Problem: Discharge Planning  Goal: Discharge to home or other facility with appropriate resources  12/24/2024 0103 by Anny Ortiz GN  Outcome: Progressing  12/24/2024 0103 by Anny Ortiz GN  Outcome: Progressing  12/24/2024 0103 by Anny Ortiz GN  Outcome: Progressing     Problem: Safety - Adult  Goal: Free from fall injury  12/24/2024 0103 by Anny Ortiz GN  Outcome: Progressing  12/24/2024 0103 by Anny Ortiz GN  Outcome: Progressing  12/24/2024 0103 by Anny Ortiz GN  Outcome: Progressing     Problem: ABCDS Injury Assessment  Goal: Absence of physical injury  12/24/2024 0103 by Anny Ortiz GN  Outcome: Progressing  12/24/2024 0103 by Anny Ortiz GN  Outcome: Progressing  12/24/2024 0103 by Anny Ortiz GN  Outcome: Progressing     Problem: Cardiovascular - Adult  Goal: Absence of cardiac dysrhythmias or at baseline  Outcome: Progressing

## 2024-12-24 NOTE — H&P
[facility]  CDU / OBSERVATION ENCOUNTER  Physician NOTE     Pt Name: Coni Baker  MRN: [id number]  Birthdate 1950  Date of evaluation: 12/24/24  Patient's PCP is :  Earlene Salcedo MD    CHIEF COMPLAINT       Chief Complaint   Patient presents with    Palpitations         HISTORY OF PRESENT ILLNESS    Coni Baker is a 74 y.o. female who presents with palpitations.  Patient reports that she has had issues like this in the past, but it on this occurrence her heart rate dipped into the 30s.  Patient does have a cardiac history, per chart review.  This includes previous ablation in July/August 2024, history PVCs, previously on metoprolol (, patient states that she has not been taking this due to feelings of drowsiness), episodes of ventricular tachycardia, CAD status post stenting, CHF with a 35% ejection fraction per last echo (July 2024).  Patient has no significant complaints this morning, only endorsing feelings of left arm numbness, which she attributes to carpal tunnel.  Patient reports that with movement of her left upper extremity the feelings of numbness/tingling subside.    Location/Symptom: chest   Timing/Onset: 1 day  Provocation: none  Quality: palpitations  Radiation: none  Severity: 8/10  Timing/Duration: 1 day  Modifying Factors: none    History was obtained in part through review of the ED chart. When possible, a direct discussion was had with ED nurses, residents, and attendings  REVIEW OF SYSTEMS       General ROS - No fevers, No malaise   Ophthalmic ROS - No discharge, No changes in vision  ENT ROS -  No sore throat, No rhinorrhea,   Respiratory ROS - no shortness of breath, no cough, no  wheezing  Cardiovascular ROS - No chest pain, no dyspnea on exertion  Gastrointestinal ROS - No abdominal pain, no nausea or vomiting, no change in bowel habits, no black or bloody stools  Genito-Urinary ROS - No dysuria, trouble voiding, or hematuria  Musculoskeletal ROS - No     All other components within normal limits   TROPONIN   TROPONIN   CBC   MAGNESIUM         CDU IMPRESSION / PLAN      Coni Baker is a 74 y.o. female who presents with episodes of palpitation.  She was also noted to be bradycardic at home, with a heart rate in the 30s.  Of note, patient had a previous ablation, in August/July 2024.  She is currently scheduled for a second ablation in March 2025, per the patient.  Patient has minimally elevated troponins.    Cardiology consultation: Recommendations appreciated  CHF clinic confirmed with the patient: Patient instructed to limit fluid intake to 2 L/day  Awaiting cardiologist recommendations  Continue home medications and pain control  Monitor vitals, labs, and imaging  DISPO: pending consults and clinical improvement    CONSULTS:    IP CONSULT TO CARDIOLOGY    PROCEDURES:  Not indicated       PATIENT REFERRED TO:    No follow-up provider specified.    --  Tejinder Baldwin MD   Observation Physician    This dictation was generated by voice recognition computer software.  Although all attempts are made to edit the dictation for accuracy, there may be errors in the transcription that are not intended.

## 2024-12-24 NOTE — DISCHARGE SUMMARY
CDU Discharge Summary        Patient:  Coni Baker  YOB: 1950    MRN: [id number]   Acct: [id number]    Primary Care Physician: Earlene Salcedo MD    Admit date:  12/23/2024  7:03 PM  Discharge date: 12/24/24    Discharge Diagnoses:     1.)  Patient had chest pain with sudden onset due to palpitations.  Treated with cardiology consultation .  Patient's symptoms are improving with the plan to discharge with PCP follow up.     Follow-up:  Call today/tomorrow for a follow up appointment with Earlene Salcedo MD , or return to the Emergency Room with worsening symptoms    Stressed to patient the importance of following up with primary care doctor for further workup/management of symptoms.  Pt verbalizes understanding and agrees with plan.    Discharge Medication Changes:       Medication List        CONTINUE taking these medications      aspirin 81 MG EC tablet     budesonide-formoterol 160-4.5 MCG/ACT Aero  Commonly known as: SYMBICORT  Inhale 2 puffs into the lungs in the morning and 2 puffs in the evening.     Calcium-Vitamin D 600-200 MG-UNIT Tabs  Take 1 tablet by mouth 3 times daily (before meals)     Handicap Placard Misc  by Does not apply route 5 years, cannot walk over 200 ft.     metoprolol succinate 25 MG extended release tablet  Commonly known as: TOPROL XL     mexiletine 150 MG capsule  Commonly known as: MEXITIL  TAKE 1 CAPSULE BY MOUTH 3 TIMES  DAILY WITH MEALS     rosuvastatin 20 MG tablet  Commonly known as: CRESTOR     valsartan 40 MG tablet  Commonly known as: DIOVAN  Take 0.5 tablets by mouth daily              Diet:  Diet NPO, advance as tolerated     Activity:  As tolerated    Consultants: IP CONSULT TO CARDIOLOGY    Procedures:  Not indicated     Diagnostic Test:   Results for orders placed or performed during the hospital encounter of 12/23/24   Troponin   Result Value Ref Range    Troponin, High Sensitivity 12 0 - 14 ng/L   Troponin   Result Value Ref Range

## 2024-12-24 NOTE — ED NOTES
ED to inpatient nurses report      Chief Complaint:  Chief Complaint   Patient presents with    Palpitations     Present to ED from: Home    MOA:     LOC: alert and orientated to name, place, date  Mobility: Requires assistance * 1  Oxygen Baseline: Room air    Current needs required: Room air   Pending ED orders: N/A  Present condition: Uptrending trops, AOx4    Why did the patient come to the ED? Pt presents to ED via wheelchair through triage with family c/o palpitations that began this morning. Pt reports recently stopping her metoprolol. Pt reports heart rate has been \"going up and down\" today. Pt reports heart rate dropped to the \"30s\" PTA. Pt denies any over pain, chest pain or shortness of breath. Pt denies any nausea or emesis. Pt is ambulatory with steady gait.  Pt reports hx of CHF, afib.  Pt placed on full cardiac monitor, EKG completed, IV access completed. Labs drawn.  Call light within reach.  Family at bedside.   What is the plan? Admission, possible cardiology consult   Any procedures or intervention occur? Labs, imaging   Any safety concerns??     Mental Status:  Level of Consciousness: Alert (0)    Psych Assessment:   Psychosocial  Psychosocial (WDL): (S) Exceptions to WDL  Patient Behaviors: Tearful  Vital signs   Vitals:    12/23/24 2149 12/23/24 2150 12/23/24 2200 12/23/24 2231   BP: (!) 124/52  111/69 (!) 124/107   Pulse: 92 95 92 91   Resp: 20 20 (!) 32 27   Temp:       TempSrc:       SpO2:  94% 95% 96%   Weight:       Height:            Vitals:  Patient Vitals for the past 24 hrs:   BP Temp Temp src Pulse Resp SpO2 Height Weight   12/23/24 2231 (!) 124/107 -- -- 91 27 96 % -- --   12/23/24 2200 111/69 -- -- 92 (!) 32 95 % -- --   12/23/24 2150 -- -- -- 95 20 94 % -- --   12/23/24 2149 (!) 124/52 -- -- 92 20 -- -- --   12/23/24 2145 (!) 124/52 -- -- -- -- -- -- --   12/23/24 2032 118/60 -- -- (!) 101 26 97 % -- --   12/23/24 1947 (!) 118/98 -- -- (!) 105 19 94 % -- --   12/23/24 1928 108/82  Assessment  Pain Assessment: None - Denies Pain      SOCIAL HISTORY       Social History     Socioeconomic History    Marital status:      Spouse name: None    Number of children: None    Years of education: None    Highest education level: None   Tobacco Use    Smoking status: Former     Current packs/day: 0.00     Average packs/day: 1 pack/day for 59.0 years (59.0 ttl pk-yrs)     Types: Cigarettes     Start date:      Quit date:      Years since quittin.9     Passive exposure: Past    Smokeless tobacco: Never   Vaping Use    Vaping status: Never Used   Substance and Sexual Activity    Alcohol use: Yes     Alcohol/week: 0.0 standard drinks of alcohol     Comment: Rarely     Drug use: No    Sexual activity: Not Currently     Social Determinants of Health     Financial Resource Strain: Low Risk  (2024)    Overall Financial Resource Strain (CARDIA)     Difficulty of Paying Living Expenses: Not hard at all   Food Insecurity: No Food Insecurity (2024)    Hunger Vital Sign     Worried About Running Out of Food in the Last Year: Never true     Ran Out of Food in the Last Year: Never true   Transportation Needs: No Transportation Needs (2024)    PRAPARE - Transportation     Lack of Transportation (Medical): No     Lack of Transportation (Non-Medical): No   Physical Activity: Inactive (2024)    Exercise Vital Sign     Days of Exercise per Week: 0 days     Minutes of Exercise per Session: 0 min   Housing Stability: Low Risk  (2024)    Housing Stability Vital Sign     Unable to Pay for Housing in the Last Year: No     Number of Places Lived in the Last Year: 1     Unstable Housing in the Last Year: No       FAMILY HISTORY       Family History   Problem Relation Age of Onset    Cancer Father     Kidney Disease Mother        ALLERGIES     Ciprofloxacin    CURRENT MEDICATIONS       Previous Medications    ASPIRIN 81 MG EC TABLET    Take 1 tablet by mouth daily    BUDESONIDE-FORMOTEROL

## 2024-12-24 NOTE — ED PROVIDER NOTES
given her bigeminy, likely will lower her heart rate such that her blood pressure is affected.  Will obtain cardiac workup, regardless, patient would benefit from admission for reevaluation by cardiology for metoprolol restarting versus sooner turnaround for ablation.    Amount and/or Complexity of Data Reviewed  Labs: ordered. Decision-making details documented in ED Course.  Radiology: ordered.  ECG/medicine tests: ordered.    Risk  OTC drugs.  Prescription drug management.  Decision regarding hospitalization.        EKG  ECG Interpretation    Rhythm: Normal sinus rhythm, bigeminy  Rate: 55 (110 with bigeminy)  Axis: normal  Ectopy: bigeminy  Conduction: normal  ST Segments: no acute change  T Waves: no acute change  Q Waves: none  QTc Interval: 467  CA Interval: 176    Clinical Impression: Raphael Brewer MD     All EKG's are interpreted by the Emergency Department Physician who either signs or Co-signs this chart in the absence of a cardiologist.    EMERGENCY DEPARTMENT COURSE:  ED Course as of 12/24/24 0104   Mon Dec 23, 2024   2119 D-Dimer, Quant(!): 0.85  PE excluded per the years algorithm [KJ]   2121 BP: 118/60 [KJ]   2121 Pulse(!): 101 [KJ]   2121 Respirations: 26 [KJ]   2121 SpO2: 97 % [KJ]   2121 Temp: 97.5 °F (36.4 °C) [KJ]   2202 NT Pro-BNP(!): 961 [KJ]   2202 Sodium: 143 [KJ]   2202 Potassium: 4.5 [KJ]   2202 Anion Gap(!): 8 [KJ]   2202 Chloride(!): 110 [KJ]   2202 Glucose(!): 107 [KJ]   2202 Creatinine: 0.9 [KJ]   2203 WBC: 6.7 [KJ]   2203 Hemoglobin Quant: 13.2 [KJ]   2203 Platelet Count: 162 [KJ]   2321 Did discuss with patient, will admit to ED observation.  Patient does have palpitations, was noted to have low heart rate in the 30s at home by her own manual palpation.  States that she stopped taking the metoprolol and her symptoms started roughly around that time.  Does have a history of PVCs, does have what appears to be bigeminy on arrival and throughout here.  Is meant to go for

## 2024-12-24 NOTE — ED PROVIDER NOTES
[facility]     Emergency Department     Faculty Attestation    I performed a history and physical examination of the patient and discussed management with the resident. I have reviewed and agree with the resident’s findings including all diagnostic interpretations, and treatment plans as written at the time of my review. Any areas of disagreement are noted on the chart. I was personally present for the key portions of any procedures. I have documented in the chart those procedures where I was not present during the key portions. For Physician Assistant/ Nurse Practitioner cases/documentation I have personally evaluated this patient and have completed at least one if not all key elements of the E/M (history, physical exam, and MDM). Additional findings are as noted.    PtName: Coni Baker  MRN: [id number]  Birthdate 1950  Date of evaluation: 12/23/24  Note Started: 7:13 PM EST    Primary Care Physician: Earlene Salcedo MD    Brief HPI:  74-year-old female history of CAD status post PCI, ischemic cardiomyopathy, nonsustained V. tach status post ablation presenting to the emergency department with palpitations.  She reports heart rate has been fluctuating between the 30s and the 100s over the past few days.  She wants a brief episode of chest pain yesterday however denies chest pain at this time.  She also admits to some shortness of breath.      Pertinent Physical Exam Findings:  Vitals:    12/23/24 1915   BP: (!) 149/50   Pulse: (!) 115   Resp: 22   Temp:    SpO2: 96%   Appears chronically ill, no acute distress, tachycardic rate, regular rhythm, lungs are clear to auscultation, no significant lower extremity swelling.    Medical Decision Making: Patient is a 74 y.o. female presenting to the emergency department with palpitations. The chart was reviewed for pertinent history relating to the chief complaint.  The patient appears well at this time, no acute  distress, vitals are stable.  See history and physical exam above.  Initial twelve-lead EKG obtained reveals sinus tachycardia with rate of 111, PVCs in the pattern of bigeminy, no specific signs of acute ischemia.  The patient is asymptomatic at this time, given the patient's extensive cardiac history further workup will be obtained.     All results, including labs (if ordered), imaging (if ordered), and EKGs (if ordered) were independently interpreted by me.  See radiologist report for additional details on imaging studies.      (Please note that portions of this note were completed with a voice recognition program.  Efforts were made to edit the dictations but occasionally words are mis-transcribed.)    Baudilio Varghese DO   Attending Emergency Medicine Physician         Baudilio Varghese DO  12/23/24 1938

## 2024-12-24 NOTE — DISCHARGE INSTRUCTIONS
You were seen in the ED for heart palpitations. You were evaluated by our cardiologist and at this time we believe you are medically stable for discharge. We recommend you follow up with your primary care physician.

## 2024-12-24 NOTE — ED NOTES
Pt presents to ED via wheelchair through triage with family c/o palpitations that began this morning. Pt reports recently stopping her metoprolol. Pt reports heart rate has been \"going up and down\" today. Pt reports heart rate dropped to the \"30s\" PTA. Pt denies any over pain, chest pain or shortness of breath. Pt denies any nausea or emesis. Pt is ambulatory with steady gait.  Pt reports hx of CHF, afib.  Pt placed on full cardiac monitor, EKG completed, IV access completed. Labs drawn.  Call light within reach.  Family at bedside.

## 2024-12-24 NOTE — ED PROVIDER NOTES
Faculty Sign-Out Attestation  Handoff taken on the following patient from prior Attending Physician: Halima  Note Started: 11:00 PM EST    I was available and discussed any additional care issues that arose and coordinated the management plans with the resident(s) caring for the patient during my duty period. Any areas of disagreement with resident’s documentation of care or procedures are noted on the chart. I was personally present for the key portions of any/all procedures during my duty period. I have documented in the chart those procedures where I was not present during the key portions.    Palpitation, hr 30's, needing admit     Fam Hoover DO  Attending Physician       Fam Hoover DO  12/23/24 [id number]

## 2024-12-24 NOTE — ED NOTES
Pt reporting breast pain.  Dr. Brewer at bedside to perform exam, writer at bedside as well as chaperone.

## 2024-12-24 NOTE — CARE COORDINATION
Case Management Assessment  Initial Evaluation    Date/Time of Evaluation: 12/24/2024 12:17 PM  Assessment Completed by: Mary Smith RN    If patient is discharged prior to next notation, then this note serves as note for discharge by case management.    Patient Name: Coni Baker                   YOB: 1950  Diagnosis: Palpitations [R00.2]  Bigeminy [I49.8]                   Date / Time: 12/23/2024  7:03 PM    Patient Admission Status: Observation   Readmission Risk (Low < 19, Mod (19-27), High > 27): Readmission Risk Score: 19.7    Current PCP: Earlene Salcedo MD  PCP verified by CM? (P) Yes    Chart Reviewed: Yes      History Provided by: (P) Patient  Patient Orientation: (P) Alert and Oriented    Patient Cognition: (P) Alert    Hospitalization in the last 30 days (Readmission):  No    If yes, Readmission Assessment in CM Navigator will be completed.    Advance Directives:      Code Status: Full Code   Patient's Primary Decision Maker is: (P) Legal Next of Kin    Primary Decision Maker: Girma Bateman - Child - [phone]    Secondary Decision Maker: Gi Chau - Child - [phone]    Discharge Planning:    Patient lives with: Alone, Other (Comment) (grand daughter visits) Type of Home: House  Primary Care Giver: (P) Self  Patient Support Systems include: (P) Children   Current Financial resources:    Current community resources:    Current services prior to admission: None            Current DME:              Type of Home Care services:  None    ADLS  Prior functional level: (P) Independent in ADLs/IADLs  Current functional level: (P) Independent in ADLs/IADLs    PT AM-PAC:   /24  OT AM-PAC:   /24    Family can provide assistance at DC:    Would you like Case Management to discuss the discharge plan with any other family members/significant others, and if so, who?    Plans to Return to Present Housing: (P) Yes  Other Identified Issues/Barriers to RETURNING to current housing:

## 2024-12-24 NOTE — DISCHARGE INSTR - COC
Continuity of Care Form    Patient Name: Coni Baker   :  1950  MRN:  [id number]    Admit date:  2024  Discharge date:  ***    Code Status Order: Full Code   Advance Directives:   Advance Care Flowsheet Documentation             Admitting Physician:  Simon Ohara MD  PCP: Earlene Salcedo MD    Discharging Nurse: ***  Discharging Hospital Unit/Room#: OBS -1  Discharging Unit Phone Number: ***    Emergency Contact:   Extended Emergency Contact Information  Primary Emergency Contact: Girma Bateman  Home Phone: [phone]  Relation: Child  Secondary Emergency Contact: Gi Chau  Home Phone: [phone]  Relation: Child    Past Surgical History:  Past Surgical History:   Procedure Laterality Date    CARDIAC PROCEDURE N/A 2024    diane / Left heart cath / coronary angiography / op [facility] performed by Moises Dunbar MD at [facility] CARDIAC CATH LAB    COLONOSCOPY      WITH POLYPECTOMY     COLONOSCOPY N/A 3/24/2022    COLONOSCOPY WITH BIOPSY performed by Naldo Milton MD at [facility] ENDO    CORONARY ANGIOPLASTY WITH STENT PLACEMENT      STENTS X4    EP DEVICE PROCEDURE N/A 2024    elaine / Ablation V-tach / rm  performed by Juan Pablo Cordero MD at [facility] CARDIAC CATH LAB    SHOULDER SURGERY Right 16    arthroscopy& mini open rotator cuff repair    TUBAL LIGATION         Immunization History:   Immunization History   Administered Date(s) Administered    COVID-19, MODERNA BLUE border, Primary or Immunocompromised, (age 12y+), IM, 100 mcg/0.5mL 2021, 2021    COVID-19, US Vaccine, Vaccine Unspecified 2021, 2021    Pneumococcal, PCV20, PREVNAR 20, (age 6w+), IM, 0.5mL 2024    Pneumococcal, PPSV23, PNEUMOVAX 23, (age 2y+), SC/IM, 0.5mL 2021       Active Problems:  Patient Active Problem List   Diagnosis Code    Polyp of sigmoid colon K63.5    Mixed hyperlipidemia E78.2    Primary hypertension I10    Ventricular tachycardia (HCC) I47.20     data in the 24 hours ending 24 0742  No intake/output data recorded.    Safety Concerns:     { ALIYAH Safety Concerns:814593766}    Impairments/Disabilities:      { ALIYAH Impairments/Disabilities:702121914}    Nutrition Therapy:  Current Nutrition Therapy:   { ALIYAH Diet List:776913183}    Routes of Feeding: {CHP DME Other Feedings:259489921}  Liquids: {Slp liquid thickness:03876}  Daily Fluid Restriction: {CHP DME Yes amt example:710455255}  Last Modified Barium Swallow with Video (Video Swallowing Test): {Done Not Done Date:}    Treatments at the Time of Hospital Discharge:   Respiratory Treatments: ***  Oxygen Therapy:  {Therapy; copd oxygen:84975}  Ventilator:    { CC Vent List:903913594}    Rehab Therapies: {THERAPEUTIC INTERVENTION:8989845834}  Weight Bearing Status/Restrictions: { CC Weight Bearin}  Other Medical Equipment (for information only, NOT a DME order):  {EQUIPMENT:634514601}  Other Treatments: ***    Patient's personal belongings (please select all that are sent with patient):  {[facility] DME Belongings:448345472}    RN SIGNATURE:  {Esignature:268130940}    CASE MANAGEMENT/SOCIAL WORK SECTION    Inpatient Status Date: ***    Readmission Risk Assessment Score:  [facility] RISK OF UNPLANNED READMISSION 2.0             0 Total Score        Discharging to Facility/ Agency   Name:   Address:  Phone:  Fax:    Dialysis Facility (if applicable)   Name:  Address:  Dialysis Schedule:  Phone:  Fax:    / signature: {Esignature:282478308}    PHYSICIAN SECTION    Prognosis: {Prognosis:5207841840}    Condition at Discharge: { Patient Condition:966144664}    Rehab Potential (if transferring to Rehab): {Prognosis:3800737028}    Recommended Labs or Other Treatments After Discharge: ***    Physician Certification: I certify the above information and transfer of Coni Baker  is necessary for the continuing treatment of the diagnosis listed and that she requires {Admit to

## 2024-12-24 NOTE — CONSULTS
[facility]    Consult               Today's Date: 12/24/2024  Patient Name: Coni Baker  Date of admission: 12/23/2024  7:03 PM  Patient's age: 74 y.o., 1950  Admission Dx: Palpitations [R00.2]  Bigeminy [I49.8]    Requesting Physician: Simon Ohara MD    Cardiac Evaluation Reason:  palpitations     History Obtained From: patient and chart review     History of Present Illness:    This patient 74 y.o. years old with PMHx of CAD with prior LAD stent placement, ischemic cardiomyopathy with EF 35-45%, frequent PVCs and non sustained Vtach presents with palpitations with Hr in the 30s and her blood pressure was normotensive. The night before she reported drinking alcohol.  She denies chest pain, shortness of breath or LOC. She was advised by her cardiologist to stop taking metoprolol and it also makes her feel drowsy.     She had a previous unsuccessful ablation on 5/22/24 of RVOT and was referred to [facility] outpatient for epicardial RVOT ablation by primary electrophysiologist. She is scheduled for ablation with Dr. Handy at [facility] on March 11th, 2025.     Past Medical History:   has a past medical history of Atrial fibrillation (HCC), CAD (coronary artery disease), CHF (congestive heart failure) (HCC), Hx of myocardial infarction, Hyperlipidemia, Hypertension, and SOBOE (shortness of breath on exertion).    Past Surgical History:   has a past surgical history that includes Tubal ligation; Coronary angioplasty with stent; Colonoscopy; shoulder surgery (Right, 2/1/16); Colonoscopy (N/A, 3/24/2022); Cardiac procedure (N/A, 5/16/2024); and ep device procedure (N/A, 5/21/2024).     Home Medications:    Prior to Admission medications    Medication Sig Start Date End Date Taking? Authorizing Provider   metoprolol succinate (TOPROL XL) 25 MG extended release tablet  12/6/24  Yes Provider, MD Deshawn   mexiletine (MEXITIL) 150 MG capsule TAKE 1 CAPSULE BY MOUTH 3 TIMES  DAILY WITH MEALS

## 2024-12-24 NOTE — PROGRESS NOTES
[facility]  CDU / OBSERVATION ENCOUNTER  ATTENDING NOTE       I performed a history and physical examination of the patient and discussed management with the resident or midlevel provider. I reviewed the resident or midlevel provider's note and agree with the documented findings and plan of care. Any areas of disagreement are noted on the chart. I was personally present for the key portions of any procedures. I have documented in the chart those procedures where I was not present during the key portions. I have reviewed the nurses notes. I agree with the chief complaint, past medical history, past surgical history, allergies, medications, social and family history as documented unless otherwise noted below.    The Family history, social history, and ROS are effectively unchanged since admission unless noted elsewhere in the chart.    This patient was placed in the observation unit for reevaluation for possible admission to the hospital    The patient is a 74-year-old female with history of CAD status post stents, CHF with EF of 35%, hypertension, hyperlipidemia, and A-fib/V. tach/PVCs status post ablation who presents for evaluation of heart palpitations.  The patient reportedly had bradycardia at home but has had normal heart rates in the ER.  She stopped taking her metoprolol 2 weeks ago.  ER workup was grossly unremarkable.  Troponin was negative.  Chest x-ray shows no acute process. She was placed in the observation unit for cardiology consult.     Martha Quezada DO, FACEP  Attending Emergency  Physician

## 2024-12-24 NOTE — ED NOTES
Writer at bedside to adjust telemetry leads.   Pt requesting update on plan of care. Dr. Brewer notified.

## 2025-02-19 ENCOUNTER — HOSPITAL ENCOUNTER (INPATIENT)
Age: 75
LOS: 7 days | Discharge: ANOTHER HOSPITAL | DRG: 308 | End: 2025-02-27
Attending: EMERGENCY MEDICINE | Admitting: INTERNAL MEDICINE
Payer: MEDICARE

## 2025-02-19 ENCOUNTER — APPOINTMENT (OUTPATIENT)
Dept: GENERAL RADIOLOGY | Age: 75
DRG: 308 | End: 2025-02-19
Payer: MEDICARE

## 2025-02-19 DIAGNOSIS — I50.22 CHRONIC SYSTOLIC CONGESTIVE HEART FAILURE (HCC): ICD-10-CM

## 2025-02-19 DIAGNOSIS — I47.29 PAROXYSMAL VENTRICULAR TACHYCARDIA (HCC): Primary | ICD-10-CM

## 2025-02-19 DIAGNOSIS — R06.02 SHORTNESS OF BREATH: ICD-10-CM

## 2025-02-19 PROBLEM — I47.20 PAROXYSMAL VENTRICULAR TACHYCARDIA: Status: ACTIVE | Noted: 2024-07-16

## 2025-02-19 LAB
ALBUMIN SERPL-MCNC: 3.7 G/DL (ref 3.5–5.2)
ALBUMIN/GLOB SERPL: 1.5 {RATIO} (ref 1–2.5)
ALP SERPL-CCNC: 97 U/L (ref 35–104)
ALT SERPL-CCNC: 47 U/L (ref 10–35)
ANION GAP SERPL CALCULATED.3IONS-SCNC: 9 MMOL/L (ref 9–16)
AST SERPL-CCNC: 40 U/L (ref 10–35)
BASOPHILS # BLD: 0.05 K/UL (ref 0–0.2)
BASOPHILS NFR BLD: 1 % (ref 0–2)
BILIRUB SERPL-MCNC: 0.4 MG/DL (ref 0–1.2)
BNP SERPL-MCNC: 1369 PG/ML (ref 0–450)
BUN SERPL-MCNC: 19 MG/DL (ref 8–23)
CA-I BLD-SCNC: 1.14 MMOL/L (ref 1.13–1.33)
CALCIUM SERPL-MCNC: 8.8 MG/DL (ref 8.6–10.4)
CHLORIDE SERPL-SCNC: 109 MMOL/L (ref 98–107)
CO2 SERPL-SCNC: 24 MMOL/L (ref 20–31)
CREAT SERPL-MCNC: 0.8 MG/DL (ref 0.6–0.9)
EOSINOPHIL # BLD: 0.12 K/UL (ref 0–0.44)
EOSINOPHILS RELATIVE PERCENT: 2 % (ref 1–4)
ERYTHROCYTE [DISTWIDTH] IN BLOOD BY AUTOMATED COUNT: 13.9 % (ref 11.8–14.4)
GFR, ESTIMATED: 77 ML/MIN/1.73M2
GLUCOSE SERPL-MCNC: 92 MG/DL (ref 74–99)
HCT VFR BLD AUTO: 42.1 % (ref 36.3–47.1)
HGB BLD-MCNC: 13.7 G/DL (ref 11.9–15.1)
IMM GRANULOCYTES # BLD AUTO: <0.03 K/UL (ref 0–0.3)
IMM GRANULOCYTES NFR BLD: 0 %
LYMPHOCYTES NFR BLD: 2.44 K/UL (ref 1.1–3.7)
LYMPHOCYTES RELATIVE PERCENT: 36 % (ref 24–43)
MAGNESIUM SERPL-MCNC: 2.8 MG/DL (ref 1.6–2.4)
MCH RBC QN AUTO: 30.3 PG (ref 25.2–33.5)
MCHC RBC AUTO-ENTMCNC: 32.5 G/DL (ref 28.4–34.8)
MCV RBC AUTO: 93.1 FL (ref 82.6–102.9)
MONOCYTES NFR BLD: 0.56 K/UL (ref 0.1–1.2)
MONOCYTES NFR BLD: 8 % (ref 3–12)
NEUTROPHILS NFR BLD: 53 % (ref 36–65)
NEUTS SEG NFR BLD: 3.52 K/UL (ref 1.5–8.1)
NRBC BLD-RTO: 0 PER 100 WBC
PLATELET # BLD AUTO: 176 K/UL (ref 138–453)
PMV BLD AUTO: 10.3 FL (ref 8.1–13.5)
POTASSIUM SERPL-SCNC: 4.5 MMOL/L (ref 3.7–5.3)
PROT SERPL-MCNC: 6.1 G/DL (ref 6.6–8.7)
RBC # BLD AUTO: 4.52 M/UL (ref 3.95–5.11)
SODIUM SERPL-SCNC: 142 MMOL/L (ref 136–145)
T4 FREE SERPL-MCNC: 1.3 NG/DL (ref 0.9–1.7)
TROPONIN I SERPL HS-MCNC: 14 NG/L (ref 0–14)
TROPONIN I SERPL HS-MCNC: 15 NG/L (ref 0–14)
TSH SERPL DL<=0.05 MIU/L-ACNC: 1.48 UIU/ML (ref 0.27–4.2)
WBC OTHER # BLD: 6.7 K/UL (ref 3.5–11.3)

## 2025-02-19 PROCEDURE — G0378 HOSPITAL OBSERVATION PER HR: HCPCS

## 2025-02-19 PROCEDURE — 2500000003 HC RX 250 WO HCPCS: Performed by: INTERNAL MEDICINE

## 2025-02-19 PROCEDURE — 80053 COMPREHEN METABOLIC PANEL: CPT

## 2025-02-19 PROCEDURE — 84484 ASSAY OF TROPONIN QUANT: CPT

## 2025-02-19 PROCEDURE — 84439 ASSAY OF FREE THYROXINE: CPT

## 2025-02-19 PROCEDURE — 93005 ELECTROCARDIOGRAM TRACING: CPT | Performed by: STUDENT IN AN ORGANIZED HEALTH CARE EDUCATION/TRAINING PROGRAM

## 2025-02-19 PROCEDURE — 99223 1ST HOSP IP/OBS HIGH 75: CPT | Performed by: INTERNAL MEDICINE

## 2025-02-19 PROCEDURE — 96365 THER/PROPH/DIAG IV INF INIT: CPT

## 2025-02-19 PROCEDURE — 71045 X-RAY EXAM CHEST 1 VIEW: CPT

## 2025-02-19 PROCEDURE — 94664 DEMO&/EVAL PT USE INHALER: CPT

## 2025-02-19 PROCEDURE — 94640 AIRWAY INHALATION TREATMENT: CPT

## 2025-02-19 PROCEDURE — 93005 ELECTROCARDIOGRAM TRACING: CPT

## 2025-02-19 PROCEDURE — 93005 ELECTROCARDIOGRAM TRACING: CPT | Performed by: INTERNAL MEDICINE

## 2025-02-19 PROCEDURE — 6370000000 HC RX 637 (ALT 250 FOR IP): Performed by: INTERNAL MEDICINE

## 2025-02-19 PROCEDURE — 84443 ASSAY THYROID STIM HORMONE: CPT

## 2025-02-19 PROCEDURE — 6360000002 HC RX W HCPCS

## 2025-02-19 PROCEDURE — 99285 EMERGENCY DEPT VISIT HI MDM: CPT

## 2025-02-19 PROCEDURE — 96375 TX/PRO/DX INJ NEW DRUG ADDON: CPT

## 2025-02-19 PROCEDURE — 83735 ASSAY OF MAGNESIUM: CPT

## 2025-02-19 PROCEDURE — 83880 ASSAY OF NATRIURETIC PEPTIDE: CPT

## 2025-02-19 PROCEDURE — 82330 ASSAY OF CALCIUM: CPT

## 2025-02-19 PROCEDURE — 85025 COMPLETE CBC W/AUTO DIFF WBC: CPT

## 2025-02-19 RX ORDER — LIDOCAINE HYDROCHLORIDE 20 MG/ML
INJECTION, SOLUTION INTRAVENOUS
Status: COMPLETED
Start: 2025-02-19 | End: 2025-02-19

## 2025-02-19 RX ORDER — ENOXAPARIN SODIUM 100 MG/ML
40 INJECTION SUBCUTANEOUS DAILY
Status: DISCONTINUED | OUTPATIENT
Start: 2025-02-20 | End: 2025-02-28 | Stop reason: HOSPADM

## 2025-02-19 RX ORDER — LIDOCAINE HYDROCHLORIDE ANHYDROUS AND DEXTROSE MONOHYDRATE 5; 400 G/100ML; MG/100ML
INJECTION, SOLUTION INTRAVENOUS
Status: COMPLETED
Start: 2025-02-19 | End: 2025-02-19

## 2025-02-19 RX ORDER — ACETAMINOPHEN 650 MG/1
650 SUPPOSITORY RECTAL EVERY 6 HOURS PRN
Status: DISCONTINUED | OUTPATIENT
Start: 2025-02-19 | End: 2025-02-28 | Stop reason: HOSPADM

## 2025-02-19 RX ORDER — SODIUM CHLORIDE 0.9 % (FLUSH) 0.9 %
5-40 SYRINGE (ML) INJECTION PRN
Status: DISCONTINUED | OUTPATIENT
Start: 2025-02-19 | End: 2025-02-28 | Stop reason: HOSPADM

## 2025-02-19 RX ORDER — ASPIRIN 81 MG/1
81 TABLET ORAL DAILY
Status: DISCONTINUED | OUTPATIENT
Start: 2025-02-20 | End: 2025-02-28 | Stop reason: HOSPADM

## 2025-02-19 RX ORDER — METOPROLOL SUCCINATE 25 MG/1
25 TABLET, EXTENDED RELEASE ORAL DAILY
Status: DISCONTINUED | OUTPATIENT
Start: 2025-02-20 | End: 2025-02-21

## 2025-02-19 RX ORDER — VALSARTAN 40 MG/1
20 TABLET ORAL DAILY
Status: DISCONTINUED | OUTPATIENT
Start: 2025-02-20 | End: 2025-02-20

## 2025-02-19 RX ORDER — SODIUM CHLORIDE 0.9 % (FLUSH) 0.9 %
5-40 SYRINGE (ML) INJECTION EVERY 12 HOURS SCHEDULED
Status: DISCONTINUED | OUTPATIENT
Start: 2025-02-19 | End: 2025-02-28 | Stop reason: HOSPADM

## 2025-02-19 RX ORDER — POTASSIUM CHLORIDE 1500 MG/1
40 TABLET, EXTENDED RELEASE ORAL PRN
Status: DISCONTINUED | OUTPATIENT
Start: 2025-02-19 | End: 2025-02-28 | Stop reason: HOSPADM

## 2025-02-19 RX ORDER — POTASSIUM CHLORIDE 7.45 MG/ML
10 INJECTION INTRAVENOUS PRN
Status: DISCONTINUED | OUTPATIENT
Start: 2025-02-19 | End: 2025-02-28 | Stop reason: HOSPADM

## 2025-02-19 RX ORDER — RAMIPRIL 5 MG/1
5 CAPSULE ORAL DAILY
Status: ON HOLD | COMMUNITY
End: 2025-02-19

## 2025-02-19 RX ORDER — LIDOCAINE HYDROCHLORIDE ANHYDROUS AND DEXTROSE MONOHYDRATE 5; 400 G/100ML; MG/100ML
1 INJECTION, SOLUTION INTRAVENOUS CONTINUOUS
Status: DISCONTINUED | OUTPATIENT
Start: 2025-02-19 | End: 2025-02-28 | Stop reason: HOSPADM

## 2025-02-19 RX ORDER — ONDANSETRON 2 MG/ML
4 INJECTION INTRAMUSCULAR; INTRAVENOUS EVERY 6 HOURS PRN
Status: DISCONTINUED | OUTPATIENT
Start: 2025-02-19 | End: 2025-02-28 | Stop reason: HOSPADM

## 2025-02-19 RX ORDER — LIDOCAINE HCL/PF 100 MG/5ML
150 SYRINGE (ML) INJECTION ONCE
Status: COMPLETED | OUTPATIENT
Start: 2025-02-19 | End: 2025-02-19

## 2025-02-19 RX ORDER — ROSUVASTATIN CALCIUM 20 MG/1
20 TABLET, COATED ORAL DAILY
Status: DISCONTINUED | OUTPATIENT
Start: 2025-02-20 | End: 2025-02-28 | Stop reason: HOSPADM

## 2025-02-19 RX ORDER — LIDOCAINE HCL/PF 100 MG/5ML
100 SYRINGE (ML) INJECTION ONCE
Status: COMPLETED | OUTPATIENT
Start: 2025-02-19 | End: 2025-02-19

## 2025-02-19 RX ORDER — SODIUM CHLORIDE 9 MG/ML
INJECTION, SOLUTION INTRAVENOUS PRN
Status: DISCONTINUED | OUTPATIENT
Start: 2025-02-19 | End: 2025-02-28 | Stop reason: HOSPADM

## 2025-02-19 RX ORDER — CALCIUM CARBONATE/VITAMIN D3 600 MG-10
1 TABLET ORAL
Status: DISCONTINUED | OUTPATIENT
Start: 2025-02-19 | End: 2025-02-28 | Stop reason: HOSPADM

## 2025-02-19 RX ORDER — MAGNESIUM SULFATE IN WATER 40 MG/ML
2000 INJECTION, SOLUTION INTRAVENOUS ONCE
Status: COMPLETED | OUTPATIENT
Start: 2025-02-19 | End: 2025-02-19

## 2025-02-19 RX ORDER — BUDESONIDE AND FORMOTEROL FUMARATE DIHYDRATE 160; 4.5 UG/1; UG/1
2 AEROSOL RESPIRATORY (INHALATION)
Status: DISCONTINUED | OUTPATIENT
Start: 2025-02-19 | End: 2025-02-28 | Stop reason: HOSPADM

## 2025-02-19 RX ORDER — ONDANSETRON 4 MG/1
4 TABLET, ORALLY DISINTEGRATING ORAL EVERY 8 HOURS PRN
Status: DISCONTINUED | OUTPATIENT
Start: 2025-02-19 | End: 2025-02-28 | Stop reason: HOSPADM

## 2025-02-19 RX ORDER — ACETAMINOPHEN 325 MG/1
650 TABLET ORAL EVERY 6 HOURS PRN
Status: DISCONTINUED | OUTPATIENT
Start: 2025-02-19 | End: 2025-02-28 | Stop reason: HOSPADM

## 2025-02-19 RX ORDER — BISACODYL 10 MG
10 SUPPOSITORY, RECTAL RECTAL DAILY PRN
Status: DISCONTINUED | OUTPATIENT
Start: 2025-02-19 | End: 2025-02-28 | Stop reason: HOSPADM

## 2025-02-19 RX ORDER — MAGNESIUM SULFATE IN WATER 40 MG/ML
INJECTION, SOLUTION INTRAVENOUS
Status: COMPLETED
Start: 2025-02-19 | End: 2025-02-19

## 2025-02-19 RX ORDER — POLYETHYLENE GLYCOL 3350 17 G/17G
17 POWDER, FOR SOLUTION ORAL DAILY PRN
Status: DISCONTINUED | OUTPATIENT
Start: 2025-02-19 | End: 2025-02-28 | Stop reason: HOSPADM

## 2025-02-19 RX ADMIN — Medication 100 MG: at 20:22

## 2025-02-19 RX ADMIN — LIDOCAINE HYDROCHLORIDE 150 MG: 20 INJECTION, SOLUTION INTRAVENOUS at 17:08

## 2025-02-19 RX ADMIN — SODIUM CHLORIDE, PRESERVATIVE FREE 5 ML: 5 INJECTION INTRAVENOUS at 22:31

## 2025-02-19 RX ADMIN — AMIODARONE HYDROCHLORIDE 150 MG: 1.5 INJECTION, SOLUTION INTRAVENOUS at 22:16

## 2025-02-19 RX ADMIN — LIDOCAINE HYDROCHLORIDE 100 MG: 20 INJECTION, SOLUTION INTRAVENOUS at 20:40

## 2025-02-19 RX ADMIN — LIDOCAINE HYDROCHLORIDE 1 MG/MIN: 4 INJECTION, SOLUTION INTRAVENOUS at 17:11

## 2025-02-19 RX ADMIN — LIDOCAINE HYDROCHLORIDE 100 MG: 20 INJECTION, SOLUTION INTRAVENOUS at 20:22

## 2025-02-19 RX ADMIN — Medication 100 MG: at 20:40

## 2025-02-19 RX ADMIN — AMIODARONE HYDROCHLORIDE 1 MG/MIN: 1.8 INJECTION, SOLUTION INTRAVENOUS at 22:28

## 2025-02-19 RX ADMIN — BUDESONIDE AND FORMOTEROL FUMARATE DIHYDRATE 2 PUFF: 160; 4.5 AEROSOL RESPIRATORY (INHALATION) at 21:29

## 2025-02-19 RX ADMIN — Medication 1 TABLET: at 22:28

## 2025-02-19 RX ADMIN — MAGNESIUM SULFATE IN WATER 2000 MG: 40 INJECTION, SOLUTION INTRAVENOUS at 17:22

## 2025-02-19 RX ADMIN — MAGNESIUM SULFATE HEPTAHYDRATE 2000 MG: 40 INJECTION, SOLUTION INTRAVENOUS at 17:22

## 2025-02-19 RX ADMIN — Medication 150 MG: at 17:08

## 2025-02-19 NOTE — ED NOTES
Labeled blood specimens sent to lab via tube system.    [x] Green/yellow  [x] Lavender   [] on ice   [x] Blue   [x] Green/black [x] on ice  [x] Yellow  [] on ice  [x] Red  [] Pink  [] Blood Cultures  [] x1 [] X2    [] Ped Green  [] on ice  [] Ped Lavender  [] on ice    [] Ped Yellow  [] on ice  [] Ped Red

## 2025-02-19 NOTE — ED NOTES
Pt to room 12 via wheelchair with c/o dizziness. Pt reports that she was watching TV earlier and started feeling dizzy. Pt reports that she is supposed to have an ablation next month. Pt reports that she felt like her heart was racing. Upon arrival, pt denies any complaints. Pt heart rate 140-190bpm. Pt talking in complete sentences. Pt placed on continuous cardiac monitor, bp and pulse ox. EKG completed, IV established, blood work drawn.   Pt alert and oriented x4, talking in complete sentences, respirations even and unlabored. Pt acting age appropriate. Will continue to plan of care.

## 2025-02-19 NOTE — ED PROVIDER NOTES
[facility]C STEPDOWN  Emergency Department Encounter  Emergency Medicine Resident     Pt Name:Coni Baker  MRN: [id number]  Birthdate 1950  Date of evaluation: 2/19/25  PCP:  Earlene Salcedo MD  Note Started: 5:12 PM EST      CHIEF COMPLAINT       Chief Complaint   Patient presents with    Tachycardia       HISTORY OF PRESENT ILLNESS  (Location/Symptom, Timing/Onset, Context/Setting, Quality, Duration, Modifying Factors, Severity.)      Coni Baker is a 74 y.o. female history of coronary artery disease, hypertension, hyperlipidemia, CHF, A-fib, paroxysmal V. tach who presents with concerns for low heart rates when she measured at home today.  States she was watching television feeling short of breath and a headache.  She checked her pulse and it was in the 30s.  Patient has a history of arrhythmia takes mexiletine at home.  States she has had 4 ablations previously and is scheduled to have her fifth at [facility] this March.    Echo in 2024 with EF of 35 to 40%    PAST MEDICAL / SURGICAL / SOCIAL / FAMILY HISTORY      has a past medical history of Atrial fibrillation (HCC), CAD (coronary artery disease), CHF (congestive heart failure) (HCC), Hx of myocardial infarction, Hyperlipidemia, Hypertension, and SOBOE (shortness of breath on exertion).       has a past surgical history that includes Tubal ligation; Coronary angioplasty with stent; Colonoscopy; shoulder surgery (Right, 2/1/16); Colonoscopy (N/A, 3/24/2022); Cardiac procedure (N/A, 5/16/2024); and ep device procedure (N/A, 5/21/2024).      Social History     Socioeconomic History    Marital status:      Spouse name: Not on file    Number of children: Not on file    Years of education: Not on file    Highest education level: Not on file   Occupational History    Not on file   Tobacco Use    Smoking status: Former     Current packs/day: 0.00     Average packs/day: 1 pack/day for 59.0 years (59.0 ttl pk-yrs)     Types: Cigarettes     
circumstances.)    Steve Monterroso MD [facility]  Attending Emergency Medicine Physician            Steve Monterroso MD  02/19/25 1722       Steve Monterroso MD  02/19/25 1723       Steve Monterroso MD  02/19/25 1811       Steve Monterroso MD  02/19/25 4230

## 2025-02-20 PROBLEM — I47.20 VENTRICULAR TACHYCARDIA, SUSTAINED (HCC): Status: ACTIVE | Noted: 2025-02-20

## 2025-02-20 PROBLEM — I47.29 VENTRICULAR TACHYCARDIA, NON-SUSTAINED (HCC): Status: ACTIVE | Noted: 2025-02-20

## 2025-02-20 LAB
ANION GAP SERPL CALCULATED.3IONS-SCNC: 12 MMOL/L (ref 9–16)
BASOPHILS # BLD: 0.06 K/UL (ref 0–0.2)
BASOPHILS NFR BLD: 1 % (ref 0–2)
BUN SERPL-MCNC: 16 MG/DL (ref 8–23)
CALCIUM SERPL-MCNC: 9 MG/DL (ref 8.6–10.4)
CHLORIDE SERPL-SCNC: 105 MMOL/L (ref 98–107)
CO2 SERPL-SCNC: 20 MMOL/L (ref 20–31)
CREAT SERPL-MCNC: 0.8 MG/DL (ref 0.6–0.9)
EKG ATRIAL RATE: 129 BPM
EKG ATRIAL RATE: 535 BPM
EKG ATRIAL RATE: 75 BPM
EKG ATRIAL RATE: 89 BPM
EKG ATRIAL RATE: 92 BPM
EKG ATRIAL RATE: 94 BPM
EKG P AXIS: 49 DEGREES
EKG P AXIS: 54 DEGREES
EKG P AXIS: 62 DEGREES
EKG P AXIS: 66 DEGREES
EKG P-R INTERVAL: 170 MS
EKG P-R INTERVAL: 174 MS
EKG P-R INTERVAL: 182 MS
EKG P-R INTERVAL: 186 MS
EKG Q-T INTERVAL: 272 MS
EKG Q-T INTERVAL: 310 MS
EKG Q-T INTERVAL: 336 MS
EKG Q-T INTERVAL: 358 MS
EKG Q-T INTERVAL: 364 MS
EKG Q-T INTERVAL: 374 MS
EKG QRS DURATION: 104 MS
EKG QRS DURATION: 156 MS
EKG QRS DURATION: 74 MS
EKG QRS DURATION: 78 MS
EKG QRS DURATION: 82 MS
EKG QRS DURATION: 88 MS
EKG QTC CALCULATION (BAZETT): 420 MS
EKG QTC CALCULATION (BAZETT): 435 MS
EKG QTC CALCULATION (BAZETT): 450 MS
EKG QTC CALCULATION (BAZETT): 454 MS
EKG QTC CALCULATION (BAZETT): 521 MS
EKG QTC CALCULATION (BAZETT): 535 MS
EKG R AXIS: -5 DEGREES
EKG R AXIS: -5 DEGREES
EKG R AXIS: 1 DEGREES
EKG R AXIS: 120 DEGREES
EKG R AXIS: 36 DEGREES
EKG R AXIS: 5 DEGREES
EKG T AXIS: -59 DEGREES
EKG T AXIS: 66 DEGREES
EKG T AXIS: 67 DEGREES
EKG T AXIS: 72 DEGREES
EKG T AXIS: 76 DEGREES
EKG T AXIS: 82 DEGREES
EKG VENTRICULAR RATE: 123 BPM
EKG VENTRICULAR RATE: 168 BPM
EKG VENTRICULAR RATE: 170 BPM
EKG VENTRICULAR RATE: 89 BPM
EKG VENTRICULAR RATE: 92 BPM
EKG VENTRICULAR RATE: 94 BPM
EOSINOPHIL # BLD: 0.08 K/UL (ref 0–0.44)
EOSINOPHILS RELATIVE PERCENT: 1 % (ref 1–4)
ERYTHROCYTE [DISTWIDTH] IN BLOOD BY AUTOMATED COUNT: 13.9 % (ref 11.8–14.4)
GFR, ESTIMATED: 77 ML/MIN/1.73M2
GLUCOSE SERPL-MCNC: 96 MG/DL (ref 74–99)
HCT VFR BLD AUTO: 39.9 % (ref 36.3–47.1)
HGB BLD-MCNC: 12.5 G/DL (ref 11.9–15.1)
IMM GRANULOCYTES # BLD AUTO: <0.03 K/UL (ref 0–0.3)
IMM GRANULOCYTES NFR BLD: 0 %
LYMPHOCYTES NFR BLD: 1.66 K/UL (ref 1.1–3.7)
LYMPHOCYTES RELATIVE PERCENT: 27 % (ref 24–43)
MCH RBC QN AUTO: 30.1 PG (ref 25.2–33.5)
MCHC RBC AUTO-ENTMCNC: 31.3 G/DL (ref 28.4–34.8)
MCV RBC AUTO: 96.1 FL (ref 82.6–102.9)
MONOCYTES NFR BLD: 0.57 K/UL (ref 0.1–1.2)
MONOCYTES NFR BLD: 9 % (ref 3–12)
NEUTROPHILS NFR BLD: 62 % (ref 36–65)
NEUTS SEG NFR BLD: 3.74 K/UL (ref 1.5–8.1)
NRBC BLD-RTO: 0 PER 100 WBC
PLATELET # BLD AUTO: 156 K/UL (ref 138–453)
PMV BLD AUTO: 9.6 FL (ref 8.1–13.5)
POTASSIUM SERPL-SCNC: 4.5 MMOL/L (ref 3.7–5.3)
RBC # BLD AUTO: 4.15 M/UL (ref 3.95–5.11)
SODIUM SERPL-SCNC: 137 MMOL/L (ref 136–145)
WBC OTHER # BLD: 6.1 K/UL (ref 3.5–11.3)

## 2025-02-20 PROCEDURE — 6370000000 HC RX 637 (ALT 250 FOR IP): Performed by: INTERNAL MEDICINE

## 2025-02-20 PROCEDURE — 6360000002 HC RX W HCPCS: Performed by: INTERNAL MEDICINE

## 2025-02-20 PROCEDURE — 85025 COMPLETE CBC W/AUTO DIFF WBC: CPT

## 2025-02-20 PROCEDURE — 99291 CRITICAL CARE FIRST HOUR: CPT | Performed by: INTERNAL MEDICINE

## 2025-02-20 PROCEDURE — 94761 N-INVAS EAR/PLS OXIMETRY MLT: CPT

## 2025-02-20 PROCEDURE — 2500000003 HC RX 250 WO HCPCS: Performed by: STUDENT IN AN ORGANIZED HEALTH CARE EDUCATION/TRAINING PROGRAM

## 2025-02-20 PROCEDURE — 93010 ELECTROCARDIOGRAM REPORT: CPT | Performed by: INTERNAL MEDICINE

## 2025-02-20 PROCEDURE — 80048 BASIC METABOLIC PNL TOTAL CA: CPT

## 2025-02-20 PROCEDURE — 2580000003 HC RX 258: Performed by: STUDENT IN AN ORGANIZED HEALTH CARE EDUCATION/TRAINING PROGRAM

## 2025-02-20 PROCEDURE — 93005 ELECTROCARDIOGRAM TRACING: CPT | Performed by: INTERNAL MEDICINE

## 2025-02-20 PROCEDURE — 2500000003 HC RX 250 WO HCPCS

## 2025-02-20 PROCEDURE — 94640 AIRWAY INHALATION TREATMENT: CPT

## 2025-02-20 PROCEDURE — 2000000000 HC ICU R&B

## 2025-02-20 PROCEDURE — 36415 COLL VENOUS BLD VENIPUNCTURE: CPT

## 2025-02-20 PROCEDURE — 2500000003 HC RX 250 WO HCPCS: Performed by: INTERNAL MEDICINE

## 2025-02-20 PROCEDURE — 99233 SBSQ HOSP IP/OBS HIGH 50: CPT | Performed by: STUDENT IN AN ORGANIZED HEALTH CARE EDUCATION/TRAINING PROGRAM

## 2025-02-20 PROCEDURE — 80176 ASSAY OF LIDOCAINE: CPT

## 2025-02-20 RX ORDER — SODIUM CHLORIDE 9 MG/ML
INJECTION, SOLUTION INTRAVENOUS CONTINUOUS
Status: DISCONTINUED | OUTPATIENT
Start: 2025-02-20 | End: 2025-02-21

## 2025-02-20 RX ORDER — MIDODRINE HYDROCHLORIDE 5 MG/1
5 TABLET ORAL 3 TIMES DAILY PRN
Status: DISCONTINUED | OUTPATIENT
Start: 2025-02-20 | End: 2025-02-28 | Stop reason: HOSPADM

## 2025-02-20 RX ORDER — NOREPINEPHRINE BITARTRATE 0.06 MG/ML
1-100 INJECTION, SOLUTION INTRAVENOUS CONTINUOUS
Status: DISCONTINUED | OUTPATIENT
Start: 2025-02-20 | End: 2025-02-28 | Stop reason: HOSPADM

## 2025-02-20 RX ORDER — ALBUTEROL SULFATE 90 UG/1
2 INHALANT RESPIRATORY (INHALATION) EVERY 6 HOURS PRN
Status: DISCONTINUED | OUTPATIENT
Start: 2025-02-20 | End: 2025-02-28 | Stop reason: HOSPADM

## 2025-02-20 RX ORDER — 0.9 % SODIUM CHLORIDE 0.9 %
1000 INTRAVENOUS SOLUTION INTRAVENOUS ONCE
Status: COMPLETED | OUTPATIENT
Start: 2025-02-20 | End: 2025-02-20

## 2025-02-20 RX ADMIN — ASPIRIN 81 MG: 81 TABLET, COATED ORAL at 07:51

## 2025-02-20 RX ADMIN — Medication 1 TABLET: at 16:59

## 2025-02-20 RX ADMIN — AMIODARONE HYDROCHLORIDE 150 MG: 1.5 INJECTION, SOLUTION INTRAVENOUS at 06:24

## 2025-02-20 RX ADMIN — SODIUM CHLORIDE: 0.9 INJECTION, SOLUTION INTRAVENOUS at 09:44

## 2025-02-20 RX ADMIN — AMIODARONE HYDROCHLORIDE 150 MG: 1.5 INJECTION, SOLUTION INTRAVENOUS at 08:25

## 2025-02-20 RX ADMIN — BUDESONIDE AND FORMOTEROL FUMARATE DIHYDRATE 2 PUFF: 160; 4.5 AEROSOL RESPIRATORY (INHALATION) at 21:34

## 2025-02-20 RX ADMIN — SODIUM CHLORIDE 1000 ML: 0.9 INJECTION, SOLUTION INTRAVENOUS at 10:41

## 2025-02-20 RX ADMIN — BUDESONIDE AND FORMOTEROL FUMARATE DIHYDRATE 2 PUFF: 160; 4.5 AEROSOL RESPIRATORY (INHALATION) at 08:53

## 2025-02-20 RX ADMIN — Medication 1 TABLET: at 11:47

## 2025-02-20 RX ADMIN — NOREPINEPHRINE BITARTRATE 5 MCG/MIN: 64 SOLUTION INTRAVENOUS at 11:46

## 2025-02-20 RX ADMIN — METOPROLOL SUCCINATE 25 MG: 25 TABLET, EXTENDED RELEASE ORAL at 07:51

## 2025-02-20 RX ADMIN — Medication 1 TABLET: at 07:53

## 2025-02-20 RX ADMIN — ONDANSETRON 4 MG: 2 INJECTION INTRAMUSCULAR; INTRAVENOUS at 09:23

## 2025-02-20 RX ADMIN — AMIODARONE HYDROCHLORIDE 1 MG/MIN: 1.8 INJECTION, SOLUTION INTRAVENOUS at 19:39

## 2025-02-20 RX ADMIN — AMIODARONE HYDROCHLORIDE 1 MG/MIN: 1.8 INJECTION, SOLUTION INTRAVENOUS at 08:05

## 2025-02-20 RX ADMIN — SODIUM CHLORIDE, PRESERVATIVE FREE 10 ML: 5 INJECTION INTRAVENOUS at 19:44

## 2025-02-20 RX ADMIN — AMIODARONE HYDROCHLORIDE 1 MG/MIN: 1.8 INJECTION, SOLUTION INTRAVENOUS at 13:51

## 2025-02-20 RX ADMIN — AMIODARONE HYDROCHLORIDE 1 MG/MIN: 1.8 INJECTION, SOLUTION INTRAVENOUS at 06:33

## 2025-02-20 RX ADMIN — ENOXAPARIN SODIUM 40 MG: 100 INJECTION SUBCUTANEOUS at 07:51

## 2025-02-20 RX ADMIN — SODIUM CHLORIDE: 0.9 INJECTION, SOLUTION INTRAVENOUS at 19:38

## 2025-02-20 RX ADMIN — MIDODRINE HYDROCHLORIDE 5 MG: 5 TABLET ORAL at 02:29

## 2025-02-20 NOTE — ED NOTES
Per cardiology resident   If pt goes into another abnormal  Rhythm to give a 150 mg amio bolus and start a gtt at 1 mg/min

## 2025-02-20 NOTE — ED NOTES
ED to inpatient nurses report      Chief Complaint:  Chief Complaint   Patient presents with    Tachycardia     Present to ED from: Home     MOA:     LOC: alert and orientated to name, place, date  Mobility: Requires assistance * 1  Oxygen Baseline: RA    Current needs required: RA   Pending ED orders: na  Present condition: resting in ed cot, on lifepak, lidocaine gtt     Why did the patient come to the ED? Pt to room 12 via wheelchair with c/o dizziness. Pt reports that she was watching TV earlier and started feeling dizzy. Pt reports that she is supposed to have an ablation next month. Pt reports that she felt like her heart was racing. Upon arrival, pt denies any complaints. Pt heart rate 140-190bpm. Pt talking in complete sentences. Pt placed on continuous cardiac monitor, bp and pulse ox. EKG completed, IV established, blood work drawn.  What is the plan? Cards follow up, lidocaine gtt  Any procedures or intervention occur? Line,labs, imaging, EKG, lidocaine bolus and gtt     Mental Status:       Psych Assessment:   Psychosocial  Psychosocial (WDL): Within Defined Limits  Vital signs   Vitals:    02/19/25 1800 02/19/25 1845 02/19/25 1846 02/19/25 1847   BP: (!) 151/91 (!) 106/90     Pulse: 77 78 78    Resp: 18 18  16   Temp:       SpO2: 94%  99%         Vitals:  Patient Vitals for the past 24 hrs:   BP Temp Pulse Resp SpO2   02/19/25 1847 -- -- -- 16 --   02/19/25 1846 -- -- 78 -- 99 %   02/19/25 1845 (!) 106/90 -- 78 18 --   02/19/25 1800 (!) 151/91 -- 77 18 94 %   02/19/25 1740 -- 97.7 °F (36.5 °C) -- -- --   02/19/25 1722 (!) 156/103 -- 89 17 96 %   02/19/25 1711 120/86 -- 100 19 96 %   02/19/25 1709 -- -- (!) 107 14 96 %   02/19/25 1708 -- -- (!) 174 24 97 %   02/19/25 1705 -- -- (!) 153 -- --   02/19/25 1704 -- -- (!) 160 29 --   02/19/25 1703 (!) 170/127 -- (!) 156 20 --   02/19/25 1702 -- -- (!) 159 18 96 %   02/19/25 1700 (!) 170/127 -- (!) 135 16 98 %      Visit Vitals  BP (!) 106/90   Pulse 78   Temp

## 2025-02-20 NOTE — DISCHARGE SUMMARY
[facility]  Office: [phone]  Joey Smith DO, Miguel Machado DO, Vikash Conklin DO, Alan Mcnulty DO, Joseph Thomas MD, Elsy Lacy MD, Eduardo Maddox MD, Kindra Mello MD,  Stef Awad MD, Esperanza Gandhi MD, Andi Hull MD,  Nolberto Allison DO, Anju Ellis MD, Paco Solis MD, Girma Smith DO, Heydi Yu MD,  Rigo Holliday DO, Rajwinder Stafford MD, Ita Camacho MD, Berna Vides MD, Eloy Bronson MD,  Gigi Romero MD, Elis Mckeon MD, Alfred Conner MD, Steve Greer MD, Jaime Banegas MD, Sandhya Collins MD, Fran Rehman DO, Caesar Leiva MD, Nolberto Holguin MD, Mohsin Reza, MD, Shirley Waterhouse, CNP,  Chastity Astorga CNP, Fran Fuentes, CNP,  Olive Melgoza, DNP, Madai Holley, CNP, Meghan Rodriguez, CNP, Mary Aleman, CNP, Haleigh Francois CNP, Kira Rosales PA-C, Radha Mariee, CNP, Michael Hurst, CNP,  Malgorzata Desai, CNP, Diamante Barron, CNP, Meghna Holland, CNP,  Tammy Jarrett, CNS, Viky White CNP, Carolyne Boston CNP,   Keiko Stroud, CNP         [facility]   IN-PATIENT SERVICE   [facility]    Discharge Summary     Patient ID: Coni Baker  :  1950   MRN: [id number]     ACCOUNT:  [id number]   Patient's PCP: Earlene Salcedo MD  Admit Date: 2025   Discharge Date: 2025     Length of Stay: 0  Code Status:  Full Code  Admitting Physician: Andi Hull MD  Discharge Physician: Andi Hull MD     Active Discharge Diagnoses:     Hospital Problem Lists:  Principal Problem:    Paroxysmal ventricular tachycardia (HCC)  Active Problems:    Ischemic cardiomyopathy    Ventricular tachycardia, sustained (HCC)    Mixed hyperlipidemia    Primary hypertension    Bradycardia    Chronic systolic congestive heart failure (HCC)    Stented coronary artery    Ventricular tachycardia, non-sustained (HCC)  Resolved Problems:    * No resolved hospital problems. *      Admission Condition:

## 2025-02-20 NOTE — H&P
[facility]  Office: [phone]  Joey Smith DO, Miguel Machado DO, Vikash Conklin DO, Alan Mcnulty DO, Joseph Thomas MD, Elsy Lacy MD, Eduardo Maddox MD, Kindra Mello MD,  Stef Awad MD, Esperanza Gandhi MD, Andi Hull MD,  Nolberto Allison DO, Anju Ellis MD, Paco Solis MD, Girma Smith DO, Heydi Yu MD,  Rigo Holliday DO, Rajwinder Stafford MD, Ita Camacho MD, Berna Vides MD, Eloy Bronson MD,  Gigi Romero MD, Elis Mckeon MD, Alfred Conner MD, Steve Greer MD, Jaime Banegas MD, Sandhya Collins MD, Fran Rehman DO, Caesar Leiva MD, Nolberto Holguin MD, Mohsin Reza, MD, Shirley Waterhouse, CNP,  Chastity Astorga CNP, Fran Fuentes, CNP,  Olive Melgoza, BOBBY, Madai Holley, CNP, Meghan Rodriguez, CNP, Mary Aleman, CNP, Haleigh Francois CNP, ROCK Mancera-YESSI, Radha Mariee, CNP, Michael Hurst, CNP,  Malgorzata Desai, CNP, Diamante Barron, CNP, Meghna Holland, CNP,  Tammy Jarrett, CNS, Viky White CNP, Carolyne Boston CNP,   Keiko Stroud, CNP         [facility]   IN-PATIENT SERVICE   [facility]    HISTORY AND PHYSICAL EXAMINATION            Date:   2/19/2025  Patient name:  Coni Baker  Date of admission:  2/19/2025  4:58 PM  MRN:   [id number]  Account:  [id number]  YOB: 1950  PCP:    Earlene Salcedo MD  Room:   [address] [place] [address]  Code Status:    Full Code    Chief Complaint:     Chief Complaint   Patient presents with    Tachycardia       History Obtained From:     patient    History of Present Illness:     Coni Baker is a 74 y.o. Non- / non  female who presents with Tachycardia   and is admitted to the hospital for the management of Paroxysmal ventricular tachycardia (HCC).    This is a 74-year-old female with history of coronary disease with prior cardiac stent placements and known ventricular tachycardia.  She has had refractory episodes of tachycardia and previously treated at

## 2025-02-20 NOTE — CONSULTS
[facility]    Consult               Today's Date: 2/20/2025  Patient Name: Coni Baker  Date of admission: 2/19/2025  4:58 PM  Patient's age: 74 y.o., 1950  Admission Dx: Paroxysmal ventricular tachycardia (HCC) [I47.29]  NSVT (nonsustained ventricular tachycardia) (HCC) [I47.29]    Requesting Physician: Vikash Conklin, DO    Cardiac Evaluation Reason:  V-tach.     History Obtained From: patient and chart review     History of Present Illness:    This patient 74 y.o. years old with PMH of CAD status post LAD stent, history of SVTs that is post SVT ablation, history of frequent PVCs status post PVC ablation, history of failed V. tach ablation of epicardial RVOT, and history of Zio patch in the past who presents due to bradycardia at home.  Patient states that she frequent takes her vitals at home.  Patient noted that she had a heart rate of roughly 23.  Patient then checked again and her heart rate was 25.  She then took her blood pressure and her heart rate was also in the upper 20s as well.  At that time patient was stable with systolic blood pressures in the 110s.  Patient states that she did not feel symptomatic at that time.  She has experienced bradycardia in the past secondary to medications.  Currently, patient is on Toprol XL 25 mg daily and mexiletine 250 mg 3 times daily.  Patient was on mexiletine 202 150 mg at 1 point, but due to bradycardia in the past patient had to be switched to a lower dose.    It should be noted that patient does have extensive heart history.  Patient has had numerous ablations in the past for PVCs, SVT, and an attempted ablation that was failed for V. tach.  Patient has also had history of Zio patch.  Also noted to have history of HFrEF most recent EF 35 to 40% last year.  Also has a history of CAD with LAD stent.    She follows with [facility] as well as [facility] electrophysiologist.  Patient was actually scheduled for V. tach ablation with electrophysiologist at

## 2025-02-20 NOTE — DISCHARGE INSTR - COC
{Prognosis:7749875585}    Recommended Labs or Other Treatments After Discharge: ***    Physician Certification: I certify the above information and transfer of Coni Baker  is necessary for the continuing treatment of the diagnosis listed and that she requires {Admit to Appropriate Level of Care:98213} for {GREATER/LESS:956511893} 30 days.     Update Admission H&P: {CHP DME Changes in HandP:889749283}    PHYSICIAN SIGNATURE:  {Esignature:209342136}

## 2025-02-20 NOTE — ED NOTES
Pt transferred to floor by Abdelrahman Skelton  Pt on full monitor, lifepak and all gtt continued

## 2025-02-20 NOTE — CARE COORDINATION
Case Management Assessment  Initial Evaluation    Date/Time of Evaluation: 2/20/2025 10:02 AM  Assessment Completed by: TIERA RUTLEDGE RN    If patient is discharged prior to next notation, then this note serves as note for discharge by case management.    Patient Name: Coni Baker                   YOB: 1950  Diagnosis: Paroxysmal ventricular tachycardia (HCC) [I47.29]  NSVT (nonsustained ventricular tachycardia) (HCC) [I47.29]                   Date / Time: 2/19/2025  4:58 PM    Patient Admission Status: Observation   Readmission Risk (Low < 19, Mod (19-27), High > 27): Readmission Risk Score: 19.7    Current PCP: Earlene Salcedo MD  PCP verified by CM? (P) Yes    Chart Reviewed: Yes      History Provided by: (P) Patient  Patient Orientation: (P) Alert and Oriented    Patient Cognition: (P) Alert    Hospitalization in the last 30 days (Readmission):  No    If yes, Readmission Assessment in CM Navigator will be completed.    Advance Directives:      Code Status: Full Code   Patient's Primary Decision Maker is: (P) Legal Next of Kin    Primary Decision Maker: Girma Bateman - Child - [phone]    Secondary Decision Maker: Gi Chau - Child - [phone]    Discharge Planning:    Patient lives with: (P) Alone Type of Home: (P) House  Primary Care Giver: (P) Self  Patient Support Systems include: (P) Children, Friends/Neighbors   Current Financial resources: (P) Medicare  Current community resources:    Current services prior to admission: (P) Durable Medical Equipment, Home Care            Current DME: (P) Walker            Type of Home Care services:  (P) Nursing Services    ADLS  Prior functional level: (P) Independent in ADLs/IADLs  Current functional level: (P) Other (see comment) (Await PT/OT evals)    PT AM-PAC:   /24  OT AM-PAC:   /24    Family can provide assistance at DC:    Would you like Case Management to discuss the discharge plan with any other family members/significant

## 2025-02-20 NOTE — ED NOTES
Pt Hr elevated in the 120s-150s  Pt asymptomatic  Admitting team notified, Dr. Thayer and Dr. Monterroso at bedside

## 2025-02-21 ENCOUNTER — APPOINTMENT (OUTPATIENT)
Dept: GENERAL RADIOLOGY | Age: 75
DRG: 308 | End: 2025-02-21
Payer: MEDICARE

## 2025-02-21 ENCOUNTER — APPOINTMENT (OUTPATIENT)
Age: 75
DRG: 308 | End: 2025-02-21
Attending: STUDENT IN AN ORGANIZED HEALTH CARE EDUCATION/TRAINING PROGRAM
Payer: MEDICARE

## 2025-02-21 LAB
ANION GAP SERPL CALCULATED.3IONS-SCNC: 11 MMOL/L (ref 9–16)
BASOPHILS # BLD: 0.04 K/UL (ref 0–0.2)
BASOPHILS NFR BLD: 1 % (ref 0–2)
BUN SERPL-MCNC: 16 MG/DL (ref 8–23)
CALCIUM SERPL-MCNC: 8 MG/DL (ref 8.6–10.4)
CHLORIDE SERPL-SCNC: 106 MMOL/L (ref 98–107)
CO2 SERPL-SCNC: 17 MMOL/L (ref 20–31)
CREAT SERPL-MCNC: 0.9 MG/DL (ref 0.6–0.9)
ECHO AV AREA PEAK VELOCITY: 2 CM2
ECHO AV AREA VTI: 2.1 CM2
ECHO AV AREA/BSA PEAK VELOCITY: 1.1 CM2/M2
ECHO AV AREA/BSA VTI: 1.1 CM2/M2
ECHO AV MEAN GRADIENT: 4 MMHG
ECHO AV MEAN VELOCITY: 0.9 M/S
ECHO AV PEAK GRADIENT: 9 MMHG
ECHO AV PEAK VELOCITY: 1.5 M/S
ECHO AV VELOCITY RATIO: 0.67
ECHO AV VTI: 30.2 CM
ECHO BSA: 1.91 M2
ECHO EST RA PRESSURE: 8 MMHG
ECHO LA AREA 2C: 24.6 CM2
ECHO LA AREA 4C: 20.7 CM2
ECHO LA DIAMETER INDEX: 2.17 CM/M2
ECHO LA DIAMETER: 4 CM
ECHO LA MAJOR AXIS: 6 CM
ECHO LA MINOR AXIS: 6.2 CM
ECHO LA VOL BP: 68 ML (ref 22–52)
ECHO LA VOL MOD A2C: 80 ML (ref 22–52)
ECHO LA VOL MOD A4C: 58 ML (ref 22–52)
ECHO LA VOL/BSA BIPLANE: 37 ML/M2 (ref 16–34)
ECHO LA VOLUME INDEX MOD A2C: 43 ML/M2 (ref 16–34)
ECHO LA VOLUME INDEX MOD A4C: 32 ML/M2 (ref 16–34)
ECHO LV E' LATERAL VELOCITY: 11.3 CM/S
ECHO LV E' SEPTAL VELOCITY: 8.05 CM/S
ECHO LV EDV A2C: 125 ML
ECHO LV EDV A4C: 172 ML
ECHO LV EDV INDEX A4C: 93 ML/M2
ECHO LV EDV NDEX A2C: 68 ML/M2
ECHO LV EF PHYSICIAN: 35 %
ECHO LV EJECTION FRACTION A2C: 50 %
ECHO LV EJECTION FRACTION A4C: 50 %
ECHO LV EJECTION FRACTION BIPLANE: 50 % (ref 55–100)
ECHO LV ESV A2C: 63 ML
ECHO LV ESV A4C: 87 ML
ECHO LV ESV INDEX A2C: 34 ML/M2
ECHO LV ESV INDEX A4C: 47 ML/M2
ECHO LV FRACTIONAL SHORTENING: 14 % (ref 28–44)
ECHO LV INTERNAL DIMENSION DIASTOLE INDEX: 3.48 CM/M2
ECHO LV INTERNAL DIMENSION DIASTOLIC: 6.4 CM (ref 3.9–5.3)
ECHO LV INTERNAL DIMENSION SYSTOLIC INDEX: 2.99 CM/M2
ECHO LV INTERNAL DIMENSION SYSTOLIC: 5.5 CM
ECHO LV IVSD: 1 CM (ref 0.6–0.9)
ECHO LV MASS 2D: 275.6 G (ref 67–162)
ECHO LV MASS INDEX 2D: 149.8 G/M2 (ref 43–95)
ECHO LV POSTERIOR WALL DIASTOLIC: 1 CM (ref 0.6–0.9)
ECHO LV RELATIVE WALL THICKNESS RATIO: 0.31
ECHO LVOT AREA: 3.1 CM2
ECHO LVOT AV VTI INDEX: 0.68
ECHO LVOT DIAM: 2 CM
ECHO LVOT MEAN GRADIENT: 2 MMHG
ECHO LVOT PEAK GRADIENT: 4 MMHG
ECHO LVOT PEAK VELOCITY: 1 M/S
ECHO LVOT STROKE VOLUME INDEX: 34.8 ML/M2
ECHO LVOT SV: 64.1 ML
ECHO LVOT VTI: 20.4 CM
ECHO MV A VELOCITY: 1.04 M/S
ECHO MV AREA VTI: 1.5 CM2
ECHO MV E DECELERATION TIME (DT): 191 MS
ECHO MV E VELOCITY: 0.91 M/S
ECHO MV E/A RATIO: 0.88
ECHO MV E/E' LATERAL: 8.05
ECHO MV E/E' RATIO (AVERAGED): 9.68
ECHO MV E/E' SEPTAL: 11.3
ECHO MV LVOT VTI INDEX: 2.11
ECHO MV MAX VELOCITY: 1.1 M/S
ECHO MV MEAN GRADIENT: 2 MMHG
ECHO MV MEAN VELOCITY: 0.6 M/S
ECHO MV PEAK GRADIENT: 5 MMHG
ECHO MV VTI: 43.1 CM
ECHO RIGHT VENTRICULAR SYSTOLIC PRESSURE (RVSP): 48 MMHG
ECHO RV FREE WALL PEAK S': 12.8 CM/S
ECHO RV INTERNAL DIMENSION: 4.2 CM
ECHO RV TAPSE: 1.6 CM (ref 1.7–?)
ECHO TV REGURGITANT MAX VELOCITY: 3.15 M/S
ECHO TV REGURGITANT PEAK GRADIENT: 40 MMHG
EKG ATRIAL RATE: 50 BPM
EKG ATRIAL RATE: 84 BPM
EKG ATRIAL RATE: 99 BPM
EKG P AXIS: 28 DEGREES
EKG P AXIS: 51 DEGREES
EKG P-R INTERVAL: 136 MS
EKG P-R INTERVAL: 152 MS
EKG P-R INTERVAL: 174 MS
EKG Q-T INTERVAL: 312 MS
EKG Q-T INTERVAL: 340 MS
EKG Q-T INTERVAL: 396 MS
EKG QRS DURATION: 130 MS
EKG QRS DURATION: 92 MS
EKG QRS DURATION: 94 MS
EKG QTC CALCULATION (BAZETT): 436 MS
EKG QTC CALCULATION (BAZETT): 467 MS
EKG QTC CALCULATION (BAZETT): 526 MS
EKG R AXIS: -13 DEGREES
EKG R AXIS: 136 DEGREES
EKG R AXIS: 25 DEGREES
EKG T AXIS: -154 DEGREES
EKG T AXIS: 62 DEGREES
EKG T AXIS: 68 DEGREES
EKG VENTRICULAR RATE: 171 BPM
EKG VENTRICULAR RATE: 84 BPM
EKG VENTRICULAR RATE: 99 BPM
EOSINOPHIL # BLD: <0.03 K/UL (ref 0–0.44)
EOSINOPHILS RELATIVE PERCENT: 0 % (ref 1–4)
ERYTHROCYTE [DISTWIDTH] IN BLOOD BY AUTOMATED COUNT: 14.1 % (ref 11.8–14.4)
GFR, ESTIMATED: 67 ML/MIN/1.73M2
GLUCOSE SERPL-MCNC: 112 MG/DL (ref 74–99)
HCT VFR BLD AUTO: 40.2 % (ref 36.3–47.1)
HGB BLD-MCNC: 12.3 G/DL (ref 11.9–15.1)
IMM GRANULOCYTES # BLD AUTO: <0.03 K/UL (ref 0–0.3)
IMM GRANULOCYTES NFR BLD: 0 %
LIDOCAIN SERPL-MCNC: 2.5 UG/ML (ref 1.2–5)
LYMPHOCYTES NFR BLD: 1.23 K/UL (ref 1.1–3.7)
LYMPHOCYTES RELATIVE PERCENT: 17 % (ref 24–43)
MAGNESIUM SERPL-MCNC: 2.1 MG/DL (ref 1.6–2.4)
MCH RBC QN AUTO: 29.9 PG (ref 25.2–33.5)
MCHC RBC AUTO-ENTMCNC: 30.6 G/DL (ref 28.4–34.8)
MCV RBC AUTO: 97.8 FL (ref 82.6–102.9)
MONOCYTES NFR BLD: 0.59 K/UL (ref 0.1–1.2)
MONOCYTES NFR BLD: 8 % (ref 3–12)
NEUTROPHILS NFR BLD: 74 % (ref 36–65)
NEUTS SEG NFR BLD: 5.37 K/UL (ref 1.5–8.1)
NRBC BLD-RTO: 0 PER 100 WBC
PLATELET # BLD AUTO: 145 K/UL (ref 138–453)
PMV BLD AUTO: 9.8 FL (ref 8.1–13.5)
POTASSIUM SERPL-SCNC: 4.7 MMOL/L (ref 3.7–5.3)
RBC # BLD AUTO: 4.11 M/UL (ref 3.95–5.11)
SODIUM SERPL-SCNC: 134 MMOL/L (ref 136–145)
WBC OTHER # BLD: 7.3 K/UL (ref 3.5–11.3)

## 2025-02-21 PROCEDURE — 6360000004 HC RX CONTRAST MEDICATION: Performed by: STUDENT IN AN ORGANIZED HEALTH CARE EDUCATION/TRAINING PROGRAM

## 2025-02-21 PROCEDURE — 93010 ELECTROCARDIOGRAM REPORT: CPT | Performed by: INTERNAL MEDICINE

## 2025-02-21 PROCEDURE — 85025 COMPLETE CBC W/AUTO DIFF WBC: CPT

## 2025-02-21 PROCEDURE — 2500000003 HC RX 250 WO HCPCS

## 2025-02-21 PROCEDURE — 6370000000 HC RX 637 (ALT 250 FOR IP): Performed by: STUDENT IN AN ORGANIZED HEALTH CARE EDUCATION/TRAINING PROGRAM

## 2025-02-21 PROCEDURE — 2700000000 HC OXYGEN THERAPY PER DAY

## 2025-02-21 PROCEDURE — 99232 SBSQ HOSP IP/OBS MODERATE 35: CPT | Performed by: STUDENT IN AN ORGANIZED HEALTH CARE EDUCATION/TRAINING PROGRAM

## 2025-02-21 PROCEDURE — 83735 ASSAY OF MAGNESIUM: CPT

## 2025-02-21 PROCEDURE — 2500000003 HC RX 250 WO HCPCS: Performed by: INTERNAL MEDICINE

## 2025-02-21 PROCEDURE — 99291 CRITICAL CARE FIRST HOUR: CPT | Performed by: INTERNAL MEDICINE

## 2025-02-21 PROCEDURE — 2580000003 HC RX 258: Performed by: STUDENT IN AN ORGANIZED HEALTH CARE EDUCATION/TRAINING PROGRAM

## 2025-02-21 PROCEDURE — 6360000002 HC RX W HCPCS: Performed by: INTERNAL MEDICINE

## 2025-02-21 PROCEDURE — 93306 TTE W/DOPPLER COMPLETE: CPT | Performed by: INTERNAL MEDICINE

## 2025-02-21 PROCEDURE — 6360000002 HC RX W HCPCS: Performed by: STUDENT IN AN ORGANIZED HEALTH CARE EDUCATION/TRAINING PROGRAM

## 2025-02-21 PROCEDURE — 71045 X-RAY EXAM CHEST 1 VIEW: CPT

## 2025-02-21 PROCEDURE — 80048 BASIC METABOLIC PNL TOTAL CA: CPT

## 2025-02-21 PROCEDURE — 94761 N-INVAS EAR/PLS OXIMETRY MLT: CPT

## 2025-02-21 PROCEDURE — 6370000000 HC RX 637 (ALT 250 FOR IP): Performed by: INTERNAL MEDICINE

## 2025-02-21 PROCEDURE — 94640 AIRWAY INHALATION TREATMENT: CPT

## 2025-02-21 PROCEDURE — 2000000000 HC ICU R&B

## 2025-02-21 PROCEDURE — C8929 TTE W OR WO FOL WCON,DOPPLER: HCPCS

## 2025-02-21 PROCEDURE — 36415 COLL VENOUS BLD VENIPUNCTURE: CPT

## 2025-02-21 RX ORDER — FUROSEMIDE 10 MG/ML
20 INJECTION INTRAMUSCULAR; INTRAVENOUS ONCE
Status: COMPLETED | OUTPATIENT
Start: 2025-02-21 | End: 2025-02-21

## 2025-02-21 RX ORDER — METOPROLOL SUCCINATE 25 MG/1
12.5 TABLET, EXTENDED RELEASE ORAL DAILY
Status: DISCONTINUED | OUTPATIENT
Start: 2025-02-21 | End: 2025-02-22

## 2025-02-21 RX ORDER — ALPRAZOLAM 0.25 MG
0.25 TABLET ORAL ONCE
Status: COMPLETED | OUTPATIENT
Start: 2025-02-21 | End: 2025-02-21

## 2025-02-21 RX ORDER — HYDROXYZINE HYDROCHLORIDE 25 MG/1
25 TABLET, FILM COATED ORAL ONCE
Status: COMPLETED | OUTPATIENT
Start: 2025-02-21 | End: 2025-02-21

## 2025-02-21 RX ORDER — SODIUM BICARBONATE 650 MG/1
1300 TABLET ORAL 2 TIMES DAILY
Status: DISCONTINUED | OUTPATIENT
Start: 2025-02-21 | End: 2025-02-23

## 2025-02-21 RX ADMIN — ENOXAPARIN SODIUM 40 MG: 100 INJECTION SUBCUTANEOUS at 10:09

## 2025-02-21 RX ADMIN — AMIODARONE HYDROCHLORIDE 1 MG/MIN: 1.8 INJECTION, SOLUTION INTRAVENOUS at 21:03

## 2025-02-21 RX ADMIN — SODIUM CHLORIDE: 0.9 INJECTION, SOLUTION INTRAVENOUS at 16:28

## 2025-02-21 RX ADMIN — SODIUM BICARBONATE 1300 MG: 650 TABLET ORAL at 23:59

## 2025-02-21 RX ADMIN — BUDESONIDE AND FORMOTEROL FUMARATE DIHYDRATE 2 PUFF: 160; 4.5 AEROSOL RESPIRATORY (INHALATION) at 09:00

## 2025-02-21 RX ADMIN — ALPRAZOLAM 0.25 MG: 0.25 TABLET ORAL at 07:41

## 2025-02-21 RX ADMIN — SULFUR HEXAFLUORIDE 2 ML: KIT at 11:47

## 2025-02-21 RX ADMIN — HYDROXYZINE HYDROCHLORIDE 25 MG: 25 TABLET, FILM COATED ORAL at 18:47

## 2025-02-21 RX ADMIN — AMIODARONE HYDROCHLORIDE 1 MG/MIN: 1.8 INJECTION, SOLUTION INTRAVENOUS at 08:13

## 2025-02-21 RX ADMIN — AMIODARONE HYDROCHLORIDE 1 MG/MIN: 1.8 INJECTION, SOLUTION INTRAVENOUS at 02:09

## 2025-02-21 RX ADMIN — SODIUM CHLORIDE, PRESERVATIVE FREE 10 ML: 5 INJECTION INTRAVENOUS at 10:10

## 2025-02-21 RX ADMIN — ASPIRIN 81 MG: 81 TABLET, COATED ORAL at 10:09

## 2025-02-21 RX ADMIN — AMIODARONE HYDROCHLORIDE 1 MG/MIN: 1.8 INJECTION, SOLUTION INTRAVENOUS at 14:40

## 2025-02-21 RX ADMIN — ROSUVASTATIN CALCIUM 20 MG: 20 TABLET, FILM COATED ORAL at 10:09

## 2025-02-21 RX ADMIN — Medication 1 TABLET: at 12:33

## 2025-02-21 RX ADMIN — FUROSEMIDE 20 MG: 10 INJECTION, SOLUTION INTRAMUSCULAR; INTRAVENOUS at 18:36

## 2025-02-21 RX ADMIN — SODIUM BICARBONATE 1300 MG: 650 TABLET ORAL at 12:33

## 2025-02-21 RX ADMIN — Medication 1 TABLET: at 07:38

## 2025-02-21 RX ADMIN — Medication 1 TABLET: at 17:17

## 2025-02-21 RX ADMIN — ALBUTEROL SULFATE 2 PUFF: 90 AEROSOL, METERED RESPIRATORY (INHALATION) at 15:24

## 2025-02-21 RX ADMIN — LIDOCAINE HYDROCHLORIDE 1 MG/MIN: 4 INJECTION, SOLUTION INTRAVENOUS at 00:09

## 2025-02-21 NOTE — CARE COORDINATION
Transition Planning    Call to [facility] [phone] and spoke with Pascual. Waiting for bed in their CICU. Call transferred to TAMI Fuentes to provide update.

## 2025-02-22 LAB
ANION GAP SERPL CALCULATED.3IONS-SCNC: 9 MMOL/L (ref 9–16)
BUN SERPL-MCNC: 19 MG/DL (ref 8–23)
CALCIUM SERPL-MCNC: 8.1 MG/DL (ref 8.6–10.4)
CHLORIDE SERPL-SCNC: 108 MMOL/L (ref 98–107)
CO2 SERPL-SCNC: 22 MMOL/L (ref 20–31)
CREAT SERPL-MCNC: 1 MG/DL (ref 0.6–0.9)
ERYTHROCYTE [DISTWIDTH] IN BLOOD BY AUTOMATED COUNT: 14.2 % (ref 11.8–14.4)
GFR, ESTIMATED: 59 ML/MIN/1.73M2
GLUCOSE SERPL-MCNC: 96 MG/DL (ref 74–99)
HCT VFR BLD AUTO: 36.5 % (ref 36.3–47.1)
HGB BLD-MCNC: 11.1 G/DL (ref 11.9–15.1)
MAGNESIUM SERPL-MCNC: 1.9 MG/DL (ref 1.6–2.4)
MCH RBC QN AUTO: 30.5 PG (ref 25.2–33.5)
MCHC RBC AUTO-ENTMCNC: 30.4 G/DL (ref 28.4–34.8)
MCV RBC AUTO: 100.3 FL (ref 82.6–102.9)
NRBC BLD-RTO: 0 PER 100 WBC
PLATELET # BLD AUTO: 109 K/UL (ref 138–453)
PMV BLD AUTO: 10.1 FL (ref 8.1–13.5)
POTASSIUM SERPL-SCNC: 4 MMOL/L (ref 3.7–5.3)
RBC # BLD AUTO: 3.64 M/UL (ref 3.95–5.11)
SODIUM SERPL-SCNC: 139 MMOL/L (ref 136–145)
WBC OTHER # BLD: 5.6 K/UL (ref 3.5–11.3)

## 2025-02-22 PROCEDURE — 85027 COMPLETE CBC AUTOMATED: CPT

## 2025-02-22 PROCEDURE — 99233 SBSQ HOSP IP/OBS HIGH 50: CPT | Performed by: INTERNAL MEDICINE

## 2025-02-22 PROCEDURE — 2500000003 HC RX 250 WO HCPCS

## 2025-02-22 PROCEDURE — 97166 OT EVAL MOD COMPLEX 45 MIN: CPT

## 2025-02-22 PROCEDURE — 99232 SBSQ HOSP IP/OBS MODERATE 35: CPT | Performed by: STUDENT IN AN ORGANIZED HEALTH CARE EDUCATION/TRAINING PROGRAM

## 2025-02-22 PROCEDURE — 97162 PT EVAL MOD COMPLEX 30 MIN: CPT

## 2025-02-22 PROCEDURE — 2700000000 HC OXYGEN THERAPY PER DAY

## 2025-02-22 PROCEDURE — 36415 COLL VENOUS BLD VENIPUNCTURE: CPT

## 2025-02-22 PROCEDURE — 2500000003 HC RX 250 WO HCPCS: Performed by: INTERNAL MEDICINE

## 2025-02-22 PROCEDURE — 83735 ASSAY OF MAGNESIUM: CPT

## 2025-02-22 PROCEDURE — 80048 BASIC METABOLIC PNL TOTAL CA: CPT

## 2025-02-22 PROCEDURE — 94761 N-INVAS EAR/PLS OXIMETRY MLT: CPT

## 2025-02-22 PROCEDURE — 2500000003 HC RX 250 WO HCPCS: Performed by: STUDENT IN AN ORGANIZED HEALTH CARE EDUCATION/TRAINING PROGRAM

## 2025-02-22 PROCEDURE — 6370000000 HC RX 637 (ALT 250 FOR IP): Performed by: INTERNAL MEDICINE

## 2025-02-22 PROCEDURE — 6370000000 HC RX 637 (ALT 250 FOR IP): Performed by: STUDENT IN AN ORGANIZED HEALTH CARE EDUCATION/TRAINING PROGRAM

## 2025-02-22 PROCEDURE — 6360000002 HC RX W HCPCS: Performed by: INTERNAL MEDICINE

## 2025-02-22 PROCEDURE — 94640 AIRWAY INHALATION TREATMENT: CPT

## 2025-02-22 PROCEDURE — 97530 THERAPEUTIC ACTIVITIES: CPT

## 2025-02-22 PROCEDURE — 2000000000 HC ICU R&B

## 2025-02-22 RX ORDER — HYDROXYZINE HYDROCHLORIDE 25 MG/1
25 TABLET, FILM COATED ORAL 3 TIMES DAILY PRN
Status: DISCONTINUED | OUTPATIENT
Start: 2025-02-22 | End: 2025-02-28 | Stop reason: HOSPADM

## 2025-02-22 RX ADMIN — LIDOCAINE HYDROCHLORIDE 1 MG/MIN: 4 INJECTION, SOLUTION INTRAVENOUS at 11:24

## 2025-02-22 RX ADMIN — AMIODARONE HYDROCHLORIDE 0.5 MG/MIN: 1.8 INJECTION, SOLUTION INTRAVENOUS at 10:58

## 2025-02-22 RX ADMIN — AMIODARONE HYDROCHLORIDE 0.5 MG/MIN: 1.8 INJECTION, SOLUTION INTRAVENOUS at 23:30

## 2025-02-22 RX ADMIN — Medication 1 TABLET: at 09:50

## 2025-02-22 RX ADMIN — SODIUM BICARBONATE 1300 MG: 650 TABLET ORAL at 09:50

## 2025-02-22 RX ADMIN — BUDESONIDE AND FORMOTEROL FUMARATE DIHYDRATE 2 PUFF: 160; 4.5 AEROSOL RESPIRATORY (INHALATION) at 20:24

## 2025-02-22 RX ADMIN — MIDODRINE HYDROCHLORIDE 5 MG: 5 TABLET ORAL at 04:16

## 2025-02-22 RX ADMIN — AMIODARONE HYDROCHLORIDE 1 MG/MIN: 1.8 INJECTION, SOLUTION INTRAVENOUS at 03:19

## 2025-02-22 RX ADMIN — SODIUM BICARBONATE 1300 MG: 650 TABLET ORAL at 20:31

## 2025-02-22 RX ADMIN — ENOXAPARIN SODIUM 40 MG: 100 INJECTION SUBCUTANEOUS at 09:50

## 2025-02-22 RX ADMIN — Medication 1 TABLET: at 18:46

## 2025-02-22 RX ADMIN — SODIUM CHLORIDE, PRESERVATIVE FREE 10 ML: 5 INJECTION INTRAVENOUS at 09:51

## 2025-02-22 RX ADMIN — ROSUVASTATIN CALCIUM 20 MG: 20 TABLET, FILM COATED ORAL at 09:50

## 2025-02-22 RX ADMIN — Medication 1 TABLET: at 12:34

## 2025-02-22 RX ADMIN — ASPIRIN 81 MG: 81 TABLET, COATED ORAL at 09:50

## 2025-02-22 RX ADMIN — BUDESONIDE AND FORMOTEROL FUMARATE DIHYDRATE 2 PUFF: 160; 4.5 AEROSOL RESPIRATORY (INHALATION) at 09:30

## 2025-02-22 ASSESSMENT — PAIN SCALES - GENERAL: PAINLEVEL_OUTOF10: 0

## 2025-02-23 LAB
ANION GAP SERPL CALCULATED.3IONS-SCNC: 8 MMOL/L (ref 9–16)
BUN SERPL-MCNC: 19 MG/DL (ref 8–23)
CALCIUM SERPL-MCNC: 8.2 MG/DL (ref 8.6–10.4)
CHLORIDE SERPL-SCNC: 108 MMOL/L (ref 98–107)
CO2 SERPL-SCNC: 24 MMOL/L (ref 20–31)
CREAT SERPL-MCNC: 0.9 MG/DL (ref 0.6–0.9)
ERYTHROCYTE [DISTWIDTH] IN BLOOD BY AUTOMATED COUNT: 14 % (ref 11.8–14.4)
GFR, ESTIMATED: 67 ML/MIN/1.73M2
GLUCOSE SERPL-MCNC: 98 MG/DL (ref 74–99)
HCT VFR BLD AUTO: 34.5 % (ref 36.3–47.1)
HCT VFR BLD AUTO: 38.1 % (ref 36.3–47.1)
HGB BLD-MCNC: 10.8 G/DL (ref 11.9–15.1)
HGB BLD-MCNC: 11.7 G/DL (ref 11.9–15.1)
MAGNESIUM SERPL-MCNC: 1.9 MG/DL (ref 1.6–2.4)
MCH RBC QN AUTO: 30.1 PG (ref 25.2–33.5)
MCHC RBC AUTO-ENTMCNC: 31.3 G/DL (ref 28.4–34.8)
MCV RBC AUTO: 96.1 FL (ref 82.6–102.9)
NRBC BLD-RTO: 0 PER 100 WBC
PLATELET # BLD AUTO: 122 K/UL (ref 138–453)
PMV BLD AUTO: 10.8 FL (ref 8.1–13.5)
POTASSIUM SERPL-SCNC: 3.8 MMOL/L (ref 3.7–5.3)
RBC # BLD AUTO: 3.59 M/UL (ref 3.95–5.11)
SODIUM SERPL-SCNC: 140 MMOL/L (ref 136–145)
WBC OTHER # BLD: 5.9 K/UL (ref 3.5–11.3)

## 2025-02-23 PROCEDURE — 85014 HEMATOCRIT: CPT

## 2025-02-23 PROCEDURE — 94761 N-INVAS EAR/PLS OXIMETRY MLT: CPT

## 2025-02-23 PROCEDURE — 6370000000 HC RX 637 (ALT 250 FOR IP): Performed by: INTERNAL MEDICINE

## 2025-02-23 PROCEDURE — 83735 ASSAY OF MAGNESIUM: CPT

## 2025-02-23 PROCEDURE — 2500000003 HC RX 250 WO HCPCS: Performed by: INTERNAL MEDICINE

## 2025-02-23 PROCEDURE — 80048 BASIC METABOLIC PNL TOTAL CA: CPT

## 2025-02-23 PROCEDURE — 6360000002 HC RX W HCPCS: Performed by: INTERNAL MEDICINE

## 2025-02-23 PROCEDURE — 85027 COMPLETE CBC AUTOMATED: CPT

## 2025-02-23 PROCEDURE — 6370000000 HC RX 637 (ALT 250 FOR IP): Performed by: STUDENT IN AN ORGANIZED HEALTH CARE EDUCATION/TRAINING PROGRAM

## 2025-02-23 PROCEDURE — 99233 SBSQ HOSP IP/OBS HIGH 50: CPT | Performed by: STUDENT IN AN ORGANIZED HEALTH CARE EDUCATION/TRAINING PROGRAM

## 2025-02-23 PROCEDURE — 2500000003 HC RX 250 WO HCPCS: Performed by: STUDENT IN AN ORGANIZED HEALTH CARE EDUCATION/TRAINING PROGRAM

## 2025-02-23 PROCEDURE — 94640 AIRWAY INHALATION TREATMENT: CPT

## 2025-02-23 PROCEDURE — 99233 SBSQ HOSP IP/OBS HIGH 50: CPT | Performed by: INTERNAL MEDICINE

## 2025-02-23 PROCEDURE — 2000000000 HC ICU R&B

## 2025-02-23 PROCEDURE — 85018 HEMOGLOBIN: CPT

## 2025-02-23 PROCEDURE — 2700000000 HC OXYGEN THERAPY PER DAY

## 2025-02-23 PROCEDURE — 36415 COLL VENOUS BLD VENIPUNCTURE: CPT

## 2025-02-23 RX ORDER — FUROSEMIDE 20 MG/1
20 TABLET ORAL DAILY
Status: DISCONTINUED | OUTPATIENT
Start: 2025-02-23 | End: 2025-02-28 | Stop reason: HOSPADM

## 2025-02-23 RX ADMIN — BUDESONIDE AND FORMOTEROL FUMARATE DIHYDRATE 2 PUFF: 160; 4.5 AEROSOL RESPIRATORY (INHALATION) at 19:59

## 2025-02-23 RX ADMIN — AMIODARONE HYDROCHLORIDE 0.5 MG/MIN: 1.8 INJECTION, SOLUTION INTRAVENOUS at 13:47

## 2025-02-23 RX ADMIN — ENOXAPARIN SODIUM 40 MG: 100 INJECTION SUBCUTANEOUS at 09:22

## 2025-02-23 RX ADMIN — Medication 1 TABLET: at 18:16

## 2025-02-23 RX ADMIN — ASPIRIN 81 MG: 81 TABLET, COATED ORAL at 09:22

## 2025-02-23 RX ADMIN — Medication 1 TABLET: at 12:54

## 2025-02-23 RX ADMIN — BUDESONIDE AND FORMOTEROL FUMARATE DIHYDRATE 2 PUFF: 160; 4.5 AEROSOL RESPIRATORY (INHALATION) at 09:58

## 2025-02-23 RX ADMIN — POLYETHYLENE GLYCOL 3350 17 G: 17 POWDER, FOR SOLUTION ORAL at 09:22

## 2025-02-23 RX ADMIN — MIDODRINE HYDROCHLORIDE 5 MG: 5 TABLET ORAL at 03:41

## 2025-02-23 RX ADMIN — SODIUM CHLORIDE, PRESERVATIVE FREE 10 ML: 5 INJECTION INTRAVENOUS at 09:23

## 2025-02-23 RX ADMIN — Medication 1 TABLET: at 09:22

## 2025-02-23 RX ADMIN — FUROSEMIDE 20 MG: 20 TABLET ORAL at 12:54

## 2025-02-23 RX ADMIN — ROSUVASTATIN CALCIUM 20 MG: 20 TABLET, FILM COATED ORAL at 09:22

## 2025-02-23 ASSESSMENT — PAIN SCALES - GENERAL: PAINLEVEL_OUTOF10: 0

## 2025-02-23 NOTE — CARE COORDINATION
Transitional Planning    14[address] PC to [facility] @ [phone], spoke to Candi, still waiting on an available bed.  Updated TAMI Fuentes.

## 2025-02-24 LAB
ANION GAP SERPL CALCULATED.3IONS-SCNC: 9 MMOL/L (ref 9–16)
BUN SERPL-MCNC: 18 MG/DL (ref 8–23)
CALCIUM SERPL-MCNC: 8.4 MG/DL (ref 8.6–10.4)
CHLORIDE SERPL-SCNC: 105 MMOL/L (ref 98–107)
CO2 SERPL-SCNC: 25 MMOL/L (ref 20–31)
CREAT SERPL-MCNC: 1 MG/DL (ref 0.6–0.9)
ERYTHROCYTE [DISTWIDTH] IN BLOOD BY AUTOMATED COUNT: 13.9 % (ref 11.8–14.4)
GFR, ESTIMATED: 59 ML/MIN/1.73M2
GLUCOSE SERPL-MCNC: 153 MG/DL (ref 74–99)
HCT VFR BLD AUTO: 35.4 % (ref 36.3–47.1)
HGB BLD-MCNC: 11.3 G/DL (ref 11.9–15.1)
MAGNESIUM SERPL-MCNC: 1.8 MG/DL (ref 1.6–2.4)
MAGNESIUM SERPL-MCNC: 2.4 MG/DL (ref 1.6–2.4)
MCH RBC QN AUTO: 30.3 PG (ref 25.2–33.5)
MCHC RBC AUTO-ENTMCNC: 31.9 G/DL (ref 28.4–34.8)
MCV RBC AUTO: 94.9 FL (ref 82.6–102.9)
NRBC BLD-RTO: 0 PER 100 WBC
PLATELET # BLD AUTO: 130 K/UL (ref 138–453)
PMV BLD AUTO: 10 FL (ref 8.1–13.5)
POTASSIUM SERPL-SCNC: 3.1 MMOL/L (ref 3.7–5.3)
POTASSIUM SERPL-SCNC: 4.5 MMOL/L (ref 3.7–5.3)
RBC # BLD AUTO: 3.73 M/UL (ref 3.95–5.11)
SODIUM SERPL-SCNC: 139 MMOL/L (ref 136–145)
WBC OTHER # BLD: 5.4 K/UL (ref 3.5–11.3)

## 2025-02-24 PROCEDURE — 36415 COLL VENOUS BLD VENIPUNCTURE: CPT

## 2025-02-24 PROCEDURE — 94761 N-INVAS EAR/PLS OXIMETRY MLT: CPT

## 2025-02-24 PROCEDURE — 2500000003 HC RX 250 WO HCPCS: Performed by: STUDENT IN AN ORGANIZED HEALTH CARE EDUCATION/TRAINING PROGRAM

## 2025-02-24 PROCEDURE — 6370000000 HC RX 637 (ALT 250 FOR IP): Performed by: STUDENT IN AN ORGANIZED HEALTH CARE EDUCATION/TRAINING PROGRAM

## 2025-02-24 PROCEDURE — 2000000000 HC ICU R&B

## 2025-02-24 PROCEDURE — 6360000002 HC RX W HCPCS: Performed by: STUDENT IN AN ORGANIZED HEALTH CARE EDUCATION/TRAINING PROGRAM

## 2025-02-24 PROCEDURE — 85027 COMPLETE CBC AUTOMATED: CPT

## 2025-02-24 PROCEDURE — 80048 BASIC METABOLIC PNL TOTAL CA: CPT

## 2025-02-24 PROCEDURE — 84132 ASSAY OF SERUM POTASSIUM: CPT

## 2025-02-24 PROCEDURE — 83735 ASSAY OF MAGNESIUM: CPT

## 2025-02-24 PROCEDURE — 94640 AIRWAY INHALATION TREATMENT: CPT

## 2025-02-24 PROCEDURE — 6370000000 HC RX 637 (ALT 250 FOR IP): Performed by: INTERNAL MEDICINE

## 2025-02-24 PROCEDURE — 99233 SBSQ HOSP IP/OBS HIGH 50: CPT | Performed by: STUDENT IN AN ORGANIZED HEALTH CARE EDUCATION/TRAINING PROGRAM

## 2025-02-24 PROCEDURE — 99232 SBSQ HOSP IP/OBS MODERATE 35: CPT | Performed by: STUDENT IN AN ORGANIZED HEALTH CARE EDUCATION/TRAINING PROGRAM

## 2025-02-24 PROCEDURE — 6360000002 HC RX W HCPCS: Performed by: INTERNAL MEDICINE

## 2025-02-24 PROCEDURE — 2500000003 HC RX 250 WO HCPCS: Performed by: INTERNAL MEDICINE

## 2025-02-24 RX ORDER — MAGNESIUM SULFATE IN WATER 40 MG/ML
2000 INJECTION, SOLUTION INTRAVENOUS ONCE
Status: COMPLETED | OUTPATIENT
Start: 2025-02-24 | End: 2025-02-24

## 2025-02-24 RX ORDER — POTASSIUM CHLORIDE 1500 MG/1
40 TABLET, EXTENDED RELEASE ORAL ONCE
Status: COMPLETED | OUTPATIENT
Start: 2025-02-24 | End: 2025-02-24

## 2025-02-24 RX ADMIN — MAGNESIUM SULFATE HEPTAHYDRATE 2000 MG: 40 INJECTION, SOLUTION INTRAVENOUS at 15:42

## 2025-02-24 RX ADMIN — BUDESONIDE AND FORMOTEROL FUMARATE DIHYDRATE 2 PUFF: 160; 4.5 AEROSOL RESPIRATORY (INHALATION) at 08:24

## 2025-02-24 RX ADMIN — Medication 1 TABLET: at 19:00

## 2025-02-24 RX ADMIN — ENOXAPARIN SODIUM 40 MG: 100 INJECTION SUBCUTANEOUS at 08:43

## 2025-02-24 RX ADMIN — AMIODARONE HYDROCHLORIDE 0.5 MG/MIN: 1.8 INJECTION, SOLUTION INTRAVENOUS at 22:41

## 2025-02-24 RX ADMIN — POTASSIUM CHLORIDE 40 MEQ: 1500 TABLET, EXTENDED RELEASE ORAL at 06:07

## 2025-02-24 RX ADMIN — BUDESONIDE AND FORMOTEROL FUMARATE DIHYDRATE 2 PUFF: 160; 4.5 AEROSOL RESPIRATORY (INHALATION) at 20:30

## 2025-02-24 RX ADMIN — POTASSIUM CHLORIDE 40 MEQ: 1500 TABLET, EXTENDED RELEASE ORAL at 13:00

## 2025-02-24 RX ADMIN — FUROSEMIDE 20 MG: 20 TABLET ORAL at 08:42

## 2025-02-24 RX ADMIN — LIDOCAINE HYDROCHLORIDE 1 MG/MIN: 4 INJECTION, SOLUTION INTRAVENOUS at 00:00

## 2025-02-24 RX ADMIN — ROSUVASTATIN CALCIUM 20 MG: 20 TABLET, FILM COATED ORAL at 08:42

## 2025-02-24 RX ADMIN — ASPIRIN 81 MG: 81 TABLET, COATED ORAL at 08:42

## 2025-02-24 RX ADMIN — SODIUM CHLORIDE, PRESERVATIVE FREE 10 ML: 5 INJECTION INTRAVENOUS at 08:44

## 2025-02-24 RX ADMIN — AMIODARONE HYDROCHLORIDE 0.5 MG/MIN: 1.8 INJECTION, SOLUTION INTRAVENOUS at 00:00

## 2025-02-24 RX ADMIN — SODIUM CHLORIDE, PRESERVATIVE FREE 10 ML: 5 INJECTION INTRAVENOUS at 21:37

## 2025-02-24 RX ADMIN — Medication 1 TABLET: at 13:01

## 2025-02-24 RX ADMIN — Medication 1 TABLET: at 08:42

## 2025-02-24 ASSESSMENT — PAIN SCALES - GENERAL
PAINLEVEL_OUTOF10: 0
PAINLEVEL_OUTOF10: 0

## 2025-02-24 NOTE — CARE COORDINATION
Dx: Paroxysmal ventricular tachycardia (HCC) [I47.29]  NSVT (nonsustained ventricular tachycardia) (HCC) [I47.29]  Ventricular tachycardia, non-sustained (HCC) [I47.29]    Admit date: 2/19/2025  GMLOS : 2.3  LOS : 4    [facility] RISK OF UNPLANNED READMISSION 2.0             18.6 Total Score        ______________________________________      Patients goal/ Transitional Planning : Waiting for bed at [facility].      PT/OT recommendations : Patient would benefit from continued therapy after discharge         Barriers to discharge : Need for higher level of care d/t VT storm.

## 2025-02-25 LAB
ANION GAP SERPL CALCULATED.3IONS-SCNC: 10 MMOL/L (ref 9–16)
BUN SERPL-MCNC: 14 MG/DL (ref 8–23)
CALCIUM SERPL-MCNC: 9 MG/DL (ref 8.6–10.4)
CHLORIDE SERPL-SCNC: 105 MMOL/L (ref 98–107)
CO2 SERPL-SCNC: 25 MMOL/L (ref 20–31)
CREAT SERPL-MCNC: 0.8 MG/DL (ref 0.6–0.9)
GFR, ESTIMATED: 77 ML/MIN/1.73M2
GLUCOSE SERPL-MCNC: 97 MG/DL (ref 74–99)
MAGNESIUM SERPL-MCNC: 2.3 MG/DL (ref 1.6–2.4)
POTASSIUM SERPL-SCNC: 4.9 MMOL/L (ref 3.7–5.3)
SODIUM SERPL-SCNC: 140 MMOL/L (ref 136–145)

## 2025-02-25 PROCEDURE — 2500000003 HC RX 250 WO HCPCS: Performed by: STUDENT IN AN ORGANIZED HEALTH CARE EDUCATION/TRAINING PROGRAM

## 2025-02-25 PROCEDURE — 36415 COLL VENOUS BLD VENIPUNCTURE: CPT

## 2025-02-25 PROCEDURE — 6370000000 HC RX 637 (ALT 250 FOR IP): Performed by: INTERNAL MEDICINE

## 2025-02-25 PROCEDURE — 99232 SBSQ HOSP IP/OBS MODERATE 35: CPT | Performed by: INTERNAL MEDICINE

## 2025-02-25 PROCEDURE — 94640 AIRWAY INHALATION TREATMENT: CPT

## 2025-02-25 PROCEDURE — 6360000002 HC RX W HCPCS: Performed by: INTERNAL MEDICINE

## 2025-02-25 PROCEDURE — 97535 SELF CARE MNGMENT TRAINING: CPT

## 2025-02-25 PROCEDURE — 94761 N-INVAS EAR/PLS OXIMETRY MLT: CPT

## 2025-02-25 PROCEDURE — 97110 THERAPEUTIC EXERCISES: CPT

## 2025-02-25 PROCEDURE — 80048 BASIC METABOLIC PNL TOTAL CA: CPT

## 2025-02-25 PROCEDURE — 2500000003 HC RX 250 WO HCPCS: Performed by: INTERNAL MEDICINE

## 2025-02-25 PROCEDURE — 97116 GAIT TRAINING THERAPY: CPT

## 2025-02-25 PROCEDURE — 83735 ASSAY OF MAGNESIUM: CPT

## 2025-02-25 PROCEDURE — 97530 THERAPEUTIC ACTIVITIES: CPT

## 2025-02-25 PROCEDURE — 99291 CRITICAL CARE FIRST HOUR: CPT | Performed by: INTERNAL MEDICINE

## 2025-02-25 PROCEDURE — 6370000000 HC RX 637 (ALT 250 FOR IP): Performed by: STUDENT IN AN ORGANIZED HEALTH CARE EDUCATION/TRAINING PROGRAM

## 2025-02-25 PROCEDURE — 2700000000 HC OXYGEN THERAPY PER DAY

## 2025-02-25 PROCEDURE — 2060000000 HC ICU INTERMEDIATE R&B

## 2025-02-25 RX ADMIN — Medication 1 TABLET: at 16:59

## 2025-02-25 RX ADMIN — AMIODARONE HYDROCHLORIDE 0.5 MG/MIN: 1.8 INJECTION, SOLUTION INTRAVENOUS at 12:16

## 2025-02-25 RX ADMIN — BUDESONIDE AND FORMOTEROL FUMARATE DIHYDRATE 2 PUFF: 160; 4.5 AEROSOL RESPIRATORY (INHALATION) at 20:51

## 2025-02-25 RX ADMIN — LIDOCAINE HYDROCHLORIDE 1 MG/MIN: 4 INJECTION, SOLUTION INTRAVENOUS at 09:36

## 2025-02-25 RX ADMIN — Medication 1 TABLET: at 09:12

## 2025-02-25 RX ADMIN — ASPIRIN 81 MG: 81 TABLET, COATED ORAL at 09:12

## 2025-02-25 RX ADMIN — ENOXAPARIN SODIUM 40 MG: 100 INJECTION SUBCUTANEOUS at 09:14

## 2025-02-25 RX ADMIN — FUROSEMIDE 20 MG: 20 TABLET ORAL at 09:13

## 2025-02-25 RX ADMIN — BUDESONIDE AND FORMOTEROL FUMARATE DIHYDRATE 2 PUFF: 160; 4.5 AEROSOL RESPIRATORY (INHALATION) at 08:23

## 2025-02-25 RX ADMIN — SODIUM CHLORIDE, PRESERVATIVE FREE 10 ML: 5 INJECTION INTRAVENOUS at 23:04

## 2025-02-25 RX ADMIN — ROSUVASTATIN CALCIUM 20 MG: 20 TABLET, FILM COATED ORAL at 09:12

## 2025-02-25 RX ADMIN — Medication 1 TABLET: at 12:17

## 2025-02-25 RX ADMIN — AMIODARONE HYDROCHLORIDE 0.5 MG/MIN: 1.8 INJECTION, SOLUTION INTRAVENOUS at 23:06

## 2025-02-25 ASSESSMENT — PAIN SCALES - GENERAL
PAINLEVEL_OUTOF10: 0
PAINLEVEL_OUTOF10: 0

## 2025-02-25 NOTE — CARE COORDINATION
Voicemail left for echo lab requesting imaging to be shared with [facility]. Spoke to Olive from radiology. She will share xrays with [facility]

## 2025-02-25 NOTE — CARE COORDINATION
Transition Planning    Call to [facility]. Spoke with Nuris. Aware of pt. They are not sure when pt will receive bed. Will also have a wait for a step down bed.     Dr Grayson informed. Plan to make pt stepdown status.     Call to Abdi with [facility] to update pt to change to stepdown. Pt has already been accepted there as a step down pt. Waiting bed.

## 2025-02-25 NOTE — FLOWSHEET NOTE
Ambulated in rm to bathroom with assistance  Voids and stool    Potassium and Magnesium replace    Lido and Amio cont

## 2025-02-26 LAB
ANION GAP SERPL CALCULATED.3IONS-SCNC: 11 MMOL/L (ref 9–16)
B PARAP IS1001 DNA NPH QL NAA+NON-PROBE: NOT DETECTED
B PERT DNA SPEC QL NAA+PROBE: NOT DETECTED
BUN SERPL-MCNC: 16 MG/DL (ref 8–23)
C PNEUM DNA NPH QL NAA+NON-PROBE: NOT DETECTED
CALCIUM SERPL-MCNC: 8.8 MG/DL (ref 8.6–10.4)
CHLORIDE SERPL-SCNC: 103 MMOL/L (ref 98–107)
CO2 SERPL-SCNC: 25 MMOL/L (ref 20–31)
CREAT SERPL-MCNC: 0.8 MG/DL (ref 0.6–0.9)
FLUAV RNA NPH QL NAA+NON-PROBE: NOT DETECTED
FLUBV RNA NPH QL NAA+NON-PROBE: NOT DETECTED
GFR, ESTIMATED: 77 ML/MIN/1.73M2
GLUCOSE SERPL-MCNC: 90 MG/DL (ref 74–99)
HADV DNA NPH QL NAA+NON-PROBE: NOT DETECTED
HCOV 229E RNA NPH QL NAA+NON-PROBE: NOT DETECTED
HCOV HKU1 RNA NPH QL NAA+NON-PROBE: NOT DETECTED
HCOV NL63 RNA NPH QL NAA+NON-PROBE: NOT DETECTED
HCOV OC43 RNA NPH QL NAA+NON-PROBE: NOT DETECTED
HMPV RNA NPH QL NAA+NON-PROBE: NOT DETECTED
HPIV1 RNA NPH QL NAA+NON-PROBE: NOT DETECTED
HPIV2 RNA NPH QL NAA+NON-PROBE: NOT DETECTED
HPIV3 RNA NPH QL NAA+NON-PROBE: NOT DETECTED
HPIV4 RNA NPH QL NAA+NON-PROBE: NOT DETECTED
M PNEUMO DNA NPH QL NAA+NON-PROBE: NOT DETECTED
MAGNESIUM SERPL-MCNC: 2 MG/DL (ref 1.6–2.4)
POTASSIUM SERPL-SCNC: 4.1 MMOL/L (ref 3.7–5.3)
RSV RNA NPH QL NAA+NON-PROBE: NOT DETECTED
RV+EV RNA NPH QL NAA+NON-PROBE: NOT DETECTED
SARS-COV-2 RNA NPH QL NAA+NON-PROBE: DETECTED
SODIUM SERPL-SCNC: 139 MMOL/L (ref 136–145)
SPECIMEN DESCRIPTION: ABNORMAL

## 2025-02-26 PROCEDURE — 36415 COLL VENOUS BLD VENIPUNCTURE: CPT

## 2025-02-26 PROCEDURE — 80048 BASIC METABOLIC PNL TOTAL CA: CPT

## 2025-02-26 PROCEDURE — 6370000000 HC RX 637 (ALT 250 FOR IP): Performed by: INTERNAL MEDICINE

## 2025-02-26 PROCEDURE — 6370000000 HC RX 637 (ALT 250 FOR IP): Performed by: STUDENT IN AN ORGANIZED HEALTH CARE EDUCATION/TRAINING PROGRAM

## 2025-02-26 PROCEDURE — 94640 AIRWAY INHALATION TREATMENT: CPT

## 2025-02-26 PROCEDURE — 6360000002 HC RX W HCPCS: Performed by: INTERNAL MEDICINE

## 2025-02-26 PROCEDURE — 99232 SBSQ HOSP IP/OBS MODERATE 35: CPT | Performed by: INTERNAL MEDICINE

## 2025-02-26 PROCEDURE — 2060000000 HC ICU INTERMEDIATE R&B

## 2025-02-26 PROCEDURE — 2500000003 HC RX 250 WO HCPCS: Performed by: STUDENT IN AN ORGANIZED HEALTH CARE EDUCATION/TRAINING PROGRAM

## 2025-02-26 PROCEDURE — 83735 ASSAY OF MAGNESIUM: CPT

## 2025-02-26 PROCEDURE — 0202U NFCT DS 22 TRGT SARS-COV-2: CPT

## 2025-02-26 PROCEDURE — 99233 SBSQ HOSP IP/OBS HIGH 50: CPT | Performed by: NURSE PRACTITIONER

## 2025-02-26 PROCEDURE — 94761 N-INVAS EAR/PLS OXIMETRY MLT: CPT

## 2025-02-26 PROCEDURE — 2500000003 HC RX 250 WO HCPCS: Performed by: INTERNAL MEDICINE

## 2025-02-26 RX ADMIN — BUDESONIDE AND FORMOTEROL FUMARATE DIHYDRATE 2 PUFF: 160; 4.5 AEROSOL RESPIRATORY (INHALATION) at 08:10

## 2025-02-26 RX ADMIN — BUDESONIDE AND FORMOTEROL FUMARATE DIHYDRATE 2 PUFF: 160; 4.5 AEROSOL RESPIRATORY (INHALATION) at 21:44

## 2025-02-26 RX ADMIN — AMIODARONE HYDROCHLORIDE 0.5 MG/MIN: 1.8 INJECTION, SOLUTION INTRAVENOUS at 11:55

## 2025-02-26 RX ADMIN — ENOXAPARIN SODIUM 40 MG: 100 INJECTION SUBCUTANEOUS at 10:12

## 2025-02-26 RX ADMIN — Medication 1 TABLET: at 10:22

## 2025-02-26 RX ADMIN — ASPIRIN 81 MG: 81 TABLET, COATED ORAL at 10:13

## 2025-02-26 RX ADMIN — ROSUVASTATIN CALCIUM 20 MG: 20 TABLET, FILM COATED ORAL at 10:13

## 2025-02-26 RX ADMIN — SODIUM CHLORIDE, PRESERVATIVE FREE 10 ML: 5 INJECTION INTRAVENOUS at 10:13

## 2025-02-26 RX ADMIN — Medication 1 TABLET: at 17:53

## 2025-02-26 RX ADMIN — SODIUM CHLORIDE, PRESERVATIVE FREE 10 ML: 5 INJECTION INTRAVENOUS at 22:43

## 2025-02-26 RX ADMIN — LIDOCAINE HYDROCHLORIDE 1 MG/MIN: 4 INJECTION, SOLUTION INTRAVENOUS at 19:14

## 2025-02-26 RX ADMIN — AMIODARONE HYDROCHLORIDE 0.5 MG/MIN: 1.8 INJECTION, SOLUTION INTRAVENOUS at 22:44

## 2025-02-26 RX ADMIN — FUROSEMIDE 20 MG: 20 TABLET ORAL at 10:12

## 2025-02-27 VITALS
OXYGEN SATURATION: 95 % | WEIGHT: 198.63 LBS | RESPIRATION RATE: 22 BRPM | BODY MASS INDEX: 37.5 KG/M2 | SYSTOLIC BLOOD PRESSURE: 116 MMHG | HEIGHT: 61 IN | DIASTOLIC BLOOD PRESSURE: 44 MMHG | TEMPERATURE: 98.2 F | HEART RATE: 78 BPM

## 2025-02-27 PROBLEM — U07.1 COVID-19: Status: ACTIVE | Noted: 2025-02-27

## 2025-02-27 LAB
ANION GAP SERPL CALCULATED.3IONS-SCNC: 12 MMOL/L (ref 9–16)
BUN SERPL-MCNC: 15 MG/DL (ref 8–23)
CALCIUM SERPL-MCNC: 8.5 MG/DL (ref 8.6–10.4)
CHLORIDE SERPL-SCNC: 101 MMOL/L (ref 98–107)
CO2 SERPL-SCNC: 22 MMOL/L (ref 20–31)
CREAT SERPL-MCNC: 1 MG/DL (ref 0.6–0.9)
GFR, ESTIMATED: 59 ML/MIN/1.73M2
GLUCOSE SERPL-MCNC: 92 MG/DL (ref 74–99)
MAGNESIUM SERPL-MCNC: 2.1 MG/DL (ref 1.6–2.4)
POTASSIUM SERPL-SCNC: 4.1 MMOL/L (ref 3.7–5.3)
SODIUM SERPL-SCNC: 135 MMOL/L (ref 136–145)

## 2025-02-27 PROCEDURE — 6370000000 HC RX 637 (ALT 250 FOR IP): Performed by: STUDENT IN AN ORGANIZED HEALTH CARE EDUCATION/TRAINING PROGRAM

## 2025-02-27 PROCEDURE — 6370000000 HC RX 637 (ALT 250 FOR IP): Performed by: INTERNAL MEDICINE

## 2025-02-27 PROCEDURE — 2500000003 HC RX 250 WO HCPCS: Performed by: INTERNAL MEDICINE

## 2025-02-27 PROCEDURE — 83735 ASSAY OF MAGNESIUM: CPT

## 2025-02-27 PROCEDURE — 6360000002 HC RX W HCPCS: Performed by: INTERNAL MEDICINE

## 2025-02-27 PROCEDURE — 36415 COLL VENOUS BLD VENIPUNCTURE: CPT

## 2025-02-27 PROCEDURE — 80048 BASIC METABOLIC PNL TOTAL CA: CPT

## 2025-02-27 PROCEDURE — 2500000003 HC RX 250 WO HCPCS: Performed by: STUDENT IN AN ORGANIZED HEALTH CARE EDUCATION/TRAINING PROGRAM

## 2025-02-27 PROCEDURE — 99232 SBSQ HOSP IP/OBS MODERATE 35: CPT | Performed by: INTERNAL MEDICINE

## 2025-02-27 PROCEDURE — 94640 AIRWAY INHALATION TREATMENT: CPT

## 2025-02-27 RX ADMIN — HYALURONIDASE (HUMAN RECOMBINANT) 150 UNITS: 150 INJECTION, SOLUTION SUBCUTANEOUS at 12:17

## 2025-02-27 RX ADMIN — AMIODARONE HYDROCHLORIDE 0.5 MG/MIN: 1.8 INJECTION, SOLUTION INTRAVENOUS at 11:10

## 2025-02-27 RX ADMIN — AMIODARONE HYDROCHLORIDE 0.5 MG/MIN: 1.8 INJECTION, SOLUTION INTRAVENOUS at 22:09

## 2025-02-27 RX ADMIN — FUROSEMIDE 20 MG: 20 TABLET ORAL at 11:02

## 2025-02-27 RX ADMIN — Medication 1 TABLET: at 11:04

## 2025-02-27 RX ADMIN — BUDESONIDE AND FORMOTEROL FUMARATE DIHYDRATE 2 PUFF: 160; 4.5 AEROSOL RESPIRATORY (INHALATION) at 07:57

## 2025-02-27 RX ADMIN — ENOXAPARIN SODIUM 40 MG: 100 INJECTION SUBCUTANEOUS at 11:02

## 2025-02-27 RX ADMIN — ROSUVASTATIN CALCIUM 20 MG: 20 TABLET, FILM COATED ORAL at 11:02

## 2025-02-27 RX ADMIN — ASPIRIN 81 MG: 81 TABLET, COATED ORAL at 11:02

## 2025-02-27 RX ADMIN — SODIUM CHLORIDE, PRESERVATIVE FREE 10 ML: 5 INJECTION INTRAVENOUS at 11:03

## 2025-02-27 NOTE — CARE COORDINATION
Bed assignment received from [facility], [facility].     Chart printed

## 2025-02-27 NOTE — PLAN OF CARE
Problem: Chronic Conditions and Co-morbidities  Goal: Patient's chronic conditions and co-morbidity symptoms are monitored and maintained or improved  2/21/2025 1742 by Milligan, Matthew, RN  Outcome: Progressing  Flowsheets (Taken 2/21/2025 1742)  Care Plan - Patient's Chronic Conditions and Co-Morbidity Symptoms are Monitored and Maintained or Improved:   Monitor and assess patient's chronic conditions and comorbid symptoms for stability, deterioration, or improvement   Collaborate with multidisciplinary team to address chronic and comorbid conditions and prevent exacerbation or deterioration   Update acute care plan with appropriate goals if chronic or comorbid symptoms are exacerbated and prevent overall improvement and discharge  2/21/2025 0601 by Berna Arnold RN  Outcome: Progressing     Problem: Discharge Planning  Goal: Discharge to home or other facility with appropriate resources  2/21/2025 1742 by Milligan, Matthew, RN  Outcome: Progressing  Flowsheets (Taken 2/21/2025 1742)  Discharge to home or other facility with appropriate resources:   Identify barriers to discharge with patient and caregiver   Identify discharge learning needs (meds, wound care, etc)   Refer to discharge planning if patient needs post-hospital services based on physician order or complex needs related to functional status, cognitive ability or social support system   Arrange for needed discharge resources and transportation as appropriate  2/21/2025 0601 by Berna Arnold RN  Outcome: Progressing     Problem: Safety - Adult  Goal: Free from fall injury  2/21/2025 1742 by Milligan, Matthew, RN  Outcome: Progressing  Flowsheets (Taken 2/21/2025 1742)  Free From Fall Injury: Instruct family/caregiver on patient safety  2/21/2025 0601 by Berna Arnold RN  Outcome: Progressing     Problem: Respiratory - Adult  Goal: Achieves optimal ventilation and oxygenation  2/21/2025 1742 by Milligan, Matthew, RN  Outcome: 
  Problem: Chronic Conditions and Co-morbidities  Goal: Patient's chronic conditions and co-morbidity symptoms are monitored and maintained or improved  2/24/2025 0107 by Meghna Bauman RN  Outcome: Progressing  Flowsheets (Taken 2/23/2025 2000)  Care Plan - Patient's Chronic Conditions and Co-Morbidity Symptoms are Monitored and Maintained or Improved: Monitor and assess patient's chronic conditions and comorbid symptoms for stability, deterioration, or improvement  2/23/2025 1813 by Milligan, Matthew, RN  Outcome: Progressing  Flowsheets (Taken 2/22/2025 2000 by Meghna Bauman RN)  Care Plan - Patient's Chronic Conditions and Co-Morbidity Symptoms are Monitored and Maintained or Improved:   Monitor and assess patient's chronic conditions and comorbid symptoms for stability, deterioration, or improvement   Collaborate with multidisciplinary team to address chronic and comorbid conditions and prevent exacerbation or deterioration   Update acute care plan with appropriate goals if chronic or comorbid symptoms are exacerbated and prevent overall improvement and discharge     Problem: Discharge Planning  Goal: Discharge to home or other facility with appropriate resources  2/24/2025 0107 by Meghna Bauman RN  Outcome: Progressing  Flowsheets (Taken 2/23/2025 2000)  Discharge to home or other facility with appropriate resources: Identify barriers to discharge with patient and caregiver  2/23/2025 1813 by Milligan, Matthew, RN  Outcome: Progressing  Flowsheets (Taken 2/22/2025 2000 by Meghna Bauman RN)  Discharge to home or other facility with appropriate resources:   Identify barriers to discharge with patient and caregiver   Arrange for needed discharge resources and transportation as appropriate   Identify discharge learning needs (meds, wound care, etc)   Refer to discharge planning if patient needs post-hospital services based on physician order or complex needs related to functional status, cognitive ability or social 
  Problem: Chronic Conditions and Co-morbidities  Goal: Patient's chronic conditions and co-morbidity symptoms are monitored and maintained or improved  2/24/2025 0914 by Maya Wilburn RN  Outcome: Not Progressing  2/24/2025 0107 by Meghna Bauman RN  Outcome: Progressing     Problem: Respiratory - Adult  Goal: Achieves optimal ventilation and oxygenation  2/24/2025 0914 by Maya Wilburn RN  Outcome: Not Progressing  2/24/2025 0107 by Meghna Bauman RN  Outcome: Progressing     Problem: Metabolic/Fluid and Electrolytes - Adult  Goal: Electrolytes maintained within normal limits  2/24/2025 0914 by Maya Wilburn RN  Outcome: Not Progressing  2/24/2025 0107 by Meghna Bauman RN  Outcome: Progressing  Goal: Hemodynamic stability and optimal renal function maintained  2/24/2025 0914 by Maya Wilburn RN  Outcome: Not Progressing  2/24/2025 0107 by Meghna Bauman RN  Outcome: Progressing     Problem: Pain  Goal: Verbalizes/displays adequate comfort level or baseline comfort level  2/24/2025 0914 by Maya Wilburn RN  Outcome: Not Progressing  2/24/2025 0107 by Meghna Bauman RN  Outcome: Progressing     Problem: Chronic Conditions and Co-morbidities  Goal: Patient's chronic conditions and co-morbidity symptoms are monitored and maintained or improved  2/24/2025 0914 by Maya Wilburn RN  Outcome: Not Progressing  2/24/2025 0107 by Meghna Bauman RN  Outcome: Progressing     Problem: Respiratory - Adult  Goal: Achieves optimal ventilation and oxygenation  2/24/2025 0914 by Maya Wilburn RN  Outcome: Not Progressing  2/24/2025 0107 by Meghna Bauman RN  Outcome: Progressing     Problem: Metabolic/Fluid and Electrolytes - Adult  Goal: Electrolytes maintained within normal limits  2/24/2025 0914 by Maya Wilburn RN  Outcome: Not Progressing  2/24/2025 0107 by Meghna Bauman RN  Outcome: Progressing     Problem: Metabolic/Fluid and Electrolytes - Adult  Goal: Hemodynamic stability and optimal renal function 
  Problem: Chronic Conditions and Co-morbidities  Goal: Patient's chronic conditions and co-morbidity symptoms are monitored and maintained or improved  2/24/2025 1901 by Jihan Mahmood RN  Outcome: Progressing  2/24/2025 0914 by Maya Wilburn RN  Outcome: Not Progressing     Problem: Respiratory - Adult  Goal: Achieves optimal ventilation and oxygenation  2/24/2025 1901 by Jhian Mahmood RN  Outcome: Progressing  2/24/2025 0914 by Maya Wilburn RN  Outcome: Not Progressing     Problem: Metabolic/Fluid and Electrolytes - Adult  Goal: Electrolytes maintained within normal limits  2/24/2025 0914 by Maya Wilburn RN  Outcome: Not Progressing  Goal: Hemodynamic stability and optimal renal function maintained  2/24/2025 0914 by Maya Wilburn RN  Outcome: Not Progressing     Problem: Pain  Goal: Verbalizes/displays adequate comfort level or baseline comfort level  2/24/2025 0914 by Maya Wilburn RN  Outcome: Not Progressing     
  Problem: Chronic Conditions and Co-morbidities  Goal: Patient's chronic conditions and co-morbidity symptoms are monitored and maintained or improved  2/24/2025 2138 by Fran Pal RN  Outcome: Progressing  2/24/2025 1901 by Jihan Mahmood RN  Outcome: Progressing  2/24/2025 0914 by Maya Wilburn RN  Outcome: Not Progressing     Problem: Discharge Planning  Goal: Discharge to home or other facility with appropriate resources  2/24/2025 2138 by Fran Pal RN  Outcome: Progressing  2/24/2025 0914 by Maya Wilburn RN  Outcome: Progressing     Problem: Safety - Adult  Goal: Free from fall injury  2/24/2025 2138 by Fran Pal RN  Outcome: Progressing  2/24/2025 1901 by Jihan Mahmood RN  Outcome: Progressing  2/24/2025 0914 by Maya Wilburn RN  Outcome: Progressing     Problem: Respiratory - Adult  Goal: Achieves optimal ventilation and oxygenation  2/24/2025 2138 by Fran Pal RN  Outcome: Progressing  2/24/2025 1901 by Jihan Mahmood RN  Outcome: Progressing  2/24/2025 0914 by Maya Wilburn RN  Outcome: Not Progressing     Problem: Cardiovascular - Adult  Goal: Maintains optimal cardiac output and hemodynamic stability  2/24/2025 2138 by Fran Pal RN  Outcome: Progressing  2/24/2025 1901 by Jihan Mahmood RN  Outcome: Progressing  2/24/2025 0914 by Maya Wilburn RN  Outcome: Progressing  Goal: Absence of cardiac dysrhythmias or at baseline  2/24/2025 2138 by Fran Pal RN  Outcome: Progressing  2/24/2025 0914 by Maya Wilburn RN  Outcome: Progressing     Problem: Skin/Tissue Integrity - Adult  Goal: Skin integrity remains intact  Description: 1.  Monitor for areas of redness and/or skin breakdown  2.  Assess vascular access sites hourly  3.  Every 4-6 hours minimum:  Change oxygen saturation probe site  4.  Every 4-6 hours:  If on nasal continuous positive airway pressure, respiratory therapy assess nares and determine need for appliance change or resting 
  Problem: Chronic Conditions and Co-morbidities  Goal: Patient's chronic conditions and co-morbidity symptoms are monitored and maintained or improved  2/25/2025 1019 by Vianney Greenfield RN  Outcome: Progressing  2/24/2025 2138 by Fran Pal RN  Outcome: Progressing  Flowsheets (Taken 2/24/2025 2000)  Care Plan - Patient's Chronic Conditions and Co-Morbidity Symptoms are Monitored and Maintained or Improved:   Monitor and assess patient's chronic conditions and comorbid symptoms for stability, deterioration, or improvement   Collaborate with multidisciplinary team to address chronic and comorbid conditions and prevent exacerbation or deterioration   Update acute care plan with appropriate goals if chronic or comorbid symptoms are exacerbated and prevent overall improvement and discharge     Problem: Discharge Planning  Goal: Discharge to home or other facility with appropriate resources  2/25/2025 1019 by Vianney Greenfield RN  Outcome: Progressing  2/24/2025 2138 by Fran Pal RN  Outcome: Progressing  Flowsheets (Taken 2/24/2025 2000)  Discharge to home or other facility with appropriate resources:   Identify barriers to discharge with patient and caregiver   Arrange for needed discharge resources and transportation as appropriate   Identify discharge learning needs (meds, wound care, etc)   Arrange for interpreters to assist at discharge as needed   Refer to discharge planning if patient needs post-hospital services based on physician order or complex needs related to functional status, cognitive ability or social support system     Problem: Safety - Adult  Goal: Free from fall injury  2/25/2025 1019 by Vianney Greenfield RN  Outcome: Progressing  2/24/2025 2138 by Fran Pal RN  Outcome: Progressing     Problem: Respiratory - Adult  Goal: Achieves optimal ventilation and oxygenation  2/25/2025 1019 by Vianney Greenfield RN  Outcome: Progressing  2/24/2025 2138 by Kae 
  Problem: Chronic Conditions and Co-morbidities  Goal: Patient's chronic conditions and co-morbidity symptoms are monitored and maintained or improved  2/26/2025 0144 by Yessi Craig RN  Outcome: Progressing  2/25/2025 1729 by Mary Cowan RN  Outcome: Progressing     Problem: Discharge Planning  Goal: Discharge to home or other facility with appropriate resources  2/26/2025 0144 by Yessi Craig RN  Outcome: Progressing  2/25/2025 1729 by Mary Cowan RN  Outcome: Progressing     Problem: Safety - Adult  Goal: Free from fall injury  2/26/2025 0144 by Yessi Craig RN  Outcome: Progressing  2/25/2025 1729 by Mary Cowan RN  Outcome: Progressing     Problem: Respiratory - Adult  Goal: Achieves optimal ventilation and oxygenation  2/26/2025 0144 by Yessi Craig RN  Outcome: Progressing  2/25/2025 1729 by Mary Cowan RN  Outcome: Progressing     Problem: Cardiovascular - Adult  Goal: Maintains optimal cardiac output and hemodynamic stability  2/26/2025 0144 by Yessi Craig RN  Outcome: Progressing  2/25/2025 1729 by Mary Cowan RN  Outcome: Progressing  Goal: Absence of cardiac dysrhythmias or at baseline  2/26/2025 0144 by Yessi Craig RN  Outcome: Progressing  2/25/2025 1729 by Mary Cowan RN  Outcome: Progressing     Problem: Skin/Tissue Integrity - Adult  Goal: Skin integrity remains intact  Description: 1.  Monitor for areas of redness and/or skin breakdown  2.  Assess vascular access sites hourly  3.  Every 4-6 hours minimum:  Change oxygen saturation probe site  4.  Every 4-6 hours:  If on nasal continuous positive airway pressure, respiratory therapy assess nares and determine need for appliance change or resting period  2/26/2025 0144 by Yessi Craig RN  Outcome: Progressing  2/25/2025 1729 by Mary Cowan RN  Outcome: Progressing     Problem: Metabolic/Fluid and Electrolytes - Adult  Goal: Electrolytes maintained within normal 
  Problem: Chronic Conditions and Co-morbidities  Goal: Patient's chronic conditions and co-morbidity symptoms are monitored and maintained or improved  Outcome: Progressing     Problem: Discharge Planning  Goal: Discharge to home or other facility with appropriate resources  2/20/2025 1057 by Estelle Anguiano RN  Outcome: Progressing  2/20/2025 0606 by Anna Brian RN  Outcome: Progressing     Problem: Safety - Adult  Goal: Free from fall injury  2/20/2025 1057 by Estelle Anguiano RN  Outcome: Progressing  Flowsheets (Taken 2/20/2025 0726 by Ruth Escalante RN)  Free From Fall Injury: Instruct family/caregiver on patient safety  2/20/2025 0606 by Anna Brian RN  Outcome: Progressing     Problem: Respiratory - Adult  Goal: Achieves optimal ventilation and oxygenation  2/20/2025 1057 by Estelle Anguiano RN  Outcome: Progressing  2/20/2025 0606 by Anna Brian RN  Outcome: Progressing  2/19/2025 2249 by Afia Landa RCP  Outcome: Not Progressing  Flowsheets (Taken 2/19/2025 2249)  Achieves optimal ventilation and oxygenation:   Assess for changes in respiratory status   Position to facilitate oxygenation and minimize respiratory effort   Initiate smoking cessation protocol as indicated   Assess the need for suctioning and aspirate as needed   Respiratory therapy support as indicated   Assess and instruct to report shortness of breath or any respiratory difficulty   Encourage broncho-pulmonary hygiene including cough, deep breathe, incentive spirometry   Oxygen supplementation based on oxygen saturation or arterial blood gases   Assess for changes in mentation and behavior     Problem: Cardiovascular - Adult  Goal: Maintains optimal cardiac output and hemodynamic stability  Outcome: Progressing  Goal: Absence of cardiac dysrhythmias or at baseline  Outcome: Progressing     Problem: Skin/Tissue Integrity - Adult  Goal: Skin integrity remains intact  Description: 1.  Monitor for areas of 
  Problem: Chronic Conditions and Co-morbidities  Goal: Patient's chronic conditions and co-morbidity symptoms are monitored and maintained or improved  Outcome: Progressing     Problem: Discharge Planning  Goal: Discharge to home or other facility with appropriate resources  Outcome: Progressing     Problem: Safety - Adult  Goal: Free from fall injury  Outcome: Progressing     Problem: Respiratory - Adult  Goal: Achieves optimal ventilation and oxygenation  Outcome: Progressing     Problem: Cardiovascular - Adult  Goal: Maintains optimal cardiac output and hemodynamic stability  Outcome: Progressing  Goal: Absence of cardiac dysrhythmias or at baseline  Outcome: Progressing     Problem: Skin/Tissue Integrity - Adult  Goal: Skin integrity remains intact  Description: 1.  Monitor for areas of redness and/or skin breakdown  2.  Assess vascular access sites hourly  3.  Every 4-6 hours minimum:  Change oxygen saturation probe site  4.  Every 4-6 hours:  If on nasal continuous positive airway pressure, respiratory therapy assess nares and determine need for appliance change or resting period  Outcome: Progressing     Problem: Metabolic/Fluid and Electrolytes - Adult  Goal: Electrolytes maintained within normal limits  Outcome: Progressing  Goal: Hemodynamic stability and optimal renal function maintained  Outcome: Progressing     Problem: Skin/Tissue Integrity  Goal: Skin integrity remains intact  Description: 1.  Monitor for areas of redness and/or skin breakdown  2.  Assess vascular access sites hourly  3.  Every 4-6 hours minimum:  Change oxygen saturation probe site  4.  Every 4-6 hours:  If on nasal continuous positive airway pressure, respiratory therapy assess nares and determine need for appliance change or resting period  Outcome: Progressing     Problem: ABCDS Injury Assessment  Goal: Absence of physical injury  Outcome: Progressing     
  Problem: Chronic Conditions and Co-morbidities  Goal: Patient's chronic conditions and co-morbidity symptoms are monitored and maintained or improved  Outcome: Progressing     Problem: Discharge Planning  Goal: Discharge to home or other facility with appropriate resources  Outcome: Progressing     Problem: Safety - Adult  Goal: Free from fall injury  Outcome: Progressing     Problem: Respiratory - Adult  Goal: Achieves optimal ventilation and oxygenation  Outcome: Progressing     Problem: Cardiovascular - Adult  Goal: Maintains optimal cardiac output and hemodynamic stability  Outcome: Progressing  Goal: Absence of cardiac dysrhythmias or at baseline  Outcome: Progressing     Problem: Skin/Tissue Integrity - Adult  Goal: Skin integrity remains intact  Description: 1.  Monitor for areas of redness and/or skin breakdown  2.  Assess vascular access sites hourly  3.  Every 4-6 hours minimum:  Change oxygen saturation probe site  4.  Every 4-6 hours:  If on nasal continuous positive airway pressure, respiratory therapy assess nares and determine need for appliance change or resting period  Outcome: Progressing     Problem: Metabolic/Fluid and Electrolytes - Adult  Goal: Electrolytes maintained within normal limits  Outcome: Progressing  Goal: Hemodynamic stability and optimal renal function maintained  Outcome: Progressing     Problem: Skin/Tissue Integrity  Goal: Skin integrity remains intact  Description: 1.  Monitor for areas of redness and/or skin breakdown  2.  Assess vascular access sites hourly  3.  Every 4-6 hours minimum:  Change oxygen saturation probe site  4.  Every 4-6 hours:  If on nasal continuous positive airway pressure, respiratory therapy assess nares and determine need for appliance change or resting period  Outcome: Progressing     Problem: ABCDS Injury Assessment  Goal: Absence of physical injury  Outcome: Progressing     Problem: Pain  Goal: Verbalizes/displays adequate comfort level or baseline 
  Problem: Chronic Conditions and Co-morbidities  Goal: Patient's chronic conditions and co-morbidity symptoms are monitored and maintained or improved  Outcome: Progressing  Flowsheets (Taken 2/22/2025 2000 by Meghna Bauman RN)  Care Plan - Patient's Chronic Conditions and Co-Morbidity Symptoms are Monitored and Maintained or Improved:   Monitor and assess patient's chronic conditions and comorbid symptoms for stability, deterioration, or improvement   Collaborate with multidisciplinary team to address chronic and comorbid conditions and prevent exacerbation or deterioration   Update acute care plan with appropriate goals if chronic or comorbid symptoms are exacerbated and prevent overall improvement and discharge     Problem: Discharge Planning  Goal: Discharge to home or other facility with appropriate resources  Outcome: Progressing  Flowsheets (Taken 2/22/2025 2000 by Meghna Bauman RN)  Discharge to home or other facility with appropriate resources:   Identify barriers to discharge with patient and caregiver   Arrange for needed discharge resources and transportation as appropriate   Identify discharge learning needs (meds, wound care, etc)   Refer to discharge planning if patient needs post-hospital services based on physician order or complex needs related to functional status, cognitive ability or social support system     Problem: Safety - Adult  Goal: Free from fall injury  Outcome: Progressing  Flowsheets (Taken 2/23/2025 1813)  Free From Fall Injury: Instruct family/caregiver on patient safety     Problem: Respiratory - Adult  Goal: Achieves optimal ventilation and oxygenation  Outcome: Progressing  Flowsheets (Taken 2/23/2025 0958 by Sheridan Saenz, RCDEBORAH)  Achieves optimal ventilation and oxygenation:   Assess for changes in respiratory status   Assess for changes in mentation and behavior   Position to facilitate oxygenation and minimize respiratory effort   Oxygen supplementation based on oxygen 
  Problem: Discharge Planning  Goal: Discharge to home or other facility with appropriate resources  Outcome: Progressing     Problem: Safety - Adult  Goal: Free from fall injury  Outcome: Progressing     Problem: Respiratory - Adult  Goal: Achieves optimal ventilation and oxygenation  2/20/2025 0606 by Anna Brian RN  Outcome: Progressing  2/19/2025 2249 by Afia Landa RCP  Outcome: Not Progressing  Flowsheets (Taken 2/19/2025 2249)  Achieves optimal ventilation and oxygenation:   Assess for changes in respiratory status   Position to facilitate oxygenation and minimize respiratory effort   Initiate smoking cessation protocol as indicated   Assess the need for suctioning and aspirate as needed   Respiratory therapy support as indicated   Assess and instruct to report shortness of breath or any respiratory difficulty   Encourage broncho-pulmonary hygiene including cough, deep breathe, incentive spirometry   Oxygen supplementation based on oxygen saturation or arterial blood gases   Assess for changes in mentation and behavior     Problem: Respiratory - Adult  Goal: Achieves optimal ventilation and oxygenation  2/20/2025 0606 by Anna Brian RN  Outcome: Progressing  2/19/2025 2249 by Afia Landa RCP  Outcome: Not Progressing  Flowsheets (Taken 2/19/2025 2249)  Achieves optimal ventilation and oxygenation:   Assess for changes in respiratory status   Position to facilitate oxygenation and minimize respiratory effort   Initiate smoking cessation protocol as indicated   Assess the need for suctioning and aspirate as needed   Respiratory therapy support as indicated   Assess and instruct to report shortness of breath or any respiratory difficulty   Encourage broncho-pulmonary hygiene including cough, deep breathe, incentive spirometry   Oxygen supplementation based on oxygen saturation or arterial blood gases   Assess for changes in mentation and behavior     
  Problem: Metabolic/Fluid and Electrolytes - Adult  Goal: Electrolytes maintained within normal limits  2/24/2025 0914 by Maya Wilburn RN  Outcome: Not Progressing  Goal: Hemodynamic stability and optimal renal function maintained  2/24/2025 0914 by Maya Wilburn RN  Outcome: Not Progressing     Problem: Pain  Goal: Verbalizes/displays adequate comfort level or baseline comfort level  2/24/2025 0914 by Maya Wilburn RN  Outcome: Not Progressing     Problem: Chronic Conditions and Co-morbidities  Goal: Patient's chronic conditions and co-morbidity symptoms are monitored and maintained or improved  2/24/2025 1901 by Jihan Mahmood RN  Outcome: Progressing  2/24/2025 0914 by Maya Wilburn RN  Outcome: Not Progressing     Problem: Discharge Planning  Goal: Discharge to home or other facility with appropriate resources  2/24/2025 0914 by Maya Wilburn RN  Outcome: Progressing     Problem: Safety - Adult  Goal: Free from fall injury  2/24/2025 1901 by Jihan Mahmood RN  Outcome: Progressing  2/24/2025 0914 by Maya Wilburn RN  Outcome: Progressing     Problem: Respiratory - Adult  Goal: Achieves optimal ventilation and oxygenation  2/24/2025 1901 by Jihan Mahmood RN  Outcome: Progressing  2/24/2025 0914 by Maya Wilburn RN  Outcome: Not Progressing     Problem: Cardiovascular - Adult  Goal: Maintains optimal cardiac output and hemodynamic stability  2/24/2025 1901 by Jihan Mahmood RN  Outcome: Progressing  2/24/2025 0914 by Maya Wilburn RN  Outcome: Progressing  Goal: Absence of cardiac dysrhythmias or at baseline  2/24/2025 0914 by Maya Wilburn RN  Outcome: Progressing     Problem: Skin/Tissue Integrity - Adult  Goal: Skin integrity remains intact  Description: 1.  Monitor for areas of redness and/or skin breakdown  2.  Assess vascular access sites hourly  3.  Every 4-6 hours minimum:  Change oxygen saturation probe site  4.  Every 4-6 hours:  If on nasal continuous positive airway 
  Problem: Respiratory - Adult  Goal: Achieves optimal ventilation and oxygenation  2/27/2025 0759 by Lisa Kothari, RCDEBORAH  Outcome: Progressing     
  Problem: Respiratory - Adult  Goal: Achieves optimal ventilation and oxygenation  Outcome: Not Progressing  Flowsheets (Taken 2/19/2025 9347)  Achieves optimal ventilation and oxygenation:   Assess for changes in respiratory status   Position to facilitate oxygenation and minimize respiratory effort   Initiate smoking cessation protocol as indicated   Assess the need for suctioning and aspirate as needed   Respiratory therapy support as indicated   Assess and instruct to report shortness of breath or any respiratory difficulty   Encourage broncho-pulmonary hygiene including cough, deep breathe, incentive spirometry   Oxygen supplementation based on oxygen saturation or arterial blood gases   Assess for changes in mentation and behavior     
BRONCHOSPASM/BRONCHOCONSTRICTION     [x]         IMPROVE AERATION/BREATH SOUNDS  [x]   ADMINISTER BRONCHODILATOR THERAPY AS APPROPRIATE  [x]   ASSESS BREATH SOUNDS  [x]   IMPLEMENT AEROSOL/MDI PROTOCOL  [x]   PATIENT EDUCATION AS NEEDED    
Plan of care      Transfer to [facility] initiated. Called the transfer center and waiting for a callback by the cardiology team. Face sheet to be faxed by RN to [phone].     4:17 pm update  Accepted to [facility]. Will be transferred to CVICU at [facility]. Pending timing  Heidi Dillard MD  [employer]  
(Taken 2/22/2025 1722)  Skin Integrity Remains Intact: Monitor for areas of redness and/or skin breakdown     Problem: ABCDS Injury Assessment  Goal: Absence of physical injury  Outcome: Progressing  Flowsheets (Taken 2/21/2025 2000 by Meghna Bauman RN)  Absence of Physical Injury: Implement safety measures based on patient assessment     Problem: Pain  Goal: Verbalizes/displays adequate comfort level or baseline comfort level  Outcome: Progressing  Flowsheets (Taken 2/22/2025 1722)  Verbalizes/displays adequate comfort level or baseline comfort level:   Encourage patient to monitor pain and request assistance   Assess pain using appropriate pain scale     
continuous positive airway pressure, respiratory therapy assess nares and determine need for appliance change or resting period  2/25/2025 1729 by Mary Cowan RN  Outcome: Progressing  2/25/2025 1019 by Vianney Greenfield RN  Outcome: Progressing  Flowsheets (Taken 2/25/2025 0800)  Skin Integrity Remains Intact: Monitor for areas of redness and/or skin breakdown     Problem: ABCDS Injury Assessment  Goal: Absence of physical injury  2/25/2025 1729 by Mary Cowan RN  Outcome: Progressing  2/25/2025 1019 by Vianney Greenfield RN  Outcome: Progressing     Problem: Pain  Goal: Verbalizes/displays adequate comfort level or baseline comfort level  2/25/2025 1729 by Mary Cowan RN  Outcome: Progressing  2/25/2025 1019 by Vianney Greenfield RN  Outcome: Progressing  Flowsheets (Taken 2/25/2025 0800)  Verbalizes/displays adequate comfort level or baseline comfort level:   Encourage patient to monitor pain and request assistance   Assess pain using appropriate pain scale     
rate  Goal: Absence of cardiac dysrhythmias or at baseline  2/22/2025 0152 by Meghna Bauman RN  Outcome: Progressing  Flowsheets (Taken 2/21/2025 2000)  Absence of cardiac dysrhythmias or at baseline: Monitor cardiac rate and rhythm  2/21/2025 1742 by Milligan, Matthew, RN  Outcome: Progressing  Flowsheets (Taken 2/21/2025 1742)  Absence of cardiac dysrhythmias or at baseline: Monitor cardiac rate and rhythm     Problem: Skin/Tissue Integrity - Adult  Goal: Skin integrity remains intact  Description: 1.  Monitor for areas of redness and/or skin breakdown  2.  Assess vascular access sites hourly  3.  Every 4-6 hours minimum:  Change oxygen saturation probe site  4.  Every 4-6 hours:  If on nasal continuous positive airway pressure, respiratory therapy assess nares and determine need for appliance change or resting period  2/22/2025 0152 by Meghna Bauman RN  Outcome: Progressing  Flowsheets (Taken 2/21/2025 2000)  Skin Integrity Remains Intact: Monitor for areas of redness and/or skin breakdown  2/21/2025 1742 by Milligan, Matthew, RN  Outcome: Progressing  Flowsheets (Taken 2/21/2025 1742)  Skin Integrity Remains Intact: Monitor for areas of redness and/or skin breakdown     Problem: Metabolic/Fluid and Electrolytes - Adult  Goal: Electrolytes maintained within normal limits  2/22/2025 0152 by Meghna Bauman RN  Outcome: Progressing  Flowsheets (Taken 2/21/2025 2000)  Electrolytes maintained within normal limits:   Monitor labs and assess patient for signs and symptoms of electrolyte imbalances   Administer electrolyte replacement as ordered  2/21/2025 1742 by Milligan, Matthew, RN  Outcome: Progressing  Flowsheets (Taken 2/21/2025 1742)  Electrolytes maintained within normal limits:   Monitor labs and assess patient for signs and symptoms of electrolyte imbalances   Administer electrolyte replacement as ordered  Goal: Hemodynamic stability and optimal renal function maintained  2/22/2025 0152 by Meghna Bauman

## 2025-02-28 NOTE — PROGRESS NOTES
Physical Therapy Cancel Note      DATE: 2025    NAME: Coni Baker  MRN: [id number]   : 1950      Patient not seen this date for Physical Therapy due to:    Other: pt transferred to cardiac ICU this AM; monitor and evaluate when medically appropriate.       Electronically signed by Simone Woodard PT on 2025 at 9:25 AM      
    Notified by nursing patient with recurrent episodes of ventricular tachycardia despite lidocaine drip w/ HR up to 180s.  Case discussed with cardiology fellow, Dr. Sanabria.  Status post prior lidocaine bolus x3 in ED. recommends amnio drip with bolus 150mg.        Subsequently notified overnight with concern for acute hypotension status post midodrine.  arb held.  Hold parameters for beta-blocker.      
   02/23/25 0958   Care Plan - Respiratory Goals   Achieves optimal ventilation and oxygenation Assess for changes in respiratory status;Assess for changes in mentation and behavior;Position to facilitate oxygenation and minimize respiratory effort;Oxygen supplementation based on oxygen saturation or arterial blood gases;Encourage broncho-pulmonary hygiene including cough, deep breathe, incentive spirometry;Assess the need for suctioning and aspirate as needed;Assess and instruct to report shortness of breath or any respiratory difficulty;Respiratory therapy support as indicated       
   02/23/25 1959   Care Plan - Respiratory Goals   Achieves optimal ventilation and oxygenation Assess for changes in respiratory status;Assess for changes in mentation and behavior;Position to facilitate oxygenation and minimize respiratory effort;Oxygen supplementation based on oxygen saturation or arterial blood gases;Encourage broncho-pulmonary hygiene including cough, deep breathe, incentive spirometry;Assess the need for suctioning and aspirate as needed;Assess and instruct to report shortness of breath or any respiratory difficulty;Respiratory therapy support as indicated       
Congestive Heart Failure Education completed and charted. CHF booklet given. Patient was receptive to education.    Discussed the  importance of medication compliance.    Discussed the importance of a heart healthy diet. Discussed 2000 mg sodium-restricted daily diet.  Patient instructed to limit fluid intake to  1.5 to 2 liters per day.    Patient instructed to weigh self at the same time of each day each morning, reinforced teaching to monitor for 3-5 lb weight increase over 1-2 days notify physician if change noted.      Signs and symptoms of CHF discussed with patient, such as feeling more tired than normal, feeling short of breath, coughing that increases when lying down, sudden weight gain, swelling of the feet, legs or belly.  Patient verbalized understanding to notify physician office if these symptoms occur.  EF 30%  Pt is agreeable to an outpatient CHF Clinic referral . Referral placed.    
[facility]  Office: [phone]  Joey Smith DO, Miguel Machado DO, Vikash Conklin DO, Alan Mcnulty DO, Joseph Thomas MD, Elsy Lacy MD, Eduardo Maddox MD, Kindra Mello MD,  Stef Awad MD, Esperanza Gandhi MD, Andi Hull MD,  Nolberto Allison DO, Anju Ellis MD, Paco Solis MD, Girma Smith DO, Heydi Yu MD,  Rigo Holliday DO, Rajwinder Stafford MD, Ita Camacho MD, Berna Vides MD, Eloy Bronson MD,  Gigi Romero MD, Elis Mckeon MD, Alfred Conner MD, Steve Greer MD, Jaime Banegas MD, Sandhya Collins MD, Fran Rehman DO, Caesar Leiva MD, Nolberto Holguin MD, Mohsin Reza, MD, Shirley Waterhouse, CNP,  Chastity Astorga CNP, Fran Fuentes, CNP,  Olive Melgoza, BOBBY, Madai Holley, CNP, Meghan Rodriguez, CNP, Mary Aleman CNP, Haleigh Francosi CNP, ROCK Mancera-YESSI, Radha Mariee, CNP, Michael Hurst, CNP,  Malgorzata Desai, CNP, Diamante Barron, CNP, Meghna Holland, CNP,  Tammy Jarrett, CNS, Viky White CNP, Carolyne Boston CNP,   Keiko Stroud, CNP         [facility]   IN-PATIENT SERVICE   [facility]    Progress Note    2/27/2025    10:46 AM    Name:   Coni Baker  MRN:     [id number]     Acct:      [id number]   Room:   2014/2014-01  IP Day:  7  Admit Date:  2/19/2025  4:58 PM    PCP:   Earlene Salcedo MD  Code Status:  Full Code    Subjective:   She feels weak and more fatigued today compared to yesterday. She did test positive for COVID 19 infection but is not on any oxygen supplementation. No cough or congestion today. No nausea, vomiting or diarrhea. No chest pain cough congestion and is otherwise doing well.       Brief History:     74-year-old female who presented to the hospital for evaluation after being found to have ventricular tachycardia, shortness of breath.  Patient previously had an ablation done in [facility].  She was started on amiodarone and lidocaine drip and transfer was initiated to 
[facility]  Office: [phone]  Joey Smith DO, Miguel Machado DO, Vikash Conklin DO, Alan Mcnulty DO, Joseph Thomas MD, Elsy Lacy MD, Eduardo Maddox MD, Kindra Mello MD,  Stef Awad MD, Esperanza Gandhi MD, Andi Hull MD,  Nolberto Allison DO, Anju Ellis MD, Paco Solis MD, Girma Smith DO, Heydi Yu MD,  Rigo Holliday DO, Rajwinder Stafford MD, Ita Camacho MD, Berna Vides MD, Eloy Bronson MD,  Gigi Romero MD, Elis Mckeon MD, Alfred Conner MD, Steve Greer MD, Jaime Banegas MD, Sandhya Collins MD, Fran Rehman DO, Caesar Leiva MD, Nolberto Holguin MD, Mohsin Reza, MD, Shirley Waterhouse, CNP,  Chastity Astorga CNP, Fran Fuentes, CNP,  Olive Melgoza, DNP, Madai Holley, CNP, Meghan Rodriguez, CNP, Mary Aleman, CNP, Haleigh Francois CNP, Kira Rosales, PA-C, Radha Mariee, CNP, Michael Hurst, CNP,  Malgorzata Deasi, CNP, Diamante Barron, CNP, Meghna Holland, CNP,  Tammy Jarrett, CNS, Viky White CNP, Carolyne Boston CNP,   Keiko Stroud, CNP         [facility]   IN-PATIENT SERVICE   [facility]    Progress Note    2/22/2025    8:52 AM    Name:   Coni Baker  MRN:     [id number]     Acct:      [id number]   Room:   [address] [place] [address] Day:  2  Admit Date:  2/19/2025  4:58 PM    PCP:   Earlene Salcedo MD  Code Status:  Full Code    Subjective:     C/C:   Chief Complaint   Patient presents with    Tachycardia     Interval History Status: improved.   Patient seen examined at bedside.  Remains in sinus bradycardia today. Slept ok, family not at bedside yet this morning. No other issues overnight.       Brief History:     This is a 74-year-old female with history of coronary disease with prior cardiac stent placements and known ventricular tachycardia. She has had refractory episodes of tachycardia and previously treated at [facility] with an attempted RVOT ablation. Judithjus presents with recurrent v tach. 
[facility]   Progress Note                   Date:   2/21/2025  Patient name: Coni Baker  Date of admission:  2/19/2025  4:58 PM  MRN:   [id number]  YOB: 1950  PCP: Earlene Salcedo MD    Reason for Admission: VT storm    Subjective:       Patient seen and examined at bedside: No acute events-overnight but in the morning patient was more anxious with PVCs in bigeminy pattern, hemodynamically stable.    Medications:   Scheduled Meds:   ALPRAZolam  0.25 mg Oral Once    aspirin  81 mg Oral Daily    budesonide-formoterol  2 puff Inhalation BID RT    calcium carb-cholecalciferol  1 tablet Oral TID AC    metoprolol succinate  25 mg Oral Daily    rosuvastatin  20 mg Oral Daily    sodium chloride flush  5-40 mL IntraVENous 2 times per day    enoxaparin  40 mg SubCUTAneous Daily     Continuous Infusions:   amiodarone 1 mg/min (02/21/25 0715)    sodium chloride 100 mL/hr at 02/21/25 0715    norepinephrine Stopped (02/20/25 1408)    lidocaine 1 mg/min (02/21/25 0715)    sodium chloride       CBC:   Recent Labs     02/19/25  1717 02/20/25  0616   WBC 6.7 6.1   HGB 13.7 12.5    156     BMP:    Recent Labs     02/19/25  1734 02/20/25  0616    137   K 4.5 4.5   * 105   CO2 24 20   BUN 19 16   CREATININE 0.8 0.8   GLUCOSE 92 96     Hepatic:   Recent Labs     02/19/25  1734   AST 40*   ALT 47*   BILITOT 0.4   ALKPHOS 97     Troponin: No results for input(s): \"TROPONINI\" in the last 72 hours.  BNP: No results for input(s): \"BNP\" in the last 72 hours.  Lipids: No results for input(s): \"CHOL\", \"HDL\" in the last 72 hours.    Invalid input(s): \"LDLCALCU\"  INR: No results for input(s): \"INR\" in the last 72 hours.    Objective:   Vitals: /63   Pulse 54   Temp 97.5 °F (36.4 °C) (Oral)   Resp 18   Ht 1.549 m (5' 1\")   Wt 84.9 kg (187 lb 2.7 oz)   SpO2 94%   BMI 35.37 kg/m²   General appearance: alert and cooperative with exam  HEENT: Normocephalic, atraumatic   Neck: no 
[facility]   Progress Note                   Date:   2/22/2025  Patient name: Coni Baker  Date of admission:  2/19/2025  4:58 PM  MRN:   [id number]  YOB: 1950  PCP: Earlene Salcedo MD    Reason for Admission: VT storm    Subjective:       Patient seen and examined at bedside: She is sleeping at the moment.  In normal sinus rhythm on telemetry.  No acute vents overnight.  No PVCs seen on the telemetry at this point.  She is on IV amiodarone and IV lidocaine.  She is waiting for bed for transfer to [facility].    Medications:   Scheduled Meds:   metoprolol succinate  12.5 mg Oral Daily    sodium bicarbonate  1,300 mg Oral BID    aspirin  81 mg Oral Daily    budesonide-formoterol  2 puff Inhalation BID RT    calcium carb-cholecalciferol  1 tablet Oral TID AC    rosuvastatin  20 mg Oral Daily    sodium chloride flush  5-40 mL IntraVENous 2 times per day    enoxaparin  40 mg SubCUTAneous Daily     Continuous Infusions:   amiodarone 1 mg/min (02/22/25 0641)    norepinephrine Stopped (02/20/25 1408)    lidocaine 1 mg/min (02/22/25 0641)    sodium chloride       CBC:   Recent Labs     02/20/25  0616 02/21/25  0854 02/22/25  0407   WBC 6.1 7.3 5.6   HGB 12.5 12.3 11.1*    145 109*     BMP:    Recent Labs     02/20/25  0616 02/21/25  0854 02/22/25  0407    134* 139   K 4.5 4.7 4.0    106 108*   CO2 20 17* 22   BUN 16 16 19   CREATININE 0.8 0.9 1.0*   GLUCOSE 96 112* 96     Hepatic:   Recent Labs     02/19/25  1734   AST 40*   ALT 47*   BILITOT 0.4   ALKPHOS 97     Troponin: No results for input(s): \"TROPONINI\" in the last 72 hours.  BNP: No results for input(s): \"BNP\" in the last 72 hours.  Lipids: No results for input(s): \"CHOL\", \"HDL\" in the last 72 hours.    Invalid input(s): \"LDLCALCU\"  INR: No results for input(s): \"INR\" in the last 72 hours.    Objective:   Vitals: BP (!) 113/51   Pulse 58   Temp 97.2 °F (36.2 °C) (Axillary)   Resp 16   Ht 1.549 m (5' 
[facility]   Progress Note                   Date:   2/23/2025  Patient name: Coni Baker  Date of admission:  2/19/2025  4:58 PM  MRN:   [id number]  YOB: 1950  PCP: Earlene Salcedo MD    Reason for Admission: VT storm    Subjective:       Patient seen and examined at bedside: Denies any complaints.  In normal sinus rhythm on telemetry.  No acute vents overnight.  Ventricular bigeminy noted on telemetry.  She is on IV amiodarone and IV lidocaine.  She is waiting for bed for transfer to [facility].    Medications:   Scheduled Meds:   aspirin  81 mg Oral Daily    budesonide-formoterol  2 puff Inhalation BID RT    calcium carb-cholecalciferol  1 tablet Oral TID AC    rosuvastatin  20 mg Oral Daily    sodium chloride flush  5-40 mL IntraVENous 2 times per day    enoxaparin  40 mg SubCUTAneous Daily     Continuous Infusions:   amiodarone 0.5 mg/min (02/23/25 0617)    norepinephrine Stopped (02/20/25 1408)    lidocaine 1 mg/min (02/23/25 0617)    sodium chloride       CBC:   Recent Labs     02/21/25  0854 02/22/25  0407 02/23/25  0230   WBC 7.3 5.6 5.9   HGB 12.3 11.1* 10.8*    109* 122*     BMP:    Recent Labs     02/21/25  0854 02/22/25  0407 02/23/25  0230   * 139 140   K 4.7 4.0 3.8    108* 108*   CO2 17* 22 24   BUN 16 19 19   CREATININE 0.9 1.0* 0.9   GLUCOSE 112* 96 98     Hepatic:   No results for input(s): \"AST\", \"ALT\", \"BILITOT\", \"ALKPHOS\" in the last 72 hours.    Invalid input(s): \"ALB\"    Troponin: No results for input(s): \"TROPONINI\" in the last 72 hours.  BNP: No results for input(s): \"BNP\" in the last 72 hours.  Lipids: No results for input(s): \"CHOL\", \"HDL\" in the last 72 hours.    Invalid input(s): \"LDLCALCU\"  INR: No results for input(s): \"INR\" in the last 72 hours.    Objective:   Vitals: BP (!) 114/40   Pulse 61   Temp 98 °F (36.7 °C) (Axillary)   Resp 21   Ht 1.549 m (5' 1\")   Wt 90.9 kg (200 lb 6.4 oz)   SpO2 96%   BMI 37.86 kg/m² 
[facility]   Progress Note                   Date:   2/25/2025  Patient name: Coni Baker  Date of admission:  2/19/2025  4:58 PM  MRN:   [id number]  YOB: 1950  PCP: Earlene Salcedo MD    Reason for Admission: VT storm    Subjective:       Patient seen and examined at bedside: No acute issues overnight, currently in bigeminy, IV epidural and IV lidocaine, NSR 83, /98    Medications:   Scheduled Meds:   furosemide  20 mg Oral Daily    aspirin  81 mg Oral Daily    budesonide-formoterol  2 puff Inhalation BID RT    calcium carb-cholecalciferol  1 tablet Oral TID AC    rosuvastatin  20 mg Oral Daily    sodium chloride flush  5-40 mL IntraVENous 2 times per day    enoxaparin  40 mg SubCUTAneous Daily     Continuous Infusions:   amiodarone 0.5 mg/min (02/24/25 2241)    norepinephrine Stopped (02/20/25 1408)    lidocaine 1 mg/min (02/24/25 0651)    sodium chloride       CBC:   Recent Labs     02/23/25  0230 02/23/25  1341 02/24/25  0316   WBC 5.9  --  5.4   HGB 10.8* 11.7* 11.3*   *  --  130*     BMP:    Recent Labs     02/23/25  0230 02/24/25  0316 02/24/25  2037    139  --    K 3.8 3.1* 4.5   * 105  --    CO2 24 25  --    BUN 19 18  --    CREATININE 0.9 1.0*  --    GLUCOSE 98 153*  --      Hepatic:   No results for input(s): \"AST\", \"ALT\", \"BILITOT\", \"ALKPHOS\" in the last 72 hours.    Invalid input(s): \"ALB\"    Troponin: No results for input(s): \"TROPONINI\" in the last 72 hours.  BNP: No results for input(s): \"BNP\" in the last 72 hours.  Lipids: No results for input(s): \"CHOL\", \"HDL\" in the last 72 hours.    Invalid input(s): \"LDLCALCU\"  INR: No results for input(s): \"INR\" in the last 72 hours.    Objective:   Vitals: BP (!) 120/98   Pulse 63   Temp 97.6 °F (36.4 °C)   Resp 16   Ht 1.549 m (5' 1\")   Wt 90.1 kg (198 lb 10.2 oz)   SpO2 98%   BMI 37.53 kg/m²   General appearance: sleeping  HEENT: Normocephalic, atraumatic   Neck: no carotid bruit, no JVD, 
[facility]   Progress Note                   Date:   2/26/2025  Patient name: Coni Baker  Date of admission:  2/19/2025  4:58 PM  MRN:   [id number]  YOB: 1950  PCP: Earlene Salcedo MD    Reason for Admission: VT storm    Subjective:       Patient seen and examined at bedside: No acute issues overnight, currently in bigeminy, remains on  IV lidocaine and amio , Family in room.  Pt is asymptomatic to PCVs.     Medications:   Scheduled Meds:   furosemide  20 mg Oral Daily    aspirin  81 mg Oral Daily    budesonide-formoterol  2 puff Inhalation BID RT    calcium carb-cholecalciferol  1 tablet Oral TID AC    rosuvastatin  20 mg Oral Daily    sodium chloride flush  5-40 mL IntraVENous 2 times per day    enoxaparin  40 mg SubCUTAneous Daily     Continuous Infusions:   amiodarone 0.5 mg/min (02/25/25 2306)    norepinephrine Stopped (02/20/25 1408)    lidocaine 1 mg/min (02/25/25 0936)    sodium chloride       CBC:   Recent Labs     02/23/25  1341 02/24/25  0316   WBC  --  5.4   HGB 11.7* 11.3*   PLT  --  130*     BMP:    Recent Labs     02/24/25  0316 02/24/25  2037 02/25/25  0755 02/26/25  0717     --  140 139   K 3.1* 4.5 4.9 4.1     --  105 103   CO2 25  --  25 25   BUN 18  --  14 16   CREATININE 1.0*  --  0.8 0.8   GLUCOSE 153*  --  97 90     Hepatic:   No results for input(s): \"AST\", \"ALT\", \"BILITOT\", \"ALKPHOS\" in the last 72 hours.    Invalid input(s): \"ALB\"    Troponin: No results for input(s): \"TROPONINI\" in the last 72 hours.  BNP: No results for input(s): \"BNP\" in the last 72 hours.  Lipids: No results for input(s): \"CHOL\", \"HDL\" in the last 72 hours.    Invalid input(s): \"LDLCALCU\"  INR: No results for input(s): \"INR\" in the last 72 hours.    Objective:   Vitals: BP (!) 154/43   Pulse 65   Temp 97.8 °F (36.6 °C) (Oral)   Resp 25   Ht 1.549 m (5' 1\")   Wt 90.1 kg (198 lb 10.2 oz)   SpO2 94%   BMI 37.53 kg/m²   General appearance: sleeping  HEENT: 
[facility]   Progress Note                   Date:   2/27/2025  Patient name: Coni Baker  Date of admission:  2/19/2025  4:58 PM  MRN:   [id number]  YOB: 1950  PCP: Earlene Salcedo MD    Reason for Admission: VT storm    Subjective:       Patient seen and examined at bedside: No acute issues overnight, currently in bigeminy, remains on  IV lidocaine and amio , Family in room.  Pt is asymptomatic to PCVs.     Medications:   Scheduled Meds:   furosemide  20 mg Oral Daily    aspirin  81 mg Oral Daily    budesonide-formoterol  2 puff Inhalation BID RT    calcium carb-cholecalciferol  1 tablet Oral TID AC    rosuvastatin  20 mg Oral Daily    sodium chloride flush  5-40 mL IntraVENous 2 times per day    enoxaparin  40 mg SubCUTAneous Daily     Continuous Infusions:   amiodarone 0.5 mg/min (02/26/25 2244)    norepinephrine Stopped (02/20/25 1408)    lidocaine 1 mg/min (02/26/25 1914)    sodium chloride       CBC:   No results for input(s): \"WBC\", \"HGB\", \"PLT\" in the last 72 hours.    BMP:    Recent Labs     02/25/25  0755 02/26/25  0717 02/27/25  0634    139 135*   K 4.9 4.1 4.1    103 101   CO2 25 25 22   BUN 14 16 15   CREATININE 0.8 0.8 1.0*   GLUCOSE 97 90 92     Hepatic:   No results for input(s): \"AST\", \"ALT\", \"BILITOT\", \"ALKPHOS\" in the last 72 hours.    Invalid input(s): \"ALB\"    Troponin: No results for input(s): \"TROPONINI\" in the last 72 hours.  BNP: No results for input(s): \"BNP\" in the last 72 hours.  Lipids: No results for input(s): \"CHOL\", \"HDL\" in the last 72 hours.    Invalid input(s): \"LDLCALCU\"  INR: No results for input(s): \"INR\" in the last 72 hours.    Objective:   Vitals: BP (!) 107/44   Pulse 67   Temp 97.8 °F (36.6 °C) (Oral)   Resp 22   Ht 1.549 m (5' 1\")   Wt 90.1 kg (198 lb 10.2 oz)   SpO2 96%   BMI 37.53 kg/m²   General appearance: sleeping  HEENT: Normocephalic, atraumatic   Neck: no carotid bruit, no JVD, trachea midline and thyroid 
Occupational Therapy    [facility]  Occupational Therapy Not Seen Note    DATE: 2025    NAME: Coni Baker  MRN: [id number]   : 1950      Patient not seen this date for Occupational Therapy due to:    Patient is not appropriate for active participation in OT evaluation/treatment at this time d/t awaiting EP consult    Next Scheduled Treatment: 2025     Electronically signed by MAXIME Park on 2025 at 9:08 AM    
Occupational Therapy  Occupational Therapy Daily Treatment Note  Facility/Department: [facility] CAR 2- STEPDOWN   Patient Name: Coni Baker        MRN: [id number]    : 1950    Date of Service: 2025      Past Medical History:  has a past medical history of Atrial fibrillation (HCC), CAD (coronary artery disease), CHF (congestive heart failure) (HCC), Hx of myocardial infarction, Hyperlipidemia, Hypertension, and SOBOE (shortness of breath on exertion).  Past Surgical History:  has a past surgical history that includes Tubal ligation; Coronary angioplasty with stent; Colonoscopy; shoulder surgery (Right, 16); Colonoscopy (N/A, 3/24/2022); Cardiac procedure (N/A, 2024); and ep device procedure (N/A, 2024).    Discharge Recommendations  Discharge Recommendations: Patient would benefit from continued therapy after discharge  OT Equipment Recommendations  Equipment Needed: No    Assessment  Performance deficits / Impairments: Decreased functional mobility ;Decreased ADL status;Decreased endurance;Decreased balance;Decreased high-level IADLs  Assessment: patient would continue to benefit from OT services to address deficits listed above and improve overall functional performance prior to discharge.  Prognosis: Good  Activity Tolerance  Activity Tolerance: Patient Tolerated treatment well  Safety Devices  Type of Devices: All fall risk precautions in place;Patient at risk for falls;Call light within reach;Gait belt;Nurse notified;Left in chair  Restraints  Restraints Initially in Place: No    AM-PAC  AM-PAC Daily Activity - Inpatient   How much help is needed for putting on and taking off regular lower body clothing?: A Little  How much help is needed for bathing (which includes washing, rinsing, drying)?: A Little  How much help is needed for toileting (which includes using toilet, bedpan, or urinal)?: A Little  How much help is needed for putting on and taking off regular upper body clothing?: A 
Occupational Therapy  Occupational Therapy Initial Evaluation  Facility/Department: [facility] CAR 1- SICU   Patient Name: Coni Baker        MRN: [id number]    : 1950    Date of Service: 2025    Chief Complaint   Patient presents with    Tachycardia     Past Medical History:  has a past medical history of Atrial fibrillation (HCC), CAD (coronary artery disease), CHF (congestive heart failure) (HCC), Hx of myocardial infarction, Hyperlipidemia, Hypertension, and SOBOE (shortness of breath on exertion).  Past Surgical History:  has a past surgical history that includes Tubal ligation; Coronary angioplasty with stent; Colonoscopy; shoulder surgery (Right, 16); Colonoscopy (N/A, 3/24/2022); Cardiac procedure (N/A, 2024); and ep device procedure (N/A, 2024).    Discharge Recommendations  Discharge Recommendations: Patient would benefit from continued therapy after discharge  OT Equipment Recommendations  Equipment Needed: No    Assessment  Performance deficits / Impairments: Decreased functional mobility ;Decreased ADL status;Decreased endurance;Decreased balance;Decreased high-level IADLs  Assessment: Pt limited this date by SOB and decreased endurance. Pt would continue to benefit from OT services to address deficits listed above and improve overall functional performance prior to discharge.  Prognosis: Good  Decision Making: Medium Complexity  REQUIRES OT FOLLOW-UP: Yes  Activity Tolerance  Activity Tolerance: Patient Tolerated treatment well  Safety Devices  Type of Devices: All fall risk precautions in place;Patient at risk for falls;Call light within reach;Gait belt;Nurse notified;Left in chair  Restraints  Restraints Initially in Place: No    AM-PAC  AM-PAC Daily Activity - Inpatient   How much help is needed for putting on and taking off regular lower body clothing?: A Little  How much help is needed for bathing (which includes washing, rinsing, drying)?: A Little  How much help is needed for 
Patient transferred to room 1020 from [facility]. Report called, all questions answered.   
Physical Therapy  Facility/Department: [facility] CAR 1- SICU   Physical Therapy Daily Treatment Note    Patient Name: Coni Baker        MRN: [id number]    : 1950    Date of Service: 2025    Chief Complaint   Patient presents with    Tachycardia     Past Medical History:  has a past medical history of Atrial fibrillation (HCC), CAD (coronary artery disease), CHF (congestive heart failure) (HCC), Hx of myocardial infarction, Hyperlipidemia, Hypertension, and SOBOE (shortness of breath on exertion).  Past Surgical History:  has a past surgical history that includes Tubal ligation; Coronary angioplasty with stent; Colonoscopy; shoulder surgery (Right, 16); Colonoscopy (N/A, 3/24/2022); Cardiac procedure (N/A, 2024); and ep device procedure (N/A, 2024).    Discharge Recommendations  Discharge Recommendations: Patient would benefit from continued therapy after discharge  PT Equipment Recommendations  Equipment Needed: No    Assessment  Body Structures, Functions, Activity Limitations Requiring Skilled Therapeutic Intervention: Decreased functional mobility ;Decreased strength;Decreased endurance;Decreased balance  Assessment: Pt ambulated 200ft with RW and CGA, fair stability with no true LOB. Pt with improved stability with use of RW. Recommending continued PT to address deficits and maximize safety and independence with mobility.  Therapy Prognosis: Good  Requires PT Follow-Up: Yes  Activity Tolerance  Activity Tolerance: Patient limited by endurance;Patient tolerated treatment well  Safety Devices  Type of Devices: All fall risk precautions in place;Patient at risk for falls;Call light within reach;Gait belt;Nurse notified;Left in chair  Restraints  Restraints Initially in Place: No    AM-PAC  AM-PAC Basic Mobility - Inpatient   How much help is needed turning from your back to your side while in a flat bed without using bedrails?: A Little  How much help is needed moving from lying on your back to 
Physical Therapy  Facility/Department: [facility] CAR 1- SICU   Physical Therapy Initial Evaluation    Patient Name: Coni Baker        MRN: [id number]    : 1950    Date of Service: 2025    Chief Complaint   Patient presents with    Tachycardia     Past Medical History:  has a past medical history of Atrial fibrillation (HCC), CAD (coronary artery disease), CHF (congestive heart failure) (HCC), Hx of myocardial infarction, Hyperlipidemia, Hypertension, and SOBOE (shortness of breath on exertion).  Past Surgical History:  has a past surgical history that includes Tubal ligation; Coronary angioplasty with stent; Colonoscopy; shoulder surgery (Right, 16); Colonoscopy (N/A, 3/24/2022); Cardiac procedure (N/A, 2024); and ep device procedure (N/A, 2024).    Discharge Recommendations   Further therapy recommended at discharge.    PT Equipment Recommendations  Equipment Needed: No    Assessment  Body Structures, Functions, Activity Limitations Requiring Skilled Therapeutic Intervention: Decreased functional mobility , Decreased strength, Decreased endurance, Decreased balance  Assessment: Pt amb 30' CGA no AD. Pt reports using no AD at baseline, will use rollator in community distances for seated rest breaks. Pt does present w/ abdnormal gait deviations from chronic low back pain, could benefit from use of SPC and OP PT to address deficits. Pt would benefit from continued acute PT to address deficits.  Therapy Prognosis: Good  Decision Making: Medium Complexity  Requires PT Follow-Up: Yes  Activity Tolerance  Activity Tolerance: Patient limited by fatigue, Patient limited by endurance  Safety Devices  Type of Devices: All fall risk precautions in place, Patient at risk for falls, Call light within reach, Gait belt, Nurse notified, Left in chair  Restraints  Restraints Initially in Place: No    AM-PAC  AM-PAC Basic Mobility - Inpatient   How much help is needed turning from your back to your side while in 
[facility]  Office: [phone]  Joey Smith DO, Miguel Machado DO, Vikash Conklin DO, Alan Mcnulty DO, Joseph Thomas MD, Elsy Lacy MD, Eduardo Maddox MD, Kindra Mello MD,  Stef Awad MD, Esperanza Gandhi MD, Andi Hull MD,  Nolberto Allison DO, Anju Ellis MD, Paco Solis MD, Girma Smith DO, Heydi Yu MD,  Rigo Holliday DO, Rajwinder Stafford MD, Ita Camacho MD, Berna Vides MD, Eloy Bronson MD,  Gigi Romero MD, Elis Mckeon MD, Alfred Conner MD, Steve Greer MD, Jaime Banegas MD, Sandhya Collins MD, Fran Rehman DO, Caesar Leiva MD, Nolberto Holguin MD, Mohsin Reza, MD, Shirley Waterhouse, CNP,  Chastity Astorga CNP, Fran Fuentes, CNP,  Olive Melgoza, DNP, Madai Holley, CNP, Meghna Rodriguez, CNP, Mary Aleman, CNP, Haleigh Francois CNP, Kira Rosales, PA-C, Radha Mariee, CNP, Michael Hurst, CNP,  Malgorzata Desai, CNP, Diamante Barron, CNP, Meghna Holland, CNP,  Tammy Jarrett, CNS, Viky White CNP, Carolyne Boston CNP,   Keiko Stroud, CNP         [facility]   IN-PATIENT SERVICE   [facility]    Progress Note    2/24/2025    2:17 PM    Name:   Coni Baker  MRN:     [id number]     Acct:      [id number]   Room:   [address] [place] [address] Day:  4  Admit Date:  2/19/2025  4:58 PM    PCP:   Earlene Salcedo MD  Code Status:  Full Code    Subjective:     C/C:   Chief Complaint   Patient presents with    Tachycardia     Interval History Status:   Denies any complaints today, leg swelling improving, denies any shortness of breath currently on nasal cannula  Ventricular bigeminy on telemetry continues to be on IV amiodarone, IV lidocaine, awaiting bed at [facility]  Labs reviewed potassium/magnesium replaced    Brief History:     74-year-old female with history of essential hypertension, coronary artery disease, smoking/COPD presented with abnormal heart rhythm/ventricular tachycardia, shortness of breath, dizziness previously 
[facility]  Office: [phone]  Joey Smith DO, Miguel Machado DO, Vikash Conklin DO, Alan Mcnulty DO, Joseph Thomas MD, Elsy Lacy MD, Eduardo Maddox MD, Kindra Mello MD,  Stef Awad MD, Esperanza Gandhi MD, Andi Hull MD,  Nolberto Allison DO, Anju Ellis MD, Paco Solis MD, Girma Smith DO, Heydi Yu MD,  Rigo Holliday DO, Rajwinder Stafford MD, Ita Camacho MD, Berna Vides MD, Eloy Bronson MD,  iGgi Romero MD, Elis Mckeon MD, lAfred Conner MD, Steve Greer MD, Jaime Banegas MD, Sandhya Collins MD, Fran Rehman DO, Caesar Leiva MD, Nolberto Holguin MD, Mohsin Reza, MD, Shirley Waterhouse, CNP,  Chastity Astorga CNP, Fran Fuentes, CNP,  Olive Melgoza, DNP, Madai Holley, CNP, Meghan Rodriguez, CNP, Mary Aleman, CNP, Haleigh Francois CNP, Kira Rosales, PA-C, Radha Mariee, CNP, Michael Hurst, CNP,  Malgorzata Desai, CNP, Diamante Barron, CNP, Meghna Holland, CNP,  Tammy Jarrett, CNS, Viky White CNP, Carolyne Boston CNP,   Keiko Stroud, CNP         [facility]   IN-PATIENT SERVICE   [facility]    Progress Note    2/21/2025    11:42 AM    Name:   Coni Baker  MRN:     [id number]     Acct:      [id number]   Room:   [address] [place] [address] Day:  1  Admit Date:  2/19/2025  4:58 PM    PCP:   Earlene Salcedo MD  Code Status:  Full Code    Subjective:     C/C:   Chief Complaint   Patient presents with    Tachycardia     Interval History Status: improved.   Patient seen examined at bedside.  Undergoing echo.  V. tach appears to be improving.  Heart rate now in the 50s and 60s.  Patient denies any dizziness or shortness of breath at this time.  Overall doing much better.  Continues on amiodarone drip and lidocaine drip.      Brief History:     This is a 74-year-old female with history of coronary disease with prior cardiac stent placements and known ventricular tachycardia. She has had refractory episodes of tachycardia 
Pt left with life flight with patient belongings. Report given to [facility]. All questions answered.   
[facility]  Office: [phone]  Joey Smith DO, Miguel Machado DO, Vikash Conklin DO, Alan Mcnulty DO, Joseph Thomas MD, Elsy Lacy MD, Eduardo Maddox MD, Kindra Mello MD,  Stef Awad MD, Esperanza Gandhi MD, Andi Hull MD,  Nolberto Allison DO, Anju Ellis MD, Paco Solis MD, Girma Smith DO, Heydi Yu MD,  Rigo Holliday DO, Rajwinder Stafford MD, Ita Camacho MD, Berna Vides MD, Eloy Bronson MD,  Gigi Romero MD, Elis Mckeon MD, Alfred Conner MD, Steve Greer MD, Jaime Banegas MD, Sandhya Collins MD, Fran Rehman DO, Caesar Leiva MD, Nolberto Holguin MD, Mohsin Reza, MD, Shirley Waterhouse, CNP,  Chastity Astorga CNP, Fran Fuentes, CNP,  Olive Melgoza, BOBBY, Madai Holley, CNP, Meghan Rodriguez, CNP, Mary Aleman, CNP, Haleigh Francois CNP, Kira Rosales PA-C, Radha Mariee, CNP, Michael Hurst, CNP,  Malgorzata Desai, CNP, Diamante Barron, CNP, Meghna Holland, CNP,  Tammy Jarrett, CNS, Viky White CNP, Carolyne Boston CNP,   Keiko Stroud, CNP         [facility]   IN-PATIENT SERVICE   [facility]    Progress Note    2/25/2025    10:28 AM    Name:   Coni Baker  MRN:     [id number]     Acct:      [id number]   Room:   [address] [place] [address] Day:  5  Admit Date:  2/19/2025  4:58 PM    PCP:   Earlene Salcedo MD  Code Status:  Full Code    Subjective:     She feels well, no complaints. Waiting on a bed at       Brief History:     Medications:     Allergies:    Allergies   Allergen Reactions    Ciprofloxacin Hives and Itching       Current Meds:   Scheduled Meds:    furosemide  20 mg Oral Daily    aspirin  81 mg Oral Daily    budesonide-formoterol  2 puff Inhalation BID RT    calcium carb-cholecalciferol  1 tablet Oral TID AC    rosuvastatin  20 mg Oral Daily    sodium chloride flush  5-40 mL IntraVENous 2 times per day    enoxaparin  40 mg SubCUTAneous Daily     Continuous Infusions:    amiodarone 0.5 mg/min (02/25/25 
[facility]  Office: [phone]  Joey Smith DO, Miguel Machado DO, Vikash Conklin DO, Alan Mcnulty DO, Joseph Thomas MD, Elsy Lacy MD, Eduardo Maddox MD, Kindra Mello MD,  Stef Awad MD, Esperanza Gandhi MD, Andi Hull MD,  Nolberto Allison DO, Anju Ellis MD, Paco Solis MD, Girma Smith DO, Heydi Yu MD,  Rigo Holliday DO, Rajwinder Stafford MD, Ita Camacho MD, Berna Vides MD, Eloy Bronson MD,  Gigi Romero MD, Elis Mckeon MD, Alfred Conner MD, Steve Greer MD, Jaime Banegas MD, Sandhya Collins MD, Fran Rehman DO, Caesar Leiva MD, Nolberto Holguin MD, Mohsin Reza, MD, Shirley Waterhouse, CNP,  Chastity Astorga CNP, Fran Fuentes, CNP,  Olive Melgoza, DNP, Madai Holley, CNP, Meghan Rodriguez, CNP, Mary Aleman, CNP, Haleigh Francois, CNP, Kira Rosales, PA-C, Radha Mariee, CNP, Michael Hurst, CNP,  Malgorzata Desai, CNP, Diamante Barron, CNP, Meghna Holland, CNP,  Tammy Jarrett, CNS, Viky White CNP, Carolyne Boston CNP,   Keiko Stroud, CNP         [facility]   IN-PATIENT SERVICE   [facility]    Progress Note    2/20/2025    7:49 AM    Name:   Coni Baker  MRN:     [id number]     Acct:      [id number]   Room:   0138/0138-01   Day:  0  Admit Date:  2/19/2025  4:58 PM    PCP:   Earlene Salcedo MD  Code Status:  Full Code    Subjective:     C/C:   Chief Complaint   Patient presents with    Tachycardia     Interval History Status: worsened.     Patient seen and examined. Still in v tach with hear rates fluctuating up to 140. On lidocaine drip. On amiodarone drip. Daughter at bedside. Denies chest pain. No other complaints at thist nancy.     Brief History:     This is a 74-year-old female with history of coronary disease with prior cardiac stent placements and known ventricular tachycardia. She has had refractory episodes of tachycardia and previously treated at [facility] with an attempted RVOT 
[facility]  Office: [phone]  Joey Smith DO, Miguel Machado DO, Vikash Conklin DO, Alan Mcnulty DO, Joseph Thomas MD, Elsy Lacy MD, Eduardo Maddox MD, Kindra Mello MD,  Stef Awad MD, Esperanza Gandhi MD, Andi Hull MD,  Nolberto Allison DO, Anju Ellis MD, Paco Solis MD, Girma Smith DO, Heydi Yu MD,  Rigo Holliday DO, Rajwinder Stafford MD, Ita Camacho MD, Berna Vides MD, Eloy Bronson MD,  Gigi Romero MD, Elis Mckeon MD, Alfred Conner MD, Steve Greer MD, Jaime Banegas MD, Sandhya Collins MD, Fran Rehman DO, Caesar Leiva MD, Nolberto Holguin MD, Mohsin Reza, MD, Shirley Waterhouse, CNP,  Chastity Astorga CNP, Fran Fuentes, CNP,  Olive Melgoza, DNP, Madai Holley, CNP, Meghan Rodriguez, CNP, Mary Aleman, CNP, Haleigh Francois CNP, Kira Rosales, PA-C, Radha Mariee, CNP, Michael Hurst, CNP,  Malgorzata Desai, CNP, Diamante Barron, CNP, Meghna Holland, CNP,  Tammy Jarrett, CNS, Viky White CNP, Carolyne Boston CNP,   Keiko Stroud, CNP         [facility]   IN-PATIENT SERVICE   [facility]    Progress Note    2/23/2025    3:28 PM    Name:   Coni Baker  MRN:     [id number]     Acct:      [id number]   Room:   [address] [place] [address] Day:  3  Admit Date:  2/19/2025  4:58 PM    PCP:   Earlene Salcedo MD  Code Status:  Full Code    Subjective:     C/C:   Chief Complaint   Patient presents with    Tachycardia     Interval History Status:   Denies any complaints today except leg swelling, denies any shortness of breath currently 3 L nasal cannula  Ventricular bigeminy on telemetry continues to be on IV amiodarone, IV lidocaine, awaiting bed    Brief History:     74-year-old female with history of essential hypertension, coronary artery disease, smoking/COPD presented with abnormal heart rhythm/ventricular tachycardia, shortness of breath, dizziness [facility] 
[facility]  Office: [phone]  Joey Smith DO, Miguel Machado DO, Vikash Conklin DO, Alan Mcnulty DO, Joseph Thomas MD, Elsy Lacy MD, Eduardo Maddox MD, Kindra Mello MD,  Stef Awad MD, Esperanza Gandhi MD, Andi Hull MD,  Nolberto Allison DO, Anju Ellis MD, Paco Solis MD, Girma Smith DO, Heydi Yu MD,  Rigo Holliday DO, Rajwinder Stafford MD, Ita Camacho MD, Berna Vides MD, Eloy Bronson MD,  Gigi Romero MD, Elis Mckeon MD, Alfred Conner MD, Steve Greer MD, Jaime Banegas MD, Sandhya Collins MD, Fran Rehman DO, Caesar Leiva MD, Nolberto Holguin MD, Mohsin Reza, MD, Shirley Waterhouse, CNP,  Chastity Astorga CNP, Fran Fuentes, CNP,  Olive Melgoza, BOBBY, Madai Holley, CNP, Meghan Rodriguez, CNP, Mary Aleman CNP, Haleigh Francois CNP, ROCK Mancera-YESSI, Radha Mariee, CNP, Michael Hurst, CNP,  Malgorzata Desai, CNP, Diamante Barron, CNP, Meghna Holland, CNP,  Tammy Jarrett, CNS, Viky White CNP, Carolyne Boston CNP,   Keiko Stroud, CNP         [facility]   IN-PATIENT SERVICE   [facility]    Progress Note    2/26/2025    1:56 PM    Name:   Coni Baker  MRN:     [id number]     Acct:      [id number]   Room:   2014/2014-01  IP Day:  6  Admit Date:  2/19/2025  4:58 PM    PCP:   Earlene Salcedo MD  Code Status:  Full Code    Subjective:     She feels well, no complaints. Waiting on a bed at . She tells me she feels like she has a runny nose that is new. No fevers or chills.       Brief History:     Medications:     Allergies:    Allergies   Allergen Reactions    Ciprofloxacin Hives and Itching       Current Meds:   Scheduled Meds:    furosemide  20 mg Oral Daily    aspirin  81 mg Oral Daily    budesonide-formoterol  2 puff Inhalation BID RT    calcium carb-cholecalciferol  1 tablet Oral TID AC    rosuvastatin  20 mg Oral Daily    sodium chloride flush  5-40 mL IntraVENous 2 times per day    enoxaparin  40 mg 
bruit, no JVD, trachea midline and thyroid not enlarged  Lungs: clear to auscultation bilaterally  Heart: regular rate and rhythm, S1, S2 normal, no murmur  Abdomen: soft, bowel sounds normal  Extremities: no edema or swelling     EKG:   Normal sinus rhythm followed by non-sustained v-tach after every sinus beat        ECHO: 7/22/24  Left Ventricular Ejection Fraction  % 30   LEFT VENTRICULAR EJECTION FRACTION MODE Echo   Left Ventricular Ejection Fraction High Value  % 35         6/6/24    Left Ventricle: Moderately reduced left ventricular systolic function with a visually estimated EF of 35 - 40%. See diagram for wall motion findings.    Right Ventricle: Reduced systolic function.    Aortic Valve: Valve structure is normal.    Mitral Valve: Mild regurgitation.    Pericardium: No pericardial effusion.    Image quality is adequate. Contrast used: Definity.         Stress Test:         6/13/22     Myocardial perfusion:  Apical/anterior wall infarct  Anterolateral wall ischemia  Mild LV dysfunction        EKG   Negative EKG portion of pharmacological stress test         Cardiac Angiography:   5/16/24     Dominance is right     Findings:     Left main: Normal with 0% stenosis.     LAD: Patent proximal and mid stents with luminal irregularities of 20 to 30%     LCX: Luminal irregularities of 20 to 30%     RCA: Normal with 0% stenosis.     The LV gram was performed in the UGNTER 30 position.   LVEF: 35%. LV Wall Motion: Anteroapical akinesis     Conclusions:  Patent prior LAD stents.  Minimal LCx and RCA disease  Moderately reduced LV systolic function     Recommendation:  Medical treatments.  Risk factors modifications.     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

## 2025-03-26 ENCOUNTER — HOSPITAL ENCOUNTER (OUTPATIENT)
Dept: OTHER | Age: 75
Discharge: HOME | End: 2025-03-26
Payer: MEDICARE

## 2025-03-26 VITALS
HEART RATE: 44 BPM | RESPIRATION RATE: 16 BRPM | WEIGHT: 183.8 LBS | SYSTOLIC BLOOD PRESSURE: 100 MMHG | BODY MASS INDEX: 34.73 KG/M2 | DIASTOLIC BLOOD PRESSURE: 75 MMHG | OXYGEN SATURATION: 97 %

## 2025-03-26 DIAGNOSIS — I50.20 HFREF (HEART FAILURE WITH REDUCED EJECTION FRACTION) (HCC): Primary | ICD-10-CM

## 2025-03-26 DIAGNOSIS — I25.5 ISCHEMIC CARDIOMYOPATHY: ICD-10-CM

## 2025-03-26 DIAGNOSIS — R00.1 BRADYCARDIA: ICD-10-CM

## 2025-03-26 DIAGNOSIS — Z87.891 FORMER SMOKER: ICD-10-CM

## 2025-03-26 PROBLEM — I16.1 HYPERTENSIVE EMERGENCY: Status: RESOLVED | Noted: 2024-07-19 | Resolved: 2025-03-26

## 2025-03-26 PROBLEM — J96.01 ACUTE RESPIRATORY FAILURE WITH HYPOXIA: Status: RESOLVED | Noted: 2024-07-19 | Resolved: 2025-03-26

## 2025-03-26 PROCEDURE — 99215 OFFICE O/P EST HI 40 MIN: CPT | Performed by: NURSE PRACTITIONER

## 2025-03-26 PROCEDURE — 99212 OFFICE O/P EST SF 10 MIN: CPT

## 2025-03-26 RX ORDER — FUROSEMIDE 20 MG/1
20 TABLET ORAL SEE ADMIN INSTRUCTIONS
COMMUNITY

## 2025-03-26 ASSESSMENT — ENCOUNTER SYMPTOMS
COUGH: 1
SHORTNESS OF BREATH: 1
EYE DISCHARGE: 0
ABDOMINAL PAIN: 0
BLOOD IN STOOL: 0

## 2025-03-26 NOTE — PROGRESS NOTES
CHF Clinic at [facility]    Office: [phone] Fax: [phone]    [facility] CHF CLINIC  [address] [address]  Dept: [phone]  Loc: [phone]    Coni Baker is a 74 y.o. female who presents today for CHF evaluation.       HPI:     Here with son  +  shortness of breath w/ exertion, none at rest  +   fatigue   +  Edema this past week, took lasix 20 mg  for hand  and ankle swelling. She has lasix 20 mg TID prn for edema. Typically takes 20 mg qd only if it is needed  Denies chest pain   Denies  palpitations   No orthopnea , nor PND    Sleeps on couch w/ 2 pillows    Pt states her HR usually under 50 bpm, however EPIC flowsheet reveals HR consistently over 50 in VS  Today HR  is 44.   Pt has not been taking home BP or HR. She is encouraged to do so.   Quit smoking 2024           Past Medical History:   Diagnosis Date    Acute respiratory failure with hypoxia (HCC) 07/19/2024    Atrial fibrillation (HCC)     CAD (coronary artery disease)     CHB (complete heart block) (HCC)     CHF (congestive heart failure) (HCC)     Hx of myocardial infarction 01/01/2008    Hyperlipidemia     Hypertension     Hypertensive emergency 07/19/2024    SOBOE (shortness of breath on exertion)      Past Surgical History:   Procedure Laterality Date    CARDIAC PROCEDURE N/A 05/16/2024    diane / Left heart cath / coronary angiography / op scmh performed by Moises Dunbar MD at [facility] [place] CARDIAC CATH LAB    COLONOSCOPY      WITH POLYPECTOMY     COLONOSCOPY N/A 03/24/2022    COLONOSCOPY WITH BIOPSY performed by Naldo Milton MD at [facility] ENDO    CORONARY ANGIOPLASTY WITH STENT PLACEMENT      STENTS X4    EP DEVICE PROCEDURE N/A 05/21/2024    Abbott ETHELD elaine / Ablation V-tach / rm 2003 performed by Juan Pablo Cordero MD at [facility] [place] CARDIAC CATH LAB    SHOULDER SURGERY Right 02/01/2016    arthroscopy& mini open rotator cuff repair

## 2025-04-02 ENCOUNTER — TELEPHONE (OUTPATIENT)
Dept: OTHER | Age: 75
End: 2025-04-02

## 2025-04-09 ENCOUNTER — TELEPHONE (OUTPATIENT)
Dept: OTHER | Age: 75
End: 2025-04-09

## 2025-04-09 NOTE — TELEPHONE ENCOUNTER
Called for 2 week follow up. Pt denies any c/o SOB or swelling. Discussed 2000 mg low sodium diet and 64 oz fluid restriction. Pt states she has been doing good with both. Reminded of upcoming appointment.

## 2025-04-16 ENCOUNTER — HOSPITAL ENCOUNTER (OUTPATIENT)
Dept: OTHER | Age: 75
Discharge: HOME | End: 2025-04-16
Payer: MEDICARE

## 2025-04-16 VITALS
WEIGHT: 188.6 LBS | BODY MASS INDEX: 35.64 KG/M2 | DIASTOLIC BLOOD PRESSURE: 84 MMHG | RESPIRATION RATE: 16 BRPM | SYSTOLIC BLOOD PRESSURE: 114 MMHG | OXYGEN SATURATION: 95 % | HEART RATE: 43 BPM

## 2025-04-16 DIAGNOSIS — I50.22 CHRONIC SYSTOLIC CONGESTIVE HEART FAILURE (HCC): Primary | ICD-10-CM

## 2025-04-16 DIAGNOSIS — R00.1 BRADYCARDIA: ICD-10-CM

## 2025-04-16 DIAGNOSIS — J44.9 CHRONIC OBSTRUCTIVE PULMONARY DISEASE, UNSPECIFIED COPD TYPE (HCC): ICD-10-CM

## 2025-04-16 PROBLEM — F17.200 CURRENT SMOKER: Status: RESOLVED | Noted: 2024-08-05 | Resolved: 2025-04-16

## 2025-04-16 PROCEDURE — 99214 OFFICE O/P EST MOD 30 MIN: CPT | Performed by: NURSE PRACTITIONER

## 2025-04-16 PROCEDURE — 99212 OFFICE O/P EST SF 10 MIN: CPT

## 2025-04-16 RX ORDER — AMIODARONE HYDROCHLORIDE 200 MG/1
200 TABLET ORAL 2 TIMES DAILY
COMMUNITY

## 2025-04-16 RX ORDER — SPIRONOLACTONE 25 MG/1
12.5 TABLET ORAL DAILY
Qty: 30 TABLET | Refills: 1 | Status: SHIPPED | OUTPATIENT
Start: 2025-04-16

## 2025-04-16 ASSESSMENT — ENCOUNTER SYMPTOMS
ABDOMINAL PAIN: 0
COUGH: 1
BLOOD IN STOOL: 0
EYE DISCHARGE: 0
SHORTNESS OF BREATH: 1

## 2025-04-16 NOTE — PROGRESS NOTES
CHF Clinic at [facility]    Office: [phone] Fax: [phone]    [facility] CHF CLINIC  [address] [address]  Dept: [phone]  Loc: [phone]    Coni Baker is a 74 y.o. female who presents today for CHF evaluation.       HPI:     +  shortness of breath w/ exertion, has been going to grocery , to [employer], none at rest  Was SOB today b/c drove here alone , got seated walker out of car and set up for herself, then walked to  CHF Clinic  H/o copd, has symbicort to use    +   fatigue   +  Edema this past week, takes lasix 20 mg PRN   for hand  and ankle swelling.   Denies chest pain   Denies  palpitations   No orthopnea , nor PND    Sleeps on couch w/ 2 pillows    Pt states her HR usually under 50 bpm, however EPIC flowsheet reveals HR consistently over 50 in VS  Today HR  is 43 per auto cuff reading, per ausculation 54bpm   Pt taking home wt.     Quit smoking 2024     H/o Hypokalemia  Lasix prn TID, takes rarely     Reports some high Na+ foods, like kelli seasoning onion soup   Pt reports moderate compliance with fluids <= 2L .  The patient reports some non compliance with low Na+ intake.     Wt up 5 lbs, no real wt gain on her home wt log              Past Medical History:   Diagnosis Date    Acute respiratory failure with hypoxia 07/19/2024    Atrial fibrillation (HCC)     CAD (coronary artery disease)     CHB (complete heart block) (HCC)     CHF (congestive heart failure) (HCC)     Current smoker 08/05/2024    Hx of myocardial infarction 01/01/2008    Hyperlipidemia     Hypertension     Hypertensive emergency 07/19/2024    SOBOE (shortness of breath on exertion)      Past Surgical History:   Procedure Laterality Date    CARDIAC PROCEDURE N/A 05/16/2024    diane / Left heart cath / coronary angiography / op scmh performed by Moises Dunbar MD at [facility] CARDIAC CATH LAB    COLONOSCOPY      WITH

## 2025-04-18 ENCOUNTER — HOSPITAL ENCOUNTER (EMERGENCY)
Age: 75
Discharge: HOME | End: 2025-04-18
Attending: EMERGENCY MEDICINE
Payer: MEDICARE

## 2025-04-18 VITALS
OXYGEN SATURATION: 95 % | SYSTOLIC BLOOD PRESSURE: 142 MMHG | HEART RATE: 60 BPM | RESPIRATION RATE: 18 BRPM | TEMPERATURE: 98.2 F | DIASTOLIC BLOOD PRESSURE: 95 MMHG

## 2025-04-18 DIAGNOSIS — B37.89 CANDIDA RASH OF GROIN: Primary | ICD-10-CM

## 2025-04-18 LAB
ALBUMIN SERPL-MCNC: 4 G/DL (ref 3.5–5.2)
ALBUMIN/GLOB SERPL: 1.5 {RATIO} (ref 1–2.5)
ALP SERPL-CCNC: 91 U/L (ref 35–104)
ALT SERPL-CCNC: 16 U/L (ref 10–35)
ANION GAP SERPL CALCULATED.3IONS-SCNC: 12 MMOL/L (ref 9–16)
AST SERPL-CCNC: 27 U/L (ref 10–35)
BASOPHILS # BLD: 0.03 K/UL (ref 0–0.2)
BASOPHILS NFR BLD: 1 % (ref 0–2)
BILIRUB SERPL-MCNC: 0.9 MG/DL (ref 0–1.2)
BUN SERPL-MCNC: 17 MG/DL (ref 8–23)
CALCIUM SERPL-MCNC: 10 MG/DL (ref 8.6–10.4)
CHLORIDE SERPL-SCNC: 106 MMOL/L (ref 98–107)
CO2 SERPL-SCNC: 24 MMOL/L (ref 20–31)
CREAT SERPL-MCNC: 0.9 MG/DL (ref 0.6–0.9)
EOSINOPHIL # BLD: 0.09 K/UL (ref 0–0.44)
EOSINOPHILS RELATIVE PERCENT: 2 % (ref 1–4)
ERYTHROCYTE [DISTWIDTH] IN BLOOD BY AUTOMATED COUNT: 14.4 % (ref 11.8–14.4)
GFR, ESTIMATED: 67 ML/MIN/1.73M2
GLUCOSE SERPL-MCNC: 88 MG/DL (ref 74–99)
HCT VFR BLD AUTO: 39.4 % (ref 36.3–47.1)
HGB BLD-MCNC: 12.9 G/DL (ref 11.9–15.1)
IMM GRANULOCYTES # BLD AUTO: <0.03 K/UL (ref 0–0.3)
IMM GRANULOCYTES NFR BLD: 0 %
LYMPHOCYTES NFR BLD: 1.56 K/UL (ref 1.1–3.7)
LYMPHOCYTES RELATIVE PERCENT: 29 % (ref 24–43)
MAGNESIUM SERPL-MCNC: 1.9 MG/DL (ref 1.6–2.4)
MCH RBC QN AUTO: 30.4 PG (ref 25.2–33.5)
MCHC RBC AUTO-ENTMCNC: 32.7 G/DL (ref 28.4–34.8)
MCV RBC AUTO: 92.7 FL (ref 82.6–102.9)
MONOCYTES NFR BLD: 0.48 K/UL (ref 0.1–1.2)
MONOCYTES NFR BLD: 9 % (ref 3–12)
NEUTROPHILS NFR BLD: 59 % (ref 36–65)
NEUTS SEG NFR BLD: 3.23 K/UL (ref 1.5–8.1)
NRBC BLD-RTO: 0 PER 100 WBC
PLATELET # BLD AUTO: 154 K/UL (ref 138–453)
PMV BLD AUTO: 9.3 FL (ref 8.1–13.5)
POTASSIUM SERPL-SCNC: 4.6 MMOL/L (ref 3.7–5.3)
PROT SERPL-MCNC: 6.7 G/DL (ref 6.6–8.7)
RBC # BLD AUTO: 4.25 M/UL (ref 3.95–5.11)
SODIUM SERPL-SCNC: 142 MMOL/L (ref 136–145)
WBC OTHER # BLD: 5.4 K/UL (ref 3.5–11.3)

## 2025-04-18 PROCEDURE — 2500000003 HC RX 250 WO HCPCS: Performed by: EMERGENCY MEDICINE

## 2025-04-18 PROCEDURE — 83735 ASSAY OF MAGNESIUM: CPT

## 2025-04-18 PROCEDURE — 99284 EMERGENCY DEPT VISIT MOD MDM: CPT

## 2025-04-18 PROCEDURE — 80053 COMPREHEN METABOLIC PANEL: CPT

## 2025-04-18 PROCEDURE — 85025 COMPLETE CBC W/AUTO DIFF WBC: CPT

## 2025-04-18 RX ADMIN — MICONAZOLE NITRATE: 20 POWDER TOPICAL at 19:17

## 2025-04-18 ASSESSMENT — ENCOUNTER SYMPTOMS
SHORTNESS OF BREATH: 0
VOMITING: 0
ABDOMINAL PAIN: 0
DIARRHEA: 0
NAUSEA: 0

## 2025-04-18 ASSESSMENT — PAIN - FUNCTIONAL ASSESSMENT: PAIN_FUNCTIONAL_ASSESSMENT: NONE - DENIES PAIN

## 2025-04-18 NOTE — ED NOTES
Pt arrives to ed with family from home.   Pt has ablation and pacemaker placement on 3/8/25. Pt states the left side of her groin looks red and irritated and she is concerned for possible infection.   Pt denies chest pain, sob, difficulty breathing. Pt has been eating and drinking well. Pt took all her daily medication today.   Pt placed on monitor and HR in the low to mid 30's, pt logs her BP and HR daily and pt is frequently in the 30-60's and is asymptomatic. Pt rhythm is irregular.

## 2025-04-18 NOTE — ED PROVIDER NOTES
[facility] EMERGENCY DEPARTMENT  Emergency Department Encounter  Emergency Medicine Resident     Pt Name:Coni Baker  MRN: [id number]  Birthdate 1950  Date of evaluation: 4/18/25  PCP:  Earlene Salcedo MD  Note Started: 5:07 PM EDT      CHIEF COMPLAINT       Chief Complaint   Patient presents with    Wound Check       HISTORY OF PRESENT ILLNESS  (Location/Symptom, Timing/Onset, Context/Setting, Quality, Duration, Modifying Factors, Severity.)      Coni Baker is a 74 y.o. female who presents with concern for wound check in bilateral groins.  Patient had pacemaker placed at [facility] in March and they accessed both groins at that time.  She was told to keep the area clean so she has been using warm compress to the area.  She also did take a bath a couple of days ago.  Since then has noticed some increased redness in both groins but more so on the left side.  She denies any chest pain, or palpitations.  AICD has not fired.  She does have some shortness of breath intermittently but she states this is baseline for her with \"hurried activity\".  On arrival patient is noted to be bradycardic to 36.  She is awake, alert, oriented    PAST MEDICAL / SURGICAL / SOCIAL / FAMILY HISTORY      has a past medical history of Acute respiratory failure with hypoxia, Atrial fibrillation (HCC), CAD (coronary artery disease), CHB (complete heart block) (HCC), CHF (congestive heart failure) (HCC), Current smoker, Hx of myocardial infarction, Hyperlipidemia, Hypertension, Hypertensive emergency, and SOBOE (shortness of breath on exertion).       has a past surgical history that includes Tubal ligation; Coronary angioplasty with stent; Colonoscopy; shoulder surgery (Right, 02/01/2016); Colonoscopy (N/A, 03/24/2022); Cardiac procedure (N/A, 05/16/2024); and ep device procedure (N/A, 05/21/2024).      Social History     Socioeconomic History    Marital status:      Spouse name: Not on file    Number of

## 2025-04-18 NOTE — ED PROVIDER NOTES
[facility]     Emergency Department     Faculty Attestation    I performed a history and physical examination of the patient and discussed management with the resident. I reviewed the resident’s note and agree with the documented findings including all diagnostic interpretations and plan of care. Any areas of disagreement are noted on the chart. I was personally present for the key portions of any procedures. I have documented in the chart those procedures where I was not present during the key portions. I have reviewed the emergency nurses triage note. I agree with the chief complaint, past medical history, past surgical history, allergies, medications, social and family history as documented unless otherwise noted below. Documentation of the HPI, Physical Exam and Medical Decision Making performed by mayra is based on my personal performance of the HPI, PE and MDM. For Physician Assistant/ Nurse Practitioner cases/documentation I have personally evaluated this patient and have completed at least one if not all key elements of the E/M (history, physical exam, and MDM). Additional findings are as noted.    Primary Care Physician: Earlene Salcedo MD    Note Started: 6:43 PM EDT     VITAL SIGNS:   axillary temperature is 98.2 °F (36.8 °C). Her blood pressure is 142/95 (abnormal) and her pulse is 60. Her respiration is 18 and oxygen saturation is 95%.      Medical Decision Making  Wound care, incidental abnormality on cardiac monitor.  Patient had recent catheterization with bilateral groin access.  Patient has been using warm compresses since leaving the access and has numbness and pain at the site.  There is appearance of fungal infection in the groin folds.  Vomiting evaluated was noted that she had episodes of bradycardia where the pacemaker was not always taking over.  Patient has no complaints at this time other than she sometimes feels skipped

## 2025-04-18 NOTE — DISCHARGE INSTRUCTIONS
You were seen today for wounds in your groins.  This appears to be a fungal infection.  I have started you on a fungal powder.  The best thing to do for this is to keep the area dry.  Please use the powder as prescribed.  Your lab work was normal today.  Your pacemaker evaluation had no activity since your testing on the eighth.    If you notice any concerning symptoms please return to the ER immediately. These can include but are not limited to: fevers, chills, shortness of breath, vomiting, weakness of the extremities, changes in your mental status, numbness, pale extremities, or chest pain.     Wound care: please keep clean and dry.     Diet: You may resume your normal diet     Activity: resume activity as tolerated.     Medications: Continue taking your home medications as previously directed. For pain You may take tylenol 1,000mg by mouth every 6 hours as needed for pain. Do not exceed 4,000mg per day. If you have liver disease don't take tylenol. You may also take ibuprofen 600mg every 6-8 hours as needed for pain. Do not exceed 2,400 mg per day. If you experience stomach pain or you have a history of kidney disease stop taking ibuprofen. You may alternate application of ice and heat 20 minutes at a time as desired.      Please use powder as prescribed    Follow up: Please follow up with your primary care doctor within one week.  Please follow-up with your cardiologist

## 2025-04-25 ENCOUNTER — HOSPITAL ENCOUNTER (OUTPATIENT)
Age: 75
Discharge: HOME | End: 2025-04-25
Payer: MEDICARE

## 2025-04-25 ENCOUNTER — TELEPHONE (OUTPATIENT)
Dept: OTHER | Age: 75
End: 2025-04-25

## 2025-04-25 ENCOUNTER — RESULTS FOLLOW-UP (OUTPATIENT)
Dept: OTHER | Age: 75
End: 2025-04-25

## 2025-04-25 DIAGNOSIS — I50.22 CHRONIC SYSTOLIC CONGESTIVE HEART FAILURE (HCC): ICD-10-CM

## 2025-04-25 DIAGNOSIS — I50.22 CHRONIC SYSTOLIC CONGESTIVE HEART FAILURE (HCC): Primary | ICD-10-CM

## 2025-04-25 LAB
ANION GAP SERPL CALCULATED.3IONS-SCNC: 10 MMOL/L (ref 9–16)
BUN SERPL-MCNC: 31 MG/DL (ref 8–23)
CALCIUM SERPL-MCNC: 9.5 MG/DL (ref 8.6–10.4)
CHLORIDE SERPL-SCNC: 104 MMOL/L (ref 98–107)
CO2 SERPL-SCNC: 25 MMOL/L (ref 20–31)
CREAT SERPL-MCNC: 1.1 MG/DL (ref 0.7–1.2)
GFR, ESTIMATED: 52 ML/MIN/1.73M2
GLUCOSE SERPL-MCNC: 88 MG/DL (ref 74–99)
POTASSIUM SERPL-SCNC: 5.1 MMOL/L (ref 3.7–5.3)
SODIUM SERPL-SCNC: 139 MMOL/L (ref 136–145)

## 2025-04-25 PROCEDURE — 80048 BASIC METABOLIC PNL TOTAL CA: CPT

## 2025-04-25 PROCEDURE — 36415 COLL VENOUS BLD VENIPUNCTURE: CPT

## 2025-04-25 NOTE — TELEPHONE ENCOUNTER
Called had to leave a message told to increase fluid intake to 8 to 16 ounces per day and get repeat lab s in 3 weeks also asked for patient to call us back so I know she received these orders    Patient pockets emptied. Thorough search completed by staff and security. Pt was placed in hospital attire. Belongings were logged on personal belongings sheet. Room was swept for any potential safety hazards room cleared and patient placed in treatment room. Ready for evaluation.

## 2025-04-26 LAB
EKG ATRIAL RATE: 60 BPM
EKG P AXIS: 29 DEGREES
EKG P-R INTERVAL: 134 MS
EKG Q-T INTERVAL: 510 MS
EKG QRS DURATION: 158 MS
EKG QTC CALCULATION (BAZETT): 510 MS
EKG R AXIS: 133 DEGREES
EKG T AXIS: 52 DEGREES
EKG VENTRICULAR RATE: 60 BPM

## 2025-05-14 ENCOUNTER — HOSPITAL ENCOUNTER (OUTPATIENT)
Age: 75
Discharge: HOME | End: 2025-05-14
Payer: MEDICARE

## 2025-05-14 ENCOUNTER — HOSPITAL ENCOUNTER (OUTPATIENT)
Dept: OTHER | Age: 75
Discharge: HOME | End: 2025-05-14
Payer: MEDICARE

## 2025-05-14 ENCOUNTER — TELEPHONE (OUTPATIENT)
Dept: OTHER | Age: 75
End: 2025-05-14

## 2025-05-14 ENCOUNTER — RESULTS FOLLOW-UP (OUTPATIENT)
Dept: CARDIOLOGY CLINIC | Age: 75
End: 2025-05-14

## 2025-05-14 VITALS
DIASTOLIC BLOOD PRESSURE: 98 MMHG | SYSTOLIC BLOOD PRESSURE: 129 MMHG | RESPIRATION RATE: 18 BRPM | HEART RATE: 55 BPM | WEIGHT: 186.4 LBS | OXYGEN SATURATION: 97 % | BODY MASS INDEX: 35.22 KG/M2

## 2025-05-14 DIAGNOSIS — I50.22 CHRONIC SYSTOLIC CONGESTIVE HEART FAILURE (HCC): ICD-10-CM

## 2025-05-14 DIAGNOSIS — R00.1 BRADYCARDIA: ICD-10-CM

## 2025-05-14 DIAGNOSIS — I50.20 HFREF (HEART FAILURE WITH REDUCED EJECTION FRACTION) (HCC): Primary | ICD-10-CM

## 2025-05-14 LAB
ANION GAP SERPL CALCULATED.3IONS-SCNC: 9 MMOL/L (ref 9–16)
BUN SERPL-MCNC: 24 MG/DL (ref 8–23)
CALCIUM SERPL-MCNC: 8.9 MG/DL (ref 8.6–10.4)
CHLORIDE SERPL-SCNC: 109 MMOL/L (ref 98–107)
CO2 SERPL-SCNC: 24 MMOL/L (ref 20–31)
CREAT SERPL-MCNC: 1 MG/DL (ref 0.7–1.2)
GFR, ESTIMATED: 59 ML/MIN/1.73M2
GLUCOSE SERPL-MCNC: 106 MG/DL (ref 74–99)
POTASSIUM SERPL-SCNC: 4.7 MMOL/L (ref 3.7–5.3)
SODIUM SERPL-SCNC: 142 MMOL/L (ref 136–145)

## 2025-05-14 PROCEDURE — 80048 BASIC METABOLIC PNL TOTAL CA: CPT

## 2025-05-14 PROCEDURE — 36415 COLL VENOUS BLD VENIPUNCTURE: CPT

## 2025-05-14 PROCEDURE — 99212 OFFICE O/P EST SF 10 MIN: CPT

## 2025-05-14 ASSESSMENT — ENCOUNTER SYMPTOMS
ABDOMINAL PAIN: 0
COUGH: 1
BLOOD IN STOOL: 0
EYE DISCHARGE: 0
SHORTNESS OF BREATH: 1

## 2025-05-14 NOTE — PROGRESS NOTES
CHF Clinic at [facility]    Office: [phone] Fax: [phone]    [facility] CHF CLINIC  [address] [address]  Dept: [phone]  Loc: [phone]    Coni Baker is a 74 y.o. female who presents today for CHF evaluation.       HPI:     +  shortness of breath w/ exertion  W/ long walk and carrying 5 lbs load, going to basement to do laundry   H/o copd, has symbicort to use    +   fatigue   No Edema   lasix 20 mg PRN     Denies chest pain   Denies  palpitations   No orthopnea , nor PND    Sleeps on couch w/ 2 pillows    Pt states her HR usually under 50 bpm, however EPIC flowsheet reveals HR consistently over 50 in VS  Today HR  is 36  per auto BP  cuff reading, per pulse ox :55 bpm   Pt taking home wt.     Quit smoking 2024     H/o Hypokalemia  Lasix prn TID, takes rarely     Better w/ high Na+ foods   Pt reports moderate compliance with fluids <= 2L .  The patient reports some non compliance with low Na+ intake.     Wt down 2 lbs             Past Medical History:   Diagnosis Date    Acute respiratory failure with hypoxia (HCC) 07/19/2024    Atrial fibrillation (HCC)     CAD (coronary artery disease)     CHB (complete heart block) (HCC)     CHF (congestive heart failure) (HCC)     Current smoker 08/05/2024    Hx of myocardial infarction 01/01/2008    Hyperlipidemia     Hypertension     Hypertensive emergency 07/19/2024    SOBOE (shortness of breath on exertion)      Past Surgical History:   Procedure Laterality Date    CARDIAC PROCEDURE N/A 05/16/2024    diane / Left heart cath / coronary angiography / op scmh performed by Moises Dunbar MD at [facility] CARDIAC CATH LAB    COLONOSCOPY      WITH POLYPECTOMY     COLONOSCOPY N/A 03/24/2022    COLONOSCOPY WITH BIOPSY performed by Naldo Milton MD at [facility] ENDO    CORONARY ANGIOPLASTY WITH STENT PLACEMENT      STENTS X4    EP DEVICE PROCEDURE N/A

## 2025-05-14 NOTE — TELEPHONE ENCOUNTER
Called [facility] regarding pt's HR of 36 while in office. Spoke with Lisa at Dr. Handy's office. Updated her on office visit and reviewed medications and vitals. Lisa cruz will relay message to Dr. Handy and call patient if any changes need to be made  
(V5) oriented, appropriate

## 2025-06-09 ENCOUNTER — HOSPITAL ENCOUNTER (OUTPATIENT)
Dept: OTHER | Age: 75
Discharge: HOME | End: 2025-06-09
Payer: MEDICARE

## 2025-06-09 VITALS
HEART RATE: 59 BPM | DIASTOLIC BLOOD PRESSURE: 70 MMHG | BODY MASS INDEX: 35.41 KG/M2 | OXYGEN SATURATION: 96 % | SYSTOLIC BLOOD PRESSURE: 132 MMHG | WEIGHT: 187.4 LBS | RESPIRATION RATE: 16 BRPM

## 2025-06-09 PROCEDURE — 99212 OFFICE O/P EST SF 10 MIN: CPT

## 2025-06-09 NOTE — PROGRESS NOTES
Date:  2025  Time:  3:00 PM    CHF Clinic at [facility]    Office: [phone] Fax: [phone]    Re:  Coin Baker   Patient : 1950    Vital Signs: /70   Pulse 59   Resp 16   Wt 85 kg (187 lb 6.4 oz)   SpO2 96%   BMI 35.41 kg/m²                                                   No results for input(s): \"CBC\", \"HGB\", \"HCT\", \"WBC\", \"PLATELET\", \"NA\", \"K\", \"CL\", \"CO2\", \"BUN\", \"CREATININE\", \"GLUCOSE\", \"BNP\", \"INR\" in the last 72 hours.     Respiratory:    Assessment  Charting Type: Reassessment    Breath Sounds  Right Upper Lobe: Clear  Right Middle Lobe: Clear  Right Lower Lobe: Clear  Left Upper Lobe: Clear  Left Lower Lobe: Clear              Peripheral Vascular  RLE Edema: None  LLE Edema: None    Future Appointments   Date Time Provider Department Center   6/10/2025  4:00 PM Earlene Salcedo MD [facility]   2025 10:30 AM Velvet Lopez MD [facility]   2025  3:00 PM [facility] CHF CLINIC  1 [facility] CHF [facility]      Complaints: No new orders     Physician Orders No new orders     Comment : Weight is up only 1 pound from last visit,although has been having a lot of high sodium foods as well as going over the 64 ounces of fluid per day. GAve phamplets on low sodium diet foods and reinforced 64 ounces or less. Has no pedal edema,lungs are clear. Has appt. With  25 but states wants to stick with [facility] cardiologist she will discuss with  at this appt. We will see in 1 moth 2025    Electronically signed by Cecily Camarillo RN on 2025 at 3:00 PM

## 2025-07-07 ENCOUNTER — HOSPITAL ENCOUNTER (OUTPATIENT)
Dept: OTHER | Age: 75
Discharge: HOME | End: 2025-07-07
Payer: MEDICARE

## 2025-07-07 VITALS
RESPIRATION RATE: 16 BRPM | SYSTOLIC BLOOD PRESSURE: 120 MMHG | WEIGHT: 187.4 LBS | BODY MASS INDEX: 35.41 KG/M2 | HEART RATE: 59 BPM | OXYGEN SATURATION: 97 % | DIASTOLIC BLOOD PRESSURE: 74 MMHG

## 2025-07-07 PROCEDURE — 99212 OFFICE O/P EST SF 10 MIN: CPT

## 2025-07-07 NOTE — PROGRESS NOTES
Date:  2025  Time:  3:51 PM    CHF Clinic at [facility]    Office: [phone] Fax: [phone]    Re:  Coni Baker   Patient : 1950    Vital Signs: /74   Pulse 59   Resp 16   Wt 85 kg (187 lb 6.4 oz)   SpO2 97%   BMI 35.41 kg/m²                       O2 Device: None (Room air)                           No results for input(s): \"CBC\", \"HGB\", \"HCT\", \"WBC\", \"PLATELET\", \"NA\", \"K\", \"CL\", \"CO2\", \"BUN\", \"CREATININE\", \"GLUCOSE\", \"BNP\", \"INR\" in the last 72 hours.     Respiratory:    Assessment  Charting Type: Reassessment    Breath Sounds  Respiratory Pattern: Regular  Right Upper Lobe: Clear  Right Middle Lobe: Diminished  Right Lower Lobe: Diminished  Left Upper Lobe: Clear  Left Lower Lobe: Diminished    Cough/Sputum  Cough: Non-productive  Frequency: Occasional  Sputum Color: None         Peripheral Vascular  RLE Edema: None  LLE Edema: None    Future Appointments   Date Time Provider Department Center   2025  3:00 PM [facility] CHF CLINIC  [facility] CHF [facility]   2025 10:00 AM Velvet Lopez MD [facility]   12/15/2025  4:00 PM Earlene Salcedo MD [facility] DEP      Complaints: Pt denies any CHF concerns or complaints at this time    Comment : Pt arrived via  from [facility] for follow-up appt. Pts wt 187.4 lbs, which is unchanged from last CHF appt. Lungs clear, diminished throughout. No edema noted. Pt denies SOB at rest, but does get with exertion. Ankle and calf measurements stable. No s/s acute CHF noted at this time. Reviewed home meds. Pt verbalized compliance with medications. Pt admits she \"sometimes goes over\" on her 2000 mg low sodium diet and 64 oz fluid restriction. \"I'm really trying though and am more aware of how much salt is in the food I eat.\" Pt states she does weigh herself daily at home. Pt has [employer] ICD. Reinforced importance of dietary restrictions and educated on low sodium foods. Encouraged pt to call CHF

## 2025-07-28 ENCOUNTER — APPOINTMENT (OUTPATIENT)
Dept: GENERAL RADIOLOGY | Age: 75
DRG: 690 | End: 2025-07-28
Payer: MEDICARE

## 2025-07-28 ENCOUNTER — HOSPITAL ENCOUNTER (INPATIENT)
Age: 75
LOS: 1 days | Discharge: HOME | DRG: 690 | End: 2025-07-31
Attending: EMERGENCY MEDICINE | Admitting: STUDENT IN AN ORGANIZED HEALTH CARE EDUCATION/TRAINING PROGRAM
Payer: MEDICARE

## 2025-07-28 DIAGNOSIS — I50.9 CONGESTIVE HEART FAILURE, UNSPECIFIED HF CHRONICITY, UNSPECIFIED HEART FAILURE TYPE (HCC): ICD-10-CM

## 2025-07-28 DIAGNOSIS — I42.9 CARDIOMYOPATHY, UNSPECIFIED TYPE (HCC): ICD-10-CM

## 2025-07-28 DIAGNOSIS — M25.551 RIGHT HIP PAIN: Primary | ICD-10-CM

## 2025-07-28 PROBLEM — I25.10 CORONARY ARTERY DISEASE INVOLVING NATIVE CORONARY ARTERY OF NATIVE HEART WITHOUT ANGINA PECTORIS: Status: ACTIVE | Noted: 2025-07-28

## 2025-07-28 PROBLEM — J96.00 ACUTE RESPIRATORY FAILURE (HCC): Status: ACTIVE | Noted: 2025-07-28

## 2025-07-28 PROBLEM — E87.8 ELECTROLYTE IMBALANCE: Status: ACTIVE | Noted: 2025-07-28

## 2025-07-28 LAB
ANION GAP SERPL CALCULATED.3IONS-SCNC: 12 MMOL/L (ref 9–16)
B PARAP IS1001 DNA NPH QL NAA+NON-PROBE: NOT DETECTED
B PERT DNA SPEC QL NAA+PROBE: NOT DETECTED
BASOPHILS # BLD: <0.03 K/UL (ref 0–0.2)
BASOPHILS NFR BLD: 0 % (ref 0–2)
BNP SERPL-MCNC: 1534 PG/ML (ref 0–450)
BUN SERPL-MCNC: 16 MG/DL (ref 8–23)
C PNEUM DNA NPH QL NAA+NON-PROBE: NOT DETECTED
CALCIUM SERPL-MCNC: 8.3 MG/DL (ref 8.6–10.4)
CHLORIDE SERPL-SCNC: 98 MMOL/L (ref 98–107)
CO2 SERPL-SCNC: 24 MMOL/L (ref 20–31)
CREAT SERPL-MCNC: 1 MG/DL (ref 0.6–0.9)
EOSINOPHIL # BLD: <0.03 K/UL (ref 0–0.44)
EOSINOPHILS RELATIVE PERCENT: 0 % (ref 1–4)
ERYTHROCYTE [DISTWIDTH] IN BLOOD BY AUTOMATED COUNT: 13.9 % (ref 11.8–14.4)
FLUAV RNA NPH QL NAA+NON-PROBE: NOT DETECTED
FLUBV RNA NPH QL NAA+NON-PROBE: NOT DETECTED
GFR, ESTIMATED: 59 ML/MIN/1.73M2
GLUCOSE SERPL-MCNC: 100 MG/DL (ref 74–99)
HADV DNA NPH QL NAA+NON-PROBE: NOT DETECTED
HCOV 229E RNA NPH QL NAA+NON-PROBE: NOT DETECTED
HCOV HKU1 RNA NPH QL NAA+NON-PROBE: NOT DETECTED
HCOV NL63 RNA NPH QL NAA+NON-PROBE: NOT DETECTED
HCOV OC43 RNA NPH QL NAA+NON-PROBE: NOT DETECTED
HCT VFR BLD AUTO: 36.2 % (ref 36.3–47.1)
HGB BLD-MCNC: 11.9 G/DL (ref 11.9–15.1)
HMPV RNA NPH QL NAA+NON-PROBE: NOT DETECTED
HPIV1 RNA NPH QL NAA+NON-PROBE: NOT DETECTED
HPIV2 RNA NPH QL NAA+NON-PROBE: NOT DETECTED
HPIV3 RNA NPH QL NAA+NON-PROBE: NOT DETECTED
HPIV4 RNA NPH QL NAA+NON-PROBE: NOT DETECTED
IMM GRANULOCYTES # BLD AUTO: 0.03 K/UL (ref 0–0.3)
IMM GRANULOCYTES NFR BLD: 0 %
LYMPHOCYTES NFR BLD: 0.84 K/UL (ref 1.1–3.7)
LYMPHOCYTES RELATIVE PERCENT: 10 % (ref 24–43)
M PNEUMO DNA NPH QL NAA+NON-PROBE: NOT DETECTED
MAGNESIUM SERPL-MCNC: 2.4 MG/DL (ref 1.6–2.4)
MCH RBC QN AUTO: 31.1 PG (ref 25.2–33.5)
MCHC RBC AUTO-ENTMCNC: 32.9 G/DL (ref 28.4–34.8)
MCV RBC AUTO: 94.5 FL (ref 82.6–102.9)
MONOCYTES NFR BLD: 0.87 K/UL (ref 0.1–1.2)
MONOCYTES NFR BLD: 11 % (ref 3–12)
NEUTROPHILS NFR BLD: 79 % (ref 36–65)
NEUTS SEG NFR BLD: 6.36 K/UL (ref 1.5–8.1)
NRBC BLD-RTO: 0 PER 100 WBC
PHOSPHATE SERPL-MCNC: 2.3 MG/DL (ref 2.5–4.5)
PLATELET # BLD AUTO: 131 K/UL (ref 138–453)
PMV BLD AUTO: 9.2 FL (ref 8.1–13.5)
POTASSIUM SERPL-SCNC: 3.5 MMOL/L (ref 3.7–5.3)
RBC # BLD AUTO: 3.83 M/UL (ref 3.95–5.11)
RSV RNA NPH QL NAA+NON-PROBE: NOT DETECTED
RV+EV RNA NPH QL NAA+NON-PROBE: NOT DETECTED
SARS-COV-2 RNA NPH QL NAA+NON-PROBE: NOT DETECTED
SODIUM SERPL-SCNC: 134 MMOL/L (ref 136–145)
SPECIMEN DESCRIPTION: NORMAL
TROPONIN I SERPL HS-MCNC: 17 NG/L (ref 0–14)
TROPONIN I SERPL HS-MCNC: 17 NG/L (ref 0–14)
WBC OTHER # BLD: 8.1 K/UL (ref 3.5–11.3)

## 2025-07-28 PROCEDURE — 93005 ELECTROCARDIOGRAM TRACING: CPT | Performed by: EMERGENCY MEDICINE

## 2025-07-28 PROCEDURE — 80048 BASIC METABOLIC PNL TOTAL CA: CPT

## 2025-07-28 PROCEDURE — 94640 AIRWAY INHALATION TREATMENT: CPT

## 2025-07-28 PROCEDURE — 85025 COMPLETE CBC W/AUTO DIFF WBC: CPT

## 2025-07-28 PROCEDURE — 87040 BLOOD CULTURE FOR BACTERIA: CPT

## 2025-07-28 PROCEDURE — 84100 ASSAY OF PHOSPHORUS: CPT

## 2025-07-28 PROCEDURE — G0378 HOSPITAL OBSERVATION PER HR: HCPCS

## 2025-07-28 PROCEDURE — 87205 SMEAR GRAM STAIN: CPT

## 2025-07-28 PROCEDURE — 71045 X-RAY EXAM CHEST 1 VIEW: CPT

## 2025-07-28 PROCEDURE — 99222 1ST HOSP IP/OBS MODERATE 55: CPT

## 2025-07-28 PROCEDURE — 84484 ASSAY OF TROPONIN QUANT: CPT

## 2025-07-28 PROCEDURE — 83735 ASSAY OF MAGNESIUM: CPT

## 2025-07-28 PROCEDURE — 87154 CUL TYP ID BLD PTHGN 6+ TRGT: CPT

## 2025-07-28 PROCEDURE — 6370000000 HC RX 637 (ALT 250 FOR IP)

## 2025-07-28 PROCEDURE — 6360000002 HC RX W HCPCS

## 2025-07-28 PROCEDURE — 96374 THER/PROPH/DIAG INJ IV PUSH: CPT

## 2025-07-28 PROCEDURE — 2500000003 HC RX 250 WO HCPCS

## 2025-07-28 PROCEDURE — 87186 SC STD MICRODIL/AGAR DIL: CPT

## 2025-07-28 PROCEDURE — 83880 ASSAY OF NATRIURETIC PEPTIDE: CPT

## 2025-07-28 PROCEDURE — 99285 EMERGENCY DEPT VISIT HI MDM: CPT

## 2025-07-28 PROCEDURE — 0202U NFCT DS 22 TRGT SARS-COV-2: CPT

## 2025-07-28 PROCEDURE — 73502 X-RAY EXAM HIP UNI 2-3 VIEWS: CPT

## 2025-07-28 PROCEDURE — 73552 X-RAY EXAM OF FEMUR 2/>: CPT

## 2025-07-28 PROCEDURE — 94761 N-INVAS EAR/PLS OXIMETRY MLT: CPT

## 2025-07-28 RX ORDER — POLYETHYLENE GLYCOL 3350 17 G/17G
17 POWDER, FOR SOLUTION ORAL DAILY PRN
Status: DISCONTINUED | OUTPATIENT
Start: 2025-07-28 | End: 2025-07-31 | Stop reason: HOSPADM

## 2025-07-28 RX ORDER — FUROSEMIDE 20 MG/1
20 TABLET ORAL DAILY
Status: DISCONTINUED | OUTPATIENT
Start: 2025-07-29 | End: 2025-07-31 | Stop reason: HOSPADM

## 2025-07-28 RX ORDER — MAGNESIUM SULFATE 1 G/100ML
1000 INJECTION INTRAVENOUS PRN
Status: DISCONTINUED | OUTPATIENT
Start: 2025-07-28 | End: 2025-07-31 | Stop reason: HOSPADM

## 2025-07-28 RX ORDER — FUROSEMIDE 20 MG/1
20 TABLET ORAL SEE ADMIN INSTRUCTIONS
Status: DISCONTINUED | OUTPATIENT
Start: 2025-07-28 | End: 2025-07-28

## 2025-07-28 RX ORDER — SODIUM CHLORIDE 0.9 % (FLUSH) 0.9 %
5-40 SYRINGE (ML) INJECTION EVERY 12 HOURS SCHEDULED
Status: DISCONTINUED | OUTPATIENT
Start: 2025-07-28 | End: 2025-07-31 | Stop reason: HOSPADM

## 2025-07-28 RX ORDER — ONDANSETRON 2 MG/ML
4 INJECTION INTRAMUSCULAR; INTRAVENOUS EVERY 6 HOURS PRN
Status: DISCONTINUED | OUTPATIENT
Start: 2025-07-28 | End: 2025-07-31 | Stop reason: HOSPADM

## 2025-07-28 RX ORDER — VALSARTAN 40 MG/1
20 TABLET ORAL DAILY
Status: DISCONTINUED | OUTPATIENT
Start: 2025-07-29 | End: 2025-07-31 | Stop reason: HOSPADM

## 2025-07-28 RX ORDER — POTASSIUM CHLORIDE 7.45 MG/ML
10 INJECTION INTRAVENOUS PRN
Status: DISCONTINUED | OUTPATIENT
Start: 2025-07-28 | End: 2025-07-31 | Stop reason: HOSPADM

## 2025-07-28 RX ORDER — ENOXAPARIN SODIUM 100 MG/ML
40 INJECTION SUBCUTANEOUS DAILY
Status: DISCONTINUED | OUTPATIENT
Start: 2025-07-29 | End: 2025-07-31 | Stop reason: HOSPADM

## 2025-07-28 RX ORDER — SODIUM CHLORIDE 0.9 % (FLUSH) 0.9 %
10 SYRINGE (ML) INJECTION PRN
Status: DISCONTINUED | OUTPATIENT
Start: 2025-07-28 | End: 2025-07-31 | Stop reason: HOSPADM

## 2025-07-28 RX ORDER — ASPIRIN 81 MG/1
81 TABLET ORAL DAILY
Status: DISCONTINUED | OUTPATIENT
Start: 2025-07-29 | End: 2025-07-31 | Stop reason: HOSPADM

## 2025-07-28 RX ORDER — AMIODARONE HYDROCHLORIDE 200 MG/1
200 TABLET ORAL 2 TIMES DAILY
Status: DISCONTINUED | OUTPATIENT
Start: 2025-07-28 | End: 2025-07-31 | Stop reason: HOSPADM

## 2025-07-28 RX ORDER — METOPROLOL SUCCINATE 25 MG/1
25 TABLET, EXTENDED RELEASE ORAL DAILY
Status: DISCONTINUED | OUTPATIENT
Start: 2025-07-29 | End: 2025-07-31 | Stop reason: HOSPADM

## 2025-07-28 RX ORDER — ROSUVASTATIN CALCIUM 20 MG/1
20 TABLET, COATED ORAL DAILY
Status: DISCONTINUED | OUTPATIENT
Start: 2025-07-29 | End: 2025-07-31 | Stop reason: HOSPADM

## 2025-07-28 RX ORDER — ACETAMINOPHEN 325 MG/1
650 TABLET ORAL EVERY 6 HOURS PRN
Status: DISCONTINUED | OUTPATIENT
Start: 2025-07-28 | End: 2025-07-31 | Stop reason: HOSPADM

## 2025-07-28 RX ORDER — ACETAMINOPHEN 650 MG/1
650 SUPPOSITORY RECTAL EVERY 6 HOURS PRN
Status: DISCONTINUED | OUTPATIENT
Start: 2025-07-28 | End: 2025-07-31 | Stop reason: HOSPADM

## 2025-07-28 RX ORDER — METOPROLOL SUCCINATE 25 MG/1
25 TABLET, EXTENDED RELEASE ORAL NIGHTLY
Status: DISCONTINUED | OUTPATIENT
Start: 2025-07-28 | End: 2025-07-28

## 2025-07-28 RX ORDER — IPRATROPIUM BROMIDE AND ALBUTEROL SULFATE 2.5; .5 MG/3ML; MG/3ML
1 SOLUTION RESPIRATORY (INHALATION) ONCE
Status: COMPLETED | OUTPATIENT
Start: 2025-07-28 | End: 2025-07-28

## 2025-07-28 RX ORDER — POTASSIUM CHLORIDE 1500 MG/1
40 TABLET, EXTENDED RELEASE ORAL PRN
Status: DISCONTINUED | OUTPATIENT
Start: 2025-07-28 | End: 2025-07-31 | Stop reason: HOSPADM

## 2025-07-28 RX ORDER — ONDANSETRON 4 MG/1
4 TABLET, ORALLY DISINTEGRATING ORAL EVERY 8 HOURS PRN
Status: DISCONTINUED | OUTPATIENT
Start: 2025-07-28 | End: 2025-07-31 | Stop reason: HOSPADM

## 2025-07-28 RX ORDER — SODIUM CHLORIDE 9 MG/ML
INJECTION, SOLUTION INTRAVENOUS PRN
Status: DISCONTINUED | OUTPATIENT
Start: 2025-07-28 | End: 2025-07-31 | Stop reason: HOSPADM

## 2025-07-28 RX ORDER — BUDESONIDE AND FORMOTEROL FUMARATE DIHYDRATE 160; 4.5 UG/1; UG/1
2 AEROSOL RESPIRATORY (INHALATION)
Status: DISCONTINUED | OUTPATIENT
Start: 2025-07-28 | End: 2025-07-31 | Stop reason: HOSPADM

## 2025-07-28 RX ORDER — IPRATROPIUM BROMIDE AND ALBUTEROL SULFATE 2.5; .5 MG/3ML; MG/3ML
1 SOLUTION RESPIRATORY (INHALATION)
Status: DISCONTINUED | OUTPATIENT
Start: 2025-07-28 | End: 2025-07-31 | Stop reason: HOSPADM

## 2025-07-28 RX ADMIN — IPRATROPIUM BROMIDE AND ALBUTEROL SULFATE 1 DOSE: .5; 2.5 SOLUTION RESPIRATORY (INHALATION) at 18:16

## 2025-07-28 RX ADMIN — METHYLPREDNISOLONE SODIUM SUCCINATE 125 MG: 125 INJECTION, POWDER, LYOPHILIZED, FOR SOLUTION INTRAMUSCULAR; INTRAVENOUS at 21:03

## 2025-07-28 ASSESSMENT — PAIN SCALES - GENERAL: PAINLEVEL_OUTOF10: 0

## 2025-07-28 NOTE — ED PROVIDER NOTES
[facility] EMERGENCY DEPARTMENT  Emergency Department  Emergency Medicine Resident Turn-Over     Note Started: 7:30 PM EDT    Care of Coni Baker was assumed from Dr. Baldwin and is being seen for Shortness of Breath, Hip Pain (R hip), and Urinary Frequency  .  The patient's initial evaluation and plan have been discussed with the prior provider who initially evaluated the patient.     EMERGENCY DEPARTMENT COURSE / MEDICAL DECISION MAKING:       MEDICATIONS GIVEN:  Orders Placed This Encounter   Medications    ipratropium 0.5 mg-albuterol 2.5 mg (DUONEB) nebulizer solution 1 Dose     Initiate RT Bronchodilator Protocol:   Yes - Inpatient Protocol    methylPREDNISolone sodium succ (SOLU-MEDROL) 125 mg in sterile water 2 mL injection       LABS / RADIOLOGY:     Labs Reviewed   CBC WITH AUTO DIFFERENTIAL - Abnormal; Notable for the following components:       Result Value    RBC 3.83 (*)     Hematocrit 36.2 (*)     Platelets 131 (*)     Neutrophils % 79 (*)     Lymphocytes % 10 (*)     Eosinophils % 0 (*)     Lymphocytes Absolute 0.84 (*)     All other components within normal limits   BASIC METABOLIC PANEL - Abnormal; Notable for the following components:    Sodium 134 (*)     Potassium 3.5 (*)     Glucose 100 (*)     Creatinine 1.0 (*)     Est, Glom Filt Rate 59 (*)     Calcium 8.3 (*)     All other components within normal limits   TROPONIN - Abnormal; Notable for the following components:    Troponin, High Sensitivity 17 (*)     All other components within normal limits   TROPONIN - Abnormal; Notable for the following components:    Troponin, High Sensitivity 17 (*)     All other components within normal limits   BRAIN NATRIURETIC PEPTIDE - Abnormal; Notable for the following components:    NT Pro-BNP 1,534 (*)     All other components within normal limits   PHOSPHORUS - Abnormal; Notable for the following components:    Phosphorus 2.3 (*)     All other components within normal limits   CULTURE, BLOOD 1

## 2025-07-28 NOTE — ED PROVIDER NOTES
[facility] EMERGENCY DEPARTMENT  Emergency Department Encounter  Emergency Medicine Resident     Pt Name:Coni Baker  MRN: [id number]  Birthdate 1950  Date of evaluation: 7/28/25  PCP:  Earlene Salcedo MD  Note Started: 4:48 PM EDT      CHIEF COMPLAINT       Chief Complaint   Patient presents with    Shortness of Breath    Hip Pain     R hip    Urinary Frequency       HISTORY OF PRESENT ILLNESS  (Location/Symptom, Timing/Onset, Context/Setting, Quality, Duration, Modifying Factors, Severity.)      Coni Baker is a 74 y.o. female who presents with shortness of breath, right hip pain.  Chart review shows an extensive past medical history including HFrEF, V. tach, palpitations, COPD.  Patient does have a defibrillator in place.  ROS is negative for headaches, chest pain, nausea/vomiting, urinary/bowel changes.  She does report that she has been drinking more fluids recently and has thus had increased urine production.  Additionally, patient feels that she may have a cold, she reports decreased sensitivity to smell/taste.  She denies any recent sick contacts.  She also reports a recent fall within the last few days.  Patient is accompanied at bedside by family members who confirmed the patient's history.    PAST MEDICAL / SURGICAL / SOCIAL / FAMILY HISTORY      has a past medical history of Acute respiratory failure with hypoxia (HCC), Atrial fibrillation (HCC), CAD (coronary artery disease), CHB (complete heart block) (HCC), CHF (congestive heart failure) (HCC), Current smoker, Hx of myocardial infarction, Hyperlipidemia, Hypertension, Hypertensive emergency, and SOBOE (shortness of breath on exertion).     has a past surgical history that includes Tubal ligation; Coronary angioplasty with stent; Colonoscopy; shoulder surgery (Right, 02/01/2016); Colonoscopy (N/A, 03/24/2022); Cardiac procedure (N/A, 05/16/2024); and ep device procedure (N/A, 05/21/2024).    Social History     Socioeconomic History

## 2025-07-28 NOTE — ED NOTES
Patient to ED for shortness of breath for the past several weeks. Patient also reports increased weakness and urinary frequency. C/o right hip pain, states she fell 4 days ago. Patient states, \"I think I just slept on it wrong.\" Patient alert and oriented x4, talking in complete sentences. Labored respirations. Patient placed on continuous cardiac monitoring, BP cuff, and pulse ox. EKG obtained, blood work obtained. Call light in reach, all needs met at this time

## 2025-07-28 NOTE — ED PROVIDER NOTES
[facility] EMERGENCY DEPARTMENT     Emergency Department     Faculty Attestation        I performed a history and physical examination of the patient and discussed management with the resident. I reviewed the resident’s note and agree with the documented findings and plan of care. Any areas of disagreement are noted on the chart. I was personally present for the key portions of any procedures. I have documented in the chart those procedures where I was not present during the key portions. I have reviewed the emergency nurses triage note. I agree with the chief complaint, past medical history, past surgical history, allergies, medications, social and family history as documented unless otherwise noted below.  For Physician Assistant/ Nurse Practitioner cases/documentation I have personally evaluated this patient and have completed at least one if not all key elements of the E/M (history, physical exam, and MDM). Additional findings are as noted.      Vital Signs: BP: 134/70  Pulse: 75  Respirations: 18  Temp: 98.2 °F (36.8 °C) SpO2: 95 %  PCP:  Earlene Salcedo MD  Note Started: 7/28/25, 4:50 PM EDT    Pertinent Comments:     Patient is a 74-year-old female with history of ischemic cardiomyopathy with last EF between 30 and 40%.   Had several paroxysmal episodes of ventricular tachycardia in the past maintained on mexiletine and occasionally requiring lidocaine drip on admission.   Had an ablation in March 2025 at the [facility].   ICD still in place as well.    Patient here today with complaint of shortness of breath.    Patient also may have fallen from a sitting position onto her right hip and has had some pain since as well but appears neurovascular intact distally in this extremity.   Lungs are clear at this time with heart regular rate and rhythm but paced rhythm occurring.    Abdomen is soft/nontender.   No obvious congestion at this time but the

## 2025-07-29 ENCOUNTER — APPOINTMENT (OUTPATIENT)
Dept: CT IMAGING | Age: 75
DRG: 690 | End: 2025-07-29
Payer: MEDICARE

## 2025-07-29 PROBLEM — I50.9 CONGESTIVE HEART FAILURE (HCC): Status: ACTIVE | Noted: 2025-07-29

## 2025-07-29 LAB
ANION GAP SERPL CALCULATED.3IONS-SCNC: 13 MMOL/L (ref 9–16)
BACTERIA URNS QL MICRO: ABNORMAL
BASOPHILS # BLD: 0 K/UL (ref 0–0.2)
BASOPHILS NFR BLD: 0 % (ref 0–2)
BILIRUB UR QL STRIP: NEGATIVE
BNP SERPL-MCNC: 1057 PG/ML (ref 0–450)
BUN SERPL-MCNC: 18 MG/DL (ref 8–23)
CALCIUM SERPL-MCNC: 8.6 MG/DL (ref 8.6–10.4)
CASTS #/AREA URNS LPF: ABNORMAL /LPF (ref 0–8)
CHLORIDE SERPL-SCNC: 102 MMOL/L (ref 98–107)
CLARITY UR: ABNORMAL
CO2 SERPL-SCNC: 25 MMOL/L (ref 20–31)
COLOR UR: YELLOW
CREAT SERPL-MCNC: 0.9 MG/DL (ref 0.6–0.9)
D DIMER PPP FEU-MCNC: 3.1 UG/ML FEU (ref 0–0.57)
EKG ATRIAL RATE: 75 BPM
EKG P AXIS: 1 DEGREES
EKG P-R INTERVAL: 98 MS
EKG Q-T INTERVAL: 466 MS
EKG QRS DURATION: 172 MS
EKG QTC CALCULATION (BAZETT): 520 MS
EKG R AXIS: 250 DEGREES
EKG T AXIS: 61 DEGREES
EKG VENTRICULAR RATE: 75 BPM
EOSINOPHIL # BLD: 0 K/UL (ref 0–0.4)
EOSINOPHILS RELATIVE PERCENT: 0 % (ref 1–4)
EPI CELLS #/AREA URNS HPF: ABNORMAL /HPF (ref 0–5)
ERYTHROCYTE [DISTWIDTH] IN BLOOD BY AUTOMATED COUNT: 13.6 % (ref 11.8–14.4)
GFR, ESTIMATED: 67 ML/MIN/1.73M2
GLUCOSE SERPL-MCNC: 157 MG/DL (ref 74–99)
GLUCOSE UR STRIP-MCNC: NEGATIVE MG/DL
HCT VFR BLD AUTO: 38.1 % (ref 36.3–47.1)
HGB BLD-MCNC: 12.5 G/DL (ref 11.9–15.1)
HGB UR QL STRIP.AUTO: ABNORMAL
IMM GRANULOCYTES # BLD AUTO: 0 K/UL (ref 0–0.3)
IMM GRANULOCYTES NFR BLD: 0 %
INR PPP: 1.4
KETONES UR STRIP-MCNC: NEGATIVE MG/DL
LEUKOCYTE ESTERASE UR QL STRIP: ABNORMAL
LYMPHOCYTES NFR BLD: 0.66 K/UL (ref 1–4.8)
LYMPHOCYTES RELATIVE PERCENT: 13 % (ref 24–44)
MCH RBC QN AUTO: 31.2 PG (ref 25.2–33.5)
MCHC RBC AUTO-ENTMCNC: 32.8 G/DL (ref 28.4–34.8)
MCV RBC AUTO: 95 FL (ref 82.6–102.9)
MONOCYTES NFR BLD: 0.1 K/UL (ref 0.1–0.8)
MONOCYTES NFR BLD: 2 % (ref 1–7)
MORPHOLOGY: NORMAL
NEUTROPHILS NFR BLD: 85 % (ref 36–66)
NEUTS SEG NFR BLD: 4.34 K/UL (ref 1.8–7.7)
NITRITE UR QL STRIP: NEGATIVE
NRBC BLD-RTO: 0 PER 100 WBC
PARTIAL THROMBOPLASTIN TIME: 35.4 SEC (ref 23–36.5)
PH UR STRIP: 5.5 [PH] (ref 5–8)
PLATELET # BLD AUTO: 129 K/UL (ref 138–453)
PMV BLD AUTO: 9.6 FL (ref 8.1–13.5)
POTASSIUM SERPL-SCNC: 3.9 MMOL/L (ref 3.7–5.3)
PROT UR STRIP-MCNC: ABNORMAL MG/DL
PROTHROMBIN TIME: 17.3 SEC (ref 11.7–14.9)
RBC # BLD AUTO: 4.01 M/UL (ref 3.95–5.11)
RBC #/AREA URNS HPF: ABNORMAL /HPF (ref 0–4)
SODIUM SERPL-SCNC: 140 MMOL/L (ref 136–145)
SP GR UR STRIP: 1.01 (ref 1–1.03)
UROBILINOGEN UR STRIP-ACNC: NORMAL EU/DL (ref 0–1)
WBC #/AREA URNS HPF: ABNORMAL /HPF (ref 0–5)
WBC OTHER # BLD: 5.1 K/UL (ref 3.5–11.3)

## 2025-07-29 PROCEDURE — 6360000002 HC RX W HCPCS

## 2025-07-29 PROCEDURE — 85379 FIBRIN DEGRADATION QUANT: CPT

## 2025-07-29 PROCEDURE — 2700000000 HC OXYGEN THERAPY PER DAY

## 2025-07-29 PROCEDURE — 36415 COLL VENOUS BLD VENIPUNCTURE: CPT

## 2025-07-29 PROCEDURE — 6370000000 HC RX 637 (ALT 250 FOR IP)

## 2025-07-29 PROCEDURE — 85025 COMPLETE CBC W/AUTO DIFF WBC: CPT

## 2025-07-29 PROCEDURE — 99232 SBSQ HOSP IP/OBS MODERATE 35: CPT | Performed by: STUDENT IN AN ORGANIZED HEALTH CARE EDUCATION/TRAINING PROGRAM

## 2025-07-29 PROCEDURE — 85730 THROMBOPLASTIN TIME PARTIAL: CPT

## 2025-07-29 PROCEDURE — 93010 ELECTROCARDIOGRAM REPORT: CPT | Performed by: INTERNAL MEDICINE

## 2025-07-29 PROCEDURE — 2580000003 HC RX 258

## 2025-07-29 PROCEDURE — 2500000003 HC RX 250 WO HCPCS

## 2025-07-29 PROCEDURE — 87186 SC STD MICRODIL/AGAR DIL: CPT

## 2025-07-29 PROCEDURE — G0378 HOSPITAL OBSERVATION PER HR: HCPCS

## 2025-07-29 PROCEDURE — 87086 URINE CULTURE/COLONY COUNT: CPT

## 2025-07-29 PROCEDURE — 83880 ASSAY OF NATRIURETIC PEPTIDE: CPT

## 2025-07-29 PROCEDURE — 80048 BASIC METABOLIC PNL TOTAL CA: CPT

## 2025-07-29 PROCEDURE — 6360000004 HC RX CONTRAST MEDICATION: Performed by: STUDENT IN AN ORGANIZED HEALTH CARE EDUCATION/TRAINING PROGRAM

## 2025-07-29 PROCEDURE — 71260 CT THORAX DX C+: CPT

## 2025-07-29 PROCEDURE — 97530 THERAPEUTIC ACTIVITIES: CPT

## 2025-07-29 PROCEDURE — 97166 OT EVAL MOD COMPLEX 45 MIN: CPT

## 2025-07-29 PROCEDURE — 97535 SELF CARE MNGMENT TRAINING: CPT

## 2025-07-29 PROCEDURE — 85610 PROTHROMBIN TIME: CPT

## 2025-07-29 PROCEDURE — 94761 N-INVAS EAR/PLS OXIMETRY MLT: CPT

## 2025-07-29 PROCEDURE — 81001 URINALYSIS AUTO W/SCOPE: CPT

## 2025-07-29 PROCEDURE — 94640 AIRWAY INHALATION TREATMENT: CPT

## 2025-07-29 PROCEDURE — 87088 URINE BACTERIA CULTURE: CPT

## 2025-07-29 RX ORDER — IOPAMIDOL 755 MG/ML
75 INJECTION, SOLUTION INTRAVASCULAR
Status: COMPLETED | OUTPATIENT
Start: 2025-07-29 | End: 2025-07-29

## 2025-07-29 RX ADMIN — CEFEPIME 2000 MG: 2 INJECTION, POWDER, FOR SOLUTION INTRAVENOUS at 18:11

## 2025-07-29 RX ADMIN — AMIODARONE HYDROCHLORIDE 200 MG: 200 TABLET ORAL at 00:44

## 2025-07-29 RX ADMIN — CEFEPIME 2000 MG: 2 INJECTION, POWDER, FOR SOLUTION INTRAVENOUS at 06:40

## 2025-07-29 RX ADMIN — SODIUM CHLORIDE, PRESERVATIVE FREE 10 ML: 5 INJECTION INTRAVENOUS at 09:04

## 2025-07-29 RX ADMIN — ENOXAPARIN SODIUM 40 MG: 100 INJECTION SUBCUTANEOUS at 09:03

## 2025-07-29 RX ADMIN — IPRATROPIUM BROMIDE AND ALBUTEROL SULFATE 1 DOSE: .5; 2.5 SOLUTION RESPIRATORY (INHALATION) at 19:58

## 2025-07-29 RX ADMIN — FUROSEMIDE 20 MG: 20 TABLET ORAL at 09:04

## 2025-07-29 RX ADMIN — ROSUVASTATIN CALCIUM 20 MG: 20 TABLET, FILM COATED ORAL at 20:10

## 2025-07-29 RX ADMIN — SODIUM CHLORIDE, PRESERVATIVE FREE 10 ML: 5 INJECTION INTRAVENOUS at 18:09

## 2025-07-29 RX ADMIN — VALSARTAN 20 MG: 40 TABLET, FILM COATED ORAL at 09:03

## 2025-07-29 RX ADMIN — BUDESONIDE AND FORMOTEROL FUMARATE DIHYDRATE 2 PUFF: 160; 4.5 AEROSOL RESPIRATORY (INHALATION) at 07:41

## 2025-07-29 RX ADMIN — IPRATROPIUM BROMIDE AND ALBUTEROL SULFATE 1 DOSE: .5; 2.5 SOLUTION RESPIRATORY (INHALATION) at 11:08

## 2025-07-29 RX ADMIN — SODIUM CHLORIDE, PRESERVATIVE FREE 10 ML: 5 INJECTION INTRAVENOUS at 00:42

## 2025-07-29 RX ADMIN — IOPAMIDOL 75 ML: 755 INJECTION, SOLUTION INTRAVENOUS at 20:25

## 2025-07-29 RX ADMIN — AMIODARONE HYDROCHLORIDE 200 MG: 200 TABLET ORAL at 20:10

## 2025-07-29 RX ADMIN — BUDESONIDE AND FORMOTEROL FUMARATE DIHYDRATE 2 PUFF: 160; 4.5 AEROSOL RESPIRATORY (INHALATION) at 19:58

## 2025-07-29 RX ADMIN — METOPROLOL SUCCINATE 25 MG: 25 TABLET, EXTENDED RELEASE ORAL at 09:04

## 2025-07-29 RX ADMIN — AMIODARONE HYDROCHLORIDE 200 MG: 200 TABLET ORAL at 09:04

## 2025-07-29 RX ADMIN — ASPIRIN 81 MG: 81 TABLET, COATED ORAL at 09:04

## 2025-07-29 RX ADMIN — IPRATROPIUM BROMIDE AND ALBUTEROL SULFATE 1 DOSE: .5; 2.5 SOLUTION RESPIRATORY (INHALATION) at 07:41

## 2025-07-29 NOTE — CARE COORDINATION
Case Management Assessment  Obs Initial Evaluation    Date/Time of Evaluation: 7/29/2025 6:00 PM  Assessment Completed by: DIGOO HERRERA RN    If patient is discharged prior to next notation, then this note serves as note for discharge by case management.    Patient Name: Coni Baker                   YOB: 1950  Diagnosis: Acute respiratory failure (HCC) [J96.00]  Right hip pain [M25.551]  Congestive heart failure, unspecified HF chronicity, unspecified heart failure type (HCC) [I50.9]                   Date / Time: 7/28/2025  4:42 PM    Patient Admission Status: Observation   Readmission Risk (Low < 19, Mod (19-27), High > 27): Readmission Risk Score: 18.7    Current PCP: Earlene Salcedo MD  PCP verified by CM? Yes    Chart Reviewed:       History Provided by: Patient  Patient Orientation: Alert and Oriented    Patient Cognition: Alert    Hospitalization in the last 30 days (Readmission):      If yes, Readmission Assessment in CM Navigator will be completed.    Advance Directives:      Code Status: Full Code   Patient's Primary Decision Maker is:      Primary Decision Maker: Girma Bateman - Child - [phone]    Secondary Decision Maker: Khai Chaui - Child - [phone]    Discharge Planning:    Patient lives with: Children Type of Home: House  Primary Care Giver: Family  Patient Support Systems include: Children, Family Members   Current Financial resources: Medicare  Current community resources: None  Current services prior to admission: Durable Medical Equipment            Current DME: Walker, Wheelchair, Shower Chair            Type of Home Care services:  None    ADLS  Prior functional level: Assistance with the following:, Bathing  Current functional level: Assistance with the following:, Bathing    PT AM-PAC:   /24  OT AM-PAC: 19 /24    Family can provide assistance at DC: Yes  Would you like Case Management to discuss the discharge plan with any other family

## 2025-07-29 NOTE — ED NOTES
ED to inpatient nurses report      Chief Complaint:  Chief Complaint   Patient presents with    Shortness of Breath    Hip Pain     R hip    Urinary Frequency     Present to ED from: Home     MOA:     LOC: alert and orientated to name, place, date  Mobility: Independent  Oxygen Baseline: RA    Current needs required: none    Pending ED orders: none   Present condition: stable     Why did the patient come to the ED? Patient to ED for shortness of breath for the past several weeks. Patient also reports increased weakness and urinary frequency. C/o right hip pain, states she fell 4 days ago. Patient states, \"I think I just slept on it wrong.\" Patient alert and oriented x4, talking in complete sentences. Labored respirations. Patient placed on continuous cardiac monitoring, BP cuff, and pulse ox. EKG obtained, blood work obtained. Call light in reach, all needs met at this time     What is the plan? Admit   Any procedures or intervention occur? Labs imaging   Any safety concerns??    Mental Status:  Level of Consciousness: Alert (0)    Psych Assessment:   Psychosocial  Psychosocial (WDL): Within Defined Limits  Vital signs   Vitals:    07/28/25 1801 07/28/25 1802 07/28/25 1816 07/28/25 2000   BP: 125/83      Pulse: 80 78 76 79   Resp: 14 25 14 27   Temp:       SpO2:  97% 97% 92%   Weight:       Height:            Vitals:  Patient Vitals for the past 24 hrs:   BP Temp Pulse Resp SpO2 Height Weight   07/28/25 2000 -- -- 79 27 92 % -- --   07/28/25 1816 -- -- 76 14 97 % -- --   07/28/25 1802 -- -- 78 25 97 % -- --   07/28/25 1801 125/83 -- 80 14 -- -- --   07/28/25 1747 -- -- 82 18 100 % -- --   07/28/25 1746 139/81 -- 75 26 -- -- --   07/28/25 1523 134/70 98.2 °F (36.8 °C) 75 18 95 % 1.549 m (5' 1\") 84.8 kg (187 lb)      Visit Vitals  /83   Pulse 79   Temp 98.2 °F (36.8 °C)   Resp 27   Ht 1.549 m (5' 1\")   Wt 84.8 kg (187 lb)   SpO2 92%   BMI 35.33 kg/m²        LDAs:   Peripheral IV 07/28/25 Left Antecubital

## 2025-07-29 NOTE — ED NOTES
Report received from Judy RN  Patient resting on stretcher, NAD  RR even and non-labored  Bed locked and in lowest position  Call light within reach  No needs expressed at this time

## 2025-07-29 NOTE — H&P
[facility]  Office: [phone]  Joey Smith DO, Miguel Machado, DO, Vikash Conklin DO, Alan Mcnulty DO, Joseph Thomas MD, Elsy Lacy MD, Eduardo Maddox MD, Kindra Mello MD,  Stef Awad MD, Esperanza Gandhi MD, Andi Hull MD,  Nolberto Allison DO, Girma Smith DO, Heydi Yu MD,  Rigo Hollidya DO, Ita Camacho MD, Berna Vides MD, Eloy Bronson MD,  Gigi Romero MD, Elis Mckeon MD, Alfred Conner MD, Steve Greer MD, Jaime Banegas MD, Sandhya Collins MD, Fran Rehman DO, Mariela Edmond MD, Tiffany Baumann DO, Paco Solis MD, Caesar Leiva MD, Nolberto Holguin MD, Mohsin Reza, MD, Jenny Lema MD, Shirley Waterhouse, CNP,  Chastity Astorga, CNP, Fran Fuentes, CNP,  Olive Melgoza, DNP, Madai Holley, CNP, Meghan Rodriguez, CNP, Mary Aleman, CNP, Haleigh Francois, CNP, Kira Rosales, PA-C, Radha Mariee, CNP, Michael Hurst, CNP,  Malgorzata Desai, CNP, Diamante Barron, CNP, Jaydon Parker, PA-C, Jo Alejandro, PA-C, Meghna Holland, CNP,        Tammy Jarrett, CNS, Viky White, CNP, Carolyne Boston, CNP         [facility]   IN-PATIENT SERVICE   [facility]    HISTORY AND PHYSICAL EXAMINATION            Date:   7/28/2025  Patient name:  Coni Baker  Date of admission:  7/28/2025  4:42 PM  MRN:   [id number]  Account:  [id number]  YOB: 1950  PCP:    Earlene Salcedo MD  Room:   OBS 21/21-1  Code Status:    Prior    Chief Complaint:     Chief Complaint   Patient presents with    Shortness of Breath    Hip Pain     R hip    Urinary Frequency       History Obtained From:     patient, electronic medical record    History of Present Illness:     Coni Baker is a 74 y.o. Non- / non  female with past medical history of A-fib, CAD, complete heart block, congestive heart failure, MI, hypertension, hyperlipidemia, and COPD who presents with Shortness of Breath, Hip Pain (R hip), and Urinary Frequency   and is

## 2025-07-30 ENCOUNTER — APPOINTMENT (OUTPATIENT)
Age: 75
DRG: 690 | End: 2025-07-30
Attending: INTERNAL MEDICINE
Payer: MEDICARE

## 2025-07-30 PROBLEM — R78.81 GRAM-NEGATIVE BACTEREMIA: Status: ACTIVE | Noted: 2025-07-30

## 2025-07-30 PROBLEM — I42.9 CARDIOMYOPATHY (HCC): Status: ACTIVE | Noted: 2025-07-30

## 2025-07-30 PROBLEM — N39.0 COMPLICATED UTI (URINARY TRACT INFECTION): Status: ACTIVE | Noted: 2025-07-30

## 2025-07-30 PROBLEM — Z95.810 CARDIAC RESYNCHRONIZATION THERAPY DEFIBRILLATOR (CRT-D) IN PLACE: Status: ACTIVE | Noted: 2025-07-30

## 2025-07-30 PROBLEM — I50.22 CHRONIC SYSTOLIC HEART FAILURE (HCC): Status: ACTIVE | Noted: 2025-07-30

## 2025-07-30 PROBLEM — R78.81 BACTEREMIA: Status: ACTIVE | Noted: 2025-07-30

## 2025-07-30 LAB
ANION GAP SERPL CALCULATED.3IONS-SCNC: 12 MMOL/L (ref 9–16)
BUN SERPL-MCNC: 32 MG/DL (ref 8–23)
CALCIUM SERPL-MCNC: 8.5 MG/DL (ref 8.6–10.4)
CHLORIDE SERPL-SCNC: 103 MMOL/L (ref 98–107)
CO2 SERPL-SCNC: 22 MMOL/L (ref 20–31)
CREAT SERPL-MCNC: 1.2 MG/DL (ref 0.6–0.9)
ECHO BSA: 1.91 M2
ECHO LV EDV A2C: 81 ML
ECHO LV EDV A4C: 134 ML
ECHO LV EDV INDEX A4C: 73 ML/M2
ECHO LV EDV NDEX A2C: 44 ML/M2
ECHO LV EF PHYSICIAN: 35 %
ECHO LV EJECTION FRACTION A2C: 39 %
ECHO LV EJECTION FRACTION A4C: 38 %
ECHO LV EJECTION FRACTION BIPLANE: 37 % (ref 55–100)
ECHO LV ESV A2C: 50 ML
ECHO LV ESV A4C: 83 ML
ECHO LV ESV INDEX A2C: 27 ML/M2
ECHO LV ESV INDEX A4C: 45 ML/M2
GFR, ESTIMATED: 47 ML/MIN/1.73M2
GLUCOSE BLD-MCNC: 148 MG/DL (ref 65–105)
GLUCOSE SERPL-MCNC: 174 MG/DL (ref 74–99)
MAGNESIUM SERPL-MCNC: 2.3 MG/DL (ref 1.6–2.4)
MICROORGANISM SPEC CULT: ABNORMAL
POTASSIUM SERPL-SCNC: 3.4 MMOL/L (ref 3.7–5.3)
SERVICE CMNT-IMP: ABNORMAL
SODIUM SERPL-SCNC: 137 MMOL/L (ref 136–145)
SPECIMEN DESCRIPTION: ABNORMAL

## 2025-07-30 PROCEDURE — 83735 ASSAY OF MAGNESIUM: CPT

## 2025-07-30 PROCEDURE — G0545 PR INHERENT VISIT TO INPT: HCPCS | Performed by: INTERNAL MEDICINE

## 2025-07-30 PROCEDURE — 94640 AIRWAY INHALATION TREATMENT: CPT

## 2025-07-30 PROCEDURE — 97530 THERAPEUTIC ACTIVITIES: CPT

## 2025-07-30 PROCEDURE — 51798 US URINE CAPACITY MEASURE: CPT

## 2025-07-30 PROCEDURE — 6370000000 HC RX 637 (ALT 250 FOR IP): Performed by: INTERNAL MEDICINE

## 2025-07-30 PROCEDURE — 6370000000 HC RX 637 (ALT 250 FOR IP)

## 2025-07-30 PROCEDURE — 1200000000 HC SEMI PRIVATE

## 2025-07-30 PROCEDURE — 94760 N-INVAS EAR/PLS OXIMETRY 1: CPT

## 2025-07-30 PROCEDURE — 93308 TTE F-UP OR LMTD: CPT | Performed by: STUDENT IN AN ORGANIZED HEALTH CARE EDUCATION/TRAINING PROGRAM

## 2025-07-30 PROCEDURE — 80048 BASIC METABOLIC PNL TOTAL CA: CPT

## 2025-07-30 PROCEDURE — 51701 INSERT BLADDER CATHETER: CPT

## 2025-07-30 PROCEDURE — 99232 SBSQ HOSP IP/OBS MODERATE 35: CPT | Performed by: STUDENT IN AN ORGANIZED HEALTH CARE EDUCATION/TRAINING PROGRAM

## 2025-07-30 PROCEDURE — 99222 1ST HOSP IP/OBS MODERATE 55: CPT | Performed by: INTERNAL MEDICINE

## 2025-07-30 PROCEDURE — 36415 COLL VENOUS BLD VENIPUNCTURE: CPT

## 2025-07-30 PROCEDURE — 6360000002 HC RX W HCPCS

## 2025-07-30 PROCEDURE — 97162 PT EVAL MOD COMPLEX 30 MIN: CPT

## 2025-07-30 PROCEDURE — 93325 DOPPLER ECHO COLOR FLOW MAPG: CPT | Performed by: STUDENT IN AN ORGANIZED HEALTH CARE EDUCATION/TRAINING PROGRAM

## 2025-07-30 PROCEDURE — 99223 1ST HOSP IP/OBS HIGH 75: CPT | Performed by: INTERNAL MEDICINE

## 2025-07-30 PROCEDURE — 2580000003 HC RX 258

## 2025-07-30 PROCEDURE — 82947 ASSAY GLUCOSE BLOOD QUANT: CPT

## 2025-07-30 PROCEDURE — 2500000003 HC RX 250 WO HCPCS

## 2025-07-30 PROCEDURE — 93308 TTE F-UP OR LMTD: CPT

## 2025-07-30 RX ORDER — SPIRONOLACTONE 25 MG/1
12.5 TABLET ORAL DAILY
Status: DISCONTINUED | OUTPATIENT
Start: 2025-07-30 | End: 2025-07-31 | Stop reason: HOSPADM

## 2025-07-30 RX ADMIN — BUDESONIDE AND FORMOTEROL FUMARATE DIHYDRATE 2 PUFF: 160; 4.5 AEROSOL RESPIRATORY (INHALATION) at 08:18

## 2025-07-30 RX ADMIN — VALSARTAN 20 MG: 40 TABLET, FILM COATED ORAL at 09:44

## 2025-07-30 RX ADMIN — SPIRONOLACTONE 12.5 MG: 25 TABLET, FILM COATED ORAL at 09:43

## 2025-07-30 RX ADMIN — ENOXAPARIN SODIUM 40 MG: 100 INJECTION SUBCUTANEOUS at 09:44

## 2025-07-30 RX ADMIN — POTASSIUM CHLORIDE 40 MEQ: 1500 TABLET, EXTENDED RELEASE ORAL at 19:51

## 2025-07-30 RX ADMIN — IPRATROPIUM BROMIDE AND ALBUTEROL SULFATE 1 DOSE: .5; 2.5 SOLUTION RESPIRATORY (INHALATION) at 08:18

## 2025-07-30 RX ADMIN — CEFEPIME 2000 MG: 2 INJECTION, POWDER, FOR SOLUTION INTRAVENOUS at 18:05

## 2025-07-30 RX ADMIN — ROSUVASTATIN CALCIUM 20 MG: 20 TABLET, FILM COATED ORAL at 19:51

## 2025-07-30 RX ADMIN — ASPIRIN 81 MG: 81 TABLET, COATED ORAL at 09:44

## 2025-07-30 RX ADMIN — AMIODARONE HYDROCHLORIDE 200 MG: 200 TABLET ORAL at 09:44

## 2025-07-30 RX ADMIN — CEFEPIME 2000 MG: 2 INJECTION, POWDER, FOR SOLUTION INTRAVENOUS at 06:51

## 2025-07-30 RX ADMIN — AMIODARONE HYDROCHLORIDE 200 MG: 200 TABLET ORAL at 19:51

## 2025-07-30 RX ADMIN — SODIUM CHLORIDE, PRESERVATIVE FREE 10 ML: 5 INJECTION INTRAVENOUS at 19:52

## 2025-07-30 RX ADMIN — SODIUM CHLORIDE, PRESERVATIVE FREE 10 ML: 5 INJECTION INTRAVENOUS at 18:04

## 2025-07-30 RX ADMIN — METOPROLOL SUCCINATE 25 MG: 25 TABLET, EXTENDED RELEASE ORAL at 09:43

## 2025-07-30 NOTE — CARE COORDINATION
Case Management   Daily Progress Note       Patient Name: Coni Baker                   YOB: 1950  Diagnosis: Acute respiratory failure (HCC) [J96.00]  Right hip pain [M25.551]  Congestive heart failure, unspecified HF chronicity, unspecified heart failure type (HCC) [I50.9]                         days  Length of Stay: 0  days    Anticipated Discharge Date: Discharge pending    Readmission Risk (Low < 19, Mod (19-27), High > 27): Readmission Risk Score: 18.7        Current Transitional Plan    [] Home Independently    [x] Home with HC [id number] accepts    [] Skilled Nursing Facility    [] Acute Rehabilitation    [] Long Term Acute Care (LTAC)    [] Other:     Plan for the Stay (Medical Management) :  Need echo  PT eval      Workflow Continuation (Additional Notes) :  Home with family support with home care      DIOGO HERRERA RN  July 30, 2025

## 2025-07-30 NOTE — FLOWSHEET NOTE
07/30/25 0307   Urine Assessment   Intermittent/Straight Cath (mL) 500 mL   $ Cath urethra straight $ Yes

## 2025-07-30 NOTE — CONSULTS
[facility]    Consult / H&P               Today's Date: 7/30/2025  Patient Name: Coni Baker  Date of admission: 7/28/2025  4:42 PM  Patient's age: 74 y.o., 1950  Admission Dx: Acute respiratory failure (HCC) [J96.00]  Right hip pain [M25.551]  Congestive heart failure, unspecified HF chronicity, unspecified heart failure type (HCC) [I50.9]    Requesting Physician: No admitting provider for patient encounter.    Cardiac Evaluation Reason:  \"GOMEZ, HFrEF\"    History Obtained From: patient and chart review     History of Present Illness:    This patient 74 y.o. years old with past medical history given below.     Pt was admitted to the ED on 07/28/2025 woth SOB, Hip pain (right hip), and Urinary frequency. Pt has  hx of ischemic cardiomyopathy, prior CAD s/p stent of LAD (found patent in 05/2024), EF of 35% anterolapical akinesis, VT ablation /PCV source (with LV pacing due to complete heart block and 3D electroanatomic mapping)in 05/2024, Ventricular bigemity.     [facility] Note of last treatment:  On March 5th, 2025, she was brought to the electrophysiology lab for PVC ablation. Given the need for ICD in the setting of ischemic cardiomyopathy with an LVEF of 35 to 40% and non-sustained VT, and the fact that this was the third redo for PVC ablation, it was felt in the patient's best interest to pursue PVC ablation in this area knowing its proximity to the His bundle. She underwent a PVC ablation, during which complete heart block developed, necessitating the placement of a temporary wire via the right IJ. On 03/07/2025, she underwent CRT-D implantation, with post-procedure evaluation showing proper device function and lead placement. She was discharged home in stable condition, off antiarrhythmic therapy.    She reports increased dyspnea today but denies any chest pain, orthopnea, PND, lightheadedness, dizziness, syncope, near-syncope, palpitations, or rapid heartbeats. She notes no 
reduce the radiation dose to as low as reasonably achievable. COMPARISON: 06/19/2024. HISTORY: ORDERING SYSTEM PROVIDED HISTORY: Shortness of breath TECHNOLOGIST PROVIDED HISTORY: Shortness of breath Reason for Exam: Shortness of breath FINDINGS: Pulmonary Arteries: Pulmonary arteries are adequately opacified for evaluation.  No evidence of intraluminal filling defect to suggest pulmonary embolism.  Main pulmonary artery is normal in caliber. Mediastinum: No evidence of mediastinal lymphadenopathy.  The heart and pericardium demonstrate no acute abnormality.  Left subclavian approach dual lead pacemaker device is intact, terminating in the right heart.  There is no acute abnormality of the thoracic aorta. Lungs/pleura: The lungs are without acute process.  Pulmonary emphysematous changes are seen.  Subpleural interstitial thickening and atelectasis seen. No focal consolidation or pulmonary edema.  No evidence of pleural effusion or pneumothorax. Upper Abdomen: Limited images of the upper abdomen are unremarkable. Soft Tissues/Bones: No acute bone or soft tissue abnormality.     1. No evidence of pulmonary embolism or acute pulmonary abnormality. 2. Pulmonary emphysematous changes. 3. Subpleural interstitial thickening and atelectasis.     XR FEMUR RIGHT (MIN 2 VIEWS)  Result Date: 7/28/2025  EXAM: 2 VIEW(S) XRAY OF THE RIGHT FEMUR 07/28/2025 06:15:00 PM COMPARISON: None available. CLINICAL HISTORY: Pain. ORDERING SYSTEM PROVIDED HISTORY: Pain; TECHNOLOGIST PROVIDED HISTORY: Pain; Reason for exam: pain no known injury. FINDINGS: BONES AND JOINTS: No acute fracture. No focal osseous lesion. No joint dislocation. SOFT TISSUES: The soft tissues are unremarkable.     1. No significant abnormality. If there is clinical suspicion for radiographically occult proximal femoral fracture, and patient is unable to bear weight, MRI is advised.     XR CHEST PORTABLE  Result Date: 7/28/2025  EXAMINATION: ONE XRAY VIEW OF THE CHEST

## 2025-07-31 VITALS
SYSTOLIC BLOOD PRESSURE: 108 MMHG | BODY MASS INDEX: 36.17 KG/M2 | HEART RATE: 64 BPM | OXYGEN SATURATION: 96 % | RESPIRATION RATE: 25 BRPM | TEMPERATURE: 98.2 F | HEIGHT: 61 IN | WEIGHT: 191.58 LBS | DIASTOLIC BLOOD PRESSURE: 64 MMHG

## 2025-07-31 PROBLEM — J96.00 ACUTE RESPIRATORY FAILURE (HCC): Status: RESOLVED | Noted: 2025-07-28 | Resolved: 2025-07-31

## 2025-07-31 PROBLEM — J96.02 ACUTE RESPIRATORY FAILURE WITH HYPOXIA AND HYPERCAPNIA (HCC): Status: ACTIVE | Noted: 2024-07-19

## 2025-07-31 LAB
ANION GAP SERPL CALCULATED.3IONS-SCNC: 10 MMOL/L (ref 9–16)
BUN SERPL-MCNC: 23 MG/DL (ref 8–23)
CALCIUM SERPL-MCNC: 8.3 MG/DL (ref 8.6–10.4)
CHLORIDE SERPL-SCNC: 107 MMOL/L (ref 98–107)
CO2 SERPL-SCNC: 23 MMOL/L (ref 20–31)
CREAT SERPL-MCNC: 0.9 MG/DL (ref 0.6–0.9)
GFR, ESTIMATED: 67 ML/MIN/1.73M2
GLUCOSE SERPL-MCNC: 103 MG/DL (ref 74–99)
MICROORGANISM SPEC CULT: ABNORMAL
POTASSIUM SERPL-SCNC: 3.8 MMOL/L (ref 3.7–5.3)
SODIUM SERPL-SCNC: 140 MMOL/L (ref 136–145)
SPECIMEN DESCRIPTION: ABNORMAL

## 2025-07-31 PROCEDURE — 6360000002 HC RX W HCPCS

## 2025-07-31 PROCEDURE — 80048 BASIC METABOLIC PNL TOTAL CA: CPT

## 2025-07-31 PROCEDURE — 2500000003 HC RX 250 WO HCPCS: Performed by: INTERNAL MEDICINE

## 2025-07-31 PROCEDURE — G0545 PR INHERENT VISIT TO INPT: HCPCS | Performed by: INTERNAL MEDICINE

## 2025-07-31 PROCEDURE — 6370000000 HC RX 637 (ALT 250 FOR IP): Performed by: INTERNAL MEDICINE

## 2025-07-31 PROCEDURE — 6360000002 HC RX W HCPCS: Performed by: INTERNAL MEDICINE

## 2025-07-31 PROCEDURE — 94761 N-INVAS EAR/PLS OXIMETRY MLT: CPT

## 2025-07-31 PROCEDURE — 94640 AIRWAY INHALATION TREATMENT: CPT

## 2025-07-31 PROCEDURE — 99232 SBSQ HOSP IP/OBS MODERATE 35: CPT | Performed by: INTERNAL MEDICINE

## 2025-07-31 PROCEDURE — 2580000003 HC RX 258

## 2025-07-31 PROCEDURE — 36415 COLL VENOUS BLD VENIPUNCTURE: CPT

## 2025-07-31 PROCEDURE — 99233 SBSQ HOSP IP/OBS HIGH 50: CPT | Performed by: NURSE PRACTITIONER

## 2025-07-31 PROCEDURE — 2500000003 HC RX 250 WO HCPCS

## 2025-07-31 PROCEDURE — 99239 HOSP IP/OBS DSCHRG MGMT >30: CPT | Performed by: STUDENT IN AN ORGANIZED HEALTH CARE EDUCATION/TRAINING PROGRAM

## 2025-07-31 PROCEDURE — 6370000000 HC RX 637 (ALT 250 FOR IP)

## 2025-07-31 RX ORDER — PROBENECID 500 MG/1
500 TABLET, FILM COATED ORAL 2 TIMES DAILY
Status: DISCONTINUED | OUTPATIENT
Start: 2025-08-01 | End: 2025-07-31 | Stop reason: HOSPADM

## 2025-07-31 RX ORDER — SPIRONOLACTONE 25 MG/1
12.5 TABLET ORAL DAILY
Qty: 30 TABLET | Refills: 3 | Status: SHIPPED | OUTPATIENT
Start: 2025-08-01

## 2025-07-31 RX ORDER — AMOXICILLIN 500 MG/1
500 CAPSULE ORAL EVERY 8 HOURS SCHEDULED
Status: DISCONTINUED | OUTPATIENT
Start: 2025-08-01 | End: 2025-07-31 | Stop reason: HOSPADM

## 2025-07-31 RX ORDER — PROBENECID 500 MG/1
500 TABLET, FILM COATED ORAL 2 TIMES DAILY
Qty: 60 TABLET | Refills: 3 | Status: SHIPPED | OUTPATIENT
Start: 2025-08-01 | End: 2025-08-08

## 2025-07-31 RX ORDER — AMOXICILLIN 500 MG/1
500 CAPSULE ORAL EVERY 8 HOURS SCHEDULED
Qty: 21 CAPSULE | Refills: 0 | Status: SHIPPED | OUTPATIENT
Start: 2025-08-01 | End: 2025-08-08

## 2025-07-31 RX ADMIN — ASPIRIN 81 MG: 81 TABLET, COATED ORAL at 09:09

## 2025-07-31 RX ADMIN — EMPAGLIFLOZIN 10 MG: 10 TABLET, FILM COATED ORAL at 09:08

## 2025-07-31 RX ADMIN — IPRATROPIUM BROMIDE AND ALBUTEROL SULFATE 1 DOSE: .5; 2.5 SOLUTION RESPIRATORY (INHALATION) at 16:56

## 2025-07-31 RX ADMIN — WATER 2000 MG: 1 INJECTION INTRAMUSCULAR; INTRAVENOUS; SUBCUTANEOUS at 14:03

## 2025-07-31 RX ADMIN — FUROSEMIDE 20 MG: 20 TABLET ORAL at 09:09

## 2025-07-31 RX ADMIN — SPIRONOLACTONE 12.5 MG: 25 TABLET, FILM COATED ORAL at 09:08

## 2025-07-31 RX ADMIN — CEFEPIME 2000 MG: 2 INJECTION, POWDER, FOR SOLUTION INTRAVENOUS at 05:34

## 2025-07-31 RX ADMIN — IPRATROPIUM BROMIDE AND ALBUTEROL SULFATE 1 DOSE: .5; 2.5 SOLUTION RESPIRATORY (INHALATION) at 11:15

## 2025-07-31 RX ADMIN — VALSARTAN 20 MG: 40 TABLET, FILM COATED ORAL at 09:07

## 2025-07-31 RX ADMIN — ENOXAPARIN SODIUM 40 MG: 100 INJECTION SUBCUTANEOUS at 09:07

## 2025-07-31 RX ADMIN — ROSUVASTATIN CALCIUM 20 MG: 20 TABLET, FILM COATED ORAL at 09:09

## 2025-07-31 RX ADMIN — BUDESONIDE AND FORMOTEROL FUMARATE DIHYDRATE 2 PUFF: 160; 4.5 AEROSOL RESPIRATORY (INHALATION) at 07:54

## 2025-07-31 RX ADMIN — SODIUM CHLORIDE, PRESERVATIVE FREE 10 ML: 5 INJECTION INTRAVENOUS at 09:09

## 2025-07-31 RX ADMIN — IPRATROPIUM BROMIDE AND ALBUTEROL SULFATE 1 DOSE: .5; 2.5 SOLUTION RESPIRATORY (INHALATION) at 07:54

## 2025-07-31 RX ADMIN — METOPROLOL SUCCINATE 25 MG: 25 TABLET, EXTENDED RELEASE ORAL at 09:08

## 2025-07-31 RX ADMIN — AMIODARONE HYDROCHLORIDE 200 MG: 200 TABLET ORAL at 09:08

## 2025-07-31 NOTE — PROGRESS NOTES
Physician Progress Note      PATIENT:               NIALL WIN  CSN #:                  [id number]  :                       1950  ADMIT DATE:       2025 4:42 PM  DISCH DATE:  RESPONDING  PROVIDER #:        Jaime Banegas MD          QUERY TEXT:    Acute respiratory failure is documented in the medical record H&P and progress   notes on   Please provide additional clinical indicators supportive of   your documentation. Or please document if the diagnosis of acute respiratory   failure has been ruled out after study.    The clinical indicators include:  Patient presents with SOB.H&P notes admit for acute respiratory failure.   Progress note on  notes \"Acute hypoxic respiratory failure: Likely   secondary to CHF.  Does have history of COPD..  Continue diuresing with Lasix,   does not look fluid overloaded on my evaluation.  Continue to wean off of   oxygen as tolerated. \" cardiology consult on  \"Patient BNP has improved   with not signs on PE of HF exacerbations but due to GOMEZ, Limited ECHO will be   obtained\" per review oxygen use of 3 liters now at room air. H&P notes   physical exam \"Lungs clear air entry \"  Options provided:  -- Acute Respiratory Failure as evidenced by, Please document evidence.  -- Acute Respiratory Failure ruled out after study  -- Other - I will add my own diagnosis  -- Disagree - Not applicable / Not valid  -- Disagree - Clinically unable to determine / Unknown  -- Refer to Clinical Documentation Reviewer    PROVIDER RESPONSE TEXT:    Acute Respiratory Failure has been ruled out after study.    Query created by: Madeline Walker on 2025 10:16 AM      Electronically signed by:  Jaime Banegas MD 2025 12:13 PM          
  Physician Progress Note      PATIENT:               NIALL WIN  CSN #:                  [id number]  :                       1950  ADMIT DATE:       2025 4:42 PM  DISCH DATE:  RESPONDING  PROVIDER #:        Jaime Banegas MD          QUERY TEXT:    Sepsis is documented in the medical record ID consult on .Please provide   additional clinical indicators supportive of your documentation. Or please   document if the diagnosis of sepsis has been ruled out after study.    The clinical indicators include:  patient presents with sob, hip pain. ID consult on  \"E coli septicemia and   complicated UTI\".per review wbc 6.1>8.1 lactic acid not available. blood   cultures positive for gram neg rods, temp 36.9=36.7 heart rate 70s , urine   culture positive for E coli. treating with iv cefepime.  Options provided:  -- Bacteremia without sepsis due to E coli UTI  -- Sepsis due to E coli UTI  present as evidenced by, Please document   evidence.  -- Other - I will add my own diagnosis  -- Disagree - Not applicable / Not valid  -- Disagree - Clinically unable to determine / Unknown  -- Refer to Clinical Documentation Reviewer    PROVIDER RESPONSE TEXT:    Bacteremia without sepsis due to E coli UTI    Query created by: Madeline Walker on 2025 7:09 AM      Electronically signed by:  Jaime Banegas MD 2025 1:48 PM          
BRONCHOSPASM/BRONCHOCONSTRICTION     [x]         IMPROVE AERATION/BREATH SOUNDS  [x]   ADMINISTER BRONCHODILATOR THERAPY AS APPROPRIATE  [x]   ASSESS BREATH SOUNDS  [x]   IMPLEMENT AEROSOL/MDI PROTOCOL  [x]   PATIENT EDUCATION AS NEEDED    
CLINICAL PHARMACY NOTE: MEDS TO BEDS    Total # of Prescriptions Filled: 4   The following medications were delivered to the patient:  Spironolactone  Amoxicillin  Probenecid  jardiance    Additional Documentation:    
Congestive Heart Failure Education completed and charted. Patient was receptive to education.    Discussed the  importance of medication compliance.    Discussed the importance of a heart healthy diet. Discussed 2000 mg sodium-restricted daily diet.  Patient instructed to limit fluid intake to  1.5 to 2 liters per day.    Patient instructed to weigh self at the same time of each day each morning, reinforced teaching to monitor for 3-5 lb weight increase over 1-2 days notify physician if change noted.      Signs and symptoms of CHF discussed with patient, such as feeling more tired than normal, feeling short of breath, coughing that increases when lying down, sudden weight gain, swelling of the feet, legs or belly.  Patient verbalized understanding to notify physician office if these symptoms occur.    Pt known to [facility]. Reminded her of her appt 8/4/25 @ 1500. Pt verbalized understanding  
[facility]  Office: [phone]  Joey Smith DO, Miguel Machado DO, Vikash Conklin DO, Alan Mcnulty DO, Joseph Thomas MD, Elsy Lacy MD, Eduardo Maddox MD, Kindra Mello MD,  Stef Awad MD, Esperanza Gandhi MD, Ion Henry DO, Andi Hull MD,  Nolberto Allison DO, Anju Ellis MD, Paco Solis MD, Girma Smith DO, Heydi Yu MD,  Rigo Holliday DO, Rajwinder Stafford MD, Ita Camacho MD, Berna Vides MD, Eloy Bronson MD,  Gigi Romero MD, Elis Mckeon MD, Alfred Conner MD, Boris Frye DO, Jass Wyman MD,  Caesar Leiva MD, Shirley Waterhouse, CNP,  Chastity Astorga, CNP, Meghna Holland, CNP, Fran Fuentes, CNP,  Olive Melgoza, DNP, Madai Holley, CNP, Meghan Rodriguez, CNP, Viky White, CNP, Mary Aleman, CNP, Haleigh Francois, CNP, Vitaliy Garcia PA-C, Tammy Jarrett, CNS, Brooke Tipton, CNP, Carolyne Boston, CNP         [facility]   IN-PATIENT SERVICE   [facility]    Progress Note    7/29/2025    4:13 PM    Name:   Coni Baker  MRN:     [id number]     Acct:      [id number]   Room:   OBS 21/21-1   Day:  0  Admit Date:  7/28/2025  4:42 PM    PCP:   Earlene Salcedo MD  Code Status:  Full Code    Subjective:     Patient seen and examined this morning in the observation unit she is very pleasant lady saturating on supplemental oxygen 2 L, 1 by evaluation, oxygen was turned down to 1 L.  RN notified.  Remained afebrile, vitals within normal range.  Labs reviewed, D-dimer 3.10.  However patient does have elevated D-dimer in the past, given her ambulatory status, will check CT PE.    Medications:     Allergies:    Allergies   Allergen Reactions    Ciprofloxacin Hives and Itching       Current Meds:   Scheduled Meds:    cefepime  2,000 mg IntraVENous Q12H    amiodarone  200 mg Oral BID    aspirin  81 mg Oral Daily    budesonide-formoterol  2 puff Inhalation BID RT    rosuvastatin  20 mg Oral Daily    valsartan  20 mg Oral 
Occupational Therapy  Occupational Therapy Initial Evaluation  Facility/Department: [facility] OBSERVATION UNIT   Patient Name: Coni Baker        MRN: [id number]    : 1950    Date of Service: 2025    Chief Complaint   Patient presents with    Shortness of Breath    Hip Pain     R hip    Urinary Frequency     Past Medical History:  has a past medical history of Acute respiratory failure with hypoxia (HCC), Atrial fibrillation (HCC), CAD (coronary artery disease), CHB (complete heart block) (HCC), CHF (congestive heart failure) (HCC), Current smoker, Hx of myocardial infarction, Hyperlipidemia, Hypertension, Hypertensive emergency, and SOBOE (shortness of breath on exertion).  Past Surgical History:  has a past surgical history that includes Tubal ligation; Coronary angioplasty with stent; Colonoscopy; shoulder surgery (Right, 2016); Colonoscopy (N/A, 2022); Cardiac procedure (N/A, 2024); and ep device procedure (N/A, 2024).    Discharge Recommendations  Discharge Recommendations: Patient would benefit from continued therapy after discharge  OT Equipment Recommendations  Equipment Needed: Yes  Mobility Devices: ADL Assistive Devices  ADL Assistive Devices: Transfer Tub Bench  Assessment  Performance deficits / Impairments: Decreased functional mobility ;Decreased endurance;Decreased ADL status;Decreased balance;Decreased high-level IADLs;Decreased safe awareness;Decreased cognition  Prognosis: Good  Decision Making: Medium Complexity  REQUIRES OT FOLLOW-UP: Yes  Activity Tolerance  Activity Tolerance: Patient limited by fatigue, Patient limited by endurance, Treatment limited secondary to decreased cognition  Safety Devices  Type of Devices: Left in bed, Call light within reach, Gait belt, Nurse notified    AM-PAC  AM-PAC Daily Activity - Inpatient   How much help is needed for putting on and taking off regular lower body clothing?: A Little  How much help is needed for bathing (which 
Physical Therapy  Facility/Department: [facility] OBSERVATION UNIT   Physical Therapy Initial Evaluation    Patient Name: Coni Baker        MRN: [id number]    : 1950    Date of Service: 2025    Chief Complaint   Patient presents with    Shortness of Breath    Hip Pain     R hip    Urinary Frequency     Past Medical History:  has a past medical history of Acute respiratory failure with hypoxia (HCC), Atrial fibrillation (HCC), CAD (coronary artery disease), CHB (complete heart block) (HCC), CHF (congestive heart failure) (HCC), Current smoker, Hx of myocardial infarction, Hyperlipidemia, Hypertension, Hypertensive emergency, and SOBOE (shortness of breath on exertion).  Past Surgical History:  has a past surgical history that includes Tubal ligation; Coronary angioplasty with stent; Colonoscopy; shoulder surgery (Right, 2016); Colonoscopy (N/A, 2022); Cardiac procedure (N/A, 2024); and ep device procedure (N/A, 2024).    Discharge Recommendations  Discharge Recommendations: Patient would benefit from continued therapy after discharge  PT Equipment Recommendations  Equipment Needed: No (Pt owns rollator.)    Assessment  Body Structures, Functions, Activity Limitations Requiring Skilled Therapeutic Intervention: Decreased strength, Decreased balance, Increased pain, Decreased endurance, Decreased functional mobility   Assessment: Pt ambulated 200ft SBA with rollator, STS transfer SBA, and MOD I for bed mobility. Pt demonstrated decreased safety awareness and awareness of deficits noted by poor rollator management during ambulation as well as during the stand<>sit transfer. Pt would benefit from continued skilled therapy to address balance and functional mobility deficits.  Therapy Prognosis: Good  Decision Making: Medium Complexity  Requires PT Follow-Up: Yes  Activity Tolerance  Activity Tolerance: Patient tolerated treatment well, Patient limited by pain  Safety Devices  Type of Devices: 
Pt c/o not urinating/bladder pressure/having an urge to urinate but unable to start a stream. RN performed bladder scan which showed 474 mL. Resident notified via perfect serve. Rn told pt that she may need straight cath 'd to release urine from bladder. Pt states she would like to drink more to see if she can urinate before doing any interventions. Resident updated.   
Pt urinated in brief. Bladder scan showed 372 mL. Pt states she would like to urinate on her own before straight cathing. Resident notified via perfect serve.   
Report called to TAMI Andino. All questions answered at this time.   
Rn educated pt more on what straight cathing is. Pt now okay to do straight cath. Resident notified via perfect serve.   
[facility]  Office: [phone]  Joey Smith DO, Miguel Machado DO, Vikash Conklin DO, Alan Mcnulty DO, Joseph Thomas MD, Elsy Lacy MD, Eduardo Maddox MD, Kindra Mello MD,  Stef Awad MD, Esperanza Gandhi MD, Ion Henry DO, Andi Hull MD,  Nolberto Allison DO, Anju Ellis MD, Paco Solis MD, Girma Smith DO, Heydi Yu MD,  Rigo Holliday DO, Rajwinder Stafford MD, Ita Camacho MD, Berna Vides MD, Eloy Bronson MD,  Gigi Romero MD, Elis Mckeon MD, Alfred Conner MD, Boris Frye DO, Jass Wyman MD,  Caesar Leiva MD, Shirley Waterhouse, CNP,  Chastity Astorga, CNP, Meghna Holland, CNP, Fran Fuentes, CNP,  Olive Melgoza, DNP, Madai Holley, CNP, Meghan Rodriguez, CNP, Viky White, CNP, Mary Aleman, CNP, Haleigh Francois, CNP, Vitaliy Garcia PA-C, Tammy Jarrett, CNS, Brooke Tipton, CNP, Carolyne Boston, CNP         [facility]   IN-PATIENT SERVICE   [facility]    Progress Note    7/30/2025    12:14 PM    Name:   Coni Baker  MRN:     [id number]     Acct:      [id number]   Room:   OBS 21/21-1   Day:  0  Admit Date:  7/28/2025  4:42 PM    PCP:   Earlene Salcedo MD  Code Status:  Full Code    Subjective:     Patient seen and examined this morning, sitting comfortably in the bed, having her breakfast.  She has a photo elbow on her table with all the pictures of physicians and nursing staff who took care of her in the hospital during this send previous stays.    Remained afebrile, vitals reviewed, blood pressure soft, Lasix was held.  She was weaned off of oxygen and currently saturating well on room air.  Does not look fluid overloaded.  Previous labs reviewed.  Urine culture growing E. coli.  Blood culture 1 out of 2 positive for E. coli.  CT PE negative for PE.  Patient currently on cefepime.  Infectious disease consulted.  Cardiology on board.    Brief history:  74-year-old female with past medical history of 
micro called with a critical blood cultures. The pt has 2 positive blood cultures, Gram neg with Rods. PCR tested positive for e-coli. NP notified.  
failure with reduced ejection fraction) (HCC)     Former smoker     Acute respiratory failure (HCC)     Coronary artery disease involving native coronary artery of native heart without angina pectoris     Right hip pain     Electrolyte imbalance     Congestive heart failure (HCC)     Bacteremia     Cardiac resynchronization therapy defibrillator (CRT-D) in place     Chronic systolic heart failure (HCC)     Cardiomyopathy (HCC)     Gram-negative bacteremia     Complicated UTI (urinary tract infection)      Plan of Treatment:   Keep K >4 and Mg >2   ECHO reviewed. EF 30-35%. Abnormal diastolic function. Mild to moderate MR.  AICD in situ.   Continue GDMT as tolerated. Continue BB, lasix, Valsartan, aldactone and Jardiance.   CAD- continue ASA and BB  Continue Amiodarone and BB.   Ok for patient to be discharged per CV standpoint and for patient to follow up outpatient in 2-4 weeks.      Electronically signed by HARI Aguirre CNP on 7/31/2025 at 10:35 AM  [facility]  [phone]          
LIGATION         Medications:      spironolactone  12.5 mg Oral Daily    empagliflozin  10 mg Oral Daily    cefepime  2,000 mg IntraVENous Q12H    amiodarone  200 mg Oral BID    aspirin  81 mg Oral Daily    budesonide-formoterol  2 puff Inhalation BID RT    rosuvastatin  20 mg Oral Daily    valsartan  20 mg Oral Daily    sodium chloride flush  5-40 mL IntraVENous 2 times per day    enoxaparin  40 mg SubCUTAneous Daily    ipratropium 0.5 mg-albuterol 2.5 mg  1 Dose Inhalation Q4H While awake    metoprolol succinate  25 mg Oral Daily    furosemide  20 mg Oral Daily       Social History:     Social History     Socioeconomic History    Marital status:      Spouse name: Not on file    Number of children: Not on file    Years of education: Not on file    Highest education level: Not on file   Occupational History    Not on file   Tobacco Use    Smoking status: Former     Current packs/day: 0.00     Average packs/day: 1 pack/day for 59.0 years (59.0 ttl pk-yrs)     Types: Cigarettes     Start date:      Quit date:      Years since quittin.5     Passive exposure: Past    Smokeless tobacco: Never   Vaping Use    Vaping status: Never Used   Substance and Sexual Activity    Alcohol use: Yes     Alcohol/week: 0.0 standard drinks of alcohol     Comment: Rarely     Drug use: No    Sexual activity: Not Currently   Other Topics Concern    Not on file   Social History Narrative    Not on file     Social Drivers of Health     Financial Resource Strain: Low Risk  (2024)    Overall Financial Resource Strain (CARDIA)     Difficulty of Paying Living Expenses: Not hard at all   Food Insecurity: No Food Insecurity (2025)    Hunger Vital Sign     Worried About Running Out of Food in the Last Year: Never true     Ran Out of Food in the Last Year: Never true   Transportation Needs: No Transportation Needs (2025)    PRAPARE - Transportation     Lack of Transportation (Medical): No     Lack of Transportation

## 2025-07-31 NOTE — CARE COORDINATION
Case Management   Daily Progress Note       Patient Name: Coni Baker                   YOB: 1950  Diagnosis: Acute respiratory failure (HCC) [J96.00]  Right hip pain [M25.551]  Congestive heart failure, unspecified HF chronicity, unspecified heart failure type (HCC) [I50.9]  Bacteremia [R78.81]                       GMLOS: 2.8 days  Length of Stay: 1  days    Anticipated Discharge Date: Ready for discharge    Readmission Risk (Low < 19, Mod (19-27), High > 27): Readmission Risk Score: 12.6        Current Transitional Plan    [] Home Independently    [x] Home with HC    [] Skilled Nursing Facility    [] Acute Rehabilitation    [] Long Term Acute Care (LTAC)    [] Other:     Plan for the Stay (Medical Management) :          Workflow Continuation (Additional Notes) : DME order with supporting docs faxed to [facility] to be delivered to home. Daughter at bedside to transport home.         ALLAN BAIRD RN  July 31, 2025

## 2025-07-31 NOTE — PLAN OF CARE
Problem: Safety - Adult  Goal: Free from fall injury  Outcome: Progressing     Problem: Discharge Planning  Goal: Discharge to home or other facility with appropriate resources  Outcome: Progressing     Problem: Pain  Goal: Verbalizes/displays adequate comfort level or baseline comfort level  Outcome: Progressing     
  Problem: Safety - Adult  Goal: Free from fall injury  Outcome: Progressing     Problem: Discharge Planning  Goal: Discharge to home or other facility with appropriate resources  Outcome: Progressing     Problem: Pain  Goal: Verbalizes/displays adequate comfort level or baseline comfort level  Outcome: Progressing     
Coni Baker was evaluated today and a DME order was entered for a wheeled walker because she requires this to successfully complete daily living tasks of toileting, personal cares, ambulating, and meal preparation.  A wheeled walker is necessary due to the patient's unsteady gait, upper body weakness, and inability to  an ambulation device; and she can ambulate only by pushing a walker instead of a lesser assistive device such as a cane, crutch, or standard walker.  The need for this equipment was discussed with the patient and she understands and is in agreement.     Electronically signed by Jaime Banegas MD on 7/31/2025 at 5:01 PM    
range  Outcome: Progressing

## 2025-07-31 NOTE — DISCHARGE INSTRUCTIONS
Follow-up outpatient with PCP, ID and cardiology outpatient.  Continue medications as prescribed.  Return to ER if patient develops fever or worsening of symptoms.

## 2025-07-31 NOTE — DISCHARGE SUMMARY
[facility]  Office: [phone]  Joey Smith DO, Miguel Machado DO, Vikash Conklin DO, Alan Mcnulty DO, Joseph Thomas MD, Elsy Lacy MD, Eduardo Maddox MD, Kindra Mello MD,  Stef Awad MD, Esperanza Gandhi MD, Ion Henry DO, Andi Hull MD,  Nolberto Allison DO, Anju Ellis MD, Paco Solis MD, Girma Smith DO, Heydi Yu MD,  Rigo Holliday DO, Rajwinder Stafford MD, Ita Camacho MD, Berna Vides MD, Eloy Bronson MD,  Gigi Romero MD, Elis Mckeon MD, Alfred Conner MD, Boris Frye DO, Jass Wyman MD,  Caesar Leiva MD, Shirley Waterhouse, CNP,  Chastity Astorga, CNP, Meghna Holland, CNP, Fran Fuentes, CNP,  Olive Melgoza, DNP, Madai Holley, CNP, Meghan Rodriguez, CNP, Viky White, CNP, Mary Aleman, CNP, Haleigh Francois, CNP, Vitaliy Garcia PA-C, Tammy Jarrett, CNS, Brooke Tipton, CNP, Carolyne Boston, CNP         [facility]   IN-PATIENT SERVICE   [facility]    Discharge Summary     Patient ID: Coni Baker  :  1950   MRN: [id number]     ACCOUNT:  [id number]   Patient's PCP: Earlene Salcedo MD  Admit Date: 2025   Discharge Date: 2025  Length of Stay: 1  Code Status:  Full Code  Admitting Physician: Jaime Banegas MD  Discharge Physician: Jaime Banegas MD     Active Discharge Diagnoses:     Hospital Problem Lists:  Principal Problem (Resolved):    Acute respiratory failure (HCC)  Active Problems:    Mixed hyperlipidemia    Primary hypertension    Chronic systolic congestive heart failure (HCC)    COPD (chronic obstructive pulmonary disease) (HCC)    Coronary artery disease involving native coronary artery of native heart without angina pectoris    Right hip pain    Electrolyte imbalance    Congestive heart failure (HCC)    Bacteremia    Cardiac resynchronization therapy defibrillator (CRT-D) in place    Chronic systolic heart failure (HCC)    Cardiomyopathy (HCC)    Gram-negative

## 2025-08-01 NOTE — CARE COORDINATION
Post Discharge Communication    1222: CM received voicemail from patient reporting she needs assistance setting up her pills. Return phone number is [phone].    1229: CM called [address] to verify patient is current. [facility] reports they have accepted patient, but were not notified patient was discharged home. Agency reports they need the ALIYAH and Home Care Order sent via [employer].     1235: CM uploaded ALIYAH/ Home Care Order to careport.     1256: CM called and left voicemail for patient to update that voicemail was received. [address] [facility] is the patient's home care service and they will be the ones to assist with setting up her medications. CM provided their phone number of [phone] to contact for any additional questions.

## 2025-08-02 LAB
MICROORGANISM SPEC CULT: NORMAL
SERVICE CMNT-IMP: NORMAL
SPECIMEN DESCRIPTION: NORMAL

## 2025-08-04 ENCOUNTER — HOSPITAL ENCOUNTER (OUTPATIENT)
Dept: OTHER | Age: 75
Discharge: HOME | End: 2025-08-04
Payer: MEDICARE

## 2025-08-04 VITALS
BODY MASS INDEX: 35.07 KG/M2 | HEART RATE: 65 BPM | DIASTOLIC BLOOD PRESSURE: 53 MMHG | SYSTOLIC BLOOD PRESSURE: 104 MMHG | WEIGHT: 185.6 LBS | OXYGEN SATURATION: 95 % | RESPIRATION RATE: 16 BRPM

## 2025-08-04 PROCEDURE — 99212 OFFICE O/P EST SF 10 MIN: CPT

## 2025-08-19 ENCOUNTER — OFFICE VISIT (OUTPATIENT)
Dept: INFECTIOUS DISEASES | Age: 75
End: 2025-08-19
Payer: MEDICARE

## 2025-08-19 VITALS
HEIGHT: 61 IN | DIASTOLIC BLOOD PRESSURE: 60 MMHG | HEART RATE: 60 BPM | BODY MASS INDEX: 34.55 KG/M2 | WEIGHT: 183 LBS | SYSTOLIC BLOOD PRESSURE: 127 MMHG

## 2025-08-19 DIAGNOSIS — I25.5 ISCHEMIC CARDIOMYOPATHY: Primary | ICD-10-CM

## 2025-08-19 DIAGNOSIS — N39.0 COMPLICATED UTI (URINARY TRACT INFECTION): ICD-10-CM

## 2025-08-19 DIAGNOSIS — R78.81 GRAM-NEGATIVE BACTEREMIA: ICD-10-CM

## 2025-08-19 PROCEDURE — 1036F TOBACCO NON-USER: CPT | Performed by: INTERNAL MEDICINE

## 2025-08-19 PROCEDURE — 1111F DSCHRG MED/CURRENT MED MERGE: CPT | Performed by: INTERNAL MEDICINE

## 2025-08-19 PROCEDURE — 3078F DIAST BP <80 MM HG: CPT | Performed by: INTERNAL MEDICINE

## 2025-08-19 PROCEDURE — 99213 OFFICE O/P EST LOW 20 MIN: CPT | Performed by: INTERNAL MEDICINE

## 2025-08-19 PROCEDURE — G8427 DOCREV CUR MEDS BY ELIG CLIN: HCPCS | Performed by: INTERNAL MEDICINE

## 2025-08-19 PROCEDURE — 1123F ACP DISCUSS/DSCN MKR DOCD: CPT | Performed by: INTERNAL MEDICINE

## 2025-08-19 PROCEDURE — 3074F SYST BP LT 130 MM HG: CPT | Performed by: INTERNAL MEDICINE

## 2025-08-19 PROCEDURE — G8399 PT W/DXA RESULTS DOCUMENT: HCPCS | Performed by: INTERNAL MEDICINE

## 2025-08-19 PROCEDURE — 1090F PRES/ABSN URINE INCON ASSESS: CPT | Performed by: INTERNAL MEDICINE

## 2025-08-19 PROCEDURE — 3017F COLORECTAL CA SCREEN DOC REV: CPT | Performed by: INTERNAL MEDICINE

## 2025-08-19 PROCEDURE — G8417 CALC BMI ABV UP PARAM F/U: HCPCS | Performed by: INTERNAL MEDICINE

## 2025-08-26 ENCOUNTER — TELEPHONE (OUTPATIENT)
Dept: INFECTIOUS DISEASES | Age: 75
End: 2025-08-26

## 2025-08-26 DIAGNOSIS — N39.0 RECURRENT UTI: Primary | ICD-10-CM

## (undated) DEVICE — DRAPE EP LT SUBCLAV ENTRY SHLD SORBX

## (undated) DEVICE — GLOVE ORANGE PI 7   MSG9070

## (undated) DEVICE — PATCH REF EXT FOR CARTO 3 SYS (EA = 6 PACKS)

## (undated) DEVICE — CATHETER EP 7FR L115CM 2-8-2MM SPC TIP 2MM DF CRV ADV COMPR

## (undated) DEVICE — PRESSURE MONITORING SET: Brand: TRUWAVE

## (undated) DEVICE — INTRODUCER SHTH 5FR CANN L11CM GWIRE 0.038IN STD KINK

## (undated) DEVICE — SURGICAL PROCEDURE SET TBNG EP

## (undated) DEVICE — DEFENDO AIR WATER SUCTION AND BIOPSY VALVE KIT FOR  OLYMPUS: Brand: DEFENDO AIR/WATER/SUCTION AND BIOPSY VALVE

## (undated) DEVICE — SURGICAL PROCEDURE TRAY CRD CATH SVMMC

## (undated) DEVICE — ANGIOGRAPHIC CATHETER: Brand: EXPO™

## (undated) DEVICE — STRAP ARMBRD W1.5XL32IN FOAM STR YET SFT W/ HK AND LOOP

## (undated) DEVICE — INTRODUCER TRANSSEPTAL GUID 8.5FR L63CM DIL 8.5FR L67CM

## (undated) DEVICE — INTRODUCER SHTH AD L11CM DIA 9FR STD BLK S STL PERIPH BRACH

## (undated) DEVICE — INTRODUCER SHTH 6FR L11CM 0.038IN STD SIDEPRT EXTN 3 W

## (undated) DEVICE — CATHETER ANGIO 6FR L100CM DIA0.056IN FR4 CRV VASC ACCS EXPO

## (undated) DEVICE — INTRODUCER SHTH 7FR CANN L11CM 0.038IN STD ORNG W/ MINI

## (undated) DEVICE — CATHETER ABLAT 8FR L115CM 1-6-2MM SPC TIP 3.5MM DF CRV

## (undated) DEVICE — RADIFOCUS GLIDEWIRE: Brand: GLIDEWIRE

## (undated) DEVICE — ENDO KIT W/SYRINGE: Brand: MEDLINE INDUSTRIES, INC.